# Patient Record
Sex: FEMALE | Race: WHITE | NOT HISPANIC OR LATINO | Employment: FULL TIME | URBAN - METROPOLITAN AREA
[De-identification: names, ages, dates, MRNs, and addresses within clinical notes are randomized per-mention and may not be internally consistent; named-entity substitution may affect disease eponyms.]

---

## 2019-01-07 ENCOUNTER — PROCEDURE VISIT (OUTPATIENT)
Dept: SURGERY | Facility: CLINIC | Age: 54
End: 2019-01-07
Payer: COMMERCIAL

## 2019-01-07 VITALS
WEIGHT: 175 LBS | RESPIRATION RATE: 18 BRPM | SYSTOLIC BLOOD PRESSURE: 118 MMHG | HEIGHT: 68 IN | HEART RATE: 76 BPM | BODY MASS INDEX: 26.52 KG/M2 | DIASTOLIC BLOOD PRESSURE: 72 MMHG

## 2019-01-07 DIAGNOSIS — L72.0 EPIDERMOID CYST: Primary | ICD-10-CM

## 2019-01-07 PROCEDURE — 88304 TISSUE EXAM BY PATHOLOGIST: CPT | Performed by: PATHOLOGY

## 2019-01-07 PROCEDURE — 99203 OFFICE O/P NEW LOW 30 MIN: CPT | Performed by: SURGERY

## 2019-01-07 PROCEDURE — 11402 EXC TR-EXT B9+MARG 1.1-2 CM: CPT | Performed by: SURGERY

## 2019-01-07 RX ORDER — FLUTICASONE FUROATE AND VILANTEROL TRIFENATATE 100; 25 UG/1; UG/1
1 POWDER RESPIRATORY (INHALATION) DAILY
Refills: 10 | COMMUNITY
Start: 2018-10-13 | End: 2021-03-30

## 2019-01-07 RX ORDER — LORATADINE 10 MG/1
CAPSULE, LIQUID FILLED ORAL DAILY
COMMUNITY

## 2019-01-07 RX ORDER — ERGOCALCIFEROL 1.25 MG/1
1 CAPSULE ORAL WEEKLY
COMMUNITY
Start: 2014-10-02 | End: 2020-07-06

## 2019-01-07 RX ORDER — ALBUTEROL SULFATE 90 UG/1
AEROSOL, METERED RESPIRATORY (INHALATION) EVERY 4 HOURS PRN
COMMUNITY
End: 2021-12-28 | Stop reason: SDUPTHER

## 2019-01-07 NOTE — LETTER
January 7, 2019     Kimberlyn Watts MD  [address] [facility] [address]    Patient: Mariia Johnson   YOB: 1965   Date of Visit: 1/7/2019       Dear Dr Dino Verde: Thank you for referring Mariia Johnson to me for evaluation  Below are my notes for this consultation  If you have questions, please do not hesitate to call me  I look forward to following your patient along with you  Sincerely,        Scottie Denver, MD        CC: No Recipients  Scottie Denver, MD  1/7/2019  2:54 PM  Sign at close encounter  Assessment/Plan:    Epidermoid cyst  The patient presents with a mid back epidermoid cyst that is bothersome to her for which she desires definitive treatment by excisional biopsy  On physical examination she has a 15 mm mid back epidermoid cyst amenable to excisional biopsy with primary closure here in the office  There are no diagnoses linked to this encounter  Subjective:      Patient ID: Mariia Johnson is a 48 y o  female  Nurse Note:  Patient is here today for cyst on back that she has had for about 2 years  When she sits on chair it is sore and was got irriated within the last 4 years  The following portions of the patient's history were reviewed and updated as appropriate: allergies, current medications, past family history, past medical history, past social history, past surgical history and problem list     Review of Systems   All other systems reviewed and are negative  Objective:      /72 (BP Location: Left arm, Patient Position: Sitting, Cuff Size: Standard)   Pulse 76   Resp 18   Ht 5' 8" (1 727 m)   Wt 79 4 kg (175 lb)   BMI 26 61 kg/m²           Physical Exam   Constitutional: She is oriented to person, place, and time  She appears well-developed and well-nourished  HENT:   Head: Normocephalic and atraumatic  Eyes: Pupils are equal, round, and reactive to light  Conjunctivae are normal    Neck: Normal range of motion  Neck supple  Cardiovascular: Normal rate and regular rhythm  Pulmonary/Chest: Effort normal and breath sounds normal    Abdominal: Soft  Bowel sounds are normal    Musculoskeletal: Normal range of motion  Neurological: She is alert and oriented to person, place, and time  Skin: Skin is warm and dry  15 mm mid back epidermoid cyst with central pore   Psychiatric: Her behavior is normal  Judgment and thought content normal        Skin excision  Date/Time: 1/7/2019 2:53 PM  Performed by: Isabella Grimes  Authorized by: Isabella Grimes     Procedure Details - Skin Excision:     Number of Lesions:  1  Lesion 1:     Body area:  Trunk    Trunk location:  Back       Final defect size (mm):  15    Malignancy: benign lesion      Repair type:  Linear closure    Graft type: full-thickness      Repair size (cm):  3     Under sterile conditions using aseptic technique using 1% lidocaine with epinephrine and bicarbonate the epidermoid cyst excised from the back  Hemostasis ensured with [id number] of 4 0 Vicryl and the wound closed primarily with vertical mattress sutures of 3 0 nylon  The patient tolerated the procedure well

## 2019-01-07 NOTE — ASSESSMENT & PLAN NOTE
The patient presents with a mid back epidermoid cyst that is bothersome to her for which she desires definitive treatment by excisional biopsy  On physical examination she has a 15 mm mid back epidermoid cyst amenable to excisional biopsy with primary closure here in the office

## 2019-01-07 NOTE — PROGRESS NOTES
Assessment/Plan:    Epidermoid cyst  The patient presents with a mid back epidermoid cyst that is bothersome to her for which she desires definitive treatment by excisional biopsy  On physical examination she has a 15 mm mid back epidermoid cyst amenable to excisional biopsy with primary closure here in the office  There are no diagnoses linked to this encounter  Subjective:      Patient ID: Jose Veliz is a 48 y o  female  Nurse Note:  Patient is here today for cyst on back that she has had for about 2 years  When she sits on chair it is sore and was got irriated within the last 4 years  The following portions of the patient's history were reviewed and updated as appropriate: allergies, current medications, past family history, past medical history, past social history, past surgical history and problem list     Review of Systems   All other systems reviewed and are negative  Objective:      /72 (BP Location: Left arm, Patient Position: Sitting, Cuff Size: Standard)   Pulse 76   Resp 18   Ht 5' 8" (1 727 m)   Wt 79 4 kg (175 lb)   BMI 26 61 kg/m²          Physical Exam   Constitutional: She is oriented to person, place, and time  She appears well-developed and well-nourished  HENT:   Head: Normocephalic and atraumatic  Eyes: Pupils are equal, round, and reactive to light  Conjunctivae are normal    Neck: Normal range of motion  Neck supple  Cardiovascular: Normal rate and regular rhythm  Pulmonary/Chest: Effort normal and breath sounds normal    Abdominal: Soft  Bowel sounds are normal    Musculoskeletal: Normal range of motion  Neurological: She is alert and oriented to person, place, and time  Skin: Skin is warm and dry     15 mm mid back epidermoid cyst with central pore   Psychiatric: Her behavior is normal  Judgment and thought content normal        Skin excision  Date/Time: 1/7/2019 2:53 PM  Performed by: Claudetta Bode by: Allison Scott ROSAURA     Procedure Details - Skin Excision:     Number of Lesions:  1  Lesion 1:     Body area:  Trunk    Trunk location:  Back       Final defect size (mm):  15    Malignancy: benign lesion      Repair type:  Linear closure    Graft type: full-thickness      Repair size (cm):  3     Under sterile conditions using aseptic technique using 1% lidocaine with epinephrine and bicarbonate the epidermoid cyst excised from the back  Hemostasis ensured with [id number] of 4 0 Vicryl and the wound closed primarily with vertical mattress sutures of 3 0 nylon  The patient tolerated the procedure well

## 2019-01-21 ENCOUNTER — OFFICE VISIT (OUTPATIENT)
Dept: SURGERY | Facility: CLINIC | Age: 54
End: 2019-01-21

## 2019-01-21 DIAGNOSIS — L72.0 EPIDERMOID CYST: Primary | ICD-10-CM

## 2019-01-21 PROBLEM — L82.1 SEBORRHEIC KERATOSIS: Status: ACTIVE | Noted: 2019-01-21

## 2019-01-21 PROCEDURE — 99024 POSTOP FOLLOW-UP VISIT: CPT | Performed by: SURGERY

## 2019-01-21 NOTE — ASSESSMENT & PLAN NOTE
Patient well s/p excision benign epidermoid cyst from her back  No post-op complications  Pathology reviewed with copy of the report provided  Questions answered, will see as needed

## 2019-01-21 NOTE — PROGRESS NOTES
Post-Op Note - General Surgery   Aminta Diaz 48 y o  female MRN: [id number]  Encounter: [id number]    Assessment/Plan    Epidermoid cyst  Patient well s/p excision benign epidermoid cyst from her back  No post-op complications  Pathology reviewed with copy of the report provided  Questions answered, will see as needed  Seborrheic keratosis  The patient has a lesion of her left mid back consistent with the diagnosis of a benign seborrheic keratosis  The benign nature of this condition was explained  She was advised to monitor for changes  If it becomes bothersome to her or changes we can arrange for excisional biopsy for diagnosis and treatment  Questions answered  Diagnoses and all orders for this visit:    Epidermoid cyst      Subjective    1- Nurse note:  Patient is here today for a follow up suture removal of back cyst   The sutures were removed and the excision area looks good and is healing nicely  Shira Beckett 49 yo F here for follow up s/p excision epidermoid cyst from her back  She reports no problems post-op  She has a skin lesion of her left mid-back she would like examined  Present for unknown period of time  Review of Systems   Constitutional: Negative for chills and fever  HENT: Negative for congestion  Eyes: Negative for visual disturbance  Respiratory: Negative for shortness of breath  Cardiovascular: Negative for chest pain  Gastrointestinal: Negative for abdominal pain, constipation, diarrhea, nausea and vomiting  Genitourinary: Negative for dysuria  Musculoskeletal: Negative for back pain  Skin: Negative for rash and wound  Neurological: Negative for dizziness and headaches  Psychiatric/Behavioral: Negative for confusion         The following portions of the patient's history were reviewed and updated as appropriate: allergies, current medications, past family history, past medical history, past social history, past surgical history and problem list     Objective      There were no vitals taken for this visit  Physical Exam   Constitutional: She is oriented to person, place, and time  She appears well-developed and well-nourished  No distress  HENT:   Head: Normocephalic and atraumatic  Eyes: Pupils are equal, round, and reactive to light  Conjunctivae and EOM are normal    Neck: Normal range of motion  Pulmonary/Chest: No respiratory distress  Musculoskeletal: Normal range of motion  Neurological: She is alert and oriented to person, place, and time  Skin: Skin is warm and dry  Capillary refill takes less than 2 seconds  She is not diaphoretic  Incision clean dry intact  Healing well  No signs of infection  Psychiatric: She has a normal mood and affect   Her behavior is normal        Signature:  Carlos A Hernandez PA-C  Date: 1/21/2019 Time: 12:38 PM

## 2019-01-21 NOTE — ASSESSMENT & PLAN NOTE
The patient has a lesion of her left mid back consistent with the diagnosis of a benign seborrheic keratosis  The benign nature of this condition was explained  She was advised to monitor for changes  If it becomes bothersome to her or changes we can arrange for excisional biopsy for diagnosis and treatment  Questions answered

## 2019-01-21 NOTE — LETTER
January 21, 2019     Tasha Valverde MD  [address] [facility] [address]    Patient: Sebastian Murphy   YOB: 1965   Date of Visit: 1/21/2019       Dear Dr Marry Mo: Thank you for referring Sebastian Murphy to me for evaluation  Below are my notes for this consultation  If you have questions, please do not hesitate to call me  I look forward to following your patient along with you  Sincerely,        Bravo Hernandez PA-C        CC: No Recipients  Bravo Hernandez PA-C  1/21/2019 12:37 PM  Sign at close encounter  Post-Op Note - General Surgery   Sebastian Murphy 48 y o  female MRN: [id number]  Encounter: [id number]    Assessment/Plan    Epidermoid cyst  Patient well s/p excision benign epidermoid cyst from her back  No post-op complications  Pathology reviewed with copy of the report provided  Questions answered, will see as needed  Diagnoses and all orders for this visit:    Epidermoid cyst      Subjective    1- Nurse note:  Patient is here today for a follow up suture removal of back cyst   The sutures were removed and the excision area looks good and is healing nicely  Karmen Beckett 47 yo F here for follow up s/p excision epidermoid cyst from her back  She reports no problems post-op  She has a skin lesion of her left mid-back she would like examined  Present for unknown period of time  Review of Systems   Constitutional: Negative for chills and fever  HENT: Negative for congestion  Eyes: Negative for visual disturbance  Respiratory: Negative for shortness of breath  Cardiovascular: Negative for chest pain  Gastrointestinal: Negative for abdominal pain, constipation, diarrhea, nausea and vomiting  Genitourinary: Negative for dysuria  Musculoskeletal: Negative for back pain  Skin: Negative for rash and wound  Neurological: Negative for dizziness and headaches  Psychiatric/Behavioral: Negative for confusion  The following portions of the patient's history were reviewed and updated as appropriate: allergies, current medications, past family history, past medical history, past social history, past surgical history and problem list     Objective      There were no vitals taken for this visit  Physical Exam   Constitutional: She is oriented to person, place, and time  She appears well-developed and well-nourished  No distress  HENT:   Head: Normocephalic and atraumatic  Eyes: Pupils are equal, round, and reactive to light  Conjunctivae and EOM are normal    Neck: Normal range of motion  Pulmonary/Chest: No respiratory distress  Musculoskeletal: Normal range of motion  Neurological: She is alert and oriented to person, place, and time  Skin: Skin is warm and dry  Capillary refill takes less than 2 seconds  She is not diaphoretic  Incision clean dry intact  Healing well  No signs of infection  Psychiatric: She has a normal mood and affect   Her behavior is normal        Signature:  Carlos A Hernandez PA-C  Date: 1/21/2019 Time: 12:34 PM

## 2019-10-28 ENCOUNTER — TRANSCRIBE ORDERS (OUTPATIENT)
Dept: URGENT CARE | Facility: HOSPITAL | Age: 54
End: 2019-10-28

## 2019-10-28 ENCOUNTER — APPOINTMENT (OUTPATIENT)
Dept: RADIOLOGY | Facility: HOSPITAL | Age: 54
End: 2019-10-28
Attending: INTERNAL MEDICINE
Payer: COMMERCIAL

## 2019-10-28 DIAGNOSIS — R20.0 NUMBNESS: ICD-10-CM

## 2019-10-28 DIAGNOSIS — R20.0 NUMBNESS: Primary | ICD-10-CM

## 2019-10-28 PROCEDURE — 72050 X-RAY EXAM NECK SPINE 4/5VWS: CPT

## 2019-11-18 ENCOUNTER — TRANSCRIBE ORDERS (OUTPATIENT)
Dept: ADMINISTRATIVE | Facility: HOSPITAL | Age: 54
End: 2019-11-18

## 2019-11-18 DIAGNOSIS — M54.2 NECK PAIN: Primary | ICD-10-CM

## 2019-11-20 ENCOUNTER — TRANSCRIBE ORDERS (OUTPATIENT)
Dept: URGENT CARE | Facility: HOSPITAL | Age: 54
End: 2019-11-20

## 2019-11-20 ENCOUNTER — APPOINTMENT (OUTPATIENT)
Dept: RADIOLOGY | Facility: HOSPITAL | Age: 54
End: 2019-11-20
Attending: ANESTHESIOLOGY
Payer: COMMERCIAL

## 2019-11-20 DIAGNOSIS — M25.511 RIGHT SHOULDER PAIN, UNSPECIFIED CHRONICITY: Primary | ICD-10-CM

## 2019-11-20 PROCEDURE — 73030 X-RAY EXAM OF SHOULDER: CPT

## 2019-11-23 ENCOUNTER — APPOINTMENT (OUTPATIENT)
Dept: LAB | Facility: HOSPITAL | Age: 54
End: 2019-11-23
Attending: INTERNAL MEDICINE
Payer: COMMERCIAL

## 2019-11-23 ENCOUNTER — TRANSCRIBE ORDERS (OUTPATIENT)
Dept: ADMINISTRATIVE | Facility: HOSPITAL | Age: 54
End: 2019-11-23

## 2019-11-23 DIAGNOSIS — Z79.1 ENCOUNTER FOR LONG-TERM (CURRENT) USE OF NSAIDS: Primary | ICD-10-CM

## 2019-11-23 DIAGNOSIS — Z79.1 ENCOUNTER FOR LONG-TERM (CURRENT) USE OF NSAIDS: ICD-10-CM

## 2019-11-23 LAB
ALBUMIN SERPL BCP-MCNC: 4.2 G/DL (ref 3.5–5)
ALP SERPL-CCNC: 71 U/L (ref 46–116)
ALT SERPL W P-5'-P-CCNC: 25 U/L (ref 12–78)
ANION GAP SERPL CALCULATED.3IONS-SCNC: 8 MMOL/L (ref 4–13)
AST SERPL W P-5'-P-CCNC: 12 U/L (ref 5–45)
BASOPHILS # BLD AUTO: 0.1 THOUSANDS/ΜL (ref 0–0.1)
BASOPHILS NFR BLD AUTO: 1 % (ref 0–1)
BILIRUB SERPL-MCNC: 0.24 MG/DL (ref 0.2–1)
BUN SERPL-MCNC: 16 MG/DL (ref 5–25)
CALCIUM SERPL-MCNC: 9.1 MG/DL (ref 8.3–10.1)
CHLORIDE SERPL-SCNC: 111 MMOL/L (ref 100–108)
CO2 SERPL-SCNC: 24 MMOL/L (ref 21–32)
CREAT SERPL-MCNC: 0.57 MG/DL (ref 0.6–1.3)
EOSINOPHIL # BLD AUTO: 0.37 THOUSAND/ΜL (ref 0–0.61)
EOSINOPHIL NFR BLD AUTO: 4 % (ref 0–6)
ERYTHROCYTE [DISTWIDTH] IN BLOOD BY AUTOMATED COUNT: 12.9 % (ref 11.6–15.1)
GFR SERPL CREATININE-BSD FRML MDRD: 105 ML/MIN/1.73SQ M
GLUCOSE P FAST SERPL-MCNC: 95 MG/DL (ref 65–99)
HCT VFR BLD AUTO: 40.9 % (ref 34.8–46.1)
HGB BLD-MCNC: 13.4 G/DL (ref 11.5–15.4)
IMM GRANULOCYTES # BLD AUTO: 0.02 THOUSAND/UL (ref 0–0.2)
IMM GRANULOCYTES NFR BLD AUTO: 0 % (ref 0–2)
LYMPHOCYTES # BLD AUTO: 3.23 THOUSANDS/ΜL (ref 0.6–4.47)
LYMPHOCYTES NFR BLD AUTO: 36 % (ref 14–44)
MCH RBC QN AUTO: 30.3 PG (ref 26.8–34.3)
MCHC RBC AUTO-ENTMCNC: 32.8 G/DL (ref 31.4–37.4)
MCV RBC AUTO: 93 FL (ref 82–98)
MONOCYTES # BLD AUTO: 0.93 THOUSAND/ΜL (ref 0.17–1.22)
MONOCYTES NFR BLD AUTO: 10 % (ref 4–12)
NEUTROPHILS # BLD AUTO: 4.38 THOUSANDS/ΜL (ref 1.85–7.62)
NEUTS SEG NFR BLD AUTO: 49 % (ref 43–75)
NRBC BLD AUTO-RTO: 0 /100 WBCS
PLATELET # BLD AUTO: 209 THOUSANDS/UL (ref 149–390)
PMV BLD AUTO: 11 FL (ref 8.9–12.7)
POTASSIUM SERPL-SCNC: 3.9 MMOL/L (ref 3.5–5.3)
PROT SERPL-MCNC: 7.3 G/DL (ref 6.4–8.2)
RBC # BLD AUTO: 4.42 MILLION/UL (ref 3.81–5.12)
SODIUM SERPL-SCNC: 143 MMOL/L (ref 136–145)
WBC # BLD AUTO: 9.03 THOUSAND/UL (ref 4.31–10.16)

## 2019-11-23 PROCEDURE — 85025 COMPLETE CBC W/AUTO DIFF WBC: CPT

## 2019-11-23 PROCEDURE — 80053 COMPREHEN METABOLIC PANEL: CPT

## 2019-11-29 ENCOUNTER — HOSPITAL ENCOUNTER (OUTPATIENT)
Dept: MRI IMAGING | Facility: HOSPITAL | Age: 54
Discharge: HOME | End: 2019-11-29
Attending: ANESTHESIOLOGY
Payer: COMMERCIAL

## 2019-11-29 DIAGNOSIS — M54.2 NECK PAIN: ICD-10-CM

## 2019-11-29 PROCEDURE — 72141 MRI NECK SPINE W/O DYE: CPT

## 2020-06-02 ENCOUNTER — TRANSCRIBE ORDERS (OUTPATIENT)
Dept: ADMINISTRATIVE | Facility: HOSPITAL | Age: 55
End: 2020-06-02

## 2020-06-02 DIAGNOSIS — Z12.31 ENCOUNTER FOR SCREENING MAMMOGRAM FOR MALIGNANT NEOPLASM OF BREAST: Primary | ICD-10-CM

## 2020-06-05 ENCOUNTER — HOSPITAL ENCOUNTER (OUTPATIENT)
Dept: MAMMOGRAPHY | Facility: HOSPITAL | Age: 55
Discharge: HOME | End: 2020-06-05
Attending: SPECIALIST

## 2020-06-05 VITALS — HEIGHT: 68 IN | BODY MASS INDEX: 27.28 KG/M2 | WEIGHT: 180 LBS

## 2020-06-05 DIAGNOSIS — Z12.31 ENCOUNTER FOR SCREENING MAMMOGRAM FOR MALIGNANT NEOPLASM OF BREAST: ICD-10-CM

## 2020-06-05 PROCEDURE — 77063 BREAST TOMOSYNTHESIS BI: CPT

## 2020-06-05 PROCEDURE — 77067 SCR MAMMO BI INCL CAD: CPT

## 2020-06-15 ENCOUNTER — HOSPITAL ENCOUNTER (OUTPATIENT)
Dept: MAMMOGRAPHY | Facility: CLINIC | Age: 55
Discharge: HOME | End: 2020-06-15
Payer: COMMERCIAL

## 2020-06-15 ENCOUNTER — HOSPITAL ENCOUNTER (OUTPATIENT)
Dept: ULTRASOUND IMAGING | Facility: CLINIC | Age: 55
Discharge: HOME | End: 2020-06-15
Payer: COMMERCIAL

## 2020-06-15 VITALS — HEIGHT: 68 IN | WEIGHT: 180 LBS | BODY MASS INDEX: 27.28 KG/M2

## 2020-06-15 DIAGNOSIS — R92.8 ABNORMAL MAMMOGRAM: ICD-10-CM

## 2020-06-15 PROCEDURE — G0279 TOMOSYNTHESIS, MAMMO: HCPCS

## 2020-06-15 PROCEDURE — 77065 DX MAMMO INCL CAD UNI: CPT

## 2020-06-15 PROCEDURE — 76642 ULTRASOUND BREAST LIMITED: CPT

## 2020-06-15 RX ORDER — MELOXICAM 7.5 MG/1
7.5 TABLET ORAL AS NEEDED
COMMUNITY
End: 2021-02-25

## 2020-06-19 ENCOUNTER — HOSPITAL ENCOUNTER (OUTPATIENT)
Dept: ULTRASOUND IMAGING | Facility: HOSPITAL | Age: 55
Discharge: HOME | End: 2020-06-19
Attending: SPECIALIST | Admitting: SPECIALIST
Payer: COMMERCIAL

## 2020-06-19 ENCOUNTER — HOSPITAL ENCOUNTER (OUTPATIENT)
Dept: MAMMOGRAPHY | Facility: HOSPITAL | Age: 55
Discharge: HOME | End: 2020-06-19
Attending: SPECIALIST

## 2020-06-19 VITALS — HEART RATE: 72 BPM | SYSTOLIC BLOOD PRESSURE: 123 MMHG | OXYGEN SATURATION: 93 % | DIASTOLIC BLOOD PRESSURE: 58 MMHG

## 2020-06-19 DIAGNOSIS — R92.8 ABNORMAL ULTRASOUND OF BREAST: ICD-10-CM

## 2020-06-19 DIAGNOSIS — R92.8 ABNORMAL MAMMOGRAM: ICD-10-CM

## 2020-06-19 PROCEDURE — 88341 IMHCHEM/IMCYTCHM EA ADD ANTB: CPT | Performed by: PATHOLOGY

## 2020-06-19 PROCEDURE — 88361 TUMOR IMMUNOHISTOCHEM/COMPUT: CPT | Performed by: PATHOLOGY

## 2020-06-19 PROCEDURE — 19084 BX BREAST ADD LESION US IMAG: CPT

## 2020-06-19 PROCEDURE — 88305 TISSUE EXAM BY PATHOLOGIST: CPT | Performed by: PATHOLOGY

## 2020-06-19 PROCEDURE — 88342 IMHCHEM/IMCYTCHM 1ST ANTB: CPT | Performed by: PATHOLOGY

## 2020-06-19 PROCEDURE — 19083 BX BREAST 1ST LESION US IMAG: CPT

## 2020-06-19 RX ORDER — LIDOCAINE HYDROCHLORIDE 10 MG/ML
5 INJECTION, SOLUTION EPIDURAL; INFILTRATION; INTRACAUDAL; PERINEURAL ONCE
Status: COMPLETED | OUTPATIENT
Start: 2020-06-19 | End: 2020-06-19

## 2020-06-19 RX ADMIN — LIDOCAINE HYDROCHLORIDE 5 ML: 10 INJECTION, SOLUTION EPIDURAL; INFILTRATION; INTRACAUDAL; PERINEURAL at 08:50

## 2020-06-19 RX ADMIN — LIDOCAINE HYDROCHLORIDE 5 ML: 10 INJECTION, SOLUTION EPIDURAL; INFILTRATION; INTRACAUDAL; PERINEURAL at 08:55

## 2020-06-23 ENCOUNTER — TELEPHONE (OUTPATIENT)
Dept: MAMMOGRAPHY | Facility: CLINIC | Age: 55
End: 2020-06-23

## 2020-06-23 ENCOUNTER — TELEPHONE (OUTPATIENT)
Dept: SURGICAL ONCOLOGY | Facility: CLINIC | Age: 55
End: 2020-06-23

## 2020-06-23 NOTE — TELEPHONE ENCOUNTER
New Patient Encounter    New Patient Intake Form   Patient Details:  Jose Veliz  1965  [id number]    Background Information:  [address] starts by opening a telephone encounter and gathering the following information   Who is calling to schedule? If not self, relationship to patient? rbc   Referring Provider rbc   What is the diagnosis? Right breast invasive ductal   Is this diagnosis confirmed? Yes   When was the diagnosis? 6/2020   Is there a confirmed diagnosis from a biopsy/tissue reviewed by pathology? yes   Is patient aware of diagnosis? Yes   Is there a personal history of cancer and what kind? no   Is there a family history of cancer and what kind? Reason for visit? New Diagnosis   Have you had any imaging or labs done? If so: when, where? Yes     Are records in EPIC? yes   Was the patient told to bring a disk? no   Does the patient smoke or Vape? no   If yes, how many packs or cartridges per day? Scheduling Information:   Preferred [place]:  [place]     Are there any dates/time the patient cannot be seen? Miscellaneous:    After completing the above information, please route to Financial Counselor and the appropriate Nurse Navigator for review

## 2020-06-30 ENCOUNTER — TELEPHONE (OUTPATIENT)
Dept: SURGICAL ONCOLOGY | Facility: CLINIC | Age: 55
End: 2020-06-30

## 2020-07-01 ENCOUNTER — CONSULT (OUTPATIENT)
Dept: SURGICAL ONCOLOGY | Facility: CLINIC | Age: 55
End: 2020-07-01
Payer: COMMERCIAL

## 2020-07-01 VITALS
SYSTOLIC BLOOD PRESSURE: 120 MMHG | TEMPERATURE: 98.2 F | BODY MASS INDEX: 27.22 KG/M2 | HEART RATE: 83 BPM | DIASTOLIC BLOOD PRESSURE: 80 MMHG | HEIGHT: 68 IN | RESPIRATION RATE: 18 BRPM | WEIGHT: 179.6 LBS

## 2020-07-01 DIAGNOSIS — C50.211 MALIGNANT NEOPLASM OF UPPER-INNER QUADRANT OF RIGHT BREAST IN FEMALE, ESTROGEN RECEPTOR POSITIVE (HCC): Primary | ICD-10-CM

## 2020-07-01 DIAGNOSIS — Z17.0 MALIGNANT NEOPLASM OF UPPER-INNER QUADRANT OF RIGHT BREAST IN FEMALE, ESTROGEN RECEPTOR POSITIVE (HCC): Primary | ICD-10-CM

## 2020-07-01 PROBLEM — C50.919 BREAST CANCER (HCC): Status: ACTIVE | Noted: 2020-06-19

## 2020-07-01 PROCEDURE — 99245 OFF/OP CONSLTJ NEW/EST HI 55: CPT | Performed by: SURGERY

## 2020-07-01 RX ORDER — RIZATRIPTAN BENZOATE 10 MG/1
10 TABLET, ORALLY DISINTEGRATING ORAL ONCE AS NEEDED
COMMUNITY
Start: 2020-04-30 | End: 2021-04-28 | Stop reason: SDUPTHER

## 2020-07-01 RX ORDER — ASCORBIC ACID 1000 MG
TABLET ORAL DAILY
COMMUNITY
End: 2021-10-11

## 2020-07-01 RX ORDER — CALCIUM/MAGNESIUM/ZINC 333-133 MG
TABLET ORAL DAILY
COMMUNITY

## 2020-07-01 RX ORDER — TRAMADOL HYDROCHLORIDE 50 MG/1
50 TABLET ORAL EVERY 8 HOURS PRN
Qty: 9 TABLET | Refills: 0 | Status: SHIPPED | OUTPATIENT
Start: 2020-07-01 | End: 2020-07-10 | Stop reason: HOSPADM

## 2020-07-01 RX ORDER — CEFAZOLIN SODIUM 2 G/50ML
2000 SOLUTION INTRAVENOUS ONCE
Status: CANCELLED | OUTPATIENT
Start: 2020-07-10 | End: 2020-07-01
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
## 2020-07-06 ENCOUNTER — HOSPITAL ENCOUNTER (OUTPATIENT)
Dept: RADIOLOGY | Facility: HOSPITAL | Age: 55
Discharge: HOME | End: 2020-07-06
Payer: COMMERCIAL

## 2020-07-06 ENCOUNTER — OFFICE VISIT (OUTPATIENT)
Dept: LAB | Facility: HOSPITAL | Age: 55
End: 2020-07-06
Payer: COMMERCIAL

## 2020-07-06 ENCOUNTER — APPOINTMENT (OUTPATIENT)
Dept: LAB | Facility: HOSPITAL | Age: 55
End: 2020-07-06
Payer: COMMERCIAL

## 2020-07-06 DIAGNOSIS — C50.211 MALIGNANT NEOPLASM OF UPPER-INNER QUADRANT OF RIGHT BREAST IN FEMALE, ESTROGEN RECEPTOR POSITIVE (HCC): ICD-10-CM

## 2020-07-06 DIAGNOSIS — Z17.0 MALIGNANT NEOPLASM OF UPPER-INNER QUADRANT OF RIGHT BREAST IN FEMALE, ESTROGEN RECEPTOR POSITIVE (HCC): ICD-10-CM

## 2020-07-06 LAB
ALBUMIN SERPL BCP-MCNC: 4.4 G/DL (ref 3.5–5.7)
ALP SERPL-CCNC: 58 U/L (ref 40–150)
ALT SERPL W P-5'-P-CCNC: 25 U/L (ref 7–52)
ANION GAP SERPL CALCULATED.3IONS-SCNC: 8 MMOL/L (ref 4–13)
APTT PPP: 31 SECONDS (ref 23–37)
AST SERPL W P-5'-P-CCNC: 18 U/L (ref 13–39)
ATRIAL RATE: 68 BPM
BACTERIA UR QL AUTO: ABNORMAL /HPF
BASOPHILS # BLD AUTO: 0.1 THOUSANDS/ΜL (ref 0–0.1)
BASOPHILS NFR BLD AUTO: 1 % (ref 0–2)
BILIRUB SERPL-MCNC: 0.7 MG/DL (ref 0.2–1)
BILIRUB UR QL STRIP: NEGATIVE
BUN SERPL-MCNC: 17 MG/DL (ref 7–25)
CALCIUM SERPL-MCNC: 9.6 MG/DL (ref 8.6–10.5)
CHLORIDE SERPL-SCNC: 104 MMOL/L (ref 98–107)
CLARITY UR: CLEAR
CO2 SERPL-SCNC: 25 MMOL/L (ref 21–31)
COLOR UR: YELLOW
CREAT SERPL-MCNC: 0.57 MG/DL (ref 0.6–1.2)
EOSINOPHIL # BLD AUTO: 0.4 THOUSAND/ΜL (ref 0–0.61)
EOSINOPHIL NFR BLD AUTO: 4 % (ref 0–5)
ERYTHROCYTE [DISTWIDTH] IN BLOOD BY AUTOMATED COUNT: 13 % (ref 11.5–14.5)
GFR SERPL CREATININE-BSD FRML MDRD: 105 ML/MIN/1.73SQ M
GLUCOSE P FAST SERPL-MCNC: 90 MG/DL (ref 65–99)
GLUCOSE UR STRIP-MCNC: NEGATIVE MG/DL
HCT VFR BLD AUTO: 42 % (ref 42–47)
HGB BLD-MCNC: 14.5 G/DL (ref 12–16)
HGB UR QL STRIP.AUTO: ABNORMAL
INR PPP: 1.05 (ref 0.84–1.19)
KETONES UR STRIP-MCNC: NEGATIVE MG/DL
LEUKOCYTE ESTERASE UR QL STRIP: NEGATIVE
LYMPHOCYTES # BLD AUTO: 2.7 THOUSANDS/ΜL (ref 0.6–4.47)
LYMPHOCYTES NFR BLD AUTO: 30 % (ref 21–51)
MCH RBC QN AUTO: 31 PG (ref 26–34)
MCHC RBC AUTO-ENTMCNC: 34.4 G/DL (ref 31–37)
MCV RBC AUTO: 90 FL (ref 81–99)
MONOCYTES # BLD AUTO: 0.8 THOUSAND/ΜL (ref 0.17–1.22)
MONOCYTES NFR BLD AUTO: 9 % (ref 2–12)
NEUTROPHILS # BLD AUTO: 5.2 THOUSANDS/ΜL (ref 1.4–6.5)
NEUTS SEG NFR BLD AUTO: 57 % (ref 42–75)
NITRITE UR QL STRIP: NEGATIVE
NON-SQ EPI CELLS URNS QL MICRO: ABNORMAL /HPF
P AXIS: 49 DEGREES
PH UR STRIP.AUTO: 5.5 [PH]
PLATELET # BLD AUTO: 205 THOUSANDS/UL (ref 149–390)
PMV BLD AUTO: 8.6 FL (ref 8.6–11.7)
POTASSIUM SERPL-SCNC: 3.9 MMOL/L (ref 3.5–5.5)
PR INTERVAL: 192 MS
PROT SERPL-MCNC: 7.2 G/DL (ref 6.4–8.9)
PROT UR STRIP-MCNC: NEGATIVE MG/DL
PROTHROMBIN TIME: 13.2 SECONDS (ref 11.6–14.5)
QRS AXIS: 63 DEGREES
QRSD INTERVAL: 82 MS
QT INTERVAL: 424 MS
QTC INTERVAL: 450 MS
RBC # BLD AUTO: 4.67 MILLION/UL (ref 3.9–5.2)
RBC #/AREA URNS AUTO: ABNORMAL /HPF
SODIUM SERPL-SCNC: 137 MMOL/L (ref 134–143)
SP GR UR STRIP.AUTO: >=1.03 (ref 1–1.03)
T WAVE AXIS: 41 DEGREES
UROBILINOGEN UR QL STRIP.AUTO: 0.2 E.U./DL
VENTRICULAR RATE: 68 BPM
WBC # BLD AUTO: 9.2 THOUSAND/UL (ref 4.8–10.8)
WBC #/AREA URNS AUTO: ABNORMAL /HPF

## 2020-07-06 PROCEDURE — 93005 ELECTROCARDIOGRAM TRACING: CPT

## 2020-07-06 PROCEDURE — 81003 URINALYSIS AUTO W/O SCOPE: CPT | Performed by: SURGERY

## 2020-07-06 PROCEDURE — 85610 PROTHROMBIN TIME: CPT

## 2020-07-06 PROCEDURE — 85730 THROMBOPLASTIN TIME PARTIAL: CPT

## 2020-07-06 PROCEDURE — 85025 COMPLETE CBC W/AUTO DIFF WBC: CPT

## 2020-07-06 PROCEDURE — 93010 ELECTROCARDIOGRAM REPORT: CPT | Performed by: INTERNAL MEDICINE

## 2020-07-06 PROCEDURE — U0003 INFECTIOUS AGENT DETECTION BY NUCLEIC ACID (DNA OR RNA); SEVERE ACUTE RESPIRATORY SYNDROME CORONAVIRUS 2 (SARS-COV-2) (CORONAVIRUS DISEASE [COVID-19]), AMPLIFIED PROBE TECHNIQUE, MAKING USE OF HIGH THROUGHPUT TECHNOLOGIES AS DESCRIBED BY CMS-2020-01-R: HCPCS

## 2020-07-06 PROCEDURE — 71046 X-RAY EXAM CHEST 2 VIEWS: CPT

## 2020-07-06 PROCEDURE — 36415 COLL VENOUS BLD VENIPUNCTURE: CPT

## 2020-07-06 PROCEDURE — 80053 COMPREHEN METABOLIC PANEL: CPT

## 2020-07-06 PROCEDURE — 81001 URINALYSIS AUTO W/SCOPE: CPT | Performed by: SURGERY

## 2020-07-06 NOTE — PRE-PROCEDURE INSTRUCTIONS
Pre-Surgery Instructions:   Medication Instructions    albuterol (VENTOLIN HFA) 90 mcg/act inhaler Patient was instructed by Physician and understands   B Complex Vitamins (B COMPLEX 1 PO) Patient was instructed by Physician and understands   BREO ELLIPTA 100-25 MCG/INH inhaler Patient was instructed by Physician and understands   Echinacea 400 MG CAPS Patient was instructed by Physician and understands   Ginkgo Biloba 40 MG TABS Patient was instructed by Physician and understands   Loratadine 10 MG CAPS Patient was instructed by Physician and understands   meloxicam (MOBIC) 7 5 mg tablet Patient was instructed by Physician and understands   Multiple Vitamins-Minerals (MULTIVITAMIN ADULT PO) Patient was instructed by Physician and understands   rizatriptan (MAXALT-MLT) 10 MG disintegrating tablet Patient was instructed by Physician and understands  Pt instructed to use breo the morning of surgery and albuterol if needed  [facility] preop instructions reviewed with pt  Pt has surgical soap

## 2020-07-09 ENCOUNTER — ANESTHESIA EVENT (OUTPATIENT)
Dept: PERIOP | Facility: HOSPITAL | Age: 55
End: 2020-07-09
Payer: COMMERCIAL

## 2020-07-09 LAB
INPATIENT: NORMAL
SARS-COV-2 RNA SPEC QL NAA+PROBE: NOT DETECTED

## 2020-07-10 ENCOUNTER — ANESTHESIA (OUTPATIENT)
Dept: PERIOP | Facility: HOSPITAL | Age: 55
End: 2020-07-10
Payer: COMMERCIAL

## 2020-07-10 ENCOUNTER — HOSPITAL ENCOUNTER (OUTPATIENT)
Dept: MAMMOGRAPHY | Facility: HOSPITAL | Age: 55
Setting detail: OUTPATIENT SURGERY
Discharge: HOME | End: 2020-07-10
Payer: COMMERCIAL

## 2020-07-10 ENCOUNTER — HOSPITAL ENCOUNTER (OUTPATIENT)
Dept: NUCLEAR MEDICINE | Facility: HOSPITAL | Age: 55
Discharge: HOME | End: 2020-07-10
Attending: SURGERY
Payer: COMMERCIAL

## 2020-07-10 ENCOUNTER — HOSPITAL ENCOUNTER (OUTPATIENT)
Dept: MAMMOGRAPHY | Facility: HOSPITAL | Age: 55
Discharge: HOME | End: 2020-07-10
Attending: SURGERY
Payer: COMMERCIAL

## 2020-07-10 ENCOUNTER — HOSPITAL ENCOUNTER (OUTPATIENT)
Facility: HOSPITAL | Age: 55
Setting detail: OUTPATIENT SURGERY
Discharge: HOME | End: 2020-07-10
Attending: SURGERY | Admitting: SURGERY
Payer: COMMERCIAL

## 2020-07-10 VITALS
SYSTOLIC BLOOD PRESSURE: 127 MMHG | WEIGHT: 180 LBS | DIASTOLIC BLOOD PRESSURE: 58 MMHG | TEMPERATURE: 98 F | OXYGEN SATURATION: 100 % | HEART RATE: 85 BPM | BODY MASS INDEX: 27.28 KG/M2 | HEIGHT: 68 IN | RESPIRATION RATE: 18 BRPM

## 2020-07-10 DIAGNOSIS — Z17.0 MALIGNANT NEOPLASM OF UPPER-INNER QUADRANT OF RIGHT BREAST IN FEMALE, ESTROGEN RECEPTOR POSITIVE (HCC): ICD-10-CM

## 2020-07-10 DIAGNOSIS — C50.211 MALIGNANT NEOPLASM OF UPPER-INNER QUADRANT OF RIGHT BREAST IN FEMALE, ESTROGEN RECEPTOR POSITIVE (HCC): ICD-10-CM

## 2020-07-10 PROCEDURE — 88342 IMHCHEM/IMCYTCHM 1ST ANTB: CPT | Performed by: PATHOLOGY

## 2020-07-10 PROCEDURE — 88341 IMHCHEM/IMCYTCHM EA ADD ANTB: CPT | Performed by: PATHOLOGY

## 2020-07-10 PROCEDURE — 38525 BIOPSY/REMOVAL LYMPH NODES: CPT | Performed by: SURGERY

## 2020-07-10 PROCEDURE — A9541 TC99M SULFUR COLLOID: HCPCS

## 2020-07-10 PROCEDURE — 88307 TISSUE EXAM BY PATHOLOGIST: CPT | Performed by: PATHOLOGY

## 2020-07-10 PROCEDURE — 19281 PERQ DEVICE BREAST 1ST IMAG: CPT

## 2020-07-10 PROCEDURE — 19301 PARTIAL MASTECTOMY: CPT | Performed by: SURGERY

## 2020-07-10 PROCEDURE — 38900 IO MAP OF SENT LYMPH NODE: CPT | Performed by: SURGERY

## 2020-07-10 RX ORDER — CEFAZOLIN SODIUM 2 G/50ML
2000 SOLUTION INTRAVENOUS ONCE
Status: DISCONTINUED | OUTPATIENT
Start: 2020-07-10 | End: 2020-07-10 | Stop reason: HOSPADM

## 2020-07-10 RX ORDER — FENTANYL CITRATE 50 UG/ML
INJECTION, SOLUTION INTRAMUSCULAR; INTRAVENOUS AS NEEDED
Status: DISCONTINUED | OUTPATIENT
Start: 2020-07-10 | End: 2020-07-10 | Stop reason: SURG

## 2020-07-10 RX ORDER — MIDAZOLAM HYDROCHLORIDE 2 MG/2ML
INJECTION, SOLUTION INTRAMUSCULAR; INTRAVENOUS AS NEEDED
Status: DISCONTINUED | OUTPATIENT
Start: 2020-07-10 | End: 2020-07-10 | Stop reason: SURG

## 2020-07-10 RX ORDER — KETOROLAC TROMETHAMINE 30 MG/ML
INJECTION, SOLUTION INTRAMUSCULAR; INTRAVENOUS AS NEEDED
Status: DISCONTINUED | OUTPATIENT
Start: 2020-07-10 | End: 2020-07-10 | Stop reason: SURG

## 2020-07-10 RX ORDER — HYDROMORPHONE HCL/PF 1 MG/ML
SYRINGE (ML) INJECTION AS NEEDED
Status: DISCONTINUED | OUTPATIENT
Start: 2020-07-10 | End: 2020-07-10 | Stop reason: SURG

## 2020-07-10 RX ORDER — MAGNESIUM HYDROXIDE 1200 MG/15ML
LIQUID ORAL AS NEEDED
Status: DISCONTINUED | OUTPATIENT
Start: 2020-07-10 | End: 2020-07-10 | Stop reason: HOSPADM

## 2020-07-10 RX ORDER — SODIUM CHLORIDE 9 MG/ML
125 INJECTION, SOLUTION INTRAVENOUS CONTINUOUS
Status: DISCONTINUED | OUTPATIENT
Start: 2020-07-10 | End: 2020-07-10 | Stop reason: HOSPADM

## 2020-07-10 RX ORDER — PROPOFOL 10 MG/ML
INJECTION, EMULSION INTRAVENOUS AS NEEDED
Status: DISCONTINUED | OUTPATIENT
Start: 2020-07-10 | End: 2020-07-10 | Stop reason: SURG

## 2020-07-10 RX ORDER — LORAZEPAM 2 MG/ML
1 INJECTION INTRAMUSCULAR ONCE
Status: DISCONTINUED | OUTPATIENT
Start: 2020-07-10 | End: 2020-07-10 | Stop reason: HOSPADM

## 2020-07-10 RX ORDER — ONDANSETRON 2 MG/ML
4 INJECTION INTRAMUSCULAR; INTRAVENOUS ONCE AS NEEDED
Status: DISCONTINUED | OUTPATIENT
Start: 2020-07-10 | End: 2020-07-10 | Stop reason: HOSPADM

## 2020-07-10 RX ORDER — DEXAMETHASONE SODIUM PHOSPHATE 4 MG/ML
INJECTION, SOLUTION INTRA-ARTICULAR; INTRALESIONAL; INTRAMUSCULAR; INTRAVENOUS; SOFT TISSUE AS NEEDED
Status: DISCONTINUED | OUTPATIENT
Start: 2020-07-10 | End: 2020-07-10 | Stop reason: SURG

## 2020-07-10 RX ORDER — IBUPROFEN 600 MG/1
600 TABLET ORAL EVERY 8 HOURS PRN
Status: DISCONTINUED | OUTPATIENT
Start: 2020-07-10 | End: 2020-07-10 | Stop reason: HOSPADM

## 2020-07-10 RX ORDER — ONDANSETRON 2 MG/ML
INJECTION INTRAMUSCULAR; INTRAVENOUS AS NEEDED
Status: DISCONTINUED | OUTPATIENT
Start: 2020-07-10 | End: 2020-07-10 | Stop reason: SURG

## 2020-07-10 RX ORDER — LIDOCAINE WITH 8.4% SOD BICARB 0.9%(10ML)
5 SYRINGE (ML) INJECTION ONCE
Status: COMPLETED | OUTPATIENT
Start: 2020-07-10 | End: 2020-07-10

## 2020-07-10 RX ORDER — ACETAMINOPHEN 325 MG/1
650 TABLET ORAL EVERY 6 HOURS PRN
Status: DISCONTINUED | OUTPATIENT
Start: 2020-07-10 | End: 2020-07-10 | Stop reason: HOSPADM

## 2020-07-10 RX ORDER — TRAMADOL HYDROCHLORIDE 50 MG/1
50 TABLET ORAL EVERY 6 HOURS PRN
Status: DISCONTINUED | OUTPATIENT
Start: 2020-07-10 | End: 2020-07-10 | Stop reason: HOSPADM

## 2020-07-10 RX ORDER — CEFAZOLIN SODIUM 2 G/50ML
SOLUTION INTRAVENOUS AS NEEDED
Status: DISCONTINUED | OUTPATIENT
Start: 2020-07-10 | End: 2020-07-10 | Stop reason: SURG

## 2020-07-10 RX ORDER — BUPIVACAINE HYDROCHLORIDE 5 MG/ML
INJECTION, SOLUTION PERINEURAL AS NEEDED
Status: DISCONTINUED | OUTPATIENT
Start: 2020-07-10 | End: 2020-07-10 | Stop reason: HOSPADM

## 2020-07-10 RX ORDER — FENTANYL CITRATE/PF 50 MCG/ML
25 SYRINGE (ML) INJECTION
Status: DISCONTINUED | OUTPATIENT
Start: 2020-07-10 | End: 2020-07-10 | Stop reason: HOSPADM

## 2020-07-10 RX ORDER — TRAMADOL HYDROCHLORIDE 50 MG/1
50 TABLET ORAL EVERY 8 HOURS PRN
Qty: 15 TABLET | Refills: 0 | Status: SHIPPED | OUTPATIENT
Start: 2020-07-10 | End: 2020-07-20

## 2020-07-10 RX ORDER — ISOSULFAN BLUE 50 MG/5ML
INJECTION, SOLUTION SUBCUTANEOUS AS NEEDED
Status: DISCONTINUED | OUTPATIENT
Start: 2020-07-10 | End: 2020-07-10 | Stop reason: HOSPADM

## 2020-07-10 RX ORDER — LIDOCAINE HYDROCHLORIDE 20 MG/ML
INJECTION, SOLUTION EPIDURAL; INFILTRATION; INTRACAUDAL; PERINEURAL AS NEEDED
Status: DISCONTINUED | OUTPATIENT
Start: 2020-07-10 | End: 2020-07-10 | Stop reason: SURG

## 2020-07-10 RX ADMIN — LIDOCAINE HYDROCHLORIDE 100 MG: 20 INJECTION, SOLUTION EPIDURAL; INFILTRATION; INTRACAUDAL; PERINEURAL at 10:45

## 2020-07-10 RX ADMIN — HYDROMORPHONE HYDROCHLORIDE 0.5 MG: 1 INJECTION, SOLUTION INTRAMUSCULAR; INTRAVENOUS; SUBCUTANEOUS at 11:48

## 2020-07-10 RX ADMIN — KETOROLAC TROMETHAMINE 30 MG: 30 INJECTION, SOLUTION INTRAMUSCULAR at 12:34

## 2020-07-10 RX ADMIN — SODIUM CHLORIDE: 0.9 INJECTION, SOLUTION INTRAVENOUS at 11:45

## 2020-07-10 RX ADMIN — FENTANYL CITRATE 25 MCG: 50 INJECTION, SOLUTION INTRAMUSCULAR; INTRAVENOUS at 13:45

## 2020-07-10 RX ADMIN — SODIUM CHLORIDE 125 ML/HR: 0.9 INJECTION, SOLUTION INTRAVENOUS at 07:45

## 2020-07-10 RX ADMIN — FENTANYL CITRATE 100 MCG: 50 INJECTION, SOLUTION INTRAMUSCULAR; INTRAVENOUS at 10:45

## 2020-07-10 RX ADMIN — FENTANYL CITRATE 25 MCG: 50 INJECTION, SOLUTION INTRAMUSCULAR; INTRAVENOUS at 13:53

## 2020-07-10 RX ADMIN — CEFAZOLIN SODIUM 2000 MG: 2 SOLUTION INTRAVENOUS at 10:30

## 2020-07-10 RX ADMIN — DEXAMETHASONE SODIUM PHOSPHATE 4 MG: 4 INJECTION, SOLUTION INTRAMUSCULAR; INTRAVENOUS at 11:03

## 2020-07-10 RX ADMIN — PROPOFOL 150 MG: 10 INJECTION, EMULSION INTRAVENOUS at 10:45

## 2020-07-10 RX ADMIN — MIDAZOLAM 2 MG: 1 INJECTION INTRAMUSCULAR; INTRAVENOUS at 10:37

## 2020-07-10 RX ADMIN — Medication 5 ML: at 08:15

## 2020-07-10 RX ADMIN — ONDANSETRON 4 MG: 2 INJECTION INTRAMUSCULAR; INTRAVENOUS at 12:34

## 2020-07-10 NOTE — INTERVAL H&P NOTE
H&P reviewed  After examining the patient I find no changes in the patients condition since the H&P had been written      Vitals:    07/10/20 0729   BP: 122/65   Pulse: 67   Resp: 16   Temp: (!) 97 3 °F (36 3 °C)   SpO2: 96%

## 2020-07-10 NOTE — DISCHARGE INSTRUCTIONS
POST-OPERATIVE CARE INSTRUCTIONS       Care after your procedure:   General  · Rest and relax for 24 hours, then gradually return to normal activities  · Do not preform any heavy lifting or strenuous physical activities for 14 days  · Your activity restrictions will be re-evaluated at your post op visit  · Drink clear liquids until you are certain there is no nausea, then resume a normal diet  · Do not drink alcohol, drive any vehicle, operate mechanical equipment or make critical decisions for at least 24 hours and until you are off any narcotic pain medications  The Incision  · Your incision is closed with:   dissolvable stiches just underneath the skin  · The incision is also covered with:                          clear waterproof glue  · A gauze-pad is covering the wound  Wound care  · Remove your gauze-pad after 24 hours  · You may then shower using soap and water to clean your incision  Gently dry the wound  · You may redress your wound with additional gauze and tape if you choose  · A little bruising at the wound site is normal     Medication  · Resume all previous medications  · Take either Naproxen (Aleve) one tablet every 8 hours or Ibuprofen(Advil/Motrin) one(1) to two(2) tablets every 6 hours as needed  · Pain Medication Instructions:tramadol as prescribed; may use over the counter tylenol instead          Other (If applicable)  · Wear a post-surgical bra around the clock  · May use ice to the incision site(s) for the next 24-48 hours, twice daily     Call your  doctor if you have any of the following:  · Redness, swelling, heat, drainage, and/or bleeding from your wound  · Chills or fever ( above 101' F )  · Pain, not relieved with the above medications  · If you have any questions or problems call our office [phone]    Follow-up appointment:  · As scheduled

## 2020-07-10 NOTE — ANESTHESIA PREPROCEDURE EVALUATION
Review of Systems/Medical History  Patient summary reviewed  Chart reviewed  No history of anesthetic complications     Cardiovascular   Pulmonary  COPD moderate- medication dependent , Shortness of breath,        GI/Hepatic  Negative GI/hepatic ROS     Comment: IBS     Negative  ROS        Endo/Other  Negative endo/other ROS      GYN    Breast cancer (Right)        Hematology   Musculoskeletal  Back pain (HCD) , cervical pain,   Arthritis     Neurology    Headaches,    Psychology   Negative psychology ROS              Physical Exam    Airway    Mallampati score: II  TM Distance: >3 FB  Neck ROM: full     Dental   No notable dental hx     Cardiovascular  Rhythm: regular, Rate: normal, Cardiovascular exam normal    Pulmonary  Pulmonary exam normal Breath sounds clear to auscultation,     Other Findings        Anesthesia Plan  ASA Score- 2     Anesthesia Type- general with ASA Monitors  Additional Monitors:   Airway Plan:     Comment: Ativan IV preop ordered  Plan Factors-Patient not instructed to abstain from smoking on day of procedure  Patient did not smoke on day of surgery  Induction- intravenous  Postoperative Plan-     Informed Consent- Anesthetic plan and risks discussed with patient

## 2020-07-16 ENCOUNTER — TELEPHONE (OUTPATIENT)
Dept: SURGICAL ONCOLOGY | Facility: CLINIC | Age: 55
End: 2020-07-16

## 2020-07-16 NOTE — TELEPHONE ENCOUNTER
Pt called with post operative questions regarding swelling and a "swishing sound" at her incision site  Pt is experiencing some swelling in her axilla and a swishing fluid sound at her breast incision line  She denies any redness at sites or fever  Discussed wearing her supportive bra, applying warm soaks to the area and using pain medication as needed  Discussed if she experiences a fever, redness or increased swelling to call office  She has post operative appt scheduled for 07/28/20

## 2020-07-22 ENCOUNTER — PATIENT OUTREACH (OUTPATIENT)
Dept: SURGICAL ONCOLOGY | Facility: CLINIC | Age: 55
End: 2020-07-22

## 2020-07-22 NOTE — PROGRESS NOTES
Breast Cancer Nurse Navigator    Placed call to patient for initial navigation outreach  No answer  Explained reason for call  Left brief information regarding the role of breast cancer nurse navigator as well as additional cancer support services available to utilize as needed  Requested return call to discuss with more detail and to answer any questions and or assist with support needs at this time  Left direct contact information and availability  Awaiting return call

## 2020-07-23 ENCOUNTER — TELEPHONE (OUTPATIENT)
Dept: SURGICAL ONCOLOGY | Facility: CLINIC | Age: 55
End: 2020-07-23

## 2020-07-28 ENCOUNTER — PATIENT OUTREACH (OUTPATIENT)
Dept: SURGICAL ONCOLOGY | Facility: CLINIC | Age: 55
End: 2020-07-28

## 2020-07-28 ENCOUNTER — OFFICE VISIT (OUTPATIENT)
Dept: SURGICAL ONCOLOGY | Facility: CLINIC | Age: 55
End: 2020-07-28

## 2020-07-28 ENCOUNTER — DOCUMENTATION (OUTPATIENT)
Dept: HEMATOLOGY ONCOLOGY | Facility: CLINIC | Age: 55
End: 2020-07-28

## 2020-07-28 VITALS
DIASTOLIC BLOOD PRESSURE: 70 MMHG | RESPIRATION RATE: 18 BRPM | HEIGHT: 68 IN | SYSTOLIC BLOOD PRESSURE: 120 MMHG | WEIGHT: 181.5 LBS | TEMPERATURE: 98 F | BODY MASS INDEX: 27.51 KG/M2 | HEART RATE: 80 BPM

## 2020-07-28 DIAGNOSIS — C50.211 MALIGNANT NEOPLASM OF UPPER-INNER QUADRANT OF RIGHT BREAST IN FEMALE, ESTROGEN RECEPTOR POSITIVE (HCC): Primary | ICD-10-CM

## 2020-07-28 DIAGNOSIS — Z17.0 MALIGNANT NEOPLASM OF UPPER-INNER QUADRANT OF RIGHT BREAST IN FEMALE, ESTROGEN RECEPTOR POSITIVE (HCC): Primary | ICD-10-CM

## 2020-07-28 PROCEDURE — 99024 POSTOP FOLLOW-UP VISIT: CPT | Performed by: SURGERY

## 2020-07-28 NOTE — PROGRESS NOTES
ca email from [phone] that pt is in need of some f/a  Bandar Uriel Pt has active bc effective 01/06/20    Called pt & she sd that she has lots of bills that she isn't going to be paying because she has been out of work since April   she is hoping to be able to go back to work in august  But in the meantime she can't pay the bills  Referred her to  chris jaramillo at [place] pt ss she will call there & see if she can help her   emailed [phone]

## 2020-07-28 NOTE — PROGRESS NOTES
Breast Cancer Nurse Navigator    Met with patient during post op follow up with  [facility]  Patient reports she is doing well post surgically  Appears to have a good understanding of final pathology and additional treatment follow up and recommendations  She has a supportive aunt who is her main source of support  Patient expressed concerns with bills and co-pays she has received that she cannot afford to pay as well a limited income at this time causing hardship and difficulty in paying her bills  Informed that I will reach out to  and financial counselor and request that they contact her to offer support assistance available  Patient agreeable  Provided patient with my contact information and availability  Encouraged patient to call as needed with any questions or needs throughout treatment course  I will continue to follow up with patient as needed  Messages sent to both Dennis Platt  as well as Shana Arevalo counselor requesting follow up to assist as noted above

## 2020-07-28 NOTE — PROGRESS NOTES
47 y o  female is here today s/p right breast lumpectomy and SLNB on 07/10/20  She reports feeling mild discomfort at site  She has been limiting her right arm motion  Discussed arm exercises with her  Her incision lines are dry, intact and healing  Physical Exam   Constitutional: She appears well-developed and well-nourished  Pulmonary/Chest: Right breast exhibits skin change (Well-healing incision in the breast and axilla with no signs of infection)  Neurological: She is alert  Psychiatric: She has a normal mood and affect  Data:       Staging:    Four mm largest component  Tumor grade one  LVI absent  Margins clean  Estrogen receptor and progesterone receptor status positive  HER2 status and test method negative    Lymph node assessment/status negative      Neoadjuvant therapy:  Not applicable  Stage: IA      Diagnoses and all orders for this visit:    Malignant neoplasm of 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, estrogen receptor positive ([facility] [address] )  -     Ambulatory referral to Hematology / Oncology; Future  -     Ambulatory referral to Radiation Oncology; Future        Assessment/Plan:  51-year-old female status post right breast conservation for stage IA invasive ductal with tubular features, ER/MN positive and HER2 Citlali negative  She is healing well with no signs of infection  I am referring her to both medical and radiation oncology  I will see her again in six months or sooner should the need arise

## 2020-07-29 ENCOUNTER — DOCUMENTATION (OUTPATIENT)
Dept: INFUSION CENTER | Facility: CLINIC | Age: 55
End: 2020-07-29

## 2020-07-29 NOTE — SOCIAL WORK
LSW received message from [place], to call pt regarding financial concerns  LSW attempted to contact pt, no answer  Message was left with contact information

## 2020-08-03 ENCOUNTER — RADIATION ONCOLOGY CONSULT (OUTPATIENT)
Dept: RADIATION ONCOLOGY | Facility: CLINIC | Age: 55
End: 2020-08-03
Attending: RADIOLOGY
Payer: COMMERCIAL

## 2020-08-03 VITALS
RESPIRATION RATE: 18 BRPM | BODY MASS INDEX: 27.3 KG/M2 | WEIGHT: 180.12 LBS | OXYGEN SATURATION: 97 % | TEMPERATURE: 98.6 F | HEART RATE: 85 BPM | SYSTOLIC BLOOD PRESSURE: 96 MMHG | DIASTOLIC BLOOD PRESSURE: 58 MMHG | HEIGHT: 68 IN

## 2020-08-03 DIAGNOSIS — C50.211 MALIGNANT NEOPLASM OF UPPER-INNER QUADRANT OF RIGHT BREAST IN FEMALE, ESTROGEN RECEPTOR POSITIVE (HCC): Primary | ICD-10-CM

## 2020-08-03 DIAGNOSIS — Z17.0 MALIGNANT NEOPLASM OF UPPER-INNER QUADRANT OF RIGHT BREAST IN FEMALE, ESTROGEN RECEPTOR POSITIVE (HCC): Primary | ICD-10-CM

## 2020-08-03 DIAGNOSIS — C50.211 MALIGNANT NEOPLASM OF UPPER-INNER QUADRANT OF RIGHT BREAST IN FEMALE, ESTROGEN RECEPTOR POSITIVE (HCC): ICD-10-CM

## 2020-08-03 DIAGNOSIS — Z17.0 MALIGNANT NEOPLASM OF UPPER-INNER QUADRANT OF RIGHT BREAST IN FEMALE, ESTROGEN RECEPTOR POSITIVE (HCC): ICD-10-CM

## 2020-08-03 PROCEDURE — 99211 OFF/OP EST MAY X REQ PHY/QHP: CPT | Performed by: RADIOLOGY

## 2020-08-03 PROCEDURE — G0463 HOSPITAL OUTPT CLINIC VISIT: HCPCS | Performed by: RADIOLOGY

## 2020-08-03 RX ORDER — ACETAMINOPHEN 500 MG
1500 TABLET ORAL EVERY 6 HOURS PRN
COMMUNITY
End: 2021-07-02 | Stop reason: SDUPTHER
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
## 2020-08-19 ENCOUNTER — TELEPHONE (OUTPATIENT)
Dept: HEMATOLOGY ONCOLOGY | Facility: CLINIC | Age: 55
End: 2020-08-19

## 2020-08-20 ENCOUNTER — TELEPHONE (OUTPATIENT)
Dept: HEMATOLOGY ONCOLOGY | Facility: CLINIC | Age: 55
End: 2020-08-20

## 2020-08-20 ENCOUNTER — CONSULT (OUTPATIENT)
Dept: HEMATOLOGY ONCOLOGY | Facility: CLINIC | Age: 55
End: 2020-08-20
Payer: COMMERCIAL

## 2020-08-20 ENCOUNTER — APPOINTMENT (OUTPATIENT)
Dept: LAB | Facility: MEDICAL CENTER | Age: 55
End: 2020-08-20
Payer: COMMERCIAL

## 2020-08-20 VITALS
HEART RATE: 77 BPM | RESPIRATION RATE: 18 BRPM | TEMPERATURE: 98 F | OXYGEN SATURATION: 96 % | WEIGHT: 180 LBS | SYSTOLIC BLOOD PRESSURE: 114 MMHG | DIASTOLIC BLOOD PRESSURE: 64 MMHG | BODY MASS INDEX: 27.28 KG/M2 | HEIGHT: 68 IN

## 2020-08-20 DIAGNOSIS — Z17.0 MALIGNANT NEOPLASM OF UPPER-INNER QUADRANT OF RIGHT BREAST IN FEMALE, ESTROGEN RECEPTOR POSITIVE (HCC): Primary | ICD-10-CM

## 2020-08-20 DIAGNOSIS — Z17.0 MALIGNANT NEOPLASM OF UPPER-INNER QUADRANT OF RIGHT BREAST IN FEMALE, ESTROGEN RECEPTOR POSITIVE (HCC): ICD-10-CM

## 2020-08-20 DIAGNOSIS — C50.211 MALIGNANT NEOPLASM OF UPPER-INNER QUADRANT OF RIGHT BREAST IN FEMALE, ESTROGEN RECEPTOR POSITIVE (HCC): ICD-10-CM

## 2020-08-20 DIAGNOSIS — C50.211 MALIGNANT NEOPLASM OF UPPER-INNER QUADRANT OF RIGHT BREAST IN FEMALE, ESTROGEN RECEPTOR POSITIVE (HCC): Primary | ICD-10-CM

## 2020-08-20 LAB
ESTRADIOL SERPL-MCNC: 14 PG/ML
FSH SERPL-ACNC: 15.4 MIU/ML

## 2020-08-20 PROCEDURE — 83001 ASSAY OF GONADOTROPIN (FSH): CPT

## 2020-08-20 PROCEDURE — 36415 COLL VENOUS BLD VENIPUNCTURE: CPT

## 2020-08-20 PROCEDURE — 99245 OFF/OP CONSLTJ NEW/EST HI 55: CPT | Performed by: INTERNAL MEDICINE

## 2020-08-20 PROCEDURE — 82670 ASSAY OF TOTAL ESTRADIOL: CPT

## 2020-08-20 RX ORDER — ANASTROZOLE 1 MG/1
1 TABLET ORAL DAILY
Qty: 90 TABLET | Refills: 1 | Status: SHIPPED | OUTPATIENT
Start: 2020-08-20 | End: 2021-02-12
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
## 2020-08-20 NOTE — TELEPHONE ENCOUNTER
----- Message from Shu Fam MD sent at 8/20/2020  2:45 PM EDT -----  Let her know estradiol and FSH showed she is postmenopausal  Give her anastrozole 1mg daily

## 2020-08-20 NOTE — TELEPHONE ENCOUNTER
Left VM for patient to make her aware  Anastrozole script sent to her preferred [facility] pharmacy  Call back number left if needed
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
## 2020-08-26 ENCOUNTER — APPOINTMENT (OUTPATIENT)
Dept: RADIATION ONCOLOGY | Facility: CLINIC | Age: 55
End: 2020-08-26
Attending: RADIOLOGY
Payer: COMMERCIAL

## 2020-08-26 ENCOUNTER — DOCUMENTATION (OUTPATIENT)
Dept: INFUSION CENTER | Facility: CLINIC | Age: 55
End: 2020-08-26

## 2020-08-26 PROCEDURE — 77290 THER RAD SIMULAJ FIELD CPLX: CPT | Performed by: RADIOLOGY

## 2020-08-26 PROCEDURE — 77332 RADIATION TREATMENT AID(S): CPT | Performed by: RADIOLOGY

## 2020-08-26 NOTE — SOCIAL WORK
LSW received DT and problem list via email  Pt self scored 7/10 and noted concerns with insurance/financial, work, dealing with partner, fears, nervousness, worry, loss of interest in normal activities, breathing, fatigue, memory, pain and tingling in extremities  LSW attempted to call pt, no answer  LSW will attempt another call at a later time

## 2020-08-27 PROCEDURE — 77300 RADIATION THERAPY DOSE PLAN: CPT | Performed by: RADIOLOGY

## 2020-08-27 PROCEDURE — 77295 3-D RADIOTHERAPY PLAN: CPT | Performed by: RADIOLOGY

## 2020-08-27 PROCEDURE — 77334 RADIATION TREATMENT AID(S): CPT | Performed by: RADIOLOGY

## 2020-09-01 ENCOUNTER — APPOINTMENT (OUTPATIENT)
Dept: RADIATION ONCOLOGY | Facility: CLINIC | Age: 55
End: 2020-09-01
Attending: RADIOLOGY
Payer: COMMERCIAL

## 2020-09-03 PROCEDURE — 77280 THER RAD SIMULAJ FIELD SMPL: CPT | Performed by: RADIOLOGY

## 2020-09-04 DIAGNOSIS — C50.211 MALIGNANT NEOPLASM OF UPPER-INNER QUADRANT OF RIGHT BREAST IN FEMALE, ESTROGEN RECEPTOR POSITIVE (HCC): Primary | ICD-10-CM

## 2020-09-04 DIAGNOSIS — Z17.0 MALIGNANT NEOPLASM OF UPPER-INNER QUADRANT OF RIGHT BREAST IN FEMALE, ESTROGEN RECEPTOR POSITIVE (HCC): Primary | ICD-10-CM

## 2020-09-08 ENCOUNTER — APPOINTMENT (OUTPATIENT)
Dept: RADIATION ONCOLOGY | Facility: CLINIC | Age: 55
End: 2020-09-08
Payer: COMMERCIAL

## 2020-09-09 ENCOUNTER — APPOINTMENT (OUTPATIENT)
Dept: RADIATION ONCOLOGY | Facility: CLINIC | Age: 55
End: 2020-09-09
Attending: RADIOLOGY
Payer: COMMERCIAL

## 2020-09-09 PROCEDURE — 77331 SPECIAL RADIATION DOSIMETRY: CPT | Performed by: RADIOLOGY

## 2020-09-09 PROCEDURE — 77412 RADIATION TX DELIVERY LVL 3: CPT | Performed by: RADIOLOGY

## 2020-09-10 ENCOUNTER — APPOINTMENT (OUTPATIENT)
Dept: RADIATION ONCOLOGY | Facility: CLINIC | Age: 55
End: 2020-09-10
Attending: RADIOLOGY
Payer: COMMERCIAL

## 2020-09-10 ENCOUNTER — APPOINTMENT (OUTPATIENT)
Dept: LAB | Facility: CLINIC | Age: 55
End: 2020-09-10
Attending: RADIOLOGY
Payer: COMMERCIAL

## 2020-09-10 DIAGNOSIS — Z17.0 MALIGNANT NEOPLASM OF UPPER-INNER QUADRANT OF RIGHT BREAST IN FEMALE, ESTROGEN RECEPTOR POSITIVE (HCC): ICD-10-CM

## 2020-09-10 DIAGNOSIS — C50.211 MALIGNANT NEOPLASM OF UPPER-INNER QUADRANT OF RIGHT BREAST IN FEMALE, ESTROGEN RECEPTOR POSITIVE (HCC): ICD-10-CM

## 2020-09-10 LAB
BASOPHILS # BLD AUTO: 0.05 THOUSANDS/ΜL (ref 0–0.1)
BASOPHILS NFR BLD AUTO: 1 % (ref 0–1)
EOSINOPHIL # BLD AUTO: 0.27 THOUSAND/ΜL (ref 0–0.61)
EOSINOPHIL NFR BLD AUTO: 4 % (ref 0–6)
ERYTHROCYTE [DISTWIDTH] IN BLOOD BY AUTOMATED COUNT: 12.5 % (ref 11.6–15.1)
HCT VFR BLD AUTO: 41.4 % (ref 34.8–46.1)
HGB BLD-MCNC: 13.9 G/DL (ref 11.5–15.4)
LYMPHOCYTES # BLD AUTO: 2.86 THOUSANDS/ΜL (ref 0.6–4.47)
LYMPHOCYTES NFR BLD AUTO: 37 % (ref 14–44)
MCH RBC QN AUTO: 30.5 PG (ref 26.8–34.3)
MCHC RBC AUTO-ENTMCNC: 33.6 G/DL (ref 31.4–37.4)
MCV RBC AUTO: 91 FL (ref 82–98)
MONOCYTES # BLD AUTO: 0.71 THOUSAND/ΜL (ref 0.17–1.22)
MONOCYTES NFR BLD AUTO: 9 % (ref 4–12)
NEUTROPHILS # BLD AUTO: 3.85 THOUSANDS/ΜL (ref 1.85–7.62)
NEUTS SEG NFR BLD AUTO: 49 % (ref 43–75)
PLATELET # BLD AUTO: 209 THOUSANDS/UL (ref 149–390)
PMV BLD AUTO: 10.2 FL (ref 8.9–12.7)
RBC # BLD AUTO: 4.55 MILLION/UL (ref 3.81–5.12)
WBC # BLD AUTO: 7.74 THOUSAND/UL (ref 4.31–10.16)

## 2020-09-10 PROCEDURE — 77412 RADIATION TX DELIVERY LVL 3: CPT | Performed by: RADIOLOGY

## 2020-09-10 PROCEDURE — 85025 COMPLETE CBC W/AUTO DIFF WBC: CPT

## 2020-09-10 PROCEDURE — 36415 COLL VENOUS BLD VENIPUNCTURE: CPT

## 2020-09-11 ENCOUNTER — APPOINTMENT (OUTPATIENT)
Dept: RADIATION ONCOLOGY | Facility: CLINIC | Age: 55
End: 2020-09-11
Attending: RADIOLOGY
Payer: COMMERCIAL

## 2020-09-11 ENCOUNTER — APPOINTMENT (OUTPATIENT)
Dept: RADIATION ONCOLOGY | Facility: CLINIC | Age: 55
End: 2020-09-11
Payer: COMMERCIAL

## 2020-09-11 PROCEDURE — 77412 RADIATION TX DELIVERY LVL 3: CPT | Performed by: RADIOLOGY

## 2020-09-14 ENCOUNTER — APPOINTMENT (OUTPATIENT)
Dept: RADIATION ONCOLOGY | Facility: CLINIC | Age: 55
End: 2020-09-14
Attending: RADIOLOGY
Payer: COMMERCIAL

## 2020-09-14 PROCEDURE — 77412 RADIATION TX DELIVERY LVL 3: CPT | Performed by: RADIOLOGY

## 2020-09-15 ENCOUNTER — APPOINTMENT (OUTPATIENT)
Dept: RADIATION ONCOLOGY | Facility: CLINIC | Age: 55
End: 2020-09-15
Attending: RADIOLOGY
Payer: COMMERCIAL

## 2020-09-15 PROCEDURE — 77336 RADIATION PHYSICS CONSULT: CPT | Performed by: RADIOLOGY

## 2020-09-15 PROCEDURE — 77412 RADIATION TX DELIVERY LVL 3: CPT | Performed by: RADIOLOGY

## 2020-09-15 PROCEDURE — 77417 THER RADIOLOGY PORT IMAGE(S): CPT | Performed by: RADIOLOGY

## 2020-09-16 ENCOUNTER — APPOINTMENT (OUTPATIENT)
Dept: RADIATION ONCOLOGY | Facility: CLINIC | Age: 55
End: 2020-09-16
Attending: RADIOLOGY
Payer: COMMERCIAL

## 2020-09-16 PROCEDURE — 77412 RADIATION TX DELIVERY LVL 3: CPT | Performed by: RADIOLOGY

## 2020-09-17 ENCOUNTER — APPOINTMENT (OUTPATIENT)
Dept: RADIATION ONCOLOGY | Facility: CLINIC | Age: 55
End: 2020-09-17
Attending: RADIOLOGY
Payer: COMMERCIAL

## 2020-09-17 PROCEDURE — 77412 RADIATION TX DELIVERY LVL 3: CPT | Performed by: RADIOLOGY

## 2020-09-18 ENCOUNTER — APPOINTMENT (OUTPATIENT)
Dept: RADIATION ONCOLOGY | Facility: CLINIC | Age: 55
End: 2020-09-18
Attending: RADIOLOGY
Payer: COMMERCIAL

## 2020-09-18 PROCEDURE — 77412 RADIATION TX DELIVERY LVL 3: CPT | Performed by: RADIOLOGY

## 2020-09-21 ENCOUNTER — APPOINTMENT (OUTPATIENT)
Dept: RADIATION ONCOLOGY | Facility: CLINIC | Age: 55
End: 2020-09-21
Attending: RADIOLOGY
Payer: COMMERCIAL

## 2020-09-21 PROCEDURE — 77412 RADIATION TX DELIVERY LVL 3: CPT | Performed by: RADIOLOGY

## 2020-09-22 ENCOUNTER — APPOINTMENT (OUTPATIENT)
Dept: RADIATION ONCOLOGY | Facility: CLINIC | Age: 55
End: 2020-09-22
Attending: RADIOLOGY
Payer: COMMERCIAL

## 2020-09-22 PROCEDURE — 77412 RADIATION TX DELIVERY LVL 3: CPT | Performed by: RADIOLOGY

## 2020-09-22 PROCEDURE — 77321 SPECIAL TELETX PORT PLAN: CPT | Performed by: RADIOLOGY

## 2020-09-22 PROCEDURE — 77336 RADIATION PHYSICS CONSULT: CPT | Performed by: RADIOLOGY

## 2020-09-22 PROCEDURE — 77334 RADIATION TREATMENT AID(S): CPT | Performed by: RADIOLOGY

## 2020-09-23 ENCOUNTER — APPOINTMENT (OUTPATIENT)
Dept: RADIATION ONCOLOGY | Facility: CLINIC | Age: 55
End: 2020-09-23
Attending: RADIOLOGY
Payer: COMMERCIAL

## 2020-09-23 PROCEDURE — 77412 RADIATION TX DELIVERY LVL 3: CPT | Performed by: RADIOLOGY

## 2020-09-23 PROCEDURE — 77417 THER RADIOLOGY PORT IMAGE(S): CPT | Performed by: RADIOLOGY

## 2020-09-24 ENCOUNTER — APPOINTMENT (OUTPATIENT)
Dept: RADIATION ONCOLOGY | Facility: CLINIC | Age: 55
End: 2020-09-24
Attending: RADIOLOGY
Payer: COMMERCIAL

## 2020-09-24 PROCEDURE — 77412 RADIATION TX DELIVERY LVL 3: CPT | Performed by: RADIOLOGY

## 2020-09-25 ENCOUNTER — APPOINTMENT (OUTPATIENT)
Dept: RADIATION ONCOLOGY | Facility: CLINIC | Age: 55
End: 2020-09-25
Attending: RADIOLOGY
Payer: COMMERCIAL

## 2020-09-25 PROCEDURE — 77412 RADIATION TX DELIVERY LVL 3: CPT | Performed by: RADIOLOGY

## 2020-09-28 ENCOUNTER — APPOINTMENT (OUTPATIENT)
Dept: RADIATION ONCOLOGY | Facility: CLINIC | Age: 55
End: 2020-09-28
Attending: RADIOLOGY
Payer: COMMERCIAL

## 2020-09-28 PROCEDURE — 77412 RADIATION TX DELIVERY LVL 3: CPT | Performed by: RADIOLOGY

## 2020-09-29 ENCOUNTER — APPOINTMENT (OUTPATIENT)
Dept: RADIATION ONCOLOGY | Facility: CLINIC | Age: 55
End: 2020-09-29
Attending: RADIOLOGY
Payer: COMMERCIAL

## 2020-09-29 PROCEDURE — 77412 RADIATION TX DELIVERY LVL 3: CPT | Performed by: RADIOLOGY

## 2020-09-29 PROCEDURE — 77336 RADIATION PHYSICS CONSULT: CPT | Performed by: RADIOLOGY

## 2020-09-30 ENCOUNTER — APPOINTMENT (OUTPATIENT)
Dept: RADIATION ONCOLOGY | Facility: CLINIC | Age: 55
End: 2020-09-30
Attending: RADIOLOGY
Payer: COMMERCIAL

## 2020-10-01 ENCOUNTER — APPOINTMENT (OUTPATIENT)
Dept: RADIATION ONCOLOGY | Facility: CLINIC | Age: 55
End: 2020-10-01
Attending: RADIOLOGY
Payer: COMMERCIAL

## 2020-10-01 PROCEDURE — 77412 RADIATION TX DELIVERY LVL 3: CPT | Performed by: RADIOLOGY

## 2020-10-02 ENCOUNTER — APPOINTMENT (OUTPATIENT)
Dept: RADIATION ONCOLOGY | Facility: CLINIC | Age: 55
End: 2020-10-02
Attending: RADIOLOGY
Payer: COMMERCIAL

## 2020-10-02 PROCEDURE — 77412 RADIATION TX DELIVERY LVL 3: CPT | Performed by: RADIOLOGY

## 2020-10-05 ENCOUNTER — APPOINTMENT (OUTPATIENT)
Dept: RADIATION ONCOLOGY | Facility: CLINIC | Age: 55
End: 2020-10-05
Attending: RADIOLOGY
Payer: COMMERCIAL

## 2020-10-05 PROCEDURE — 77412 RADIATION TX DELIVERY LVL 3: CPT | Performed by: RADIOLOGY

## 2020-10-06 ENCOUNTER — APPOINTMENT (OUTPATIENT)
Dept: RADIATION ONCOLOGY | Facility: CLINIC | Age: 55
End: 2020-10-06
Attending: RADIOLOGY
Payer: COMMERCIAL

## 2020-10-06 PROCEDURE — 77412 RADIATION TX DELIVERY LVL 3: CPT | Performed by: RADIOLOGY

## 2020-10-07 ENCOUNTER — APPOINTMENT (OUTPATIENT)
Dept: RADIATION ONCOLOGY | Facility: CLINIC | Age: 55
End: 2020-10-07
Attending: RADIOLOGY
Payer: COMMERCIAL

## 2020-10-07 PROCEDURE — 77412 RADIATION TX DELIVERY LVL 3: CPT | Performed by: RADIOLOGY

## 2020-10-07 PROCEDURE — 77336 RADIATION PHYSICS CONSULT: CPT | Performed by: RADIOLOGY

## 2020-10-08 ENCOUNTER — APPOINTMENT (OUTPATIENT)
Dept: RADIATION ONCOLOGY | Facility: CLINIC | Age: 55
End: 2020-10-08
Attending: RADIOLOGY
Payer: COMMERCIAL

## 2020-10-08 PROCEDURE — 77412 RADIATION TX DELIVERY LVL 3: CPT | Performed by: RADIOLOGY

## 2020-10-08 PROCEDURE — 77331 SPECIAL RADIATION DOSIMETRY: CPT | Performed by: RADIOLOGY

## 2020-10-09 ENCOUNTER — APPOINTMENT (OUTPATIENT)
Dept: RADIATION ONCOLOGY | Facility: CLINIC | Age: 55
End: 2020-10-09
Payer: COMMERCIAL

## 2020-10-09 ENCOUNTER — APPOINTMENT (OUTPATIENT)
Dept: RADIATION ONCOLOGY | Facility: CLINIC | Age: 55
End: 2020-10-09
Attending: RADIOLOGY
Payer: COMMERCIAL

## 2020-11-13 ENCOUNTER — TELEMEDICINE (OUTPATIENT)
Dept: RADIATION ONCOLOGY | Facility: CLINIC | Age: 55
End: 2020-11-13
Attending: RADIOLOGY

## 2020-11-13 DIAGNOSIS — Z17.0 MALIGNANT NEOPLASM OF UPPER-INNER QUADRANT OF RIGHT BREAST IN FEMALE, ESTROGEN RECEPTOR POSITIVE (HCC): Primary | ICD-10-CM

## 2020-11-13 DIAGNOSIS — C50.211 MALIGNANT NEOPLASM OF UPPER-INNER QUADRANT OF RIGHT BREAST IN FEMALE, ESTROGEN RECEPTOR POSITIVE (HCC): Primary | ICD-10-CM

## 2020-12-18 ENCOUNTER — TELEPHONE (OUTPATIENT)
Dept: SURGICAL ONCOLOGY | Facility: CLINIC | Age: 55
End: 2020-12-18

## 2021-02-01 ENCOUNTER — TELEPHONE (OUTPATIENT)
Dept: HEMATOLOGY ONCOLOGY | Facility: MEDICAL CENTER | Age: 56
End: 2021-02-01

## 2021-02-01 NOTE — TELEPHONE ENCOUNTER
Patient called in to reschedule 02/02/2021 with Dr Underwood that needs to be reschedule - a good call back [phone]

## 2021-02-12 DIAGNOSIS — C50.211 MALIGNANT NEOPLASM OF UPPER-INNER QUADRANT OF RIGHT BREAST IN FEMALE, ESTROGEN RECEPTOR POSITIVE (HCC): ICD-10-CM

## 2021-02-12 DIAGNOSIS — Z17.0 MALIGNANT NEOPLASM OF UPPER-INNER QUADRANT OF RIGHT BREAST IN FEMALE, ESTROGEN RECEPTOR POSITIVE (HCC): ICD-10-CM

## 2021-02-12 RX ORDER — ANASTROZOLE 1 MG/1
TABLET ORAL
Qty: 90 TABLET | Refills: 1 | Status: SHIPPED | OUTPATIENT
Start: 2021-02-12 | End: 2021-08-04

## 2021-02-25 ENCOUNTER — OFFICE VISIT (OUTPATIENT)
Dept: HEMATOLOGY ONCOLOGY | Facility: CLINIC | Age: 56
End: 2021-02-25
Payer: COMMERCIAL

## 2021-02-25 VITALS
TEMPERATURE: 98.2 F | WEIGHT: 177 LBS | OXYGEN SATURATION: 97 % | HEIGHT: 68 IN | SYSTOLIC BLOOD PRESSURE: 122 MMHG | HEART RATE: 76 BPM | BODY MASS INDEX: 26.83 KG/M2 | RESPIRATION RATE: 18 BRPM | DIASTOLIC BLOOD PRESSURE: 80 MMHG

## 2021-02-25 DIAGNOSIS — C50.211 MALIGNANT NEOPLASM OF UPPER-INNER QUADRANT OF RIGHT BREAST IN FEMALE, ESTROGEN RECEPTOR POSITIVE (HCC): Primary | ICD-10-CM

## 2021-02-25 DIAGNOSIS — Z17.0 MALIGNANT NEOPLASM OF UPPER-INNER QUADRANT OF RIGHT BREAST IN FEMALE, ESTROGEN RECEPTOR POSITIVE (HCC): Primary | ICD-10-CM

## 2021-02-25 PROCEDURE — 99214 OFFICE O/P EST MOD 30 MIN: CPT | Performed by: INTERNAL MEDICINE

## 2021-02-25 RX ORDER — TOPIRAMATE 25 MG/1
25 TABLET ORAL DAILY
COMMUNITY
Start: 2021-02-10 | End: 2021-03-30 | Stop reason: SDUPTHER

## 2021-02-25 RX ORDER — BUDESONIDE AND FORMOTEROL FUMARATE DIHYDRATE 160; 4.5 UG/1; UG/1
2 AEROSOL RESPIRATORY (INHALATION) 2 TIMES DAILY
COMMUNITY
Start: 2020-11-06 | End: 2021-11-22

## 2021-02-25 NOTE — PROGRESS NOTES
Hematology / Oncology Outpatient Follow Up Note    Alexei Garsia 54 y o  female :1965 [facility]:[id number]         Date:  2021    Assessment / Plan:  A 17-year-old postmenopausal woman with  stage IA right breast cancer, grade 1 with invasive tubular histology, ER 70% positive, NC 80% positive, HER2 negative disease  She underwent lumpectomy and sentinel lymph node biopsy, resulting in FANTASMA  Her primary tumor size was only 4 mm  She is currently on adjuvant hormonal therapy with anastrozole with no side effects  Clinically, she has no evidence recurrent disease  I recommended her to continue with anastrozole 1 mg once a day  I will see her again in a year for routine follow-up  She is in agreement with my recommendations                Subjective:      HPI:  A 60-year-old female who has history of simple hysterectomy in   She was recently found to have abnormality in her right breast, based on a screening mammography  Therefore, she underwent right breast biopsy in 2020 which showed invasive ductal carcinoma, grade 1  This was ER 70% positive, NC 80% positive, HER2 negative disease  She subsequently underwent lumpectomy by Dr Gabriel Ordaz in July 10, 2020 which showed 4 mm of invasive tubular carcinoma, grade 1  2 sentinel lymph nodes were negative for metastatic disease  She presents today to discuss adjuvant treatment options  She has no breast related symptomatology  She denied any pain  Her weight is stable  She has no respiratory symptoms  She is up to date for colonoscopy  She has COPD  She was a long-time smoker until 2016  She does not drink alcohol  Her performance status is normal              Interval History:  A 54year-old postmenopausal woman with  stage IA right breast cancer, grade 1 with invasive tubular histology, ER 70% positive, NC 80% positive, HER2 negative disease  She underwent lumpectomy and sentinel lymph node biopsy, resulting in FANTASMA    Her primary tumor size was only 4 mm  Since August 2020, she has been on adjuvant hormonal therapy with anastrozole  She presents today for follow-up  She has absolutely no new symptoms  She denied hot flashes or musculoskeletal symptoms  She had COVID-19 infection in early December 2020 with very minimal symptoms  She feels well with no complaint of bone pain  She has no respiratory symptoms  Her weight is stable  Her performance status is normal            Objective:      Primary Diagnosis:     Right breast cancer, stage IA (pT1a, pN0, M0) grade 1, invasive tubular histology, ER 70% positive, WV 80% positive, HER2 negative disease  Diagnosed in July 2020       Cancer Staging:  Cancer Staging  Malignant neoplasm of 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, estrogen receptor positive ([facility] [address] )  Staging form: Breast, AJCC 8th Edition  - Clinical: Stage IA (cT1, cN0, cM0, G1, ER+, WV+, HER2-) - Signed by Swetha Ewing MD on 7/1/2020  Laterality: Right  Method of lymph node assessment: Clinical  Histologic grading system: 3 grade system  - Pathologic: Stage IA (pT1a, pN0(sn), cM0, G1, ER+, WV+, HER2-) - Signed by Swetha Ewing MD on 7/28/2020  Neoadjuvant therapy: No  Laterality: Right  Method of lymph node assessment: [place] lymph node biopsy  Histologic grading system: 3 grade system           Previous Hematologic/ Oncologic Treatment:            Current Hematologic/ Oncologic Treatment:       Adjuvant hormonal therapy with anastrozole since August 2020    Disease Status:      FANTASMA status post lumpectomy and sentinel lymph node biopsy      Test Results:     Pathology:     4 mm of invasive tubular carcinoma, grade 1  2 sentinel lymph nodes were negative for metastatic disease  ER 70 pr sound positive, WV 80% positive, HER2 negative disease  Stage IA (pT1a, pN0, M0)       Radiology:     Chest x-ray was negative for pulmonary disease      Laboratory:     See below   Estradiol was 14      Physical Exam:        General Appearance: Alert, oriented          Eyes:    PERRL   Ears:    Normal external ear canals, both ears   Nose:   Nares normal, septum midline   Throat:   Mucosa moist  Pharynx without injection  Neck:   Supple         Lungs:     Clear to auscultation bilaterally   Chest Wall:    No tenderness or deformity    Heart:    Regular rate and rhythm         Abdomen:     Soft, non-tender, bowel sounds +, no organomegaly               Extremities:   Extremities no cyanosis or edema         Skin:   no rash or icterus  Lymph nodes:   Cervical, supraclavicular, and axillary nodes normal   Neurologic:   CNII-XII intact, normal strength, sensation and reflexes     Throughout             Breast exam:   Lumpectomy scar at 12:00 p m  Position in her right breast with no palpable abnormalities  Left breast exam is negative               ROS: Review of Systems   All other systems reviewed and are negative  Imaging: No results found  Labs:   Lab Results   Component Value Date    WBC 7 74 09/10/2020    HGB 13 9 09/10/2020    HCT 41 4 09/10/2020    MCV 91 09/10/2020     09/10/2020     Lab Results   Component Value Date     10/02/2015    K 3 9 07/06/2020     07/06/2020    CO2 25 07/06/2020    ANIONGAP 8 10/02/2015    BUN 17 07/06/2020    CREATININE 0 57 (L) 07/06/2020    GLUCOSE 82 10/02/2015    GLUF 90 07/06/2020    CALCIUM 9 6 07/06/2020    AST 18 07/06/2020    ALT 25 07/06/2020    ALKPHOS 58 07/06/2020    PROT 7 5 01/14/2015    BILITOT 0 2 01/14/2015    EGFR 105 07/06/2020         Lab Results   Component Value Date    IRON 106 08/04/2015       Lab Results   Component Value Date    XUOMGAYZ09 1,289 (H) 08/04/2015         Current Medications: Reviewed  Allergies: Reviewed  PMH/FH/SH:  Reviewed      Vital Sign:    Body surface area is 1 94 meters squared      Wt Readings from Last 3 Encounters:   02/25/21 80 3 kg (177 lb)   08/20/20 81 6 kg (180 lb)   08/03/20 81 7 kg (180 lb 1 9 oz)        Temp Readings from Last 3 Encounters:   02/25/21 98 2 °F (36 8 °C) (Tympanic Core)   08/20/20 98 °F (36 7 °C) (Tympanic Core)   08/03/20 98 6 °F (37 °C) (Tympanic)        BP Readings from Last 3 Encounters:   02/25/21 122/80   08/20/20 114/64   08/03/20 96/58         Pulse Readings from Last 3 Encounters:   02/25/21 76   08/20/20 77   08/03/20 85     @LASTSAO2(3)@

## 2021-03-08 ENCOUNTER — TELEPHONE (OUTPATIENT)
Dept: SURGICAL ONCOLOGY | Facility: CLINIC | Age: 56
End: 2021-03-08

## 2021-03-08 ENCOUNTER — TELEPHONE (OUTPATIENT)
Dept: CARDIAC SURGERY | Facility: CLINIC | Age: 56
End: 2021-03-08

## 2021-03-08 NOTE — TELEPHONE ENCOUNTER
Srinivas Grant left a message regarding a breast  "lump" she is feeling   Attempted calling patient she was at work and stated she would return call in a few minutes, she did not return call,

## 2021-03-08 NOTE — TELEPHONE ENCOUNTER
Patient called in with regards to a new lump found in the area of where her surgery was  It is a hard lump and is having some color to that location  Please call patient to discuss this at [phone]

## 2021-03-09 ENCOUNTER — OFFICE VISIT (OUTPATIENT)
Dept: SURGICAL ONCOLOGY | Facility: CLINIC | Age: 56
End: 2021-03-09
Payer: COMMERCIAL

## 2021-03-09 VITALS
WEIGHT: 177 LBS | SYSTOLIC BLOOD PRESSURE: 104 MMHG | HEIGHT: 68 IN | DIASTOLIC BLOOD PRESSURE: 72 MMHG | TEMPERATURE: 98 F | BODY MASS INDEX: 26.83 KG/M2 | HEART RATE: 82 BPM

## 2021-03-09 DIAGNOSIS — N64.89 SEROMA OF BREAST: Primary | ICD-10-CM

## 2021-03-09 DIAGNOSIS — C50.211 MALIGNANT NEOPLASM OF UPPER-INNER QUADRANT OF RIGHT BREAST IN FEMALE, ESTROGEN RECEPTOR POSITIVE (HCC): ICD-10-CM

## 2021-03-09 DIAGNOSIS — Z79.811 USE OF ANASTROZOLE: ICD-10-CM

## 2021-03-09 DIAGNOSIS — Z17.0 MALIGNANT NEOPLASM OF UPPER-INNER QUADRANT OF RIGHT BREAST IN FEMALE, ESTROGEN RECEPTOR POSITIVE (HCC): ICD-10-CM

## 2021-03-09 PROBLEM — Z92.3 HISTORY OF RADIATION THERAPY: Status: RESOLVED | Noted: 2021-03-09 | Resolved: 2021-03-09

## 2021-03-09 PROBLEM — Z92.3 HISTORY OF RADIATION THERAPY: Status: ACTIVE | Noted: 2021-03-09

## 2021-03-09 PROCEDURE — 19000 PUNCTURE ASPIR CYST BREAST: CPT | Performed by: SURGERY

## 2021-03-09 PROCEDURE — 99213 OFFICE O/P EST LOW 20 MIN: CPT | Performed by: SURGERY

## 2021-03-09 PROCEDURE — 76942 ECHO GUIDE FOR BIOPSY: CPT | Performed by: SURGERY

## 2021-03-09 PROCEDURE — 76642 ULTRASOUND BREAST LIMITED: CPT | Performed by: SURGERY
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
## 2021-03-30 ENCOUNTER — OFFICE VISIT (OUTPATIENT)
Dept: FAMILY MEDICINE CLINIC | Facility: CLINIC | Age: 56
End: 2021-03-30
Payer: COMMERCIAL

## 2021-03-30 VITALS
WEIGHT: 174 LBS | DIASTOLIC BLOOD PRESSURE: 68 MMHG | HEART RATE: 73 BPM | HEIGHT: 67 IN | SYSTOLIC BLOOD PRESSURE: 104 MMHG | TEMPERATURE: 97.2 F | OXYGEN SATURATION: 97 % | BODY MASS INDEX: 27.31 KG/M2

## 2021-03-30 DIAGNOSIS — J42 CHRONIC BRONCHITIS, UNSPECIFIED CHRONIC BRONCHITIS TYPE (HCC): ICD-10-CM

## 2021-03-30 DIAGNOSIS — M54.2 NECK PAIN ON RIGHT SIDE: ICD-10-CM

## 2021-03-30 DIAGNOSIS — R93.89 ABNORMAL MRI, NECK: ICD-10-CM

## 2021-03-30 DIAGNOSIS — Z86.010 HISTORY OF COLONIC POLYPS: ICD-10-CM

## 2021-03-30 DIAGNOSIS — G43.101 MIGRAINE WITH AURA AND WITH STATUS MIGRAINOSUS, NOT INTRACTABLE: ICD-10-CM

## 2021-03-30 DIAGNOSIS — Z80.0 FAMILY HISTORY OF COLON CANCER: ICD-10-CM

## 2021-03-30 DIAGNOSIS — Z76.89 ENCOUNTER TO ESTABLISH CARE: Primary | ICD-10-CM

## 2021-03-30 DIAGNOSIS — Z13.31 NEGATIVE DEPRESSION SCREENING: ICD-10-CM

## 2021-03-30 DIAGNOSIS — M50.20 BULGE OF CERVICAL DISC WITHOUT MYELOPATHY: ICD-10-CM

## 2021-03-30 DIAGNOSIS — Z12.4 SCREENING FOR CERVICAL CANCER: ICD-10-CM

## 2021-03-30 DIAGNOSIS — M48.02 CERVICAL STENOSIS OF SPINAL CANAL: ICD-10-CM

## 2021-03-30 PROBLEM — G56.00 CARPAL TUNNEL SYNDROME: Status: ACTIVE | Noted: 2017-09-25

## 2021-03-30 PROBLEM — G54.2 DISORDER OF RIGHT CERVICAL NERVE ROOT: Status: ACTIVE | Noted: 2019-10-31

## 2021-03-30 PROBLEM — M75.20 BICEPS TENDINITIS: Status: ACTIVE | Noted: 2018-01-15

## 2021-03-30 PROBLEM — Z87.891 PERSONAL HISTORY OF TOBACCO USE, PRESENTING HAZARDS TO HEALTH: Status: ACTIVE | Noted: 2020-11-06

## 2021-03-30 PROBLEM — E66.3 OVERWEIGHT: Status: ACTIVE | Noted: 2017-09-25

## 2021-03-30 PROBLEM — M50.30 DDD (DEGENERATIVE DISC DISEASE), CERVICAL: Status: ACTIVE | Noted: 2019-10-29

## 2021-03-30 PROBLEM — E78.5 DYSLIPIDEMIA: Status: ACTIVE | Noted: 2017-01-09

## 2021-03-30 PROCEDURE — 3725F SCREEN DEPRESSION PERFORMED: CPT | Performed by: PHYSICIAN ASSISTANT

## 2021-03-30 PROCEDURE — 1036F TOBACCO NON-USER: CPT | Performed by: PHYSICIAN ASSISTANT

## 2021-03-30 PROCEDURE — 99204 OFFICE O/P NEW MOD 45 MIN: CPT | Performed by: PHYSICIAN ASSISTANT

## 2021-03-30 PROCEDURE — 3008F BODY MASS INDEX DOCD: CPT | Performed by: PHYSICIAN ASSISTANT

## 2021-03-30 RX ORDER — LANOLIN ALCOHOL/MO/W.PET/CERES
3 CREAM (GRAM) TOPICAL
COMMUNITY
End: 2021-10-11

## 2021-03-30 RX ORDER — MELOXICAM 15 MG/1
25 TABLET ORAL DAILY PRN
COMMUNITY

## 2021-03-30 RX ORDER — MULTIVIT WITH MINERALS/LUTEIN
1000 TABLET ORAL DAILY
COMMUNITY

## 2021-03-30 RX ORDER — GLUCOSAMINE/D3/BOSWELLIA SERRA 1500MG-400
TABLET ORAL DAILY
COMMUNITY

## 2021-03-30 RX ORDER — TOPIRAMATE 50 MG/1
50 TABLET, FILM COATED ORAL
Qty: 90 TABLET | Refills: 1 | Status: SHIPPED | OUTPATIENT
Start: 2021-03-30 | End: 2021-04-29 | Stop reason: SDUPTHER

## 2021-03-30 RX ORDER — LOPERAMIDE HYDROCHLORIDE 2 MG/1
2 CAPSULE ORAL 4 TIMES DAILY PRN
COMMUNITY
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
## 2021-04-12 ENCOUNTER — TELEPHONE (OUTPATIENT)
Dept: FAMILY MEDICINE CLINIC | Facility: CLINIC | Age: 56
End: 2021-04-12

## 2021-04-13 ENCOUNTER — EVALUATION (OUTPATIENT)
Dept: PHYSICAL THERAPY | Facility: CLINIC | Age: 56
End: 2021-04-13
Payer: COMMERCIAL

## 2021-04-13 DIAGNOSIS — M50.20 BULGE OF CERVICAL DISC WITHOUT MYELOPATHY: ICD-10-CM

## 2021-04-13 DIAGNOSIS — M48.02 CERVICAL STENOSIS OF SPINAL CANAL: ICD-10-CM

## 2021-04-13 DIAGNOSIS — M54.2 NECK PAIN ON RIGHT SIDE: Primary | ICD-10-CM

## 2021-04-13 DIAGNOSIS — R93.89 ABNORMAL MRI, NECK: ICD-10-CM

## 2021-04-13 PROCEDURE — 97162 PT EVAL MOD COMPLEX 30 MIN: CPT | Performed by: PHYSICAL THERAPIST

## 2021-04-13 PROCEDURE — 97112 NEUROMUSCULAR REEDUCATION: CPT | Performed by: PHYSICAL THERAPIST

## 2021-04-13 NOTE — PROGRESS NOTES
PT Evaluation     Today's date: 2021  Patient name: Janett Paula  : 1965  MRN: [id number]  Referring provider: Emelia Crandall  Dx:   Encounter Diagnosis     ICD-10-CM    1  Neck pain on right side  M54 2 Ambulatory referral to Physical Therapy   2  Cervical stenosis of spinal canal  M48 02 Ambulatory referral to Physical Therapy   3  Bulge of cervical disc without myelopathy  M50 20 Ambulatory referral to Physical Therapy   4  Abnormal MRI, neck  R93 89 Ambulatory referral to Physical Therapy                  Assessment  Assessment details: Janett Paula is a 54y o  year old female presenting to PT with pain, decreased range of motion, decreased strength, and decreased tolerance to activity  Signs and symptoms are consistent with referring diagnosis of neck pain with headaches  She is noted with forward head and shoulder posture as well as recent history of R lumpectomy with ongoing swelling and discomfort  The existing impairments result in difficulty with headaches that worsen as the week progresses  Tony Bean would benefit from skilled PT services to address these issues and to maximize function  Home exercise provided and all questions answered  Thank you for the referral     Impairments: abnormal or restricted ROM, impaired physical strength and pain with function  Understanding of Dx/Px/POC: good   Prognosis: good    Goals  SHORT TERM GOALS (2-4 WEEKS)    Increase cervical spine AROM 75%   Increase upper extremity strength by 5-10lbs in affected planes  Demonstrate 50% independence in correcting seated posture  Increase static positional tolerance to >30 minutes  Able to reach overhead with <5/10 pain    LONG TERM GOALS (DISCHARGE)    >75% independent with correcting sitting posture  Able to reach [address] without exacerbation of symptoms  Lifting tolerance >20lbs    Able to tolerate housework >60 min      Plan  Planned modality interventions: thermotherapy: hydrocollator packs  Planned therapy interventions: joint mobilization, manual therapy, neuromuscular re-education, postural training, strengthening, stretching, therapeutic activities and therapeutic exercise  Frequency: 2x week  Duration in weeks: 6        Subjective Evaluation    History of Present Illness  Mechanism of injury: Mckenzie Torre reports 1 year of neck pain that feels like a "pinched nerve"  MRI in  showed OA in her neck and shoulder and a pinched nerve/bulging disc  She has been treated by pain management since that time  She had her last injection I the neck in December and has a follow up visit scheduled tomorrow  She also reports headaches and migraines  Headaches:  Start as right side neck pain  Migraine symptoms include tinnitus, nausea and lasting up to 48 hours  She is taking medication for migraines and has not had one for >1 year  She does occasionally have numbness into the right thumb  She was diagnosed with breast cancer last year and underwent R side lumpectomy in 2020  She is working as a  and spends most of her day looking down at paperwork, her phone as well as working on a computer  Work is often repetitive  No previous PT has been attempted, but she does feel better when she stretches her neck  Pain is worse as the week goes on and is improved with moist heat or heating pad  Quality of life: good    Pain  Current pain ratin  At best pain ratin  At worst pain ratin  Quality: dull ache and sharp  Aggravating factors: keyboarding and sitting  Progression: improved      Diagnostic Tests  MRI studies: abnormal  Treatments  Previous treatment: injection treatment and medication  Current treatment: injection treatment, medication and physical therapy  Patient Goals  Patient goals for therapy: decreased pain and return to sport/leisure activities          Objective     Palpation   Left   Tenderness of the lower trapezius and upper trapezius       Right   Tenderness of the levator scapulae, pectoralis major, pectoralis minor, scalenes, sternocleidomastoid, suboccipitals and upper trapezius       Active Range of Motion   Cervical/Thoracic Spine       Cervical    Flexion:  Restriction level: moderate  Extension:  Restriction level: minimal  Left rotation:  Restriction level: moderate  Right rotation:  Restriction level: minimal    Strength/Myotome Testing   Cervical Spine     Left   Interossei strength (t1): 4+    Right   Interossei strength (t1): 4+    Left Shoulder     Planes of Motion   Flexion: 4   Abduction: 4     Right Shoulder     Planes of Motion   Flexion: 4   Abduction: 4     Left Elbow   Flexion: 4  Extension: 4    Right Elbow   Flexion: 4  Extension: 4             Precautions: breast ca s/p lympectomy, migraines      Manuals 4/13            SOR, clavicle, upper back nv            Thoracic spine nv                                      Neuro Re-Ed             Upper trap, levator stretch 30"x3            TB ER, low trap RTB 5"x10 ea                                                                Posture ed JL            Ther Ex                                                                                                                     Ther Activity                                       Gait Training                                       Modalities

## 2021-04-14 ENCOUNTER — TELEPHONE (OUTPATIENT)
Dept: SURGICAL ONCOLOGY | Facility: CLINIC | Age: 56
End: 2021-04-14

## 2021-04-14 NOTE — TELEPHONE ENCOUNTER
Spoke with [facility] and discussed her concerns regarding pain and swelling in her breast  Discussed with Dr Yamile Brito and provided [facility] with Dr Arti Jarquin recommendations to use warm compresses to site, use ibuprofen or Tylenol for discomfort  She was given an appt to be seen on 04/20/21 at 12:45 PM She understands and is agreeable

## 2021-04-14 NOTE — TELEPHONE ENCOUNTER
----- Message from Reymundo Swanson sent at 4/14/2021  3:25 PM EDT -----  Regarding: phone call  Contact: [phone]  Patient called, has questions and stated she has swelling on the right breast, hard spots on top of breast and minor pain and discomfort  Patient can be contacted at [phone]

## 2021-04-15 NOTE — TELEPHONE ENCOUNTER
Paperwork completed and scanned to chart  Attempted to call patient  Mailbox is full and message cannot be left  Faxed to [facility] as per fax number on form

## 2021-04-16 ENCOUNTER — APPOINTMENT (OUTPATIENT)
Dept: PHYSICAL THERAPY | Facility: CLINIC | Age: 56
End: 2021-04-16
Payer: COMMERCIAL

## 2021-04-20 ENCOUNTER — OFFICE VISIT (OUTPATIENT)
Dept: SURGICAL ONCOLOGY | Facility: CLINIC | Age: 56
End: 2021-04-20
Payer: COMMERCIAL

## 2021-04-20 VITALS
DIASTOLIC BLOOD PRESSURE: 80 MMHG | BODY MASS INDEX: 26.07 KG/M2 | SYSTOLIC BLOOD PRESSURE: 118 MMHG | HEIGHT: 68 IN | TEMPERATURE: 97.9 F | WEIGHT: 172 LBS | HEART RATE: 73 BPM

## 2021-04-20 DIAGNOSIS — Z17.0 MALIGNANT NEOPLASM OF UPPER-INNER QUADRANT OF RIGHT BREAST IN FEMALE, ESTROGEN RECEPTOR POSITIVE (HCC): Primary | ICD-10-CM

## 2021-04-20 DIAGNOSIS — C50.211 MALIGNANT NEOPLASM OF UPPER-INNER QUADRANT OF RIGHT BREAST IN FEMALE, ESTROGEN RECEPTOR POSITIVE (HCC): Primary | ICD-10-CM

## 2021-04-20 DIAGNOSIS — N64.89 SEROMA OF BREAST: ICD-10-CM

## 2021-04-20 PROCEDURE — 76642 ULTRASOUND BREAST LIMITED: CPT | Performed by: SURGERY

## 2021-04-20 PROCEDURE — 99213 OFFICE O/P EST LOW 20 MIN: CPT | Performed by: SURGERY

## 2021-04-20 PROCEDURE — 19000 PUNCTURE ASPIR CYST BREAST: CPT | Performed by: SURGERY
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
## 2021-04-26 ENCOUNTER — OFFICE VISIT (OUTPATIENT)
Dept: PHYSICAL THERAPY | Facility: CLINIC | Age: 56
End: 2021-04-26
Payer: COMMERCIAL

## 2021-04-26 DIAGNOSIS — R93.89 ABNORMAL MRI, NECK: ICD-10-CM

## 2021-04-26 DIAGNOSIS — M48.02 CERVICAL STENOSIS OF SPINAL CANAL: ICD-10-CM

## 2021-04-26 DIAGNOSIS — M54.2 NECK PAIN ON RIGHT SIDE: Primary | ICD-10-CM

## 2021-04-26 DIAGNOSIS — M50.20 BULGE OF CERVICAL DISC WITHOUT MYELOPATHY: ICD-10-CM

## 2021-04-26 PROCEDURE — 97112 NEUROMUSCULAR REEDUCATION: CPT | Performed by: PHYSICAL THERAPIST

## 2021-04-26 PROCEDURE — 97010 HOT OR COLD PACKS THERAPY: CPT | Performed by: PHYSICAL THERAPIST

## 2021-04-26 PROCEDURE — 97140 MANUAL THERAPY 1/> REGIONS: CPT | Performed by: PHYSICAL THERAPIST

## 2021-04-26 NOTE — PROGRESS NOTES
Daily Note     Today's date: 2021  Patient name: Rahel Pollack  : 1965  MRN: [id number]  Referring provider: Asher Lindsey PA-C  Dx:   Encounter Diagnosis     ICD-10-CM    1  Neck pain on right side  M54 2    2  Cervical stenosis of spinal canal  M48 02    3  Bulge of cervical disc without myelopathy  M50 20    4  Abnormal MRI, neck  R93 [id number]                   Subjective: Fabiola Nowak reports significantly fewer headaches since IE, she has only needed pain medication once in the past two weeks  Objective: See treatment diary below      Assessment: Tolerated treatment well and fatigues appropriately with progression of postural strength/stability exercises    Patient demonstrated fatigue post treatment, exhibited good technique with therapeutic exercises and would benefit from continued PT      Plan: Continue per plan of care  Progress treatment as tolerated         Precautions: breast ca s/p lympectomy, migraines      Manuals            SOR, clavicle, upper back nv 10'           Thoracic spine nv                                      Neuro Re-Ed             Upper trap, levator stretch 30"x3            TB ER, low trap RTB 5"x10 ea Red 5"x15           DNF supine   5"x15           Prone scap retract +ext,  5"x15                                     Posture ed JL            Ther Ex                                                                                                                     Ther Activity                                       Gait Training                                       Modalities

## 2021-04-28 ENCOUNTER — OFFICE VISIT (OUTPATIENT)
Dept: PHYSICAL THERAPY | Facility: CLINIC | Age: 56
End: 2021-04-28
Payer: COMMERCIAL

## 2021-04-28 DIAGNOSIS — M50.20 BULGE OF CERVICAL DISC WITHOUT MYELOPATHY: ICD-10-CM

## 2021-04-28 DIAGNOSIS — G43.101 MIGRAINE WITH AURA AND WITH STATUS MIGRAINOSUS, NOT INTRACTABLE: Primary | ICD-10-CM

## 2021-04-28 DIAGNOSIS — M48.02 CERVICAL STENOSIS OF SPINAL CANAL: ICD-10-CM

## 2021-04-28 DIAGNOSIS — R93.89 ABNORMAL MRI, NECK: ICD-10-CM

## 2021-04-28 DIAGNOSIS — M54.2 NECK PAIN ON RIGHT SIDE: Primary | ICD-10-CM

## 2021-04-28 PROCEDURE — 97112 NEUROMUSCULAR REEDUCATION: CPT | Performed by: PHYSICAL THERAPIST

## 2021-04-28 PROCEDURE — 97140 MANUAL THERAPY 1/> REGIONS: CPT | Performed by: PHYSICAL THERAPIST

## 2021-04-28 RX ORDER — RIZATRIPTAN BENZOATE 10 MG/1
10 TABLET, ORALLY DISINTEGRATING ORAL ONCE AS NEEDED
Qty: 9 TABLET | Refills: 2 | Status: SHIPPED | OUTPATIENT
Start: 2021-04-28 | End: 2021-07-21 | Stop reason: SDUPTHER

## 2021-04-28 NOTE — PROGRESS NOTES
Daily Note     Today's date: 2021  Patient name: Akiko Page  : 1965  MRN: [id number]  Referring provider: Cordell Mina PA-C  Dx:   Encounter Diagnosis     ICD-10-CM    1  Neck pain on right side  M54 2    2  Cervical stenosis of spinal canal  M48 02    3  Bulge of cervical disc without myelopathy  M50 20    4  Abnormal MRI, neck  R93 89                   Subjective: Gayle Dove reports a migraine began around mid-day yesterday with no known trigger  She has taken some of her pain medication with mild relief  Objective: See treatment diary below      Assessment: Tolerated treatment well and entire session focused on MT today with moderate reduction in her heacache symptoms  Greatest amount of tendeness present at proximal SCM on the right, upper trap and suboccipitals  It is possible that her headache is cervicogenic in nature as her migraines had been well controlled for some time    Patient would benefit from continued PT      Plan: Continue per plan of care  Progress treatment as tolerated         Precautions: breast ca s/p lympectomy, migraines      Manuals           SOR, clavicle, upper back nv 10' 10'          Thoracic spine nv            Upper trap,  SCM, scalenes   15                       Neuro Re-Ed             Upper trap, levator stretch 30"x3            TB ER, low trap RTB 5"x10 ea Red 5"x15           DNF supine   5"x15           Prone scap retract +ext,  5"x15           T1 shear MWM   2'          Sphenoid MWM   2'          Posture ed JL  5'          Ther Ex                                                                                                                     Ther Activity                                       Gait Training                                       Modalities             MHP   10' pre

## 2021-04-28 NOTE — TELEPHONE ENCOUNTER
Patient is requesting a refill of Rizatriptan  Pharmacy is [facility] in [place]  Medication has not been filled by our office since establishing care 3/30/21   Next appointment is 4/29/21

## 2021-04-29 ENCOUNTER — OFFICE VISIT (OUTPATIENT)
Dept: FAMILY MEDICINE CLINIC | Facility: CLINIC | Age: 56
End: 2021-04-29
Payer: COMMERCIAL

## 2021-04-29 VITALS
OXYGEN SATURATION: 96 % | WEIGHT: 168 LBS | BODY MASS INDEX: 26.37 KG/M2 | HEIGHT: 67 IN | TEMPERATURE: 97.8 F | SYSTOLIC BLOOD PRESSURE: 102 MMHG | DIASTOLIC BLOOD PRESSURE: 72 MMHG | HEART RATE: 70 BPM

## 2021-04-29 DIAGNOSIS — G43.101 MIGRAINE WITH AURA AND WITH STATUS MIGRAINOSUS, NOT INTRACTABLE: ICD-10-CM

## 2021-04-29 PROCEDURE — 3008F BODY MASS INDEX DOCD: CPT | Performed by: PHYSICIAN ASSISTANT

## 2021-04-29 PROCEDURE — 3725F SCREEN DEPRESSION PERFORMED: CPT | Performed by: PHYSICIAN ASSISTANT

## 2021-04-29 PROCEDURE — 99213 OFFICE O/P EST LOW 20 MIN: CPT | Performed by: PHYSICIAN ASSISTANT

## 2021-04-29 PROCEDURE — 1036F TOBACCO NON-USER: CPT | Performed by: PHYSICIAN ASSISTANT

## 2021-04-29 RX ORDER — METHOCARBAMOL 500 MG/1
500 TABLET, FILM COATED ORAL 2 TIMES DAILY PRN
COMMUNITY
Start: 2021-04-15 | End: 2022-03-10 | Stop reason: SDUPTHER

## 2021-04-29 RX ORDER — TOPIRAMATE 100 MG/1
100 TABLET, FILM COATED ORAL
Start: 2021-04-29 | End: 2021-07-21 | Stop reason: SDUPTHER
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
## 2021-05-03 ENCOUNTER — OFFICE VISIT (OUTPATIENT)
Dept: PHYSICAL THERAPY | Facility: CLINIC | Age: 56
End: 2021-05-03
Payer: COMMERCIAL

## 2021-05-03 DIAGNOSIS — M48.02 CERVICAL STENOSIS OF SPINAL CANAL: ICD-10-CM

## 2021-05-03 DIAGNOSIS — M54.2 NECK PAIN ON RIGHT SIDE: Primary | ICD-10-CM

## 2021-05-03 DIAGNOSIS — R93.89 ABNORMAL MRI, NECK: ICD-10-CM

## 2021-05-03 DIAGNOSIS — M50.20 BULGE OF CERVICAL DISC WITHOUT MYELOPATHY: ICD-10-CM

## 2021-05-03 PROCEDURE — 97010 HOT OR COLD PACKS THERAPY: CPT | Performed by: PHYSICAL THERAPIST

## 2021-05-03 PROCEDURE — 97112 NEUROMUSCULAR REEDUCATION: CPT | Performed by: PHYSICAL THERAPIST

## 2021-05-03 PROCEDURE — 97140 MANUAL THERAPY 1/> REGIONS: CPT | Performed by: PHYSICAL THERAPIST

## 2021-05-03 NOTE — PROGRESS NOTES
Daily Note     Today's date: 5/3/2021  Patient name: Jose Veliz  : 1965  MRN: [id number]  Referring provider: Henny Hayden PA-C  Dx:   Encounter Diagnosis     ICD-10-CM    1  Neck pain on right side  M54 2    2  Cervical stenosis of spinal canal  M48 02    3  Bulge of cervical disc without myelopathy  M50 20    4  Abnormal MRI, neck  R93 89                   Subjective: Isreal Cervantes reports full resolution of headaches  She was also fit for custom support bra's to accommodate lymphedema symptms  Objective: See treatment diary below      Assessment: Tolerated treatment well and tolerated additional strengthening well    Patient demonstrated fatigue post treatment and would benefit from continued PT      Plan: Continue per plan of care  Progress treatment as tolerated         Precautions: breast ca s/p lympectomy, migraines      Manuals 4/13 4/26 4/28 5/3         SOR, clavicle, upper back nv 10' 10' 5'         Thoracic spine nv            Upper trap,  SCM, scalenes   15 10'                      Neuro Re-Ed             Upper trap, levator stretch 30"x3            TB ER, low trap RTB 5"x10 ea Red 5"x15  Red 5"x15         DNF supine   5"x15  5"x15         Prone scap retract +ext,  5"x15           T1 shear MWM   2'          Sphenoid MWM   2'          Posture ed JL  5'          Row, low trap TB    Green 2x10         Ther Ex                                                                                                                     Ther Activity                                       Gait Training                                       Modalities             MHP   10' pre 10' pre

## 2021-05-05 ENCOUNTER — OFFICE VISIT (OUTPATIENT)
Dept: PHYSICAL THERAPY | Facility: CLINIC | Age: 56
End: 2021-05-05
Payer: COMMERCIAL

## 2021-05-05 DIAGNOSIS — R93.89 ABNORMAL MRI, NECK: ICD-10-CM

## 2021-05-05 DIAGNOSIS — M50.20 BULGE OF CERVICAL DISC WITHOUT MYELOPATHY: ICD-10-CM

## 2021-05-05 DIAGNOSIS — M54.2 NECK PAIN ON RIGHT SIDE: Primary | ICD-10-CM

## 2021-05-05 DIAGNOSIS — M48.02 CERVICAL STENOSIS OF SPINAL CANAL: ICD-10-CM

## 2021-05-05 PROCEDURE — 97112 NEUROMUSCULAR REEDUCATION: CPT | Performed by: PHYSICAL THERAPIST

## 2021-05-05 PROCEDURE — 97110 THERAPEUTIC EXERCISES: CPT | Performed by: PHYSICAL THERAPIST

## 2021-05-05 PROCEDURE — 97140 MANUAL THERAPY 1/> REGIONS: CPT | Performed by: PHYSICAL THERAPIST

## 2021-05-05 NOTE — PROGRESS NOTES
Daily Note     Today's date: 2021  Patient name: Ivonne Soto  : 1965  MRN: [id number]  Referring provider: Сергей Campbell PA-C  Dx:   Encounter Diagnosis     ICD-10-CM    1  Neck pain on right side  M54 2    2  Cervical stenosis of spinal canal  M48 02    3  Bulge of cervical disc without myelopathy  M50 20    4  Abnormal MRI, neck  R93 89                   Subjective: Goldie Brandt reports less pain and tightness since beginning PT  She is more aware of her posture throughout the day, especially while at work  Objective: See treatment diary below      Assessment: Tolerated treatment well and tolerated progression of strengthening well  She continues to present with tension in upper trap and scalenes R>L  Patient demonstrated fatigue post treatment, exhibited good technique with therapeutic exercises and would benefit from continued PT      Plan: Continue per plan of care  Progress treatment as tolerated         Precautions: breast ca s/p lympectomy, migraines      Manuals 4/13 4/26 4/28 5/3 5/5        SOR, clavicle, upper back nv 10' 10' 5' 5        Thoracic spine nv            Upper trap,  SCM, scalenes   15 10' 10                     Neuro Re-Ed             Upper trap, levator stretch 30"x3            TB ER, low trap RTB 5"x10 ea Red 5"x15  Red 5"x15 Green 5"x10        DNF supine   5"x15  5"x15         Prone scap retract +ext,  5"x15           T1 shear MWM   2'          Sphenoid MWM   2'          Posture ed JL  5'          Row, low trap TB    Green 2x10 Green 2x10        Serratus Protraction/retraction at wall     15x        Ther Ex             Hz abd     Red 2x10        PNF D2     nv        Full can scaption     0# 2x10                                                                         Ther Activity                                       Gait Training                                       Modalities             MHP   10' pre 10' pre

## 2021-05-10 ENCOUNTER — APPOINTMENT (OUTPATIENT)
Dept: PHYSICAL THERAPY | Facility: CLINIC | Age: 56
End: 2021-05-10
Payer: COMMERCIAL

## 2021-05-12 ENCOUNTER — OFFICE VISIT (OUTPATIENT)
Dept: PHYSICAL THERAPY | Facility: CLINIC | Age: 56
End: 2021-05-12
Payer: COMMERCIAL

## 2021-05-12 DIAGNOSIS — M50.20 BULGE OF CERVICAL DISC WITHOUT MYELOPATHY: ICD-10-CM

## 2021-05-12 DIAGNOSIS — M54.2 NECK PAIN ON RIGHT SIDE: Primary | ICD-10-CM

## 2021-05-12 DIAGNOSIS — M48.02 CERVICAL STENOSIS OF SPINAL CANAL: ICD-10-CM

## 2021-05-12 PROCEDURE — 97010 HOT OR COLD PACKS THERAPY: CPT | Performed by: PHYSICAL THERAPIST

## 2021-05-12 PROCEDURE — 97112 NEUROMUSCULAR REEDUCATION: CPT | Performed by: PHYSICAL THERAPIST

## 2021-05-12 NOTE — PROGRESS NOTES
Daily Note     Today's date: 2021  Patient name: Sebastian Murphy  : 1965  MRN: [id number]  Referring provider: Silvia Blanchard PA-C  Dx:   Encounter Diagnosis     ICD-10-CM    1  Neck pain on right side  M54 2    2  Cervical stenosis of spinal canal  M48 02    3  Bulge of cervical disc without myelopathy  M50 20                   Subjective: Alline New reports another migraine occurred earlier this week  Objective: See treatment diary below      Assessment: Tolerated treatment fair and limited treatment to MT only due to recent migraine and patient concern that exertion is a triggering factor    Patient would benefit from continued PT      Plan: Continue per plan of care  Progress treatment as tolerated         Precautions: breast ca s/p lympectomy, migraines      Manuals 4/13 4/26 4/28 5/3 5/5 5/12       SOR, clavicle, upper back nv 10' 10' 5' 5 10       Thoracic spine nv            Upper trap,  SCM, scalenes   15 10' 10 15                      Neuro Re-Ed             Upper trap, levator stretch 30"x3            TB ER, low trap RTB 5"x10 ea Red 5"x15  Red 5"x15 Green 5"x10        DNF supine   5"x15  5"x15         Prone scap retract +ext,  5"x15           T1 shear MWM   2'          Sphenoid MWM   2'          Posture ed JL  5'          Row, low trap TB    Green 2x10 Green 2x10        Serratus Protraction/retraction at wall     15x        Ther Ex             Hz abd     Red 2x10        PNF D2     nv        Full can scaption     0# 2x10                                                                         Ther Activity                                       Gait Training                                       Modalities             MHP   10' pre 10' pre  10' pre

## 2021-05-13 ENCOUNTER — CONSULT (OUTPATIENT)
Dept: GASTROENTEROLOGY | Facility: MEDICAL CENTER | Age: 56
End: 2021-05-13
Payer: COMMERCIAL

## 2021-05-13 VITALS
HEIGHT: 67 IN | TEMPERATURE: 96.6 F | BODY MASS INDEX: 26.06 KG/M2 | DIASTOLIC BLOOD PRESSURE: 63 MMHG | WEIGHT: 166 LBS | SYSTOLIC BLOOD PRESSURE: 96 MMHG | HEART RATE: 75 BPM

## 2021-05-13 DIAGNOSIS — K64.4 EXTERNAL HEMORRHOIDS: ICD-10-CM

## 2021-05-13 DIAGNOSIS — R10.9 ABDOMINAL CRAMPING: Primary | ICD-10-CM

## 2021-05-13 DIAGNOSIS — Z86.010 HISTORY OF COLONIC POLYPS: ICD-10-CM

## 2021-05-13 DIAGNOSIS — Z80.0 FAMILY HISTORY OF COLON CANCER: ICD-10-CM

## 2021-05-13 PROCEDURE — 99204 OFFICE O/P NEW MOD 45 MIN: CPT | Performed by: PHYSICIAN ASSISTANT

## 2021-05-13 PROCEDURE — 3008F BODY MASS INDEX DOCD: CPT | Performed by: PHYSICIAN ASSISTANT

## 2021-05-13 PROCEDURE — 1036F TOBACCO NON-USER: CPT | Performed by: PHYSICIAN ASSISTANT

## 2021-05-13 RX ORDER — DICYCLOMINE HCL 20 MG
20 TABLET ORAL EVERY 6 HOURS PRN
Qty: 120 TABLET | Refills: 0 | Status: SHIPPED | OUTPATIENT
Start: 2021-05-13 | End: 2021-10-11

## 2021-05-13 RX ORDER — HYDROCORTISONE 25 MG/G
CREAM TOPICAL 2 TIMES DAILY
Qty: 28 G | Refills: 3 | Status: SHIPPED | OUTPATIENT
Start: 2021-05-13 | End: 2022-04-12

## 2021-05-13 NOTE — PATIENT INSTRUCTIONS
The patient is scheduled at [facility] for a colon with Dr Burnett on 07/02/2021  Miralax/ducolax prep instructions have been gone over in the office, with the patient, by the MA  The patient is aware that they will receive a call with the arrival time the day prior to procedure and that they will need a  the day of the procedure   I have asked the patient to call with any questions that they might have prior to procedure
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
## 2021-05-17 ENCOUNTER — OFFICE VISIT (OUTPATIENT)
Dept: PHYSICAL THERAPY | Facility: CLINIC | Age: 56
End: 2021-05-17
Payer: COMMERCIAL

## 2021-05-17 DIAGNOSIS — M54.2 NECK PAIN ON RIGHT SIDE: Primary | ICD-10-CM

## 2021-05-17 DIAGNOSIS — R93.89 ABNORMAL MRI, NECK: ICD-10-CM

## 2021-05-17 DIAGNOSIS — M48.02 CERVICAL STENOSIS OF SPINAL CANAL: ICD-10-CM

## 2021-05-17 DIAGNOSIS — M50.20 BULGE OF CERVICAL DISC WITHOUT MYELOPATHY: ICD-10-CM

## 2021-05-17 PROCEDURE — 97140 MANUAL THERAPY 1/> REGIONS: CPT | Performed by: PHYSICAL THERAPIST

## 2021-05-17 PROCEDURE — 97112 NEUROMUSCULAR REEDUCATION: CPT | Performed by: PHYSICAL THERAPIST

## 2021-05-17 NOTE — PROGRESS NOTES
Daily Note     Today's date: 2021  Patient name: Franki Rdz  : 1965  MRN: [id number]  Referring provider: Emelyn Yang PA-C  Dx:   Encounter Diagnosis     ICD-10-CM    1  Neck pain on right side  M54 2    2  Cervical stenosis of spinal canal  M48 02    3  Bulge of cervical disc without myelopathy  M50 20    4  Abnormal MRI, neck  R93 [id number]                   Subjective: Jenni Pro reports no headaches over the weekend, she did less gardening and allowed for more rest         Objective: See treatment diary below      Assessment: Tolerated treatment well and continued to proress postural strengthening with good tolerance    Patient demonstrated fatigue post treatment and would benefit from continued PT      Plan: Continue per plan of care  Potential discharge next visit       Precautions: breast ca s/p lympectomy, migraines      Manuals 4/13 4/26 4/28 5/3 5/5 5/12 5/17      SOR, clavicle, upper back nv 10' 10' 5' 5 10 10      Thoracic spine nv            Upper trap,  SCM, scalenes   15 10' 10 15   15                   Neuro Re-Ed             Upper trap, levator stretch 30"x3            TB ER, low trap RTB 5"x10 ea Red 5"x15  Red 5"x15 Green 5"x10        DNF supine   5"x15  5"x15  5"x15 5'x15      Prone scap retract +ext,  5"x15           T1 shear MWM   2'          Sphenoid MWM   2'          Posture ed JL  5'          Row, low trap TB    Green 2x10 Green 2x10        Serratus Protraction/retraction at wall     15x  20x      Wall clock       Red 5x ea      Ther Ex             Hz abd     Red 2x10  Red 2x10      PNF D2     nv Red Red 10x ea      Full can scaption     0# 2x10  1# 2x10                                                                       Ther Activity                                       Gait Training                                       Modalities             MHP   10' pre 10' pre  10' pre

## 2021-05-19 ENCOUNTER — OFFICE VISIT (OUTPATIENT)
Dept: PHYSICAL THERAPY | Facility: CLINIC | Age: 56
End: 2021-05-19
Payer: COMMERCIAL

## 2021-05-19 DIAGNOSIS — M48.02 CERVICAL STENOSIS OF SPINAL CANAL: ICD-10-CM

## 2021-05-19 DIAGNOSIS — R93.89 ABNORMAL MRI, NECK: ICD-10-CM

## 2021-05-19 DIAGNOSIS — M50.20 BULGE OF CERVICAL DISC WITHOUT MYELOPATHY: ICD-10-CM

## 2021-05-19 DIAGNOSIS — M54.2 NECK PAIN ON RIGHT SIDE: Primary | ICD-10-CM

## 2021-05-19 PROCEDURE — 97140 MANUAL THERAPY 1/> REGIONS: CPT | Performed by: PHYSICAL THERAPIST

## 2021-05-19 PROCEDURE — 97112 NEUROMUSCULAR REEDUCATION: CPT | Performed by: PHYSICAL THERAPIST

## 2021-05-19 NOTE — PROGRESS NOTES
Alvin     Today's date: 2021  Patient name: Miladis Ely  : 1965  MRN: [id number]  Referring provider: Rosy Villanueva  Dx:   Encounter Diagnosis     ICD-10-CM    1  Neck pain on right side  M54 2    2  Cervical stenosis of spinal canal  M48 02    3  Bulge of cervical disc without myelopathy  M50 20    4  Abnormal MRI, neck  R93 89                   Subjective: Mckenzie Torre reports overall feeling much better  She is planning to DC to HEP       Objective: See treatment diary below      Assessment: Tolerated treatment well and reviewed all HEP as well as self MLD to manage remaining lymphedema symptoms    Patient demonstrated fatigue post treatment and would benefit from continued PT      Plan: Continue per plan of care  Progress treatment as tolerated         Precautions: breast ca s/p lympectomy, migraines      Manuals 4/13 4/26 4/28 5/3 5/5 5/12 5/17 5/19     SOR, clavicle, upper back nv 10' 10' 5' 5 10 10 15       Thoracic spine nv            Upper trap,  SCM, scalenes   15 10' 10 15   15                   Neuro Re-Ed             Upper trap, levator stretch 30"x3            TB ER, low trap RTB 5"x10 ea Red 5"x15  Red 5"x15 Green 5"x10   Greem 2x10     DNF supine   5"x15  5"x15  5"x15 5'x15 5"x15     Prone scap retract +ext,  5"x15           T1 shear MWM   2'          Sphenoid MWM   2'          Posture ed JL  5'          Row, low trap TB    Green 2x10 Green 2x10        Serratus Protraction/retraction at wall     15x  20x 20x     Wall clock       Red 5x ea      Ther Ex             Hz abd     Red 2x10  Red 2x10 green 2x10     PNF D2     nv Red Red 10x ea green 2x10     Full can scaption     0# 2x10  1# 2x10 1# 2x10                                                                      Ther Activity                                       Gait Training                                       Modalities             MHP   10' pre 10' pre  10' pre  5'

## 2021-05-24 ENCOUNTER — APPOINTMENT (OUTPATIENT)
Dept: PHYSICAL THERAPY | Facility: CLINIC | Age: 56
End: 2021-05-24
Payer: COMMERCIAL

## 2021-05-26 ENCOUNTER — APPOINTMENT (OUTPATIENT)
Dept: PHYSICAL THERAPY | Facility: CLINIC | Age: 56
End: 2021-05-26
Payer: COMMERCIAL

## 2021-06-10 PROBLEM — K62.89 ANAL PAIN: Status: ACTIVE | Noted: 2021-06-10

## 2021-06-15 ENCOUNTER — HOSPITAL ENCOUNTER (OUTPATIENT)
Dept: MAMMOGRAPHY | Facility: HOSPITAL | Age: 56
Discharge: HOME | End: 2021-06-15
Payer: COMMERCIAL

## 2021-06-15 ENCOUNTER — HOSPITAL ENCOUNTER (OUTPATIENT)
Dept: ULTRASOUND IMAGING | Facility: HOSPITAL | Age: 56
Discharge: HOME | End: 2021-06-15
Payer: COMMERCIAL

## 2021-06-15 VITALS — BODY MASS INDEX: 26.06 KG/M2 | HEIGHT: 67 IN | WEIGHT: 166 LBS

## 2021-06-15 DIAGNOSIS — Z17.0 MALIGNANT NEOPLASM OF UPPER-INNER QUADRANT OF RIGHT BREAST IN FEMALE, ESTROGEN RECEPTOR POSITIVE (HCC): ICD-10-CM

## 2021-06-15 DIAGNOSIS — C50.211 MALIGNANT NEOPLASM OF UPPER-INNER QUADRANT OF RIGHT BREAST IN FEMALE, ESTROGEN RECEPTOR POSITIVE (HCC): ICD-10-CM

## 2021-06-15 PROCEDURE — G0279 TOMOSYNTHESIS, MAMMO: HCPCS

## 2021-06-15 PROCEDURE — 76642 ULTRASOUND BREAST LIMITED: CPT

## 2021-06-15 PROCEDURE — 77066 DX MAMMO INCL CAD BI: CPT

## 2021-06-16 ENCOUNTER — RADIATION ONCOLOGY FOLLOW-UP (OUTPATIENT)
Dept: RADIATION ONCOLOGY | Facility: CLINIC | Age: 56
End: 2021-06-16
Attending: RADIOLOGY
Payer: COMMERCIAL

## 2021-06-16 VITALS
HEART RATE: 79 BPM | OXYGEN SATURATION: 95 % | BODY MASS INDEX: 25.85 KG/M2 | WEIGHT: 164.68 LBS | RESPIRATION RATE: 18 BRPM | DIASTOLIC BLOOD PRESSURE: 60 MMHG | TEMPERATURE: 98.8 F | SYSTOLIC BLOOD PRESSURE: 108 MMHG | HEIGHT: 67 IN

## 2021-06-16 DIAGNOSIS — Z17.0 MALIGNANT NEOPLASM OF UPPER-INNER QUADRANT OF RIGHT BREAST IN FEMALE, ESTROGEN RECEPTOR POSITIVE (HCC): Primary | ICD-10-CM

## 2021-06-16 DIAGNOSIS — C50.211 MALIGNANT NEOPLASM OF UPPER-INNER QUADRANT OF RIGHT BREAST IN FEMALE, ESTROGEN RECEPTOR POSITIVE (HCC): Primary | ICD-10-CM

## 2021-06-16 PROCEDURE — 99211 OFF/OP EST MAY X REQ PHY/QHP: CPT | Performed by: RADIOLOGY

## 2021-06-16 PROCEDURE — G0463 HOSPITAL OUTPT CLINIC VISIT: HCPCS | Performed by: RADIOLOGY
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
## 2021-06-21 ENCOUNTER — OFFICE VISIT (OUTPATIENT)
Dept: SURGICAL ONCOLOGY | Facility: CLINIC | Age: 56
End: 2021-06-21
Payer: COMMERCIAL

## 2021-06-21 VITALS
WEIGHT: 166 LBS | HEIGHT: 67 IN | DIASTOLIC BLOOD PRESSURE: 82 MMHG | HEART RATE: 72 BPM | BODY MASS INDEX: 26.06 KG/M2 | TEMPERATURE: 97.1 F | SYSTOLIC BLOOD PRESSURE: 126 MMHG

## 2021-06-21 DIAGNOSIS — Z79.811 USE OF ANASTROZOLE: ICD-10-CM

## 2021-06-21 DIAGNOSIS — Z17.0 MALIGNANT NEOPLASM OF UPPER-INNER QUADRANT OF RIGHT BREAST IN FEMALE, ESTROGEN RECEPTOR POSITIVE (HCC): Primary | ICD-10-CM

## 2021-06-21 DIAGNOSIS — R92.2 DENSE BREAST TISSUE: ICD-10-CM

## 2021-06-21 DIAGNOSIS — Z92.3 HISTORY OF RADIATION THERAPY: ICD-10-CM

## 2021-06-21 DIAGNOSIS — N60.81 CYST OF SKIN OF RIGHT BREAST: ICD-10-CM

## 2021-06-21 DIAGNOSIS — C50.211 MALIGNANT NEOPLASM OF UPPER-INNER QUADRANT OF RIGHT BREAST IN FEMALE, ESTROGEN RECEPTOR POSITIVE (HCC): Primary | ICD-10-CM

## 2021-06-21 PROBLEM — R92.30 DENSE BREAST TISSUE: Status: ACTIVE | Noted: 2021-06-21

## 2021-06-21 PROCEDURE — 3008F BODY MASS INDEX DOCD: CPT | Performed by: SURGERY

## 2021-06-21 PROCEDURE — 99214 OFFICE O/P EST MOD 30 MIN: CPT | Performed by: SURGERY

## 2021-06-21 PROCEDURE — 1036F TOBACCO NON-USER: CPT | Performed by: SURGERY
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
## 2021-07-01 ENCOUNTER — ANESTHESIA (OUTPATIENT)
Dept: ANESTHESIOLOGY | Facility: HOSPITAL | Age: 56
End: 2021-07-01

## 2021-07-01 ENCOUNTER — ANESTHESIA EVENT (OUTPATIENT)
Dept: ANESTHESIOLOGY | Facility: HOSPITAL | Age: 56
End: 2021-07-01

## 2021-07-01 ENCOUNTER — TELEPHONE (OUTPATIENT)
Dept: GASTROENTEROLOGY | Facility: MEDICAL CENTER | Age: 56
End: 2021-07-01

## 2021-07-01 NOTE — ANESTHESIA PREPROCEDURE EVALUATION
Procedure:  PRE-OP ONLY    Relevant Problems   ANESTHESIA (within normal limits)      CARDIO (within normal limits)      ENDO   (+) Hypothyroidism      GI/HEPATIC (within normal limits)      /RENAL (within normal limits)      GYN   (+) Malignant neoplasm of 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, estrogen receptor positive (HCC)      HEMATOLOGY (within normal limits)      MUSCULOSKELETAL   (+) DDD (degenerative disc disease), cervical      NEURO/PSYCH   (+) Depression   (+) History of colonic polyps      PULMONARY   (+) Chronic obstructive lung disease (HCC)             Anesthesia Plan  ASA Score- 3     Anesthesia Type- IV sedation with anesthesia with ASA Monitors  Additional Monitors:   Airway Plan:           Plan Factors-Exercise tolerance (METS): >4 METS  Chart reviewed  Patient summary reviewed  Patient is not a current smoker  Patient instructed to abstain from smoking on day of procedure  Patient did not smoke on day of surgery  Induction- intravenous  Postoperative Plan-     Informed Consent- Anesthetic plan and risks discussed with patient

## 2021-07-02 ENCOUNTER — ANESTHESIA (OUTPATIENT)
Dept: GASTROENTEROLOGY | Facility: MEDICAL CENTER | Age: 56
End: 2021-07-02

## 2021-07-02 ENCOUNTER — HOSPITAL ENCOUNTER (OUTPATIENT)
Dept: GASTROENTEROLOGY | Facility: MEDICAL CENTER | Age: 56
Setting detail: OUTPATIENT SURGERY
Discharge: HOME | End: 2021-07-02
Admitting: INTERNAL MEDICINE
Payer: COMMERCIAL

## 2021-07-02 ENCOUNTER — ANESTHESIA EVENT (OUTPATIENT)
Dept: GASTROENTEROLOGY | Facility: MEDICAL CENTER | Age: 56
End: 2021-07-02

## 2021-07-02 VITALS
OXYGEN SATURATION: 100 % | HEIGHT: 67 IN | DIASTOLIC BLOOD PRESSURE: 67 MMHG | WEIGHT: 164 LBS | SYSTOLIC BLOOD PRESSURE: 106 MMHG | BODY MASS INDEX: 25.74 KG/M2 | RESPIRATION RATE: 16 BRPM | TEMPERATURE: 98.1 F | HEART RATE: 70 BPM

## 2021-07-02 DIAGNOSIS — Z80.0 FAMILY HISTORY OF COLON CANCER: ICD-10-CM

## 2021-07-02 DIAGNOSIS — G56.00 CARPAL TUNNEL SYNDROME, UNSPECIFIED LATERALITY: Primary | ICD-10-CM

## 2021-07-02 DIAGNOSIS — Z86.010 HISTORY OF COLONIC POLYPS: ICD-10-CM

## 2021-07-02 PROCEDURE — 88305 TISSUE EXAM BY PATHOLOGIST: CPT | Performed by: PATHOLOGY

## 2021-07-02 PROCEDURE — 45385 COLONOSCOPY W/LESION REMOVAL: CPT | Performed by: INTERNAL MEDICINE

## 2021-07-02 PROCEDURE — 45380 COLONOSCOPY AND BIOPSY: CPT | Performed by: INTERNAL MEDICINE

## 2021-07-02 RX ORDER — ACETAMINOPHEN 500 MG
1000 TABLET ORAL EVERY 6 HOURS PRN
Qty: 30 TABLET | Refills: 0 | Status: SHIPPED | OUTPATIENT
Start: 2021-07-02 | End: 2022-04-12

## 2021-07-02 RX ORDER — LIDOCAINE HYDROCHLORIDE 20 MG/ML
INJECTION, SOLUTION EPIDURAL; INFILTRATION; INTRACAUDAL; PERINEURAL AS NEEDED
Status: DISCONTINUED | OUTPATIENT
Start: 2021-07-02 | End: 2021-07-02

## 2021-07-02 RX ORDER — SODIUM CHLORIDE 9 MG/ML
125 INJECTION, SOLUTION INTRAVENOUS CONTINUOUS
Status: DISCONTINUED | OUTPATIENT
Start: 2021-07-02 | End: 2021-07-02

## 2021-07-02 RX ORDER — PROPOFOL 10 MG/ML
INJECTION, EMULSION INTRAVENOUS AS NEEDED
Status: DISCONTINUED | OUTPATIENT
Start: 2021-07-02 | End: 2021-07-02

## 2021-07-02 RX ADMIN — SODIUM CHLORIDE 125 ML/HR: 0.9 INJECTION, SOLUTION INTRAVENOUS at 11:40

## 2021-07-02 RX ADMIN — PROPOFOL 50 MG: 10 INJECTION, EMULSION INTRAVENOUS at 13:00

## 2021-07-02 RX ADMIN — PROPOFOL 100 MG: 10 INJECTION, EMULSION INTRAVENOUS at 12:49

## 2021-07-02 RX ADMIN — PROPOFOL 50 MG: 10 INJECTION, EMULSION INTRAVENOUS at 13:06

## 2021-07-02 RX ADMIN — PROPOFOL 50 MG: 10 INJECTION, EMULSION INTRAVENOUS at 13:17

## 2021-07-02 RX ADMIN — LIDOCAINE HYDROCHLORIDE 80 MG: 20 INJECTION, SOLUTION EPIDURAL; INFILTRATION; INTRACAUDAL; PERINEURAL at 12:49

## 2021-07-02 RX ADMIN — PROPOFOL 50 MG: 10 INJECTION, EMULSION INTRAVENOUS at 12:55

## 2021-07-02 RX ADMIN — PROPOFOL 100 MG: 10 INJECTION, EMULSION INTRAVENOUS at 13:11
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
## 2021-07-02 NOTE — DISCHARGE INSTRUCTIONS
Colonoscopy   WHAT YOU NEED TO KNOW:   A colonoscopy is a procedure to examine the inside of your colon (intestine) with a scope  Polyps or tissue growths may have been removed during your colonoscopy  It is normal to feel bloated and to have some abdominal discomfort  You should be passing gas  If you have hemorrhoids or you had polyps removed, you may have a small amount of bleeding  DISCHARGE INSTRUCTIONS:   Seek care immediately if:    You have sudden, severe abdominal pain   You have problems swallowing   You have a large amount of black, sticky bowel movements or blood in your bowel movements   You have sudden trouble breathing   You feel weak, lightheaded, or faint or your heart beats faster than normal for you  Contact your healthcare provider if:    You have a fever and chills   You have nausea or are vomiting   Your abdomen is bloated or feels full and hard   You have abdominal pain   You have black, sticky bowel movements or blood in your bowel movements   You have not had a bowel movement for 3 days after your procedure   You have rash or hives   You have questions or concerns about your procedure  Activity:    Do not lift, strain, or run for 24 hours after your procedure   Rest after your procedure  You have been given medicine to relax you  Do not drive or make important decisions until the day after your procedure  Return to your normal activity as directed   Relieve gas and discomfort from bloating by lying on your right side with a heating pad on your abdomen  You may need to take short walks to help the gas move out  Eat small meals until bloating is relieved  Follow up with your healthcare provider as directed: Write down your questions so you remember to ask them during your visits  If you take a blood thinner, please review the specific instructions from your endoscopist about when you should resume it   These can be found in the Recommendation and Your Medication list sections of this After Visit Summary  Colorectal Polyps   WHAT YOU NEED TO KNOW:   Colorectal polyps are small growths of tissue in the lining of the colon and rectum  Most polyps are hyperplastic polyps and are usually benign (noncancerous)  Certain types of polyps, called adenomatous polyps, may turn into cancer  DISCHARGE INSTRUCTIONS:   Follow up with your healthcare provider or gastroenterologist as directed: You may need to return for more tests, such as another colonoscopy  Write down your questions so you remember to ask them during your visits  Reduce your risk for colorectal polyps:   · Eat a variety of healthy foods:  Healthy foods include fruit, vegetables, whole-grain breads, low-fat dairy products, beans, lean meat, and fish  Ask if you need to be on a special diet  · Maintain a healthy weight:  Ask your healthcare provider if you need to lose weight and how much you need to lose  Ask for help with a weight loss program     · Exercise:  Begin to exercise slowly and do more as you get stronger  Talk with your healthcare provider before you start an exercise program      · Limit alcohol:  Your risk for polyps increases the more you drink  · Do not smoke: If you smoke, it is never too late to quit  Ask for information about how to stop  For support and more information:   · Arie Sánchez ([facility]) [address], [place] [address]  Phone: [phone]  Web Address: www digestive  niddk nih gov    Contact your healthcare provider or gastroenterologist if:   · You have a fever  · You have chills, a cough, or feel weak and achy  · You have abdominal pain that does not go away or gets worse after you take medicine  · Your abdomen is swollen  · You are losing weight without trying  · You have questions or concerns about your condition or care      Seek care immediately or call 911 if:   · You have sudden shortness of breath  · You have a fast heart rate, fast breathing, or are too dizzy to stand up  · You have severe abdominal pain  · You see blood in your bowel movement  © Copyright [address] Information is for End User's use only and may not be sold, redistributed or otherwise used for commercial purposes  All illustrations and images included in CareNotes® are the copyrighted property of A D A M , Inc  or [facility] Tova Stephenson   The above information is an  only  It is not intended as medical advice for individual conditions or treatments  Talk to your doctor, nurse or pharmacist before following any medical regimen to see if it is safe and effective for you  Diverticulosis   WHAT YOU NEED TO KNOW:   Diverticulosis is a condition that causes small pockets called diverticula to form in your intestine  These pockets make it difficult for bowel movements to pass through your digestive system  DISCHARGE INSTRUCTIONS:   Seek care immediately if:   · You have severe pain on the left side of your lower abdomen  · Your bowel movements are bright or dark red  Contact your healthcare provider if:   · You have a fever and chills  · You feel dizzy or lightheaded  · You have nausea, or you are vomiting  · You have a change in your bowel movements  · You have questions or concerns about your condition or care  Medicines:   · Medicines  to soften your bowel movements may be given  You may also need medicines to treat symptoms such as bloating and pain  · Take your medicine as directed  Contact your healthcare provider if you think your medicine is not helping or if you have side effects  Tell him or her if you are allergic to any medicine  Keep a list of the medicines, vitamins, and herbs you take  Include the amounts, and when and why you take them  Bring the list or the pill bottles to follow-up visits   Carry your medicine list with you in case of an emergency  Self-care: The goal of treatment is to manage any symptoms you have and prevent other problems such as diverticulitis  Diverticulitis is swelling or infection of the diverticula  Your healthcare provider may recommend any of the following:  · Eat a variety of high-fiber foods  High-fiber foods help you have regular bowel movements  High-fiber foods include cooked beans, fruits, vegetables, and some cereals  Most adults need 25 to 35 grams of fiber each day  Your healthcare provider may recommend that you have more  Ask your healthcare provider how much fiber you need  Increase fiber slowly  You may have abdominal discomfort, bloating, and gas if you add fiber to your diet too quickly  You may need to take a fiber supplement if you are not getting enough fiber from food  · Drink liquids as directed  You may need to drink 2 to 3 liters (8 to 12 cups) of liquids every day  Ask your healthcare provider how much liquid to drink each day and which liquids are best for you  · Apply heat  on your abdomen for 20 to 30 minutes every 2 hours for as many days as directed  Heat helps decrease pain and muscle spasms  Help prevent diverticulitis or other symptoms: The following may help decrease your risk for diverticulitis or symptoms, such as bleeding  Talk to your provider about these or other things you can do to prevent problems that may occur with diverticulosis  · Exercise regularly  Ask your healthcare provider about the best exercise plan for you  Exercise can help you have regular bowel movements  Get 30 minutes of exercise on most days of the week  · Maintain a healthy weight  Ask your healthcare provider how much you should weigh  Ask him or her to help you create a weight loss plan if you are overweight  · Do not smoke  Nicotine and other chemicals in cigarettes increase your risk for diverticulitis   Ask your healthcare provider for information if you currently smoke and need help to quit  E-cigarettes or smokeless tobacco still contain nicotine  Talk to your healthcare provider before you use these products  · Ask your healthcare provider if it is safe to take NSAIDs  NSAIDs may increase your risk of diverticulitis  Follow up with your healthcare provider as directed:  Write down your questions so you remember to ask them during your visits  © Copyright [address] Information is for End User's use only and may not be sold, redistributed or otherwise used for commercial purposes  All illustrations and images included in CareNotes® are the copyrighted property of [employer]  or [address] [place], [place] [address]   The above information is an  only  It is not intended as medical advice for individual conditions or treatments  Talk to your doctor, nurse or pharmacist before following any medical regimen to see if it is safe and effective for you

## 2021-07-02 NOTE — ADDENDUM NOTE
Addendum  created 07/02/21 1934 by Rachel Hand, DO    Flowsheet accepted, Intraprocedure Flowsheets edited

## 2021-07-02 NOTE — ANESTHESIA PREPROCEDURE EVALUATION
Procedure:  COLONOSCOPY    Relevant Problems   ANESTHESIA (within normal limits)      CARDIO (within normal limits)      ENDO   (+) Hypothyroidism      GI/HEPATIC (within normal limits)      /RENAL (within normal limits)      GYN   (+) Malignant neoplasm of 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, estrogen receptor positive (HCC)      HEMATOLOGY (within normal limits)      MUSCULOSKELETAL   (+) DDD (degenerative disc disease), cervical      NEURO/PSYCH   (+) Depression   (+) History of colonic polyps      PULMONARY   (+) Chronic obstructive lung disease (HCC)        Physical Exam    Airway    Mallampati score: II  TM Distance: >3 FB  Neck ROM: full     Dental   No notable dental hx     Cardiovascular  Rhythm: regular, Rate: normal, Cardiovascular exam normal    Pulmonary  Pulmonary exam normal Breath sounds clear to auscultation,     Other Findings        Anesthesia Plan  ASA Score- 2     Anesthesia Type- IV sedation with anesthesia with ASA Monitors  Additional Monitors:   Airway Plan:           Plan Factors-Exercise tolerance (METS): >4 METS  Chart reviewed  Patient summary reviewed  Patient is not a current smoker  Patient instructed to abstain from smoking on day of procedure  Patient did not smoke on day of surgery  Induction- intravenous  Postoperative Plan-     Informed Consent- Anesthetic plan and risks discussed with patient

## 2021-07-21 DIAGNOSIS — G43.101 MIGRAINE WITH AURA AND WITH STATUS MIGRAINOSUS, NOT INTRACTABLE: ICD-10-CM

## 2021-07-21 RX ORDER — TOPIRAMATE 100 MG/1
100 TABLET, FILM COATED ORAL
Qty: 90 TABLET | Refills: 1 | Status: SHIPPED | OUTPATIENT
Start: 2021-07-21 | End: 2022-01-28

## 2021-07-21 RX ORDER — RIZATRIPTAN BENZOATE 10 MG/1
10 TABLET, ORALLY DISINTEGRATING ORAL ONCE AS NEEDED
Qty: 9 TABLET | Refills: 2 | Status: SHIPPED | OUTPATIENT
Start: 2021-07-21 | End: 2021-11-30

## 2021-07-27 ENCOUNTER — OFFICE VISIT (OUTPATIENT)
Dept: GASTROENTEROLOGY | Facility: MEDICAL CENTER | Age: 56
End: 2021-07-27
Payer: COMMERCIAL

## 2021-07-27 VITALS
SYSTOLIC BLOOD PRESSURE: 142 MMHG | HEART RATE: 64 BPM | TEMPERATURE: 97.8 F | WEIGHT: 166.6 LBS | BODY MASS INDEX: 26.09 KG/M2 | DIASTOLIC BLOOD PRESSURE: 74 MMHG

## 2021-07-27 DIAGNOSIS — R19.7 DIARRHEA, UNSPECIFIED TYPE: Primary | ICD-10-CM

## 2021-07-27 DIAGNOSIS — R10.9 ABDOMINAL PAIN, UNSPECIFIED ABDOMINAL LOCATION: ICD-10-CM

## 2021-07-27 DIAGNOSIS — Z86.010 HISTORY OF COLON POLYPS: ICD-10-CM

## 2021-07-27 PROCEDURE — 99214 OFFICE O/P EST MOD 30 MIN: CPT | Performed by: PHYSICIAN ASSISTANT
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
## 2021-07-28 ENCOUNTER — TELEMEDICINE (OUTPATIENT)
Dept: FAMILY MEDICINE CLINIC | Facility: CLINIC | Age: 56
End: 2021-07-28
Payer: COMMERCIAL

## 2021-07-28 DIAGNOSIS — R19.7 DIARRHEA, UNSPECIFIED TYPE: ICD-10-CM

## 2021-07-28 DIAGNOSIS — Z13.21 ENCOUNTER FOR VITAMIN DEFICIENCY SCREENING: ICD-10-CM

## 2021-07-28 DIAGNOSIS — R53.83 OTHER FATIGUE: Primary | ICD-10-CM

## 2021-07-28 DIAGNOSIS — Z13.29 THYROID DISORDER SCREEN: ICD-10-CM

## 2021-07-28 DIAGNOSIS — T14.8XXA BRUISING: ICD-10-CM

## 2021-07-28 PROCEDURE — 1036F TOBACCO NON-USER: CPT | Performed by: PHYSICIAN ASSISTANT

## 2021-07-28 PROCEDURE — 99214 OFFICE O/P EST MOD 30 MIN: CPT | Performed by: PHYSICIAN ASSISTANT
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
## 2021-07-29 ENCOUNTER — APPOINTMENT (OUTPATIENT)
Dept: LAB | Facility: MEDICAL CENTER | Age: 56
End: 2021-07-29
Payer: COMMERCIAL

## 2021-07-29 DIAGNOSIS — T14.8XXA BRUISING: ICD-10-CM

## 2021-07-29 DIAGNOSIS — Z13.29 THYROID DISORDER SCREEN: ICD-10-CM

## 2021-07-29 DIAGNOSIS — Z13.21 ENCOUNTER FOR VITAMIN DEFICIENCY SCREENING: ICD-10-CM

## 2021-07-29 DIAGNOSIS — R19.7 DIARRHEA, UNSPECIFIED TYPE: ICD-10-CM

## 2021-07-29 DIAGNOSIS — R53.83 OTHER FATIGUE: ICD-10-CM

## 2021-07-29 LAB
25(OH)D3 SERPL-MCNC: 49.2 NG/ML (ref 30–100)
ALBUMIN SERPL BCP-MCNC: 4 G/DL (ref 3.5–5)
ALP SERPL-CCNC: 85 U/L (ref 46–116)
ALT SERPL W P-5'-P-CCNC: 29 U/L (ref 12–78)
ANION GAP SERPL CALCULATED.3IONS-SCNC: 7 MMOL/L (ref 4–13)
AST SERPL W P-5'-P-CCNC: 15 U/L (ref 5–45)
BASOPHILS # BLD AUTO: 0.08 THOUSANDS/ΜL (ref 0–0.1)
BASOPHILS NFR BLD AUTO: 1 % (ref 0–1)
BILIRUB SERPL-MCNC: 0.41 MG/DL (ref 0.2–1)
BUN SERPL-MCNC: 23 MG/DL (ref 5–25)
CALCIUM SERPL-MCNC: 8.9 MG/DL (ref 8.3–10.1)
CHLORIDE SERPL-SCNC: 109 MMOL/L (ref 100–108)
CO2 SERPL-SCNC: 24 MMOL/L (ref 21–32)
CREAT SERPL-MCNC: 0.64 MG/DL (ref 0.6–1.3)
EOSINOPHIL # BLD AUTO: 0.23 THOUSAND/ΜL (ref 0–0.61)
EOSINOPHIL NFR BLD AUTO: 3 % (ref 0–6)
ERYTHROCYTE [DISTWIDTH] IN BLOOD BY AUTOMATED COUNT: 13.1 % (ref 11.6–15.1)
FERRITIN SERPL-MCNC: 101 NG/ML (ref 8–388)
GFR SERPL CREATININE-BSD FRML MDRD: 101 ML/MIN/1.73SQ M
GLUCOSE P FAST SERPL-MCNC: 87 MG/DL (ref 65–99)
HCT VFR BLD AUTO: 44.2 % (ref 34.8–46.1)
HGB BLD-MCNC: 14.2 G/DL (ref 11.5–15.4)
IMM GRANULOCYTES # BLD AUTO: 0.02 THOUSAND/UL (ref 0–0.2)
IMM GRANULOCYTES NFR BLD AUTO: 0 % (ref 0–2)
IRON SATN MFR SERPL: 31 %
IRON SERPL-MCNC: 105 UG/DL (ref 50–170)
LYMPHOCYTES # BLD AUTO: 1.82 THOUSANDS/ΜL (ref 0.6–4.47)
LYMPHOCYTES NFR BLD AUTO: 27 % (ref 14–44)
MCH RBC QN AUTO: 30.1 PG (ref 26.8–34.3)
MCHC RBC AUTO-ENTMCNC: 32.1 G/DL (ref 31.4–37.4)
MCV RBC AUTO: 94 FL (ref 82–98)
MONOCYTES # BLD AUTO: 0.62 THOUSAND/ΜL (ref 0.17–1.22)
MONOCYTES NFR BLD AUTO: 9 % (ref 4–12)
NEUTROPHILS # BLD AUTO: 3.97 THOUSANDS/ΜL (ref 1.85–7.62)
NEUTS SEG NFR BLD AUTO: 60 % (ref 43–75)
NRBC BLD AUTO-RTO: 0 /100 WBCS
PLATELET # BLD AUTO: 221 THOUSANDS/UL (ref 149–390)
PMV BLD AUTO: 10.8 FL (ref 8.9–12.7)
POTASSIUM SERPL-SCNC: 4.3 MMOL/L (ref 3.5–5.3)
PROT SERPL-MCNC: 7.5 G/DL (ref 6.4–8.2)
RBC # BLD AUTO: 4.71 MILLION/UL (ref 3.81–5.12)
SODIUM SERPL-SCNC: 140 MMOL/L (ref 136–145)
TIBC SERPL-MCNC: 336 UG/DL (ref 250–450)
TSH SERPL DL<=0.05 MIU/L-ACNC: 2.43 UIU/ML (ref 0.36–3.74)
WBC # BLD AUTO: 6.74 THOUSAND/UL (ref 4.31–10.16)

## 2021-07-29 PROCEDURE — 84443 ASSAY THYROID STIM HORMONE: CPT

## 2021-07-29 PROCEDURE — 87177 OVA AND PARASITES SMEARS: CPT

## 2021-07-29 PROCEDURE — 87209 SMEAR COMPLEX STAIN: CPT

## 2021-07-29 PROCEDURE — 83540 ASSAY OF IRON: CPT

## 2021-07-29 PROCEDURE — 83550 IRON BINDING TEST: CPT

## 2021-07-29 PROCEDURE — 82728 ASSAY OF FERRITIN: CPT

## 2021-07-29 PROCEDURE — 82306 VITAMIN D 25 HYDROXY: CPT

## 2021-07-29 PROCEDURE — 80053 COMPREHEN METABOLIC PANEL: CPT

## 2021-07-29 PROCEDURE — 85025 COMPLETE CBC W/AUTO DIFF WBC: CPT

## 2021-07-29 PROCEDURE — 36415 COLL VENOUS BLD VENIPUNCTURE: CPT

## 2021-07-29 PROCEDURE — 87505 NFCT AGENT DETECTION GI: CPT | Performed by: PHYSICIAN ASSISTANT

## 2021-07-30 LAB
C DIFF TOX B TCDB STL QL NAA+PROBE: NEGATIVE
CAMPYLOBACTER DNA SPEC NAA+PROBE: NORMAL
G LAMBLIA AG STL QL IA: NEGATIVE
SALMONELLA DNA SPEC QL NAA+PROBE: NORMAL
SHIGA TOXIN STX GENE SPEC NAA+PROBE: NORMAL
SHIGELLA DNA SPEC QL NAA+PROBE: NORMAL

## 2021-08-02 ENCOUNTER — TELEPHONE (OUTPATIENT)
Dept: HEMATOLOGY ONCOLOGY | Facility: CLINIC | Age: 56
End: 2021-08-02

## 2021-08-02 NOTE — TELEPHONE ENCOUNTER
Reschedule Appointment     Who is calling in Patient    Doctor Appointment Scheduled with Aníbal Stevens date and time 02- @ 8:00am    New date and time 02- @ 8:00am   Location [place]    Patient verbalized understanding

## 2021-08-04 DIAGNOSIS — Z17.0 MALIGNANT NEOPLASM OF UPPER-INNER QUADRANT OF RIGHT BREAST IN FEMALE, ESTROGEN RECEPTOR POSITIVE (HCC): ICD-10-CM

## 2021-08-04 DIAGNOSIS — C50.211 MALIGNANT NEOPLASM OF UPPER-INNER QUADRANT OF RIGHT BREAST IN FEMALE, ESTROGEN RECEPTOR POSITIVE (HCC): ICD-10-CM

## 2021-08-04 LAB — O+P STL CONC: NORMAL

## 2021-08-04 RX ORDER — ANASTROZOLE 1 MG/1
TABLET ORAL
Qty: 90 TABLET | Refills: 1 | Status: SHIPPED | OUTPATIENT
Start: 2021-08-04 | End: 2022-02-03 | Stop reason: SDUPTHER

## 2021-09-03 ENCOUNTER — TELEPHONE (OUTPATIENT)
Dept: FAMILY MEDICINE CLINIC | Facility: CLINIC | Age: 56
End: 2021-09-03

## 2021-09-09 NOTE — TELEPHONE ENCOUNTER
There is not necessary an order script for this  However, I can write a letter of medical necessity  This was sent through [employer]

## 2021-10-11 ENCOUNTER — TELEPHONE (OUTPATIENT)
Dept: FAMILY MEDICINE CLINIC | Facility: CLINIC | Age: 56
End: 2021-10-11

## 2021-10-11 ENCOUNTER — OFFICE VISIT (OUTPATIENT)
Dept: FAMILY MEDICINE CLINIC | Facility: CLINIC | Age: 56
End: 2021-10-11
Payer: COMMERCIAL

## 2021-10-11 VITALS
WEIGHT: 170.4 LBS | HEART RATE: 73 BPM | TEMPERATURE: 97.9 F | DIASTOLIC BLOOD PRESSURE: 70 MMHG | BODY MASS INDEX: 26.74 KG/M2 | OXYGEN SATURATION: 96 % | SYSTOLIC BLOOD PRESSURE: 108 MMHG | HEIGHT: 67 IN

## 2021-10-11 DIAGNOSIS — K52.9 CHRONIC DIARRHEA: ICD-10-CM

## 2021-10-11 DIAGNOSIS — R09.82 PND (POST-NASAL DRIP): ICD-10-CM

## 2021-10-11 DIAGNOSIS — M50.30 DDD (DEGENERATIVE DISC DISEASE), CERVICAL: ICD-10-CM

## 2021-10-11 DIAGNOSIS — J42 CHRONIC BRONCHITIS, UNSPECIFIED CHRONIC BRONCHITIS TYPE (HCC): ICD-10-CM

## 2021-10-11 DIAGNOSIS — C50.211 MALIGNANT NEOPLASM OF UPPER-INNER QUADRANT OF RIGHT BREAST IN FEMALE, ESTROGEN RECEPTOR POSITIVE (HCC): ICD-10-CM

## 2021-10-11 DIAGNOSIS — J30.2 SEASONAL ALLERGIES: Primary | ICD-10-CM

## 2021-10-11 DIAGNOSIS — G89.29 CHRONIC NECK PAIN: ICD-10-CM

## 2021-10-11 DIAGNOSIS — M54.2 CHRONIC NECK PAIN: ICD-10-CM

## 2021-10-11 DIAGNOSIS — Z23 FLU VACCINE NEED: ICD-10-CM

## 2021-10-11 DIAGNOSIS — Z17.0 MALIGNANT NEOPLASM OF UPPER-INNER QUADRANT OF RIGHT BREAST IN FEMALE, ESTROGEN RECEPTOR POSITIVE (HCC): ICD-10-CM

## 2021-10-11 DIAGNOSIS — R07.89 CHEST TIGHTNESS: ICD-10-CM

## 2021-10-11 DIAGNOSIS — G43.101 MIGRAINE WITH AURA AND WITH STATUS MIGRAINOSUS, NOT INTRACTABLE: ICD-10-CM

## 2021-10-11 PROCEDURE — 99214 OFFICE O/P EST MOD 30 MIN: CPT | Performed by: PHYSICIAN ASSISTANT

## 2021-10-11 PROCEDURE — 90682 RIV4 VACC RECOMBINANT DNA IM: CPT

## 2021-10-11 PROCEDURE — 1036F TOBACCO NON-USER: CPT | Performed by: PHYSICIAN ASSISTANT

## 2021-10-11 PROCEDURE — 90471 IMMUNIZATION ADMIN: CPT

## 2021-10-11 PROCEDURE — 3008F BODY MASS INDEX DOCD: CPT | Performed by: PHYSICIAN ASSISTANT

## 2021-10-11 RX ORDER — AZELASTINE 1 MG/ML
1 SPRAY, METERED NASAL 2 TIMES DAILY
Qty: 30 ML | Refills: 0 | Status: SHIPPED | OUTPATIENT
Start: 2021-10-11 | End: 2021-11-26

## 2021-10-11 RX ORDER — TRAMADOL HYDROCHLORIDE 50 MG/1
50 TABLET ORAL 2 TIMES DAILY PRN
COMMUNITY
Start: 2021-07-21 | End: 2022-06-24 | Stop reason: ALTCHOICE

## 2021-11-02 ENCOUNTER — TELEPHONE (OUTPATIENT)
Dept: FAMILY MEDICINE CLINIC | Facility: CLINIC | Age: 56
End: 2021-11-02

## 2021-11-02 DIAGNOSIS — R07.89 CHEST TIGHTNESS: ICD-10-CM

## 2021-11-02 DIAGNOSIS — J42 CHRONIC BRONCHITIS, UNSPECIFIED CHRONIC BRONCHITIS TYPE (HCC): Primary | ICD-10-CM

## 2021-11-12 ENCOUNTER — HOSPITAL ENCOUNTER (OUTPATIENT)
Dept: PULMONOLOGY | Facility: HOSPITAL | Age: 56
Discharge: HOME | End: 2021-11-12
Payer: COMMERCIAL

## 2021-11-12 ENCOUNTER — HOSPITAL ENCOUNTER (OUTPATIENT)
Dept: RADIOLOGY | Facility: HOSPITAL | Age: 56
Discharge: HOME | End: 2021-11-12
Payer: COMMERCIAL

## 2021-11-12 DIAGNOSIS — J42 CHRONIC BRONCHITIS, UNSPECIFIED CHRONIC BRONCHITIS TYPE (HCC): ICD-10-CM

## 2021-11-12 DIAGNOSIS — R07.89 CHEST TIGHTNESS: ICD-10-CM

## 2021-11-12 PROCEDURE — 94729 DIFFUSING CAPACITY: CPT | Performed by: INTERNAL MEDICINE

## 2021-11-12 PROCEDURE — 71046 X-RAY EXAM CHEST 2 VIEWS: CPT

## 2021-11-12 PROCEDURE — 94726 PLETHYSMOGRAPHY LUNG VOLUMES: CPT | Performed by: INTERNAL MEDICINE

## 2021-11-12 PROCEDURE — 94060 EVALUATION OF WHEEZING: CPT | Performed by: INTERNAL MEDICINE

## 2021-11-12 PROCEDURE — 94726 PLETHYSMOGRAPHY LUNG VOLUMES: CPT

## 2021-11-12 PROCEDURE — 94729 DIFFUSING CAPACITY: CPT

## 2021-11-12 PROCEDURE — 94760 N-INVAS EAR/PLS OXIMETRY 1: CPT

## 2021-11-12 PROCEDURE — 94060 EVALUATION OF WHEEZING: CPT

## 2021-11-12 RX ORDER — ALBUTEROL SULFATE 2.5 MG/3ML
2.5 SOLUTION RESPIRATORY (INHALATION) ONCE AS NEEDED
Status: COMPLETED | OUTPATIENT
Start: 2021-11-12 | End: 2021-11-12

## 2021-11-12 RX ADMIN — ALBUTEROL SULFATE 2.5 MG: 2.5 SOLUTION RESPIRATORY (INHALATION) at 08:29

## 2021-11-22 ENCOUNTER — CONSULT (OUTPATIENT)
Dept: PULMONOLOGY | Facility: CLINIC | Age: 56
End: 2021-11-22
Payer: COMMERCIAL

## 2021-11-22 ENCOUNTER — DOCUMENTATION (OUTPATIENT)
Dept: PULMONOLOGY | Facility: CLINIC | Age: 56
End: 2021-11-22

## 2021-11-22 VITALS
BODY MASS INDEX: 27.31 KG/M2 | OXYGEN SATURATION: 97 % | DIASTOLIC BLOOD PRESSURE: 60 MMHG | SYSTOLIC BLOOD PRESSURE: 108 MMHG | RESPIRATION RATE: 16 BRPM | HEIGHT: 67 IN | WEIGHT: 174 LBS | TEMPERATURE: 97.9 F | HEART RATE: 77 BPM

## 2021-11-22 DIAGNOSIS — F17.201 NICOTINE DEPENDENCE IN REMISSION, UNSPECIFIED NICOTINE PRODUCT TYPE: ICD-10-CM

## 2021-11-22 DIAGNOSIS — J44.9 CHRONIC OBSTRUCTIVE PULMONARY DISEASE, UNSPECIFIED COPD TYPE (HCC): Primary | ICD-10-CM

## 2021-11-22 PROCEDURE — 1036F TOBACCO NON-USER: CPT | Performed by: INTERNAL MEDICINE

## 2021-11-22 PROCEDURE — 3008F BODY MASS INDEX DOCD: CPT | Performed by: INTERNAL MEDICINE

## 2021-11-22 PROCEDURE — 99244 OFF/OP CNSLTJ NEW/EST MOD 40: CPT | Performed by: INTERNAL MEDICINE

## 2021-11-22 RX ORDER — FLUTICASONE FUROATE, UMECLIDINIUM BROMIDE AND VILANTEROL TRIFENATATE 200; 62.5; 25 UG/1; UG/1; UG/1
1 POWDER RESPIRATORY (INHALATION) DAILY
Qty: 60 BLISTER | Refills: 0 | Status: SHIPPED | OUTPATIENT
Start: 2021-11-22 | End: 2021-12-28

## 2021-11-22 RX ORDER — IPRATROPIUM BROMIDE AND ALBUTEROL SULFATE 2.5; .5 MG/3ML; MG/3ML
3 SOLUTION RESPIRATORY (INHALATION) 4 TIMES DAILY
Qty: 360 ML | Refills: 11 | Status: SHIPPED | OUTPATIENT
Start: 2021-11-22 | End: 2021-12-23

## 2021-11-26 DIAGNOSIS — R09.82 PND (POST-NASAL DRIP): ICD-10-CM

## 2021-11-26 DIAGNOSIS — J30.2 SEASONAL ALLERGIES: ICD-10-CM

## 2021-11-26 RX ORDER — AZELASTINE 1 MG/ML
1 SPRAY, METERED NASAL 2 TIMES DAILY
Qty: 1 ML | Refills: 1 | Status: SHIPPED | OUTPATIENT
Start: 2021-11-26 | End: 2022-06-24 | Stop reason: ALTCHOICE

## 2021-12-08 ENCOUNTER — CONSULT (OUTPATIENT)
Dept: PAIN MEDICINE | Facility: CLINIC | Age: 56
End: 2021-12-08
Payer: COMMERCIAL

## 2021-12-08 VITALS
TEMPERATURE: 97.3 F | HEIGHT: 67 IN | DIASTOLIC BLOOD PRESSURE: 80 MMHG | BODY MASS INDEX: 27.15 KG/M2 | SYSTOLIC BLOOD PRESSURE: 122 MMHG | WEIGHT: 173 LBS | HEART RATE: 80 BPM

## 2021-12-08 DIAGNOSIS — M54.2 CHRONIC NECK PAIN: Primary | ICD-10-CM

## 2021-12-08 DIAGNOSIS — M50.30 DDD (DEGENERATIVE DISC DISEASE), CERVICAL: ICD-10-CM

## 2021-12-08 DIAGNOSIS — M50.120 CERVICAL DISC DISORDER WITH RADICULOPATHY OF MID-CERVICAL REGION: ICD-10-CM

## 2021-12-08 DIAGNOSIS — G89.29 CHRONIC NECK PAIN: Primary | ICD-10-CM

## 2021-12-08 PROCEDURE — 99244 OFF/OP CNSLTJ NEW/EST MOD 40: CPT | Performed by: PHYSICAL MEDICINE & REHABILITATION

## 2021-12-21 ENCOUNTER — HOSPITAL ENCOUNTER (OUTPATIENT)
Dept: CT IMAGING | Facility: HOSPITAL | Age: 56
Discharge: HOME | End: 2021-12-21
Attending: INTERNAL MEDICINE
Payer: COMMERCIAL

## 2021-12-21 DIAGNOSIS — F17.201 NICOTINE DEPENDENCE IN REMISSION, UNSPECIFIED NICOTINE PRODUCT TYPE: ICD-10-CM

## 2021-12-21 PROCEDURE — 71271 CT THORAX LUNG CANCER SCR C-: CPT

## 2021-12-23 ENCOUNTER — OFFICE VISIT (OUTPATIENT)
Dept: GASTROENTEROLOGY | Facility: CLINIC | Age: 56
End: 2021-12-23
Payer: COMMERCIAL

## 2021-12-23 VITALS
SYSTOLIC BLOOD PRESSURE: 110 MMHG | HEIGHT: 67 IN | HEART RATE: 83 BPM | BODY MASS INDEX: 27.78 KG/M2 | TEMPERATURE: 97.6 F | DIASTOLIC BLOOD PRESSURE: 70 MMHG | RESPIRATION RATE: 16 BRPM | WEIGHT: 177 LBS

## 2021-12-23 DIAGNOSIS — R19.7 DIARRHEA, UNSPECIFIED TYPE: ICD-10-CM

## 2021-12-23 DIAGNOSIS — R10.9 ABDOMINAL PAIN, UNSPECIFIED ABDOMINAL LOCATION: Primary | ICD-10-CM

## 2021-12-23 PROCEDURE — 99213 OFFICE O/P EST LOW 20 MIN: CPT | Performed by: FAMILY MEDICINE

## 2021-12-23 RX ORDER — DICYCLOMINE HYDROCHLORIDE 10 MG/1
10 CAPSULE ORAL 4 TIMES DAILY PRN
Qty: 120 CAPSULE | Refills: 2 | Status: SHIPPED | OUTPATIENT
Start: 2021-12-23 | End: 2022-06-24 | Stop reason: ALTCHOICE

## 2021-12-27 ENCOUNTER — HOSPITAL ENCOUNTER (OUTPATIENT)
Dept: NON INVASIVE DIAGNOSTICS | Facility: CLINIC | Age: 56
Discharge: HOME | End: 2021-12-27
Payer: COMMERCIAL

## 2021-12-27 VITALS
HEIGHT: 67 IN | SYSTOLIC BLOOD PRESSURE: 122 MMHG | WEIGHT: 173 LBS | DIASTOLIC BLOOD PRESSURE: 80 MMHG | BODY MASS INDEX: 27.15 KG/M2 | HEART RATE: 67 BPM

## 2021-12-27 DIAGNOSIS — J44.9 CHRONIC OBSTRUCTIVE PULMONARY DISEASE, UNSPECIFIED COPD TYPE (HCC): ICD-10-CM

## 2021-12-27 LAB
AORTIC ROOT: 2.8 CM
APICAL FOUR CHAMBER EJECTION FRACTION: 56 %
ASCENDING AORTA: 3 CM
E WAVE DECELERATION TIME: 206 MS
FRACTIONAL SHORTENING: 33 % (ref 28–44)
INTERVENTRICULAR SEPTUM IN DIASTOLE (PARASTERNAL SHORT AXIS VIEW): 0.8 CM
LEFT ATRIUM AREA SYSTOLE SINGLE PLANE A4C: 16.8 CM2
LEFT INTERNAL DIMENSION IN SYSTOLE: 2.6 CM (ref 2.1–4)
LEFT VENTRICULAR INTERNAL DIMENSION IN DIASTOLE: 3.9 CM (ref 4.52–6.73)
LEFT VENTRICULAR POSTERIOR WALL IN END DIASTOLE: 0.8 CM
LEFT VENTRICULAR STROKE VOLUME: 42 ML
MV E'TISSUE VEL-SEP: 10 CM/S
MV PEAK A VEL: 0.59 M/S
MV PEAK E VEL: 69 CM/S
MV STENOSIS PRESSURE HALF TIME: 0 MS
RIGHT ATRIUM AREA SYSTOLE A4C: 15.7 CM2
RIGHT VENTRICLE ID DIMENSION: 2.6 CM
SL CV LV EF: 55
SL CV PED ECHO LEFT VENTRICLE DIASTOLIC VOLUME (MOD BIPLANE) 2D: 67 ML
SL CV PED ECHO LEFT VENTRICLE SYSTOLIC VOLUME (MOD BIPLANE) 2D: 25 ML
TRICUSPID VALVE S': 0.7 CM/S
Z-SCORE OF LEFT VENTRICULAR DIMENSION IN END SYSTOLE: -3.46

## 2021-12-27 PROCEDURE — 93306 TTE W/DOPPLER COMPLETE: CPT | Performed by: INTERNAL MEDICINE

## 2021-12-27 PROCEDURE — 93306 TTE W/DOPPLER COMPLETE: CPT

## 2021-12-28 ENCOUNTER — TELEPHONE (OUTPATIENT)
Dept: PULMONOLOGY | Facility: CLINIC | Age: 56
End: 2021-12-28

## 2021-12-28 ENCOUNTER — APPOINTMENT (OUTPATIENT)
Dept: LAB | Facility: CLINIC | Age: 56
End: 2021-12-28
Payer: COMMERCIAL

## 2021-12-28 DIAGNOSIS — R10.9 ABDOMINAL PAIN, UNSPECIFIED ABDOMINAL LOCATION: ICD-10-CM

## 2021-12-28 DIAGNOSIS — J44.9 CHRONIC OBSTRUCTIVE PULMONARY DISEASE, UNSPECIFIED COPD TYPE (HCC): ICD-10-CM

## 2021-12-28 DIAGNOSIS — R19.7 DIARRHEA, UNSPECIFIED TYPE: ICD-10-CM

## 2021-12-28 DIAGNOSIS — F17.201 NICOTINE DEPENDENCE IN REMISSION, UNSPECIFIED NICOTINE PRODUCT TYPE: ICD-10-CM

## 2021-12-28 PROCEDURE — 87338 HPYLORI STOOL AG IA: CPT

## 2021-12-28 RX ORDER — FLUTICASONE FUROATE, UMECLIDINIUM BROMIDE AND VILANTEROL TRIFENATATE 200; 62.5; 25 UG/1; UG/1; UG/1
1 POWDER RESPIRATORY (INHALATION) DAILY
Qty: 60 EACH | Refills: 5 | Status: SHIPPED | OUTPATIENT
Start: 2021-12-28 | End: 2022-01-10

## 2021-12-28 RX ORDER — ALBUTEROL SULFATE 90 UG/1
2 AEROSOL, METERED RESPIRATORY (INHALATION) EVERY 4 HOURS PRN
Qty: 18 G | Refills: 3 | Status: SHIPPED | OUTPATIENT
Start: 2021-12-28

## 2021-12-29 ENCOUNTER — OFFICE VISIT (OUTPATIENT)
Dept: SURGICAL ONCOLOGY | Facility: CLINIC | Age: 56
End: 2021-12-29
Payer: COMMERCIAL

## 2021-12-29 VITALS
SYSTOLIC BLOOD PRESSURE: 122 MMHG | BODY MASS INDEX: 27.78 KG/M2 | DIASTOLIC BLOOD PRESSURE: 82 MMHG | TEMPERATURE: 98.5 F | OXYGEN SATURATION: 96 % | HEIGHT: 67 IN | RESPIRATION RATE: 16 BRPM | WEIGHT: 177 LBS | HEART RATE: 85 BPM

## 2021-12-29 DIAGNOSIS — R92.2 DENSE BREAST TISSUE: ICD-10-CM

## 2021-12-29 DIAGNOSIS — Z79.811 USE OF ANASTROZOLE: ICD-10-CM

## 2021-12-29 DIAGNOSIS — Z17.0 MALIGNANT NEOPLASM OF UPPER-INNER QUADRANT OF RIGHT BREAST IN FEMALE, ESTROGEN RECEPTOR POSITIVE (HCC): Primary | ICD-10-CM

## 2021-12-29 DIAGNOSIS — C50.211 MALIGNANT NEOPLASM OF UPPER-INNER QUADRANT OF RIGHT BREAST IN FEMALE, ESTROGEN RECEPTOR POSITIVE (HCC): Primary | ICD-10-CM

## 2021-12-29 LAB — H PYLORI AG STL QL IA: NEGATIVE

## 2021-12-29 PROCEDURE — 1036F TOBACCO NON-USER: CPT | Performed by: SURGERY

## 2021-12-29 PROCEDURE — 3008F BODY MASS INDEX DOCD: CPT | Performed by: SURGERY

## 2021-12-29 PROCEDURE — 99214 OFFICE O/P EST MOD 30 MIN: CPT | Performed by: SURGERY

## 2022-01-04 ENCOUNTER — HOSPITAL ENCOUNTER (OUTPATIENT)
Dept: RADIOLOGY | Facility: IMAGING CENTER | Age: 57
Discharge: HOME | End: 2022-01-04
Payer: COMMERCIAL

## 2022-01-04 ENCOUNTER — APPOINTMENT (OUTPATIENT)
Dept: LAB | Facility: IMAGING CENTER | Age: 57
End: 2022-01-04
Payer: COMMERCIAL

## 2022-01-04 DIAGNOSIS — R19.7 DIARRHEA, UNSPECIFIED TYPE: ICD-10-CM

## 2022-01-04 DIAGNOSIS — R10.9 ABDOMINAL PAIN, UNSPECIFIED ABDOMINAL LOCATION: ICD-10-CM

## 2022-01-04 DIAGNOSIS — M50.30 DDD (DEGENERATIVE DISC DISEASE), CERVICAL: ICD-10-CM

## 2022-01-04 DIAGNOSIS — G89.29 CHRONIC NECK PAIN: ICD-10-CM

## 2022-01-04 DIAGNOSIS — M54.2 CHRONIC NECK PAIN: ICD-10-CM

## 2022-01-04 DIAGNOSIS — M50.120 CERVICAL DISC DISORDER WITH RADICULOPATHY OF MID-CERVICAL REGION: ICD-10-CM

## 2022-01-04 PROCEDURE — 72141 MRI NECK SPINE W/O DYE: CPT

## 2022-01-04 PROCEDURE — G1004 CDSM NDSC: HCPCS

## 2022-01-04 PROCEDURE — 82784 ASSAY IGA/IGD/IGG/IGM EACH: CPT

## 2022-01-04 PROCEDURE — 36415 COLL VENOUS BLD VENIPUNCTURE: CPT

## 2022-01-04 PROCEDURE — 83516 IMMUNOASSAY NONANTIBODY: CPT

## 2022-01-05 LAB — TTG IGA SER-ACNC: <2 U/ML (ref 0–3)

## 2022-01-06 LAB — IGA SERPL-MCNC: 123 MG/DL (ref 70–400)

## 2022-01-06 NOTE — TELEPHONE ENCOUNTER
Left VM: Lung nodules stable repeat imaging in 1 year,  incidnal liver lesion advised to follow up w PCP for any further imaging that may be required

## 2022-01-09 NOTE — PROGRESS NOTES
Pulmonary Follow Up Note   Aneesh Canales 64 y o  female MRN: [id number]  1/10/2022    Assessment:  Severe COPD   · Gold stage III B, FEV1 of 42%, no history of frequent exacerbation ED visits or hospitalizations   · Better symptoms control with triple inhaler, still with dyspnea on exertion/chest tightness and wheezing  · Currently on Trelegy Ellipta 200 once a day   · Moderate centrilobular emphysematous changes on CT chest    Plan:   · Switched to [facility] inhaler/triple therapy with twice a day coverage than Trelegy Ellipta   · Continue p r n  nebulizer/DuoNeb states that it feels better on it   · No signs of RV dysfunction on TTE at rest   · Will check myocardial perfusion scan for myocardial dysfunction on stress  · Offered pulmonary rehab referral, she would like to read about it 1st      Former tobacco abuse   · About 45 pack year history quit in 2016   · CT chest 12/2021 showing multiple benign appearing nodules largest at 4 mm   · Recommended follow-up in 1 year   · Noted to hepatic lesions 5 5 cm at 1 of them   · Message sent to PCP, will likely require MRI/further evaluation by Gastroenterology      Return in about 3 months (around 4/10/2022)  History of Present Illness     Follow up for: COPD    Background:  64 y o  female with a h/o COPD, osteoarthritis, former tobacco abuse about 45 pack year quit in 2016, right breast cancer status post lumpectomy/radiotherapy completed in 10/2020, considered stage I A invasive ductal carcinoma on anastrozole  First noted COPD symptoms several years ago, since then has been on Symbicort and p r n  albuterol  Before that was on [employer] but insurance was not covering at  Reports a slowly progressive dyspnea on exertion such as going uphill/1 flight of stairs sometimes has to stop in the middle to catch her breath  Symptoms associated with wheezing/throat clearing and minimally productive cough  No hemoptysis or weight loss  Improve with p r n  albuterol  No history of ED visits, hospitalization for COPD  Quit smoking completely in 2016 11/2021 visit-increased treatment to Trelegy Ellipta 2, discontinued the Symbicort  Ordered TTE, CT chest for lung cancer screening    Interval History  Since last seen, felt slightly better with the Trelegy Ellipta  Still with dyspnea/chest tightness on exertion such as going up steps or uphill  Still able to be active and independent at baseline  Using p r n  about 10 times per week  Review of Systems  As per hpi, all other systems reviewed and were negative    Studies:  Imaging and other studies: I have personally reviewed pertinent films in PACS  Chest x-ray 11/12/2021-clear lung fields    CT chest lung cancer screening at [facility] 12/01/2020-no suspicious nodules, mild to moderate emphysematous changes  Fluid collection at the right breast lumpectomy side  CT chest 12/21/2021-scattered pulmonary nodules up to 4 mm, benign appearing behavior  5 5 cm minimally complex hepatic lesion, suspect benign etiology suggested MRI/MRCP  Second liver lesion at the left hepatic lobe      Pulmonary function testing:     PFT 11/12/2021-ratio 55%, FEV1 1 23 L/42%, FVC 2 25 L/61%  TLC 98%, %  DLCO 51%  After BD administration FEV1 1 39 L/48%/+13% increase, FVC 2 51 L/68%/+11% increase     EKG, Pathology, and Other Studies: I have personally reviewed pertinent reports      TTE 12/27/2021-EF 55%, normal wall motion, normal diastolic function  Normal RV size and function       Past medical, surgical, social and family histories reviewed  Medications/Allergies: Reviewed      Vitals: Blood pressure 110/70, pulse 78, temperature 98 °F (36 7 °C), temperature source Tympanic, resp  rate 16, height 5' 7" (1 702 m), weight 79 7 kg (175 lb 9 6 oz), SpO2 97 %  Body mass index is 27 5 kg/m²  Oxygen Therapy  SpO2: 97 %  Oxygen Therapy: None (Room air)      Physical Exam  Body mass index is 27 5 kg/m²     Gen: not in acute distress, Neck/Eyes: supple, no adenopathy, PERRL  Ear: normal appearance, no significant hearing impairment  Nose:  normal nasal mucosa, no drainage  Mouth:  unremarkable/normal appearance of lips, teeth and gums  Oropharynx: mucosa is moist, no focal lesions or erythema  Salivary glands: soft nontender  Chest: normal respiratory efforts, diminished but clear breath sounds bilaterally  CV: RRR, no murmurs appreciated, no edema  Abdomen: soft, non tender  Extremities:  No observed deformity   Skin: unremarkable  Neuro: AAO X3, no focal motor deficit        Labs:  Lab Results   Component Value Date    WBC 6 74 07/29/2021    HGB 14 2 07/29/2021    HCT 44 2 07/29/2021    MCV 94 07/29/2021     07/29/2021     Lab Results   Component Value Date    GLUCOSE 82 10/02/2015    CALCIUM 8 9 07/29/2021     10/02/2015    K 4 3 07/29/2021    CO2 24 07/29/2021     (H) 07/29/2021    BUN 23 07/29/2021    CREATININE 0 64 07/29/2021     No results found for: IGE  Lab Results   Component Value Date    ALT 29 07/29/2021    AST 15 07/29/2021    ALKPHOS 85 07/29/2021    BILITOT 0 2 01/14/2015           Portions of the record may have been created with voice recognition software  Occasional wrong word or "sound a like" substitutions may have occurred due to the inherent limitations of voice recognition software  Read the chart carefully and recognize, using context, where substitutions have occurred    KJ Osei [facility]

## 2022-01-10 ENCOUNTER — TELEPHONE (OUTPATIENT)
Dept: GASTROENTEROLOGY | Facility: CLINIC | Age: 57
End: 2022-01-10

## 2022-01-10 ENCOUNTER — OFFICE VISIT (OUTPATIENT)
Dept: PULMONOLOGY | Facility: CLINIC | Age: 57
End: 2022-01-10
Payer: COMMERCIAL

## 2022-01-10 ENCOUNTER — APPOINTMENT (OUTPATIENT)
Dept: LAB | Facility: CLINIC | Age: 57
End: 2022-01-10
Payer: COMMERCIAL

## 2022-01-10 ENCOUNTER — TELEPHONE (OUTPATIENT)
Dept: FAMILY MEDICINE CLINIC | Facility: CLINIC | Age: 57
End: 2022-01-10

## 2022-01-10 VITALS
TEMPERATURE: 98 F | BODY MASS INDEX: 27.56 KG/M2 | DIASTOLIC BLOOD PRESSURE: 70 MMHG | WEIGHT: 175.6 LBS | HEART RATE: 78 BPM | RESPIRATION RATE: 16 BRPM | HEIGHT: 67 IN | SYSTOLIC BLOOD PRESSURE: 110 MMHG | OXYGEN SATURATION: 97 %

## 2022-01-10 DIAGNOSIS — R07.89 CHEST TIGHTNESS: Primary | ICD-10-CM

## 2022-01-10 DIAGNOSIS — J44.9 CHRONIC OBSTRUCTIVE PULMONARY DISEASE, UNSPECIFIED COPD TYPE (HCC): ICD-10-CM

## 2022-01-10 DIAGNOSIS — K76.9 LIVER LESION: Primary | ICD-10-CM

## 2022-01-10 PROCEDURE — 99214 OFFICE O/P EST MOD 30 MIN: CPT | Performed by: INTERNAL MEDICINE

## 2022-01-10 PROCEDURE — 83993 ASSAY FOR CALPROTECTIN FECAL: CPT

## 2022-01-10 RX ORDER — BUDESONIDE, GLYCOPYRROLATE, AND FORMOTEROL FUMARATE 160; 9; 4.8 UG/1; UG/1; UG/1
2 AEROSOL, METERED RESPIRATORY (INHALATION) EVERY 12 HOURS
Qty: 31.1 G | Refills: 11 | Status: SHIPPED | OUTPATIENT
Start: 2022-01-10 | End: 2022-05-19 | Stop reason: SDUPTHER

## 2022-01-10 NOTE — TELEPHONE ENCOUNTER
I called patient to make her aware that follow up with GI is recommended due to liver lesions found on CT lung today  She is already established with GI so will reach out to them  Via tiger text GI stated they would order the further testing needed (MRI/MRCP) to have done prior to her follow up appt

## 2022-01-10 NOTE — TELEPHONE ENCOUNTER
Please call patient  Patient was found to have an incidentally noted liver lesion on her most recent chest CT, and has already been made aware of these results by her PCP  While this was noted to likely be benign, follow-up with MRI/MRCP was recommended  This has been ordered  Please assist patient in scheduling, preferably to have completed prior to her f/u appointment in February  Thank you

## 2022-01-10 NOTE — LETTER
January 10, 2022     Patient: Jarrett Martinez   YOB: 1965   Date of Visit: 1/10/2022       To Whom it May Concern:    Gini Vásquez is under my professional care  She was seen in my office on 1/10/2022  She has a limited exercise capacity due to COPD and should avoid severe exertion such as going up the steps  If you have any questions or concerns, please don't hesitate to call           Sincerely,          Zoya Norman MD        CC: No Recipients

## 2022-01-12 LAB — CALPROTECTIN STL-MCNT: 16 UG/G (ref 0–120)

## 2022-01-13 ENCOUNTER — TELEPHONE (OUTPATIENT)
Dept: PAIN MEDICINE | Facility: MEDICAL CENTER | Age: 57
End: 2022-01-13

## 2022-01-13 NOTE — TELEPHONE ENCOUNTER
--pt to C/B--    S/W pt  Pt wants a neck injection  She stated she is at work on a call and needs to call [employer] back    Gave her [address] phone #     --pt had cervical MRI on 1/4/22--

## 2022-01-13 NOTE — TELEPHONE ENCOUNTER
Pt called stating that she is interested in an injection and would like to know if she can schedule one     Pt can be reached at [phone]

## 2022-01-14 NOTE — TELEPHONE ENCOUNTER
Spoke with pt offered her 1/27,2/3 and 2/10 pt couldn't do those days stated she will cb when she figures out her schedules

## 2022-01-14 NOTE — TELEPHONE ENCOUNTER
S/w pt  Pt states she spoke with Dr Willi Garrett on Friday and reviewed her MRI results  Per pt she did not want to schedule a procedure at that time due to not having pain  Per pt she is having a flare up now and would like to schedule a procedure  Pt states she is not interested in having an SCS trial, she would like to continue with injections  Pt has pain at the base of her skull which causes headaches, feels tightening in her neck and shoulders with numbness into her RT hand  Pt takes Meloxicam with some relief      Please advise-

## 2022-01-26 ENCOUNTER — RADIATION ONCOLOGY FOLLOW-UP (OUTPATIENT)
Dept: RADIATION ONCOLOGY | Facility: CLINIC | Age: 57
End: 2022-01-26
Attending: RADIOLOGY
Payer: COMMERCIAL

## 2022-01-26 VITALS
WEIGHT: 179.23 LBS | HEART RATE: 84 BPM | RESPIRATION RATE: 18 BRPM | TEMPERATURE: 98.9 F | DIASTOLIC BLOOD PRESSURE: 79 MMHG | SYSTOLIC BLOOD PRESSURE: 123 MMHG | OXYGEN SATURATION: 94 % | BODY MASS INDEX: 28.13 KG/M2 | HEIGHT: 67 IN

## 2022-01-26 DIAGNOSIS — Z17.0 MALIGNANT NEOPLASM OF UPPER-INNER QUADRANT OF RIGHT BREAST IN FEMALE, ESTROGEN RECEPTOR POSITIVE (HCC): Primary | ICD-10-CM

## 2022-01-26 DIAGNOSIS — C50.211 MALIGNANT NEOPLASM OF UPPER-INNER QUADRANT OF RIGHT BREAST IN FEMALE, ESTROGEN RECEPTOR POSITIVE (HCC): Primary | ICD-10-CM

## 2022-01-26 PROCEDURE — G0463 HOSPITAL OUTPT CLINIC VISIT: HCPCS | Performed by: RADIOLOGY

## 2022-01-26 PROCEDURE — 99213 OFFICE O/P EST LOW 20 MIN: CPT | Performed by: RADIOLOGY

## 2022-01-26 PROCEDURE — 99211 OFF/OP EST MAY X REQ PHY/QHP: CPT | Performed by: RADIOLOGY
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
## 2022-01-27 ENCOUNTER — HOSPITAL ENCOUNTER (OUTPATIENT)
Dept: MRI IMAGING | Facility: HOSPITAL | Age: 57
Discharge: HOME | End: 2022-01-27
Payer: COMMERCIAL

## 2022-01-27 DIAGNOSIS — K76.9 LIVER LESION: ICD-10-CM

## 2022-01-27 PROCEDURE — A9585 GADOBUTROL INJECTION: HCPCS | Performed by: FAMILY MEDICINE

## 2022-01-27 PROCEDURE — G1004 CDSM NDSC: HCPCS

## 2022-01-27 PROCEDURE — 74183 MRI ABD W/O CNTR FLWD CNTR: CPT

## 2022-01-27 RX ADMIN — GADOBUTROL 8 ML: 604.72 INJECTION INTRAVENOUS at 19:26

## 2022-01-28 DIAGNOSIS — G43.101 MIGRAINE WITH AURA AND WITH STATUS MIGRAINOSUS, NOT INTRACTABLE: ICD-10-CM

## 2022-01-28 RX ORDER — TOPIRAMATE 100 MG/1
TABLET, FILM COATED ORAL
Qty: 90 TABLET | Refills: 1 | Status: SHIPPED | OUTPATIENT
Start: 2022-01-28 | End: 2022-07-05

## 2022-02-02 ENCOUNTER — HOSPITAL ENCOUNTER (OUTPATIENT)
Dept: RADIOLOGY | Facility: HOSPITAL | Age: 57
Discharge: HOME | End: 2022-02-02
Attending: INTERNAL MEDICINE
Payer: COMMERCIAL

## 2022-02-02 ENCOUNTER — HOSPITAL ENCOUNTER (OUTPATIENT)
Dept: NON INVASIVE DIAGNOSTICS | Facility: HOSPITAL | Age: 57
Discharge: HOME | End: 2022-02-02
Attending: INTERNAL MEDICINE
Payer: COMMERCIAL

## 2022-02-02 VITALS — BODY MASS INDEX: 28.09 KG/M2 | HEIGHT: 67 IN | WEIGHT: 179 LBS

## 2022-02-02 DIAGNOSIS — R07.89 CHEST TIGHTNESS: ICD-10-CM

## 2022-02-02 LAB
BASELINE ST DEPRESSION: 0 MM
CHEST PAIN STATEMENT: NORMAL
MAX DIASTOLIC BP: 80 MMHG
MAX HEART RATE: 111 BPM
MAX HR PERCENT: 67 %
MAX PREDICTED HEART RATE: 164 BPM
MAX. SYSTOLIC BP: 130 MMHG
NUC STRESS EJECTION FRACTION: 75 %
PROTOCOL NAME: NORMAL
RATE PRESSURE PRODUCT: NORMAL
REASON FOR TERMINATION: NORMAL
SL CV REST NUCLEAR ISOTOPE DOSE: 10.3 MCI
SL CV STRESS NUCLEAR ISOTOPE DOSE: 31.3 MCI
SL CV STRESS RECOVERY BP: NORMAL MMHG
SL CV STRESS RECOVERY HR: 87 BPM
SL CV STRESS RECOVERY O2 SAT: 99 %
STRESS ANGINA INDEX: 0
STRESS BASELINE BP: NORMAL MMHG
STRESS BASELINE HR: 69 BPM
STRESS O2 SAT REST: 99 %
STRESS PEAK HR: 111 BPM
STRESS PERCENT HR: 67 %
STRESS POST O2 SAT PEAK: 100 %
STRESS POST PEAK BP: 130 MMHG
STRESS ST DEPRESSION: 0 MM
STRESS/REST PERFUSION RATIO: 0.92
TARGET HR FORMULA: NORMAL
TEST INDICATION: NORMAL
TIME IN EXERCISE PHASE: NORMAL

## 2022-02-02 PROCEDURE — 93018 CV STRESS TEST I&R ONLY: CPT

## 2022-02-02 PROCEDURE — 78452 HT MUSCLE IMAGE SPECT MULT: CPT

## 2022-02-02 PROCEDURE — A9502 TC99M TETROFOSMIN: HCPCS

## 2022-02-02 PROCEDURE — 93016 CV STRESS TEST SUPVJ ONLY: CPT

## 2022-02-02 PROCEDURE — 93017 CV STRESS TEST TRACING ONLY: CPT

## 2022-02-02 RX ADMIN — REGADENOSON 0.4 MG: 0.08 INJECTION, SOLUTION INTRAVENOUS at 09:45

## 2022-02-03 ENCOUNTER — OFFICE VISIT (OUTPATIENT)
Dept: HEMATOLOGY ONCOLOGY | Facility: CLINIC | Age: 57
End: 2022-02-03
Payer: COMMERCIAL

## 2022-02-03 VITALS
HEART RATE: 73 BPM | DIASTOLIC BLOOD PRESSURE: 80 MMHG | OXYGEN SATURATION: 96 % | HEIGHT: 67 IN | RESPIRATION RATE: 18 BRPM | SYSTOLIC BLOOD PRESSURE: 112 MMHG | BODY MASS INDEX: 27.62 KG/M2 | TEMPERATURE: 98.4 F | WEIGHT: 176 LBS

## 2022-02-03 DIAGNOSIS — C50.211 MALIGNANT NEOPLASM OF UPPER-INNER QUADRANT OF RIGHT BREAST IN FEMALE, ESTROGEN RECEPTOR POSITIVE (HCC): ICD-10-CM

## 2022-02-03 DIAGNOSIS — Z17.0 MALIGNANT NEOPLASM OF UPPER-INNER QUADRANT OF RIGHT BREAST IN FEMALE, ESTROGEN RECEPTOR POSITIVE (HCC): ICD-10-CM

## 2022-02-03 PROCEDURE — 99214 OFFICE O/P EST MOD 30 MIN: CPT | Performed by: INTERNAL MEDICINE

## 2022-02-03 RX ORDER — ANASTROZOLE 1 MG/1
1 TABLET ORAL DAILY
Qty: 90 TABLET | Refills: 3 | Status: SHIPPED | OUTPATIENT
Start: 2022-02-03

## 2022-02-03 NOTE — PROGRESS NOTES
Hematology / Oncology Outpatient Follow Up Note    [facility] 64 y o  female :1965 [id number]         Date:  2/3/2022    Assessment / Plan:  A 15-year-old postmenopausal woman with stage IA right breast cancer, grade 1 with invasive tubular histology, ER 70% positive, UT 80% positive, HER2 negative disease   She underwent lumpectomy and sentinel lymph node biopsy, resulting in FANTASMA   Her primary tumor size was only 4 mm  She is currently on adjuvant hormonal therapy with anastrozole with no side effects  She has no evidence recurrent disease, based on her symptoms and physical examinations  I recommended her to continue anastrozole 1 milligram once a day  She is in agreement with my recommendation  I will see her again in a year for routine follow-up           Subjective:      HPI:  A 15-year-old female who has history of simple hysterectomy in   She was recently found to have abnormality in her right breast, based on a screening mammography  Laverne Melissa, she underwent right breast biopsy in 2020 which showed invasive ductal carcinoma, grade 1  This was ER 70% positive, UT 80% positive, HER2 negative disease   She subsequently underwent lumpectomy by Dr Meek Gramajo in July 10, 2020 which showed 4 mm of invasive tubular carcinoma, grade 1  2 sentinel lymph nodes were negative for metastatic disease   She presents today to discuss adjuvant treatment options   She has no breast related symptomatology   She denied any pain   Her weight is stable   She has no respiratory symptoms   She is up to date for colonoscopy   She has COPD  Yasemin Toney was a long-time smoker until 2016   She does not drink alcohol   Her performance status is normal              Interval History:  A 15-year-old postmenopausal woman with  stage IA right breast cancer, grade 1 with invasive tubular histology, ER 70% positive, UT 80% positive, HER2 negative disease   She underwent lumpectomy and sentinel lymph node biopsy, resulting in FANTASMA   Her primary tumor size was only 4 mm  Since August 2020, she has been on adjuvant hormonal therapy with anastrozole  She presents today for follow-up  She feels well with no new complaint  She absolutely has no hot flashes or musculoskeletal symptoms  She denied any bone pain  Her weight is stable  She has COPD due to the long-time smoking until 2016  Her performance status is normal           Objective:      Primary Diagnosis:     Right breast cancer, stage IA (pT1a, pN0, M0) grade 1, invasive tubular histology, ER 70% positive, MO 80% positive, HER2 negative disease   Diagnosed in July 2020       Cancer Staging:  Cancer Staging  Malignant neoplasm of 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, estrogen receptor positive ([facility] [address] )  Staging form: Breast, AJCC 8th Edition  - Clinical: Stage IA (cT1, cN0, cM0, G1, ER+, MO+, HER2-) - Signed by Zaheer Stewart MD on 7/1/2020  Laterality: Right  Method of lymph node assessment: Clinical  Histologic grading system: 3 grade system  - Pathologic: Stage IA (pT1a, pN0(sn), cM0, G1, ER+, MO+, HER2-) - Signed by Zaheer Stewart MD on 7/28/2020  Neoadjuvant therapy: No  Laterality: Right  Method of lymph node assessment: [place] lymph node biopsy  Histologic grading system: 3 grade system           Previous Hematologic/ Oncologic Treatment:            Current Hematologic/ Oncologic Treatment:       Adjuvant hormonal therapy with anastrozole since August 2020    Disease Status:      FANTASMA status post lumpectomy and sentinel lymph node biopsy      Test Results:     Pathology:     4 mm of invasive tubular carcinoma, grade 1  2 sentinel lymph nodes were negative for metastatic disease   ER 70 pr sound positive, MO 80% positive, HER2 negative disease   Stage IA (pT1a, pN0, M0)       Radiology:     Mammography in June 2021 was benign  BI-RADS 2       Laboratory:     See below   Estradiol was 14      Physical Exam:        General Appearance:    Alert, oriented       Eyes:    PERRL   Ears:    Normal external ear canals, both ears   Nose:   Nares normal, septum midline   Throat:   Mucosa moist  Pharynx without injection  Neck:   Supple         Lungs:     Clear to auscultation bilaterally   Chest Wall:    No tenderness or deformity    Heart:    Regular rate and rhythm         Abdomen:     Soft, non-tender, bowel sounds +, no organomegaly               Extremities:   Extremities no cyanosis or edema         Skin:   no rash or icterus  Lymph nodes:   Cervical, supraclavicular, and axillary nodes normal   Neurologic:   CNII-XII intact, normal strength, sensation and reflexes     Throughout             Breast exam:   Lumpectomy scar at 12:00 p m  Position in her right breast with no palpable abnormalities   Left breast exam is negative             ROS: Review of Systems   All other systems reviewed and are negative  Imaging: MRI abdomen w wo contrast and mrcp    Result Date: 1/31/2022  Narrative: MRI OF THE ABDOMEN WITH AND WITHOUT CONTRAST WITH MRCP INDICATION:  Liver lesion seen on prior chest CT  COMPARISON: CT lung screening 12/21/2021  TECHNIQUE:  The following pulse sequences were obtained:  Coronal and axial T2 with TE of 90 and 180 respectively, axial T2 with fat saturation, axial FIESTA fat-sat, axial T1-weighted in-and-out-of phase, axial DWI/ADC, pre-contrast axial T1 with fat saturation, post-contrast dynamic axial T1 with fat saturation at 20, 70, and 180 seconds, followed by coronal and 7 minute delayed axial T1 with fat saturation  3D MRCP images were obtained with radial thick slabs and projections  3D rendering was performed from the acquisition scanner  IV Contrast:  8 mL of Gadobutrol injection (SINGLE-DOSE) FINDINGS: LOWER CHEST:   Unremarkable  LIVER: Normal in size and configuration  No suspicious mass  Scattered cysts, the largest in segment 4B measuring approximately 4 8 x 4 2 cm with few thin enhancing septations  No solid component  The hepatic veins and portal veins are patent  BILE DUCTS:  No intrahepatic or extrahepatic bile duct dilation  Common bile duct is normal in caliber  No choledocholithiasis, biliary stricture or suspicious mass  GALLBLADDER:  Normal  PANCREAS:  Unremarkable  ADRENAL GLANDS:  Normal  SPLEEN:  Subcentimeter nonenhancing cystic lesion in the superior spleen, benign  KIDNEYS/PROXIMAL URETERS:  No hydroureteronephrosis  No suspicious renal mass  There is a 7 7 x 7 8 x 11 3 cm right renal lower pole cyst with thin enhancing septation  Subcentimeter left mid renal hemorrhagic cyst  BOWEL:   No dilated loops of bowel  PERITONEUM/RETROPERITONEUM:  No ascites  LYMPH NODES:  No abdominal lymphadenopathy  VASCULAR STRUCTURES:  No aneurysm  ABDOMINAL WALL:  Unremarkable  OSSEOUS STRUCTURES:  No suspicious osseous lesion  S-shaped scoliosis  Impression: Multiple hepatic cysts, the largest in segment 4B containing a few thin internal septations  No suspicious liver lesions  Large right renal lower pole cyst with thin enhancing septation  Based on Bosniak Classification of Cystic Renal Masses, Version 2019, this lesion is a Bosniak II: Likely benign Bosniak II renal mass requiring no follow-up  Workstation performed: [id number]     Stress strip    Result Date: 2/2/2022  Narrative: [facility] by [facility] ([id number]  Celena Del Angel ([id number]) on 2/2/2022 11:04:18 AM    NM myocardial perfusion spect (rx stress and/or rest)    Result Date: 2/2/2022  Narrative: [place]  Stress ECG: No ST deviation is noted  There were no arrhythmias during recovery    The ECG was negative for ischemia  The stress ECG is negative for ischemia    Stress Function: Left ventricular function post-stress is normal  Post-stress ejection fraction is 75 %    Stress Combined Conclusion: There is image artifact, without diagnostic evidence for perfusion abnormality     Perfusion: There is a left ventricular perfusion defect that is medium in size present in the basal to mid anterior location(s) that is fixed on supine stress images  This defect resolves on prone imaging  There is a left ventricular perfusion defect that is medium in size with mild reduction in uptake present in the basal to mid inferior location(s) that is reversible  Prone imaging shows normal perfusion  There was image artifact without diagnostic evidence of ischemia  Labs:   Lab Results   Component Value Date    WBC 6 74 07/29/2021    HGB 14 2 07/29/2021    HCT 44 2 07/29/2021    MCV 94 07/29/2021     07/29/2021     Lab Results   Component Value Date     10/02/2015    K 4 3 07/29/2021     (H) 07/29/2021    CO2 24 07/29/2021    ANIONGAP 8 10/02/2015    BUN 23 07/29/2021    CREATININE 0 64 07/29/2021    GLUCOSE 82 10/02/2015    GLUF 87 07/29/2021    CALCIUM 8 9 07/29/2021    AST 15 07/29/2021    ALT 29 07/29/2021    ALKPHOS 85 07/29/2021    PROT 7 5 01/14/2015    BILITOT 0 2 01/14/2015    EGFR 101 07/29/2021         Lab Results   Component Value Date    IRON 105 07/29/2021    TIBC 336 07/29/2021    FERRITIN 101 07/29/2021       Lab Results   Component Value Date    HLLTXXOU90 1,289 (H) 08/04/2015         Current Medications: Reviewed  Allergies: Reviewed  PMH/FH/SH:  Reviewed      Vital Sign:    Body surface area is 1 91 meters squared      Wt Readings from Last 3 Encounters:   02/03/22 79 8 kg (176 lb)   02/02/22 81 2 kg (179 lb)   01/26/22 81 3 kg (179 lb 3 7 oz)        Temp Readings from Last 3 Encounters:   02/03/22 98 4 °F (36 9 °C) (Tympanic Core)   01/26/22 98 9 °F (37 2 °C)   01/10/22 98 °F (36 7 °C) (Tympanic)        BP Readings from Last 3 Encounters:   02/03/22 112/80   01/26/22 123/79   01/10/22 110/70         Pulse Readings from Last 3 Encounters:   02/03/22 73   01/26/22 84   01/10/22 78     @LASTSAO2(3)@

## 2022-02-10 ENCOUNTER — HOSPITAL ENCOUNTER (OUTPATIENT)
Dept: RADIOLOGY | Facility: MEDICAL CENTER | Age: 57
Discharge: HOME | End: 2022-02-10
Attending: PHYSICAL MEDICINE & REHABILITATION | Admitting: PHYSICAL MEDICINE & REHABILITATION
Payer: COMMERCIAL

## 2022-02-10 ENCOUNTER — OFFICE VISIT (OUTPATIENT)
Dept: GASTROENTEROLOGY | Facility: CLINIC | Age: 57
End: 2022-02-10
Payer: COMMERCIAL

## 2022-02-10 VITALS
OXYGEN SATURATION: 96 % | TEMPERATURE: 97.5 F | HEART RATE: 84 BPM | DIASTOLIC BLOOD PRESSURE: 68 MMHG | RESPIRATION RATE: 18 BRPM | SYSTOLIC BLOOD PRESSURE: 114 MMHG

## 2022-02-10 VITALS
RESPIRATION RATE: 18 BRPM | WEIGHT: 175 LBS | DIASTOLIC BLOOD PRESSURE: 68 MMHG | TEMPERATURE: 97.6 F | SYSTOLIC BLOOD PRESSURE: 110 MMHG | BODY MASS INDEX: 27.47 KG/M2 | HEART RATE: 88 BPM | HEIGHT: 67 IN

## 2022-02-10 DIAGNOSIS — M50.120 CERVICAL DISC DISORDER WITH RADICULOPATHY OF MID-CERVICAL REGION: ICD-10-CM

## 2022-02-10 DIAGNOSIS — Z86.010 PERSONAL HISTORY OF COLONIC POLYPS: ICD-10-CM

## 2022-02-10 DIAGNOSIS — K76.89 HEPATIC CYST: ICD-10-CM

## 2022-02-10 DIAGNOSIS — N28.1 RENAL CYST, RIGHT: Primary | ICD-10-CM

## 2022-02-10 DIAGNOSIS — M47.892 OTHER OSTEOARTHRITIS OF SPINE, CERVICAL REGION: ICD-10-CM

## 2022-02-10 DIAGNOSIS — K58.0 IRRITABLE BOWEL SYNDROME WITH DIARRHEA: ICD-10-CM

## 2022-02-10 DIAGNOSIS — G54.2 DISORDER OF RIGHT CERVICAL NERVE ROOT: ICD-10-CM

## 2022-02-10 DIAGNOSIS — R93.5 ABNORMAL CT OF THE ABDOMEN: ICD-10-CM

## 2022-02-10 PROCEDURE — 62321 NJX INTERLAMINAR CRV/THRC: CPT | Performed by: PHYSICAL MEDICINE & REHABILITATION

## 2022-02-10 PROCEDURE — 99214 OFFICE O/P EST MOD 30 MIN: CPT | Performed by: FAMILY MEDICINE

## 2022-02-10 RX ORDER — METHYLPREDNISOLONE ACETATE 80 MG/ML
80 INJECTION, SUSPENSION INTRA-ARTICULAR; INTRALESIONAL; INTRAMUSCULAR; PARENTERAL; SOFT TISSUE ONCE
Status: COMPLETED | OUTPATIENT
Start: 2022-02-10 | End: 2022-02-10

## 2022-02-10 RX ADMIN — METHYLPREDNISOLONE ACETATE 80 MG: 80 INJECTION, SUSPENSION INTRA-ARTICULAR; INTRALESIONAL; INTRAMUSCULAR; PARENTERAL; SOFT TISSUE at 08:59

## 2022-02-10 RX ADMIN — IOHEXOL 1 ML: 300 INJECTION, SOLUTION INTRAVENOUS at 08:59
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
## 2022-02-10 NOTE — DISCHARGE INSTRUCTIONS
Epidural Steroid Injection   WHAT YOU NEED TO KNOW:   An epidural steroid injection (PONCHO) is a procedure to inject steroid medicine into the epidural space  The epidural space is between your spinal cord and vertebrae  Steroids reduce inflammation and fluid buildup in your spine that may be causing pain  You may be given pain medicine along with the steroids  ACTIVITY  · Do not drive or operate machinery today  · No strenuous activity today - bending, lifting, etc   · You may resume normal activites starting tomorrow - start slowly and as tolerated  · You may shower today, but no tub baths or hot tubs  · You may have numbness for several hours from the local anesthetic  Please use caution and common sense, especially with weight-bearing activities  CARE OF THE INJECTION SITE  · If you have soreness or pain, apply ice to the area today (20 minutes on/20 minutes off)  · Starting tomorrow, you may use warm, moist heat or ice if needed  · You may have an increase or change in your discomfort for 36-48 hours after your treatment  · Apply ice and continue with any pain medication you have been prescribed  · Notify the [facility] if you have any of the following: redness, drainage, swelling, headache, stiff neck or fever above 100°F     SPECIAL INSTRUCTIONS  · Our office will contact you in approximately 7 days for a progress report  MEDICATIONS  · Continue to take all routine medications  · Our office may have instructed you to hold some medications  You may resume your Meloxicam after 24 hours, so tomorrow after 9:30 am    As no general anesthesia was used in today's procedure, you should not experience any side effects related to anesthesia  If you have a problem specifically related to your procedure, please call our office at [phone]  Problems not related to your procedure should be directed to your primary care physician

## 2022-02-17 ENCOUNTER — OFFICE VISIT (OUTPATIENT)
Dept: FAMILY MEDICINE CLINIC | Facility: CLINIC | Age: 57
End: 2022-02-17
Payer: COMMERCIAL

## 2022-02-17 ENCOUNTER — TELEPHONE (OUTPATIENT)
Dept: PAIN MEDICINE | Facility: CLINIC | Age: 57
End: 2022-02-17

## 2022-02-17 VITALS
HEART RATE: 78 BPM | DIASTOLIC BLOOD PRESSURE: 72 MMHG | BODY MASS INDEX: 28.25 KG/M2 | WEIGHT: 180 LBS | TEMPERATURE: 98.1 F | HEIGHT: 67 IN | SYSTOLIC BLOOD PRESSURE: 114 MMHG | OXYGEN SATURATION: 96 %

## 2022-02-17 DIAGNOSIS — J01.90 ACUTE SINUSITIS, RECURRENCE NOT SPECIFIED, UNSPECIFIED LOCATION: Primary | ICD-10-CM

## 2022-02-17 PROCEDURE — 99213 OFFICE O/P EST LOW 20 MIN: CPT | Performed by: FAMILY MEDICINE

## 2022-02-17 PROCEDURE — 3008F BODY MASS INDEX DOCD: CPT | Performed by: FAMILY MEDICINE

## 2022-02-17 PROCEDURE — 1036F TOBACCO NON-USER: CPT | Performed by: FAMILY MEDICINE

## 2022-02-17 RX ORDER — FLUTICASONE PROPIONATE 50 MCG
1 SPRAY, SUSPENSION (ML) NASAL DAILY
Qty: 9.9 ML | Refills: 0 | Status: SHIPPED | OUTPATIENT
Start: 2022-02-17 | End: 2022-06-24 | Stop reason: ALTCHOICE

## 2022-02-17 RX ORDER — IPRATROPIUM BROMIDE AND ALBUTEROL SULFATE 2.5; .5 MG/3ML; MG/3ML
SOLUTION RESPIRATORY (INHALATION)
COMMUNITY
Start: 2022-01-26

## 2022-02-17 NOTE — TELEPHONE ENCOUNTER
Pt report 30% improvement post inj   Pain level 6/10  Pt aware I will call next week for an update

## 2022-02-17 NOTE — ASSESSMENT & PLAN NOTE
Viral sinusitis  No fevers and symptoms only for 2 days  Lungs clear to auscultation and has good air movement  Encouraged supportive care  Will send ocean spray nasal spray and Flonase to pharmacy  Advised to use [place] spray prior to [facility]  Continue using inhalers for COPD as needed  If using p r n  inhaler more than 2 times or high fevers develop call back

## 2022-02-17 NOTE — PROGRESS NOTES
Assessment/Plan:    Acute sinusitis  Viral sinusitis  No fevers and symptoms only for 2 days  Lungs clear to auscultation and has good air movement  Encouraged supportive care  Will send ocean spray nasal spray and Flonase to pharmacy  Advised to use [place] spray prior to [facility]  Continue using inhalers for COPD as needed  If using p r n  inhaler more than 2 times or high fevers develop call back  Problem List Items Addressed This Visit        Respiratory    Acute sinusitis - Primary     Viral sinusitis  No fevers and symptoms only for 2 days  Lungs clear to auscultation and has good air movement  Encouraged supportive care  Will send ocean spray nasal spray and Flonase to pharmacy  Advised to use [place] spray prior to [facility]  Continue using inhalers for COPD as needed  If using p r n  inhaler more than 2 times or high fevers develop call back  Relevant Medications    sodium chloride (OCEAN) 0 65 % nasal spray    fluticasone (FLONASE) 50 mcg/act nasal spray            Subjective:      Patient ID: Amie Ruvalcaba is a 64 y o  female  Sinus Problem  This is a new problem  The current episode started in the past 7 days  The problem is unchanged  There has been no fever  Associated symptoms include congestion and sinus pressure  Pertinent negatives include no chills, coughing, diaphoresis, ear pain, hoarse voice, shortness of breath, sneezing or sore throat  Past treatments include oral decongestants  The treatment provided mild relief  The following portions of the patient's history were reviewed and updated as appropriate: allergies, current medications, past family history, past medical history, past social history, past surgical history and problem list     Review of Systems   Constitutional: Negative for appetite change, chills, diaphoresis, fatigue and fever  HENT: Positive for congestion and sinus pressure   Negative for ear discharge, ear pain, hoarse voice, postnasal drip, sinus pain, sneezing, sore throat, trouble swallowing and voice change  Eyes: Negative for pain and visual disturbance  Respiratory: Negative for cough and shortness of breath  Cardiovascular: Negative for chest pain and palpitations  Gastrointestinal: Negative for abdominal pain and vomiting  Musculoskeletal: Negative for arthralgias and back pain  Skin: Negative for color change and rash  Neurological: Negative for seizures and syncope  All other systems reviewed and are negative  Objective:      /72 (BP Location: Left arm, Patient Position: Sitting)   Pulse 78   Temp 98 1 °F (36 7 °C)   Ht 5' 7" (1 702 m)   Wt 81 6 kg (180 lb)   SpO2 96%   BMI 28 19 kg/m²          Physical Exam  Vitals and nursing note reviewed  Constitutional:       General: She is not in acute distress  Appearance: Normal appearance  She is not ill-appearing, toxic-appearing or diaphoretic  HENT:      Head: Normocephalic and atraumatic  Nose: Congestion present  Mouth/Throat:      Mouth: Mucous membranes are moist       Pharynx: Oropharynx is clear  No posterior oropharyngeal erythema  Eyes:      General:         Right eye: No discharge  Left eye: No discharge  Extraocular Movements: Extraocular movements intact  Conjunctiva/sclera: Conjunctivae normal    Cardiovascular:      Rate and Rhythm: Normal rate and regular rhythm  Pulses: Normal pulses  Heart sounds: Normal heart sounds  Pulmonary:      Effort: Pulmonary effort is normal  No respiratory distress  Breath sounds: Normal breath sounds  No stridor  No wheezing, rhonchi or rales  Chest:      Chest wall: No tenderness  Musculoskeletal:      Cervical back: Normal range of motion and neck supple  Lymphadenopathy:      Cervical: No cervical adenopathy  Skin:     General: Skin is warm and dry  Neurological:      Mental Status: She is alert and oriented to person, place, and time  Psychiatric:         Mood and Affect: Mood normal

## 2022-03-10 ENCOUNTER — OFFICE VISIT (OUTPATIENT)
Dept: PAIN MEDICINE | Facility: CLINIC | Age: 57
End: 2022-03-10
Payer: COMMERCIAL

## 2022-03-10 VITALS
SYSTOLIC BLOOD PRESSURE: 120 MMHG | DIASTOLIC BLOOD PRESSURE: 76 MMHG | HEIGHT: 67 IN | BODY MASS INDEX: 28.41 KG/M2 | HEART RATE: 71 BPM | TEMPERATURE: 98.6 F | WEIGHT: 181 LBS

## 2022-03-10 DIAGNOSIS — G89.4 CHRONIC PAIN SYNDROME: Primary | ICD-10-CM

## 2022-03-10 DIAGNOSIS — M62.830 SPASM OF BACK MUSCLES: ICD-10-CM

## 2022-03-10 DIAGNOSIS — M62.838 SPASM OF CERVICAL PARASPINOUS MUSCLE: ICD-10-CM

## 2022-03-10 DIAGNOSIS — M79.18 MYOFASCIAL PAIN SYNDROME: ICD-10-CM

## 2022-03-10 PROCEDURE — 99214 OFFICE O/P EST MOD 30 MIN: CPT | Performed by: NURSE PRACTITIONER

## 2022-03-10 PROCEDURE — 1036F TOBACCO NON-USER: CPT | Performed by: NURSE PRACTITIONER

## 2022-03-10 PROCEDURE — 3008F BODY MASS INDEX DOCD: CPT | Performed by: NURSE PRACTITIONER

## 2022-03-10 RX ORDER — METHOCARBAMOL 500 MG/1
500 TABLET, FILM COATED ORAL 3 TIMES DAILY
Qty: 120 TABLET | Refills: 1 | Status: SHIPPED | OUTPATIENT
Start: 2022-03-10
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
## 2022-03-16 ENCOUNTER — APPOINTMENT (OUTPATIENT)
Dept: RADIATION ONCOLOGY | Facility: CLINIC | Age: 57
End: 2022-03-16

## 2022-04-04 ENCOUNTER — TELEPHONE (OUTPATIENT)
Dept: NEPHROLOGY | Facility: CLINIC | Age: 57
End: 2022-04-04

## 2022-04-04 DIAGNOSIS — N17.9 ACUTE RENAL FAILURE, UNSPECIFIED ACUTE RENAL FAILURE TYPE (HCC): Primary | ICD-10-CM

## 2022-04-07 ENCOUNTER — TELEPHONE (OUTPATIENT)
Dept: FAMILY MEDICINE CLINIC | Facility: CLINIC | Age: 57
End: 2022-04-07

## 2022-04-12 ENCOUNTER — APPOINTMENT (OUTPATIENT)
Dept: LAB | Facility: CLINIC | Age: 57
End: 2022-04-12
Payer: COMMERCIAL

## 2022-04-12 ENCOUNTER — OFFICE VISIT (OUTPATIENT)
Dept: FAMILY MEDICINE CLINIC | Facility: CLINIC | Age: 57
End: 2022-04-12
Payer: COMMERCIAL

## 2022-04-12 VITALS
HEIGHT: 67 IN | BODY MASS INDEX: 28.53 KG/M2 | HEART RATE: 84 BPM | DIASTOLIC BLOOD PRESSURE: 70 MMHG | OXYGEN SATURATION: 96 % | WEIGHT: 181.8 LBS | SYSTOLIC BLOOD PRESSURE: 100 MMHG | TEMPERATURE: 97.4 F

## 2022-04-12 DIAGNOSIS — Z11.4 SCREENING FOR HIV (HUMAN IMMUNODEFICIENCY VIRUS): ICD-10-CM

## 2022-04-12 DIAGNOSIS — J42 CHRONIC BRONCHITIS, UNSPECIFIED CHRONIC BRONCHITIS TYPE (HCC): Primary | ICD-10-CM

## 2022-04-12 DIAGNOSIS — G43.101 MIGRAINE WITH AURA AND WITH STATUS MIGRAINOSUS, NOT INTRACTABLE: ICD-10-CM

## 2022-04-12 DIAGNOSIS — E78.5 DYSLIPIDEMIA: ICD-10-CM

## 2022-04-12 DIAGNOSIS — R93.7 ABNORMAL BONE DENSITY SCREENING: ICD-10-CM

## 2022-04-12 DIAGNOSIS — Z12.4 CERVICAL CANCER SCREENING: ICD-10-CM

## 2022-04-12 DIAGNOSIS — Z11.59 NEED FOR HEPATITIS C SCREENING TEST: ICD-10-CM

## 2022-04-12 DIAGNOSIS — Z78.0 POSTMENOPAUSAL: ICD-10-CM

## 2022-04-12 DIAGNOSIS — N17.9 ACUTE RENAL FAILURE, UNSPECIFIED ACUTE RENAL FAILURE TYPE (HCC): ICD-10-CM

## 2022-04-12 LAB
ANION GAP SERPL CALCULATED.3IONS-SCNC: 3 MMOL/L (ref 4–13)
BACTERIA UR QL AUTO: ABNORMAL /HPF
BASOPHILS # BLD AUTO: 0.07 THOUSANDS/ΜL (ref 0–0.1)
BASOPHILS NFR BLD AUTO: 1 % (ref 0–1)
BILIRUB UR QL STRIP: NEGATIVE
BUN SERPL-MCNC: 20 MG/DL (ref 5–25)
CALCIUM SERPL-MCNC: 9.5 MG/DL (ref 8.3–10.1)
CHLORIDE SERPL-SCNC: 109 MMOL/L (ref 100–108)
CLARITY UR: CLEAR
CO2 SERPL-SCNC: 28 MMOL/L (ref 21–32)
COLOR UR: YELLOW
CREAT SERPL-MCNC: 0.74 MG/DL (ref 0.6–1.3)
CREAT UR-MCNC: 176 MG/DL
EOSINOPHIL # BLD AUTO: 0.28 THOUSAND/ΜL (ref 0–0.61)
EOSINOPHIL NFR BLD AUTO: 5 % (ref 0–6)
ERYTHROCYTE [DISTWIDTH] IN BLOOD BY AUTOMATED COUNT: 13.2 % (ref 11.6–15.1)
GFR SERPL CREATININE-BSD FRML MDRD: 90 ML/MIN/1.73SQ M
GLUCOSE P FAST SERPL-MCNC: 99 MG/DL (ref 65–99)
GLUCOSE UR STRIP-MCNC: NEGATIVE MG/DL
HCT VFR BLD AUTO: 43.6 % (ref 34.8–46.1)
HGB BLD-MCNC: 14 G/DL (ref 11.5–15.4)
HGB UR QL STRIP.AUTO: NEGATIVE
IMM GRANULOCYTES # BLD AUTO: 0.02 THOUSAND/UL (ref 0–0.2)
IMM GRANULOCYTES NFR BLD AUTO: 0 % (ref 0–2)
KETONES UR STRIP-MCNC: NEGATIVE MG/DL
LEUKOCYTE ESTERASE UR QL STRIP: NEGATIVE
LYMPHOCYTES # BLD AUTO: 1.78 THOUSANDS/ΜL (ref 0.6–4.47)
LYMPHOCYTES NFR BLD AUTO: 30 % (ref 14–44)
MCH RBC QN AUTO: 30 PG (ref 26.8–34.3)
MCHC RBC AUTO-ENTMCNC: 32.1 G/DL (ref 31.4–37.4)
MCV RBC AUTO: 94 FL (ref 82–98)
MONOCYTES # BLD AUTO: 0.68 THOUSAND/ΜL (ref 0.17–1.22)
MONOCYTES NFR BLD AUTO: 12 % (ref 4–12)
MUCOUS THREADS UR QL AUTO: ABNORMAL
NEUTROPHILS # BLD AUTO: 3.07 THOUSANDS/ΜL (ref 1.85–7.62)
NEUTS SEG NFR BLD AUTO: 52 % (ref 43–75)
NITRITE UR QL STRIP: NEGATIVE
NON-SQ EPI CELLS URNS QL MICRO: ABNORMAL /HPF
NRBC BLD AUTO-RTO: 0 /100 WBCS
PH UR STRIP.AUTO: 6.5 [PH]
PLATELET # BLD AUTO: 207 THOUSANDS/UL (ref 149–390)
PMV BLD AUTO: 11.7 FL (ref 8.9–12.7)
POTASSIUM SERPL-SCNC: 3.8 MMOL/L (ref 3.5–5.3)
PROT UR STRIP-MCNC: ABNORMAL MG/DL
PROT UR-MCNC: 18 MG/DL
PROT/CREAT UR: 0.1 MG/G{CREAT} (ref 0–0.1)
RBC # BLD AUTO: 4.66 MILLION/UL (ref 3.81–5.12)
RBC #/AREA URNS AUTO: ABNORMAL /HPF
SODIUM SERPL-SCNC: 140 MMOL/L (ref 136–145)
SP GR UR STRIP.AUTO: 1.02 (ref 1–1.03)
UROBILINOGEN UR STRIP-ACNC: <2 MG/DL
WBC # BLD AUTO: 5.9 THOUSAND/UL (ref 4.31–10.16)
WBC #/AREA URNS AUTO: ABNORMAL /HPF

## 2022-04-12 PROCEDURE — 80048 BASIC METABOLIC PNL TOTAL CA: CPT

## 2022-04-12 PROCEDURE — 81001 URINALYSIS AUTO W/SCOPE: CPT

## 2022-04-12 PROCEDURE — 85025 COMPLETE CBC W/AUTO DIFF WBC: CPT

## 2022-04-12 PROCEDURE — 82570 ASSAY OF URINE CREATININE: CPT

## 2022-04-12 PROCEDURE — 36415 COLL VENOUS BLD VENIPUNCTURE: CPT

## 2022-04-12 PROCEDURE — 3008F BODY MASS INDEX DOCD: CPT | Performed by: INTERNAL MEDICINE

## 2022-04-12 PROCEDURE — 99214 OFFICE O/P EST MOD 30 MIN: CPT | Performed by: PHYSICIAN ASSISTANT

## 2022-04-12 PROCEDURE — 84156 ASSAY OF PROTEIN URINE: CPT
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
## 2022-04-13 ENCOUNTER — PROCEDURE VISIT (OUTPATIENT)
Dept: PAIN MEDICINE | Facility: CLINIC | Age: 57
End: 2022-04-13
Payer: COMMERCIAL

## 2022-04-13 DIAGNOSIS — M79.18 MYOFASCIAL PAIN SYNDROME: Primary | ICD-10-CM

## 2022-04-13 PROCEDURE — 76942 ECHO GUIDE FOR BIOPSY: CPT | Performed by: PHYSICAL MEDICINE & REHABILITATION

## 2022-04-13 PROCEDURE — 20552 NJX 1/MLT TRIGGER POINT 1/2: CPT | Performed by: PHYSICAL MEDICINE & REHABILITATION

## 2022-04-13 RX ORDER — METHYLPREDNISOLONE ACETATE 40 MG/ML
40 INJECTION, SUSPENSION INTRA-ARTICULAR; INTRALESIONAL; INTRAMUSCULAR; SOFT TISSUE ONCE
Status: COMPLETED | OUTPATIENT
Start: 2022-04-13 | End: 2022-04-13

## 2022-04-13 RX ORDER — BUPIVACAINE HYDROCHLORIDE 2.5 MG/ML
10 INJECTION, SOLUTION EPIDURAL; INFILTRATION; INTRACAUDAL ONCE
Status: COMPLETED | OUTPATIENT
Start: 2022-04-13 | End: 2022-04-13

## 2022-04-13 RX ADMIN — METHYLPREDNISOLONE ACETATE 40 MG: 40 INJECTION, SUSPENSION INTRA-ARTICULAR; INTRALESIONAL; INTRAMUSCULAR; SOFT TISSUE at 14:55

## 2022-04-13 RX ADMIN — BUPIVACAINE HYDROCHLORIDE 10 ML: 2.5 INJECTION, SOLUTION EPIDURAL; INFILTRATION; INTRACAUDAL at 14:54

## 2022-04-13 NOTE — PROGRESS NOTES
Indication:  Muscular pain  Preprocedure diagnosis:  1  Myofascial pain syndrome  Postprocedure diagnosis:  1  Myofascial pain syndrome    Procedure:  Ultrasound-guided right muscle trigger point injection(s)  After discussing the risks, benefits, and alternatives to the procedure, the patient expressed understanding and wished to proceed  The patient was brought to the procedure suite and placed in the seated position  A procedural pause was conducted to verify:  correct patient identity, procedure to be performed and as applicable, correct side and site, correct patient position, and availability of implants, special equipment or special requirements  A simple surgical tray was used  Prior to the procedure, the right cervical paraspinal musculature was examined with a 12 MHz linear transducer to visualize the targeted trigger points and determine the optimal needle path  Following this, the area was prepared with a ChloraPrep scrub, then re-examined using the same transducer, a sterile ultrasound transducer cover, and sterile ultrasound transducer gel  Thereafter, using ultrasound guidance, a 2 5-inch 25-gauge needle was advanced into the targeted trigger points  After visualization of the tip and negative aspiration for blood, a mixture of 1% lidocaine with 40 milligrams/milliliter Depo-Medrol was injected into the targeted trigger points  Following the injection, the needle was withdrawn  The patient tolerated the procedure well and there were no apparent complications  After an appropriate amount of observation, the patient was dismissed from the clinic in good condition under their own power

## 2022-04-14 ENCOUNTER — TELEPHONE (OUTPATIENT)
Dept: PAIN MEDICINE | Facility: CLINIC | Age: 57
End: 2022-04-14

## 2022-04-14 ENCOUNTER — TELEPHONE (OUTPATIENT)
Dept: NEPHROLOGY | Facility: CLINIC | Age: 57
End: 2022-04-14

## 2022-04-14 NOTE — TELEPHONE ENCOUNTER
Appointment Confirmation   Person confirmed appointment with  If not patient, name of the person LVM    Date and time of appointment 4/15/ 4pm    Patient acknowledged and will be at appointment? no    Did you advise the patient that they will need a urine sample if they are a new patient?  Yes    Did you advise the patient to bring their current medications for verification? (including any OTC) Yes    Additional Information

## 2022-04-14 NOTE — TELEPHONE ENCOUNTER
Drafted letter and sent to pt via [facility]  Called and informed pt

## 2022-04-14 NOTE — TELEPHONE ENCOUNTER
Patient is requesting a work note for her procedure she had yesterday  She's is hoping this work note can be put into her [employer] account & she'll get it from there   Please advise, jesusita    Call back# [phone]

## 2022-04-15 ENCOUNTER — CONSULT (OUTPATIENT)
Dept: NEPHROLOGY | Facility: CLINIC | Age: 57
End: 2022-04-15
Payer: COMMERCIAL

## 2022-04-15 VITALS
DIASTOLIC BLOOD PRESSURE: 64 MMHG | HEART RATE: 72 BPM | OXYGEN SATURATION: 98 % | BODY MASS INDEX: 28.35 KG/M2 | SYSTOLIC BLOOD PRESSURE: 110 MMHG | WEIGHT: 181 LBS

## 2022-04-15 DIAGNOSIS — E87.8 HYPERCHLOREMIA: ICD-10-CM

## 2022-04-15 DIAGNOSIS — N28.1 RENAL CYST, RIGHT: ICD-10-CM

## 2022-04-15 DIAGNOSIS — N28.1 RENAL CYST: Primary | ICD-10-CM

## 2022-04-15 DIAGNOSIS — E78.5 DYSLIPIDEMIA: ICD-10-CM

## 2022-04-15 PROCEDURE — 99204 OFFICE O/P NEW MOD 45 MIN: CPT | Performed by: INTERNAL MEDICINE

## 2022-04-15 PROCEDURE — 1036F TOBACCO NON-USER: CPT | Performed by: INTERNAL MEDICINE

## 2022-04-15 NOTE — ASSESSMENT & PLAN NOTE
She has a Bosniak 2 thin walled renal cyst on 1 kidney  This requires no follow-up  However, will obtain a kidney ultrasound in 6 months to assess for stability    She has normal kidney function and is not spilling any protein in the urine on protein to creatinine determination
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
## 2022-04-15 NOTE — ASSESSMENT & PLAN NOTE
Chloride level is mildly elevated  This is due to diarrhea with bicarbonate loss    She needs to drink more water when she has diarrhea

## 2022-04-20 ENCOUNTER — TELEPHONE (OUTPATIENT)
Dept: PAIN MEDICINE | Facility: CLINIC | Age: 57
End: 2022-04-20

## 2022-04-22 NOTE — TELEPHONE ENCOUNTER
Patient states that she got 20% relief at this time  Informed patient that we will call next week to see if there is more improvement

## 2022-05-02 ENCOUNTER — TELEPHONE (OUTPATIENT)
Dept: FAMILY MEDICINE CLINIC | Facility: CLINIC | Age: 57
End: 2022-05-02

## 2022-05-02 NOTE — TELEPHONE ENCOUNTER
Med with [facility] left msg on voicemail     Med would like to discuss Pt forms with someone

## 2022-05-07 ENCOUNTER — APPOINTMENT (OUTPATIENT)
Dept: LAB | Facility: CLINIC | Age: 57
End: 2022-05-07
Payer: COMMERCIAL

## 2022-05-07 DIAGNOSIS — Z11.4 SCREENING FOR HIV (HUMAN IMMUNODEFICIENCY VIRUS): ICD-10-CM

## 2022-05-07 DIAGNOSIS — E78.5 DYSLIPIDEMIA: ICD-10-CM

## 2022-05-07 DIAGNOSIS — Z11.59 NEED FOR HEPATITIS C SCREENING TEST: ICD-10-CM

## 2022-05-07 LAB
CHOLEST SERPL-MCNC: 219 MG/DL
HCV AB SER QL: NORMAL
HDLC SERPL-MCNC: 66 MG/DL
LDLC SERPL CALC-MCNC: 133 MG/DL (ref 0–100)
NONHDLC SERPL-MCNC: 153 MG/DL
TRIGL SERPL-MCNC: 98 MG/DL

## 2022-05-07 PROCEDURE — 80061 LIPID PANEL: CPT

## 2022-05-07 PROCEDURE — 87389 HIV-1 AG W/HIV-1&-2 AB AG IA: CPT

## 2022-05-07 PROCEDURE — 36415 COLL VENOUS BLD VENIPUNCTURE: CPT

## 2022-05-07 PROCEDURE — 86803 HEPATITIS C AB TEST: CPT

## 2022-05-08 LAB — HIV 1+2 AB+HIV1 P24 AG SERPL QL IA: NORMAL

## 2022-05-16 ENCOUNTER — TELEPHONE (OUTPATIENT)
Dept: FAMILY MEDICINE CLINIC | Facility: CLINIC | Age: 57
End: 2022-05-16

## 2022-05-16 NOTE — TELEPHONE ENCOUNTER
Spoke with patient over the phone about questions that were needed for the clarification to complete the original form  I apologized for delay and she was thankful  Form was completed and will be faxed now  Patient would like to  hard copy of the update later today or tomorrow

## 2022-05-16 NOTE — TELEPHONE ENCOUNTER
Patient had dropped off [employer] forms in the office last week that needed to be updated by the provider  She called this morning looking for an update  I did relay that this ppwk was not completed/updated and with Joel Jacobs out of the office today the soonest would be tomorrow that it could be faxed back if completed  Patient was verbally upset on the phone and stated that "it is due today and I will probably lose my accommodations because it needs to be sent in today " I relayed that our office would contact her once faxed over  Patient stated "No, I will call the office tomorrow to make sure it is done " and proceeded to hang up the phone

## 2022-05-16 NOTE — TELEPHONE ENCOUNTER
Forms faxed to [phone]  Notified patient that's forms were faxed/completed and could be picked up in office

## 2022-05-19 ENCOUNTER — OFFICE VISIT (OUTPATIENT)
Dept: PULMONOLOGY | Facility: CLINIC | Age: 57
End: 2022-05-19
Payer: COMMERCIAL

## 2022-05-19 VITALS
OXYGEN SATURATION: 94 % | BODY MASS INDEX: 28.19 KG/M2 | HEART RATE: 86 BPM | WEIGHT: 179.6 LBS | TEMPERATURE: 98 F | RESPIRATION RATE: 17 BRPM | DIASTOLIC BLOOD PRESSURE: 70 MMHG | HEIGHT: 67 IN | SYSTOLIC BLOOD PRESSURE: 102 MMHG

## 2022-05-19 DIAGNOSIS — R07.89 CHEST TIGHTNESS: ICD-10-CM

## 2022-05-19 DIAGNOSIS — J44.9 CHRONIC OBSTRUCTIVE PULMONARY DISEASE, UNSPECIFIED COPD TYPE (HCC): ICD-10-CM

## 2022-05-19 DIAGNOSIS — R06.00 DYSPNEA ON EXERTION: Primary | ICD-10-CM

## 2022-05-19 DIAGNOSIS — J42 CHRONIC BRONCHITIS, UNSPECIFIED CHRONIC BRONCHITIS TYPE (HCC): ICD-10-CM

## 2022-05-19 PROBLEM — R06.09 DYSPNEA ON EXERTION: Status: ACTIVE | Noted: 2022-05-19

## 2022-05-19 PROCEDURE — 3008F BODY MASS INDEX DOCD: CPT | Performed by: INTERNAL MEDICINE

## 2022-05-19 PROCEDURE — 99215 OFFICE O/P EST HI 40 MIN: CPT | Performed by: INTERNAL MEDICINE

## 2022-05-19 PROCEDURE — 1036F TOBACCO NON-USER: CPT | Performed by: INTERNAL MEDICINE

## 2022-05-19 RX ORDER — BUDESONIDE, GLYCOPYRROLATE, AND FORMOTEROL FUMARATE 160; 9; 4.8 UG/1; UG/1; UG/1
2 AEROSOL, METERED RESPIRATORY (INHALATION) EVERY 12 HOURS
Qty: 31.1 G | Refills: 11 | Status: SHIPPED | OUTPATIENT
Start: 2022-05-19

## 2022-05-19 NOTE — PROGRESS NOTES
Pulmonary Follow Up Note   Luisana Calvillo 64 y o  female MRN: [id number]  5/19/2022    Assessment:  Severe COPD   · GOLD stage III B, FEV1 of 42%  · Improvement symptoms with triple inhaler therapy however still symptomatic with dyspnea on exertion such as going uphill/up stairs   · Noted frequent use of p r n  HFA/nebulizer   · No history of frequent exacerbation, or need for oral steroids  · Clear lung fields on exam/diminished chest expansion   · Bilateral centrilobular emphysematous changes on last CT chest TLC 98%, %    Plan:   · Continue Breztri q 12, p r n  DuoNeb/albuterol  · Still not interested in pulmonary rehab referral  · Offered repeat PFT for evaluation of EBV/LVR, states that she would like to think about it    Chronic dyspnea on exertion  · Suspect from severe COPD/emphysema  · For possible cardiac pathology: Recent TTE without significant cardiac dysfunction  · Recent myocardial perfusion scan discussed with the reading cardiologist Dr Chandrika Warren, prone imaging were normal no evidence of ischemia  Given the ongoing symptoms on maximal treatment for COPD, it would not be unreasonable to consider coronary CT angiogram due to multiple artifacts seen on the last nuclear test  · I offered the coronary CT angiogram to the patient however, she would like to think about it and will let us know  · She appears to be appropriate for referrals to EBV/LVR, possible lung transplant given her young age but also will think about it and let us know  · I extensively reviewed all these test results with the patient, and explained the details of further next step and she would like to have more time to think about it and let us know over the phone before next visit    Return in about 6 months (around 11/19/2022)      History of Present Illness     Follow up for: COPD/BRENNAN    Background:  64 y o  female with a h/o COPD, osteoarthritis, former tobacco abuse about 45 pack year quit in 2016, right breast cancer status post lumpectomy/radiotherapy completed in 10/2020, considered stage I A invasive ductal carcinoma on anastrozole  First noted COPD symptoms several years ago, since then has been on Symbicort and p r n  albuterol   Before that was on [employer] but insurance was not covering at   Reports a slowly progressive dyspnea on exertion such as going uphill/1 flight of stairs sometimes has to stop in the middle to catch her breath   Symptoms associated with wheezing/throat clearing and minimally productive cough   No hemoptysis or weight loss   Improve with p r n  albuterol   No history of ED visits, hospitalization for COPD   Quit smoking completely in 2016 11/2021 visit-increased treatment to Trelegy Ellipta 2, discontinued the Symbicort  Ordered TTE, CT chest for lung cancer screening    1/2022 visit-switched to [employer], myocardial perfusion scan negative for inducible ischemia, not interested in pulmonary rehab    Interval History  Since last seen continues to have dyspnea on exertion, noted frequent use of the p r n  albuterol/nebulizer  Feeling frustrated about not feeling better  Reports symptoms with activities such as going up steps, sometimes associated with chest pressure, no diaphoresis  Using p r n  albuterol every day 2-3 times with some improvement  Symptoms improve with rest       Review of Systems  As per hpi, all other systems reviewed and were negative    Studies:  Imaging and other studies: I have personally reviewed pertinent films in PACS  Chest x-ray 11/12/2021-clear lung fields     CT chest lung [facility] screening [facility] 12/01/2020-no suspicious nodules, mild to moderate emphysematous changes   Fluid collection at the right breast lumpectomy side      CT chest 12/21/2021-scattered pulmonary nodules up to 4 mm, benign appearing behavior  5 5 cm minimally complex hepatic lesion, suspect benign etiology suggested MRI/MRCP    Second liver lesion at the left hepatic lobe      Pulmonary function testing:      PFT 11/12/2021-ratio 55%, FEV1 1 23 L/42%, FVC 2 25 L/61%  TLC 98%, %  DLCO 51%  After BD administration FEV1 1 39 L/48%/+13% increase, FVC 2 51 L/68%/+11% increase     EKG, Pathology, and Other Studies: I have personally reviewed pertinent reports     TTE 12/27/2021-EF 55%, normal wall motion, normal diastolic function  Normal RV size and function    Myocardial perfusion scan 02/02/2022-no EKG ischemic changes with stress  Post stress EF 75%  Without diagnostic evidence for perfusion abnormality  Some defect resolves on prone imaging  There is left ventricular perfusion defect that is medium in size with mild reduction in uptake present in the basal to mid inferior location that is carlos formal      Past medical, surgical, social and family histories reviewed  Medications/Allergies: Reviewed      Vitals: Blood pressure 102/70, pulse 86, temperature 98 °F (36 7 °C), resp  rate 17, height 5' 7" (1 702 m), weight 81 5 kg (179 lb 9 6 oz), SpO2 94 %  Body mass index is 28 13 kg/m²  Oxygen Therapy  SpO2: 94 %      Physical Exam  Body mass index is 28 13 kg/m²     Gen: not in acute distress,   Neck/Eyes: supple, no adenopathy, PERRL  Ear: normal appearance, no significant hearing impairment  Nose:  normal nasal mucosa, no drainage  Mouth:  unremarkable/normal appearance of lips, teeth and gums  Oropharynx: mucosa is moist, no focal lesions or erythema  Salivary glands: soft nontender  Chest: normal respiratory efforts, clear breath sounds bilaterally  CV: RRR, no murmurs appreciated, no edema  Abdomen: soft, non tender  Extremities:  No observed deformity   Skin: unremarkable  Neuro: AAO X3, no focal motor deficit        Labs:  Lab Results   Component Value Date    WBC 5 90 04/12/2022    HGB 14 0 04/12/2022    HCT 43 6 04/12/2022    MCV 94 04/12/2022     04/12/2022     Lab Results   Component Value Date    GLUCOSE 82 10/02/2015    CALCIUM 9 5 04/12/2022     10/02/2015 K 3 8 04/12/2022    CO2 28 04/12/2022     (H) 04/12/2022    BUN 20 04/12/2022    CREATININE 0 74 04/12/2022     No results found for: IGE  Lab Results   Component Value Date    ALT 29 07/29/2021    AST 15 07/29/2021    ALKPHOS 85 07/29/2021    BILITOT 0 2 01/14/2015           Portions of the record may have been created with voice recognition software  Occasional wrong word or "sound a like" substitutions may have occurred due to the inherent limitations of voice recognition software  Read the chart carefully and recognize, using context, where substitutions have occurred    KJ Echols [facility]

## 2022-05-31 ENCOUNTER — TELEPHONE (OUTPATIENT)
Dept: FAMILY MEDICINE CLINIC | Facility: CLINIC | Age: 57
End: 2022-05-31

## 2022-05-31 DIAGNOSIS — J42 CHRONIC BRONCHITIS, UNSPECIFIED CHRONIC BRONCHITIS TYPE (HCC): Primary | ICD-10-CM

## 2022-05-31 DIAGNOSIS — R06.09 DOE (DYSPNEA ON EXERTION): ICD-10-CM

## 2022-05-31 NOTE — TELEPHONE ENCOUNTER
Patient is asking if PCP can write a new referral for Pulmonology  Patient states she is not happy and would like a second opinion   Patient currently sees Gregg Alejo MD     Please advise

## 2022-06-07 ENCOUNTER — OFFICE VISIT (OUTPATIENT)
Dept: BARIATRICS | Facility: CLINIC | Age: 57
End: 2022-06-07

## 2022-06-07 VITALS — BODY MASS INDEX: 27.95 KG/M2 | HEIGHT: 67 IN | WEIGHT: 178.1 LBS

## 2022-06-07 DIAGNOSIS — R63.5 ABNORMAL WEIGHT GAIN: Primary | ICD-10-CM

## 2022-06-07 PROCEDURE — RECHECK: Performed by: DIETITIAN, REGISTERED

## 2022-06-07 PROCEDURE — WMDI30: Performed by: DIETITIAN, REGISTERED

## 2022-06-07 NOTE — PROGRESS NOTES
Weight Management Medical Nutrition Assessment  Patient presents for medical meal planning  Patient wants to loose weigh tto help with her COPD  Gained 20 lbs during COVID-19  Patient seen by Medical Provider in past 6 months:  yes  Requested to schedule appointment with Medical Provider: No      Anthropometric Measurements  Start Weight (#): 178 1  Current Weight (#): 178 1  TBW % Change from start weight:N/A  Ideal Body Weight (#):133 75  Goal Weight (#):155    Weight Loss History  Previous weight loss attempts: keto (December 2020- Sept 2021)    Food and Nutrition Related History  Wake up: 3:30-4a  Bed Time:9-10:30p  -works 6a-2:30, but does overtime until 3:30-4p  -no sleep issues    Food Recall  5:55am Breakfast: 16oz OJ water, frozen egg sandwich with no bread  20oz Coffee with 1 shot of flavored cream   -mixes 2 cups OJ in 1 galloon water    8-9a Snack:banana and protein bar (prefers something with nuts and chocolate)  12-1p Lunch:packs- leftovers from night before, drinks water or v-8 energy drink  Snack:none  7p Dinner: chicken breast or pork, vegetable  Snack:none      Beverages: water and coffee/tea  Volume of beverage intake: 20oz coffee, 16oz OJ/water mixed, 32oz water, 16oz sweetened iced tea on weekends    Weekends: Worse, her significant others likes sweets and will eat what he eats  Cravings: sweets  Trouble area of day:dinner times, eating at home    Frequency of Eating out: 1-2 times a week  Food restrictions: none  Cooking: self   Food Shopping: self    Physical Activity Intake  Activity:Walking (her dog- weather dependent)  -walks Tues-Thurs 3 miles with her friend  -likes to garden, active in her yard  Frequency:several times per week  Physical limitations/barriers to exercise: COPD    Estimated Needs  Energy  SECA: BMR:n/a    X 1 3 -1000 =  [employer] [place] [employer] Needs:  BMR :4769   1-2# loss weekly sedentary:  712-1212           1-2# loss weekly lightly active:961-1461  Maintenance calories for sedentary activity level: 1712  Protein:73-92g     (1 2-1 5g/kg IBW)  Fluid: 71oz (35mL/kg IBW)    Nutrition Diagnosis  Yes; Overweight/obesity  related to Excess energy intake as evidenced by  BMI more than normative standard for age and sex (overweight 25-29  9)       Nutrition Intervention    Nutrition Prescription  Calories:1200  Protein:75-90  Fluid:70OZ    Meal Plan (Bryan/Pro/Carb)  Breakfast:200-300/15-20  Snack:100-150/5-10  Lunch:200-300/20  Snack:100-150/5-10  Dinner:200-300/20  Snack:100-150/5-10    Nutrition Education:    Calorie controlled menu  Lean protein food choices  Healthy snack options  Food journaling tips      Nutrition Counseling:  Strategies: meal planning, portion sizes, healthy snack choices, hydration, fiber intake, protein intake, exercise, food journal      Monitoring and Evaluation:  Evaluation criteria:  Energy Intake  Meet protein needs  Maintain adequate hydration  Monitor weekly weight  Meal planning/preparation  Food journal   Decreased portions at mealtimes and snacks  Physical activity     Barriers to learning:none  Readiness to change: Preparation:  (Getting ready to change)   Comprehension: good  Expected Compliance: good

## 2022-06-14 ENCOUNTER — TELEPHONE (OUTPATIENT)
Dept: PULMONOLOGY | Facility: CLINIC | Age: 57
End: 2022-06-14

## 2022-06-14 DIAGNOSIS — J43.2 CENTRILOBULAR EMPHYSEMA (HCC): Primary | ICD-10-CM

## 2022-06-14 NOTE — TELEPHONE ENCOUNTER
----- Message from Isaac Gaucher, DO sent at 6/14/2022  1:50 PM EDT -----  Regarding: RE: Prash[address]  Let's repeat PFT/6MWT/ABG  Order entered  ----- Message -----  From: Maurice Heart  Sent: 6/14/2022  11:00 AM EDT  To: Isaac Gaucher, DO, #  Subject: Shalini Patel                                      Good morning,       Patient was seen by Dr Katt Jefferson and he would like her to be evaluated for valves  Looks like the according to the PFT the patient would not qualify yet for valves, please review       Thanks    Chava Styles

## 2022-06-21 ENCOUNTER — HOSPITAL ENCOUNTER (OUTPATIENT)
Dept: MAMMOGRAPHY | Facility: CLINIC | Age: 57
Discharge: HOME | End: 2022-06-21
Payer: COMMERCIAL

## 2022-06-21 VITALS — BODY MASS INDEX: 27.94 KG/M2 | HEIGHT: 67 IN | WEIGHT: 178 LBS

## 2022-06-21 DIAGNOSIS — Z17.0 MALIGNANT NEOPLASM OF UPPER-INNER QUADRANT OF RIGHT BREAST IN FEMALE, ESTROGEN RECEPTOR POSITIVE (HCC): ICD-10-CM

## 2022-06-21 DIAGNOSIS — C50.211 MALIGNANT NEOPLASM OF UPPER-INNER QUADRANT OF RIGHT BREAST IN FEMALE, ESTROGEN RECEPTOR POSITIVE (HCC): ICD-10-CM

## 2022-06-21 PROCEDURE — G0279 TOMOSYNTHESIS, MAMMO: HCPCS

## 2022-06-21 PROCEDURE — 77066 DX MAMMO INCL CAD BI: CPT

## 2022-06-23 NOTE — PROGRESS NOTES
[facility] [place], [place]    ASSESSMENT/PLAN: Ninetta Blizzard is a 64 y o   who presents for annual gynecologic exam     Encounter for routine gynecologic examination  - Routine well woman exam completed today  - Cervical Cancer Screening: Current ASCCP Guidelines reviewed  Last Pap: unknown  Next Pap Due: today  - HPV Vaccination status: Not immunized  - STI screening offered including HIV: not indicated based on hx or requested at time of visit  - Breast Cancer Screening: Last Mammogram 2022, script for next yr given  - Colorectal cancer screening was not ordered  Done 2021  - The following were reviewed in today's visit: breast self exam, mammography screening ordered, menopause, adequate intake of calcium and vitamin D, exercise and age related changes  - RTO 1 yr    Additional problems addressed at this visit:  1  Encounter for gynecological examination without abnormal finding  -     Liquid-based pap, screening    2  Malignant neoplasm of 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, estrogen receptor positive ([facility] [address] )    3  Use of anastrozole    4  Cervical cancer screening  -     Ambulatory Referral to Obstetrics / Gynecology  -     Liquid-based pap, screening          CC: Annual Gynecologic Examination    HPI: Ninetta Blizzard is a 64 y o  [place] who presents for annual gynecologic examination  Katie Orantes presents today for gyn exam  No vaginal bleeding since hysterectomy  Unknown last pap smear- believes it may have been last year, Hx of abnormal pap smear no abnormal  Sexually active- no  2022 mammogram- normal , and 2021 colonoscopy- return in 5 yrs  Reports 6 hrs of sleep daily, Takes calcium, vitamin D and magnesium  1 servings of calcium rich foods daily  Walks 7 days per week  2 servings of caffeine daily  Concerns: States that she has been on Anastrozole for 2 yrs   Breast cancer- ER,NC positive      The following portions of the patient's history were reviewed and updated as appropriate: allergies, current medications, past family history, past medical history, obstetric history, gynecologic history, past social history, past surgical history and problem list     Review of Systems   Constitutional: Negative for activity change, appetite change, fatigue and fever  Respiratory: Negative for cough and shortness of breath  Cardiovascular: Negative for chest pain, palpitations and leg swelling  Gastrointestinal: Positive for constipation and diarrhea  Genitourinary: Negative for difficulty urinating, frequency, pelvic pain, urgency, vaginal bleeding, vaginal discharge and vaginal pain  Neurological: Negative for light-headedness and headaches  Psychiatric/Behavioral: The patient is not nervous/anxious  Objective:  /64   Ht 5' 6 5" (1 689 m)   Wt 80 9 kg (178 lb 6 4 oz)   LMP  (LMP Unknown)   BMI 28 36 kg/m²    Physical Exam  Vitals reviewed  Constitutional:       Appearance: Normal appearance  HENT:      Head: Normocephalic  Neck:      Thyroid: No thyroid mass or thyroid tenderness  Cardiovascular:      Rate and Rhythm: Normal rate and regular rhythm  Heart sounds: Normal heart sounds  Pulmonary:      Effort: Pulmonary effort is normal       Breath sounds: Normal breath sounds  Chest:   Breasts:      Right: No mass, tenderness or axillary adenopathy  Left: No mass, nipple discharge, skin change, tenderness or axillary adenopathy  Comments: Surgical scar and area of lumpectomy without redness or tenderness  Abdominal:      General: There is no distension  Palpations: There is no mass  Tenderness: There is no abdominal tenderness  There is no guarding  Genitourinary:     General: Normal vulva  Exam position: Lithotomy position  Labia:         Right: No tenderness or lesion  Left: No tenderness or lesion         Vagina: No vaginal discharge, tenderness, bleeding or lesions  Uterus: Absent  Adnexa:         Right: No mass, tenderness or fullness  Left: No mass, tenderness or fullness  Musculoskeletal:      Cervical back: Normal range of motion  Lymphadenopathy:      Upper Body:      Right upper body: No axillary adenopathy  Left upper body: No axillary adenopathy  Skin:     General: Skin is warm and dry  Neurological:      Mental Status: She is alert     Psychiatric:         Mood and Affect: Mood normal          Behavior: Behavior normal          Judgment: Judgment normal              Macie Borjas CNM  [facility]  06/26/22 10:32 PM

## 2022-06-24 ENCOUNTER — OFFICE VISIT (OUTPATIENT)
Dept: OBGYN CLINIC | Facility: CLINIC | Age: 57
End: 2022-06-24
Payer: COMMERCIAL

## 2022-06-24 VITALS
WEIGHT: 178.4 LBS | SYSTOLIC BLOOD PRESSURE: 110 MMHG | BODY MASS INDEX: 28 KG/M2 | HEIGHT: 67 IN | DIASTOLIC BLOOD PRESSURE: 64 MMHG

## 2022-06-24 DIAGNOSIS — Z12.4 CERVICAL CANCER SCREENING: ICD-10-CM

## 2022-06-24 DIAGNOSIS — Z17.0 MALIGNANT NEOPLASM OF UPPER-INNER QUADRANT OF RIGHT BREAST IN FEMALE, ESTROGEN RECEPTOR POSITIVE (HCC): ICD-10-CM

## 2022-06-24 DIAGNOSIS — Z79.811 USE OF ANASTROZOLE: ICD-10-CM

## 2022-06-24 DIAGNOSIS — Z01.419 ENCOUNTER FOR GYNECOLOGICAL EXAMINATION WITHOUT ABNORMAL FINDING: Primary | ICD-10-CM

## 2022-06-24 DIAGNOSIS — C50.211 MALIGNANT NEOPLASM OF UPPER-INNER QUADRANT OF RIGHT BREAST IN FEMALE, ESTROGEN RECEPTOR POSITIVE (HCC): ICD-10-CM

## 2022-06-24 PROCEDURE — S0610 ANNUAL GYNECOLOGICAL EXAMINA: HCPCS | Performed by: ADVANCED PRACTICE MIDWIFE

## 2022-06-24 PROCEDURE — G0476 HPV COMBO ASSAY CA SCREEN: HCPCS | Performed by: ADVANCED PRACTICE MIDWIFE

## 2022-06-24 PROCEDURE — 3008F BODY MASS INDEX DOCD: CPT | Performed by: INTERNAL MEDICINE

## 2022-06-24 PROCEDURE — G0145 SCR C/V CYTO,THINLAYER,RESCR: HCPCS | Performed by: ADVANCED PRACTICE MIDWIFE

## 2022-06-27 LAB
HPV HR 12 DNA CVX QL NAA+PROBE: NEGATIVE
HPV16 DNA CVX QL NAA+PROBE: NEGATIVE
HPV18 DNA CVX QL NAA+PROBE: NEGATIVE

## 2022-06-30 LAB
LAB AP GYN PRIMARY INTERPRETATION: NORMAL
Lab: NORMAL

## 2022-07-03 DIAGNOSIS — G43.101 MIGRAINE WITH AURA AND WITH STATUS MIGRAINOSUS, NOT INTRACTABLE: ICD-10-CM

## 2022-07-05 RX ORDER — TOPIRAMATE 100 MG/1
TABLET, FILM COATED ORAL
Qty: 90 TABLET | Refills: 1 | Status: SHIPPED | OUTPATIENT
Start: 2022-07-05

## 2022-07-07 ENCOUNTER — HOSPITAL ENCOUNTER (OUTPATIENT)
Dept: PULMONOLOGY | Facility: HOSPITAL | Age: 57
Discharge: HOME | End: 2022-07-07
Attending: INTERNAL MEDICINE
Payer: COMMERCIAL

## 2022-07-07 DIAGNOSIS — G43.101 MIGRAINE WITH AURA AND WITH STATUS MIGRAINOSUS, NOT INTRACTABLE: ICD-10-CM

## 2022-07-07 DIAGNOSIS — J43.2 CENTRILOBULAR EMPHYSEMA (HCC): ICD-10-CM

## 2022-07-07 PROCEDURE — 94761 N-INVAS EAR/PLS OXIMETRY MLT: CPT

## 2022-07-07 PROCEDURE — 94618 PULMONARY STRESS TESTING: CPT | Performed by: INTERNAL MEDICINE

## 2022-07-07 PROCEDURE — 84295 ASSAY OF SERUM SODIUM: CPT

## 2022-07-07 PROCEDURE — 94729 DIFFUSING CAPACITY: CPT | Performed by: INTERNAL MEDICINE

## 2022-07-07 PROCEDURE — 94726 PLETHYSMOGRAPHY LUNG VOLUMES: CPT

## 2022-07-07 PROCEDURE — 82330 ASSAY OF CALCIUM: CPT

## 2022-07-07 PROCEDURE — 84132 ASSAY OF SERUM POTASSIUM: CPT

## 2022-07-07 PROCEDURE — 36600 WITHDRAWAL OF ARTERIAL BLOOD: CPT

## 2022-07-07 PROCEDURE — 94060 EVALUATION OF WHEEZING: CPT

## 2022-07-07 PROCEDURE — 94726 PLETHYSMOGRAPHY LUNG VOLUMES: CPT | Performed by: INTERNAL MEDICINE

## 2022-07-07 PROCEDURE — 82803 BLOOD GASES ANY COMBINATION: CPT

## 2022-07-07 PROCEDURE — 85014 HEMATOCRIT: CPT

## 2022-07-07 PROCEDURE — 94729 DIFFUSING CAPACITY: CPT

## 2022-07-07 PROCEDURE — 82947 ASSAY GLUCOSE BLOOD QUANT: CPT

## 2022-07-07 PROCEDURE — 94060 EVALUATION OF WHEEZING: CPT | Performed by: INTERNAL MEDICINE

## 2022-07-07 RX ORDER — ALBUTEROL SULFATE 2.5 MG/3ML
2.5 SOLUTION RESPIRATORY (INHALATION) ONCE AS NEEDED
Status: COMPLETED | OUTPATIENT
Start: 2022-07-07 | End: 2022-07-07

## 2022-07-07 RX ORDER — RIZATRIPTAN BENZOATE 10 MG/1
TABLET, ORALLY DISINTEGRATING ORAL
Qty: 9 TABLET | Refills: 2 | Status: SHIPPED | OUTPATIENT
Start: 2022-07-07

## 2022-07-07 RX ADMIN — ALBUTEROL SULFATE 2.5 MG: 2.5 SOLUTION RESPIRATORY (INHALATION) at 17:16

## 2022-07-08 DIAGNOSIS — R91.8 PULMONARY NODULES: ICD-10-CM

## 2022-07-08 DIAGNOSIS — J44.9 COPD, SEVERE (HCC): Primary | ICD-10-CM

## 2022-07-08 LAB
BASE EXCESS BLDA CALC-SCNC: -2 MMOL/L (ref -2–3)
CA-I BLD-SCNC: 1.34 MMOL/L (ref 1.12–1.32)
FIO2 GAS DIL.REBREATH: 21 L
GLUCOSE SERPL-MCNC: 92 MG/DL (ref 65–140)
HCO3 BLDA-SCNC: 22.9 MMOL/L (ref 22–28)
HCT VFR BLD CALC: 40 % (ref 34.8–46.1)
HGB BLDA-MCNC: 13.6 G/DL (ref 11.5–15.4)
PCO2 BLD: 24 MMOL/L (ref 21–32)
PCO2 BLD: 37 MM HG (ref 36–44)
PH BLD: 7.4 [PH] (ref 7.35–7.45)
PO2 BLD: 73 MM HG (ref 75–129)
POTASSIUM BLD-SCNC: 3.8 MMOL/L (ref 3.5–5.3)
SAO2 % BLD FROM PO2: 95 % (ref 60–85)
SODIUM BLD-SCNC: 139 MMOL/L (ref 136–145)
SPECIMEN SOURCE: ABNORMAL

## 2022-07-08 NOTE — TELEPHONE ENCOUNTER
Called and spoke to patient made her aware that per Dr Humphrey Arango, she would like to move on to the next phase of testing which is the CT and then the Eval appt  Patient is going to schedule the CT of the chest her self and is scheduled for an Eval on 08/04/ at 9 am at the [facility]

## 2022-07-25 ENCOUNTER — HOSPITAL ENCOUNTER (OUTPATIENT)
Dept: CT IMAGING | Facility: HOSPITAL | Age: 57
Discharge: HOME | End: 2022-07-25
Attending: INTERNAL MEDICINE
Payer: COMMERCIAL

## 2022-07-25 DIAGNOSIS — J44.9 COPD, SEVERE (HCC): ICD-10-CM

## 2022-07-25 DIAGNOSIS — R91.8 PULMONARY NODULES: ICD-10-CM

## 2022-07-25 PROCEDURE — 71250 CT THORAX DX C-: CPT

## 2022-07-25 PROCEDURE — G1004 CDSM NDSC: HCPCS

## 2022-07-29 ENCOUNTER — TELEPHONE (OUTPATIENT)
Dept: PULMONOLOGY | Facility: HOSPITAL | Age: 57
End: 2022-07-29

## 2022-08-04 ENCOUNTER — OFFICE VISIT (OUTPATIENT)
Dept: PULMONOLOGY | Facility: CLINIC | Age: 57
End: 2022-08-04
Payer: COMMERCIAL

## 2022-08-04 VITALS
RESPIRATION RATE: 18 BRPM | SYSTOLIC BLOOD PRESSURE: 114 MMHG | HEIGHT: 67 IN | BODY MASS INDEX: 27.45 KG/M2 | WEIGHT: 174.9 LBS | TEMPERATURE: 97.6 F | HEART RATE: 76 BPM | DIASTOLIC BLOOD PRESSURE: 68 MMHG | OXYGEN SATURATION: 97 %

## 2022-08-04 DIAGNOSIS — J45.30 MILD PERSISTENT ASTHMA WITHOUT COMPLICATION: Primary | ICD-10-CM

## 2022-08-04 DIAGNOSIS — J44.9 COPD, SEVERE (HCC): ICD-10-CM

## 2022-08-04 PROCEDURE — 99215 OFFICE O/P EST HI 40 MIN: CPT | Performed by: INTERNAL MEDICINE

## 2022-08-04 NOTE — PATIENT INSTRUCTIONS
Have your blood work done to evaluate for asthma and allergy  I will call you with results  We will make a referral to pulmonary rehab  If it is not possible to do with your schedule, can consider online rehab  The website is [email or web address]    We will make a follow up visit for 4 months from now
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
## 2022-08-04 NOTE — ASSESSMENT & PLAN NOTE
Patient has severe COPD with hyperinflation and demonstrates improvement with bronchodilator therapy  She continues to be symptomatic despite optimal therapy with Breztri  She has completed testing to determine whether she would be a candidate for bronchoscopic lung volume reduction  Unfortunately, she does not have any emphysema destruction score above 50%, suggesting predominant small airways disease contributing to her obstruction  I explained why she is not a candidate she verbalizes understanding  Would like to explore whether there is a superimposed allergy/asthma component    She will undergo [facility] allergy testing and CBC with differential

## 2022-08-07 NOTE — PROGRESS NOTES
Weight Management Medical Nutrition Assessment  Mikell Leventhal presented for a meal planning session  Today's weight is 174 1#   She has lost 4# in the past 2 months  She stated she has decreased her carb intake mainly breads  She is not formally food logging  Reviewed manual methods and gave logs with a calorie guide  Reviewed a low calorie meal plan      Patient seen by Medical Provider in past 6 months:  yes  Requested to schedule appointment with Medical Provider: No        Anthropometric Measurements  Start Weight (#): 178 1  Current Weight (#): 174 1#  TBW % Change from start weight:2 2%  Ideal Body Weight (#):132 5#  Goal Weight (#):155     Weight Loss History  Previous weight loss attempts: keto (December 2020- Sept 2021)     Food and Nutrition Related History  -works 6a-2:30pm, but does overtime until 3:30-4p  -no sleep issues     Food Recall    Wake: 3:30-4:00am  5:55am Breakfast: Protein Waffle (2) 7oz water/ 1oz Orange Juice        8-9am Snack:English Muffin/ hard boiled egg / banana   10:00am: Protein Bar or granola bar  1:00pm -2:00pm Lunch:packs- leftovers from night before, drinks water or v-8 energy drink  Or [employer] - pizza (1)   Snack:skip   7p Dinner: chicken breast or pork, vegetable  Salad with chicken   Snack:skip or ice cream sandwich         Beverages: water and coffee/tea  Volume of beverage intake: 20oz coffee, 16oz OJ/water mixed, 32oz water, 16oz sweetened iced tea on weekends     Weekends:Same   Cravings: sweets  Trouble area of day:dinner times, eating at home     Frequency of Eating out: 1-2 times a week  Food restrictions: none  Cooking: self   Food Shopping: self     Physical Activity Intake  Activity:Walking (her dog- weather dependent)  -walks Tues-Thurs 3 miles with her friend  -likes to garden, active in her yard  Frequency:several times per week  Physical limitations/barriers to exercise: COPD     Estimated Needs  Energy  Sandi 1898 Energy Needs:  BMR :8983   1-2# loss weekly sedentary:  712-1212           1-2# loss weekly lightly active:961-1461  Maintenance calories for sedentary activity level: 1712  Protein:73-92g     (1 2-1 5g/kg IBW)  Fluid: 71oz (35mL/kg IBW)     Nutrition Diagnosis  Yes; Overweight/obesity  related to Excess energy intake as evidenced by  BMI more than normative standard for age and sex (overweight 25-29  9)     Nutrition Intervention     Nutrition Prescription  Calories:1200 calories and flex to 1400 calories on cardio days  Protein:75-90gm  Fluid:70oz     Meal Plan (Bryan/Pro/Carb)  Breakfast:200-300/15-20  Snack:100-150/5-10  Lunch:200-300/20  Snack:100-150/5-10  Dinner:200-300/20  Snack:100-150/5-10     Nutrition Education:    Calorie controlled menu  Lean protein food choices  Healthy snack options  Food journaling tips        Nutrition Counseling:  Strategies: meal planning, portion sizes, healthy snack choices, hydration, fiber intake, protein intake, exercise, food journal        Monitoring and Evaluation:  Evaluation criteria:  Energy Intake  Meet protein needs  Maintain adequate hydration  Monitor weekly weight  Meal planning/preparation  Food journal   Decreased portions at mealtimes and snacks  Physical activity      Barriers to learning:none  Readiness to change: Preparation:  (Getting ready to change)   Comprehension: good  Expected Compliance: good

## 2022-08-08 ENCOUNTER — APPOINTMENT (OUTPATIENT)
Dept: LAB | Facility: MEDICAL CENTER | Age: 57
End: 2022-08-08
Payer: COMMERCIAL

## 2022-08-08 ENCOUNTER — OFFICE VISIT (OUTPATIENT)
Dept: BARIATRICS | Facility: CLINIC | Age: 57
End: 2022-08-08

## 2022-08-08 VITALS — BODY MASS INDEX: 27.33 KG/M2 | HEIGHT: 67 IN | WEIGHT: 174.1 LBS

## 2022-08-08 DIAGNOSIS — J45.30 MILD PERSISTENT ASTHMA WITHOUT COMPLICATION: ICD-10-CM

## 2022-08-08 DIAGNOSIS — R63.5 ABNORMAL WEIGHT GAIN: ICD-10-CM

## 2022-08-08 LAB
BASOPHILS # BLD AUTO: 0.09 THOUSANDS/ΜL (ref 0–0.1)
BASOPHILS NFR BLD AUTO: 1 % (ref 0–1)
EOSINOPHIL # BLD AUTO: 0.27 THOUSAND/ΜL (ref 0–0.61)
EOSINOPHIL NFR BLD AUTO: 4 % (ref 0–6)
ERYTHROCYTE [DISTWIDTH] IN BLOOD BY AUTOMATED COUNT: 12.8 % (ref 11.6–15.1)
HCT VFR BLD AUTO: 41.7 % (ref 34.8–46.1)
HGB BLD-MCNC: 13.6 G/DL (ref 11.5–15.4)
IMM GRANULOCYTES # BLD AUTO: 0.02 THOUSAND/UL (ref 0–0.2)
IMM GRANULOCYTES NFR BLD AUTO: 0 % (ref 0–2)
LYMPHOCYTES # BLD AUTO: 2.43 THOUSANDS/ΜL (ref 0.6–4.47)
LYMPHOCYTES NFR BLD AUTO: 34 % (ref 14–44)
MCH RBC QN AUTO: 29.8 PG (ref 26.8–34.3)
MCHC RBC AUTO-ENTMCNC: 32.6 G/DL (ref 31.4–37.4)
MCV RBC AUTO: 91 FL (ref 82–98)
MONOCYTES # BLD AUTO: 0.74 THOUSAND/ΜL (ref 0.17–1.22)
MONOCYTES NFR BLD AUTO: 10 % (ref 4–12)
NEUTROPHILS # BLD AUTO: 3.61 THOUSANDS/ΜL (ref 1.85–7.62)
NEUTS SEG NFR BLD AUTO: 51 % (ref 43–75)
NRBC BLD AUTO-RTO: 0 /100 WBCS
PLATELET # BLD AUTO: 226 THOUSANDS/UL (ref 149–390)
PMV BLD AUTO: 10.9 FL (ref 8.9–12.7)
RBC # BLD AUTO: 4.56 MILLION/UL (ref 3.81–5.12)
WBC # BLD AUTO: 7.16 THOUSAND/UL (ref 4.31–10.16)

## 2022-08-08 PROCEDURE — 36415 COLL VENOUS BLD VENIPUNCTURE: CPT

## 2022-08-08 PROCEDURE — 86003 ALLG SPEC IGE CRUDE XTRC EA: CPT

## 2022-08-08 PROCEDURE — RECHECK: Performed by: DIETITIAN, REGISTERED

## 2022-08-08 PROCEDURE — 82785 ASSAY OF IGE: CPT

## 2022-08-08 PROCEDURE — 85025 COMPLETE CBC W/AUTO DIFF WBC: CPT

## 2022-08-08 PROCEDURE — WMDI30: Performed by: DIETITIAN, REGISTERED

## 2022-08-10 LAB

## 2022-08-15 ENCOUNTER — OFFICE VISIT (OUTPATIENT)
Dept: FAMILY MEDICINE CLINIC | Facility: CLINIC | Age: 57
End: 2022-08-15
Payer: COMMERCIAL

## 2022-08-15 VITALS
OXYGEN SATURATION: 97 % | HEART RATE: 72 BPM | SYSTOLIC BLOOD PRESSURE: 130 MMHG | WEIGHT: 174.4 LBS | HEIGHT: 67 IN | BODY MASS INDEX: 27.37 KG/M2 | TEMPERATURE: 96.8 F | DIASTOLIC BLOOD PRESSURE: 70 MMHG

## 2022-08-15 DIAGNOSIS — M70.61 TROCHANTERIC BURSITIS OF RIGHT HIP: ICD-10-CM

## 2022-08-15 DIAGNOSIS — M25.551 RIGHT HIP PAIN: Primary | ICD-10-CM

## 2022-08-15 PROCEDURE — 99213 OFFICE O/P EST LOW 20 MIN: CPT | Performed by: PHYSICIAN ASSISTANT

## 2022-08-15 RX ORDER — MELOXICAM 15 MG/1
15 TABLET ORAL DAILY
Qty: 30 TABLET | Refills: 0 | Status: SHIPPED | OUTPATIENT
Start: 2022-08-15 | End: 2022-09-19
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
## 2022-08-16 ENCOUNTER — TELEPHONE (OUTPATIENT)
Dept: FAMILY MEDICINE CLINIC | Facility: CLINIC | Age: 57
End: 2022-08-16

## 2022-08-16 NOTE — TELEPHONE ENCOUNTER
Patient wanted to relay correct diagnosis to go with the correct case numbers for her FMLA ppwk that Kylah Pimentel is completing for her   Patient relayed following information for pain managmenet [id number], cancer [address] [place] [address], COPD [id number]

## 2022-08-22 ENCOUNTER — HOSPITAL ENCOUNTER (OUTPATIENT)
Dept: RADIOLOGY | Facility: HOSPITAL | Age: 57
Discharge: HOME | End: 2022-08-22
Payer: COMMERCIAL

## 2022-08-22 DIAGNOSIS — M25.551 RIGHT HIP PAIN: ICD-10-CM

## 2022-08-22 PROCEDURE — 73502 X-RAY EXAM HIP UNI 2-3 VIEWS: CPT

## 2022-08-24 ENCOUNTER — OFFICE VISIT (OUTPATIENT)
Dept: SURGICAL ONCOLOGY | Facility: CLINIC | Age: 57
End: 2022-08-24
Payer: COMMERCIAL

## 2022-08-24 VITALS
TEMPERATURE: 98.7 F | SYSTOLIC BLOOD PRESSURE: 122 MMHG | HEIGHT: 67 IN | DIASTOLIC BLOOD PRESSURE: 74 MMHG | WEIGHT: 176 LBS | BODY MASS INDEX: 27.62 KG/M2

## 2022-08-24 DIAGNOSIS — R92.2 DENSE BREAST TISSUE: ICD-10-CM

## 2022-08-24 DIAGNOSIS — Z79.811 USE OF ANASTROZOLE: ICD-10-CM

## 2022-08-24 DIAGNOSIS — Z17.0 MALIGNANT NEOPLASM OF UPPER-INNER QUADRANT OF RIGHT BREAST IN FEMALE, ESTROGEN RECEPTOR POSITIVE (HCC): Primary | ICD-10-CM

## 2022-08-24 DIAGNOSIS — C50.211 MALIGNANT NEOPLASM OF UPPER-INNER QUADRANT OF RIGHT BREAST IN FEMALE, ESTROGEN RECEPTOR POSITIVE (HCC): Primary | ICD-10-CM

## 2022-08-24 PROBLEM — N64.89 SEROMA OF BREAST: Status: RESOLVED | Noted: 2021-03-09 | Resolved: 2022-08-24

## 2022-08-24 PROCEDURE — 99214 OFFICE O/P EST MOD 30 MIN: CPT | Performed by: SURGERY
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
## 2022-10-10 ENCOUNTER — OFFICE VISIT (OUTPATIENT)
Dept: BARIATRICS | Facility: CLINIC | Age: 57
End: 2022-10-10

## 2022-10-10 VITALS — HEIGHT: 67 IN | WEIGHT: 172.5 LBS | BODY MASS INDEX: 27.07 KG/M2

## 2022-10-10 DIAGNOSIS — R63.5 ABNORMAL WEIGHT GAIN: ICD-10-CM

## 2022-10-10 PROCEDURE — WEIGHT: Performed by: DIETITIAN, REGISTERED

## 2022-10-10 PROCEDURE — RECHECK: Performed by: DIETITIAN, REGISTERED

## 2022-10-11 PROBLEM — J01.90 ACUTE SINUSITIS: Status: RESOLVED | Noted: 2022-02-17 | Resolved: 2022-10-11

## 2022-10-12 ENCOUNTER — TELEMEDICINE (OUTPATIENT)
Dept: FAMILY MEDICINE CLINIC | Facility: CLINIC | Age: 57
End: 2022-10-12
Payer: COMMERCIAL

## 2022-10-12 ENCOUNTER — TELEPHONE (OUTPATIENT)
Dept: FAMILY MEDICINE CLINIC | Facility: CLINIC | Age: 57
End: 2022-10-12

## 2022-10-12 ENCOUNTER — APPOINTMENT (OUTPATIENT)
Dept: RADIOLOGY | Facility: CLINIC | Age: 57
End: 2022-10-12
Payer: COMMERCIAL

## 2022-10-12 DIAGNOSIS — J44.9 COPD, SEVERE (HCC): ICD-10-CM

## 2022-10-12 DIAGNOSIS — U07.1 COVID-19 VIRUS INFECTION: Primary | ICD-10-CM

## 2022-10-12 DIAGNOSIS — Z17.0 MALIGNANT NEOPLASM OF UPPER-INNER QUADRANT OF RIGHT BREAST IN FEMALE, ESTROGEN RECEPTOR POSITIVE (HCC): ICD-10-CM

## 2022-10-12 DIAGNOSIS — C50.211 MALIGNANT NEOPLASM OF UPPER-INNER QUADRANT OF RIGHT BREAST IN FEMALE, ESTROGEN RECEPTOR POSITIVE (HCC): ICD-10-CM

## 2022-10-12 DIAGNOSIS — U07.1 COVID-19 VIRUS INFECTION: ICD-10-CM

## 2022-10-12 PROCEDURE — 99214 OFFICE O/P EST MOD 30 MIN: CPT | Performed by: FAMILY MEDICINE

## 2022-10-12 PROCEDURE — 71046 X-RAY EXAM CHEST 2 VIEWS: CPT

## 2022-10-12 RX ORDER — PREDNISONE 20 MG/1
TABLET ORAL
Qty: 10 TABLET | Refills: 0 | Status: SHIPPED | OUTPATIENT
Start: 2022-10-12

## 2022-10-12 RX ORDER — BUDESONIDE 180 UG/1
2 AEROSOL, POWDER RESPIRATORY (INHALATION) 2 TIMES DAILY
Qty: 1 EACH | Refills: 0 | Status: SHIPPED | OUTPATIENT
Start: 2022-10-12 | End: 2022-10-13

## 2022-10-12 NOTE — PROGRESS NOTES
COVID-19 Outpatient Progress Note    Assessment/Plan:  Mckenzie Torre was seen today for virtual regular visit and covid-19  Diagnoses and all orders for this visit:    COVID-19 virus infection  Comments:  Discussed antiviral, and pt not agreeable; I escribed budesonide 160mcg/act inhaler - Inhale 2 puffs 2 (two) times a day in addition to the Breztri BID           Orders:  -     Discontinue: budesonide (Pulmicort Flexhaler) 180 MCG/ACT inhaler; Inhale 2 puffs 2 (two) times a day for 14 days (in addition to the 2 puffs Breztri taken BID) Rinse mouth after use  -     predniSONE 20 mg tablet; 3 PO day 1, then 2 PO QD x 2 days, then 1 PO QD x 2 days, then half PO QD x 2 days, then stop  -     XR chest pa & lateral; Future  -     Pulse oximetry, spot; Future    COPD, severe ([address] )  -     Discontinue: budesonide (Pulmicort Flexhaler) 180 MCG/ACT inhaler; Inhale 2 puffs 2 (two) times a day for 14 days (in addition to the 2 puffs Breztri taken BID) Rinse mouth after use  -     predniSONE 20 mg tablet; 3 PO day 1, then 2 PO QD x 2 days, then 1 PO QD x 2 days, then half PO QD x 2 days, then stop  -     XR chest pa & lateral; Future  -     Pulse oximetry, spot; Future    Malignant neoplasm of 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, estrogen receptor positive ([address] )        Problem List Items Addressed This Visit        Respiratory    COPD, severe ([address] )    Relevant Medications    predniSONE 20 mg tablet    Other Relevant Orders    XR chest pa & lateral    Pulse oximetry, spot       Other    Malignant neoplasm of 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, estrogen receptor positive ([address] )    COVID-19 virus infection - Primary    Relevant Medications    predniSONE 20 mg tablet    Other Relevant Orders    XR chest pa & lateral    Pulse oximetry, spot         Disposition:     Patient has asymptomatic or mild COVID-19 infection  Based off CDC guidelines, they were recommended to isolate for 5 days   If they are asymptomatic or symptoms are improving with no fevers in the past 24 hours, isolation may be ended followed by 5 days of wearing a mask when around othes to minimize risk of infecting others  If still have a fever or other symptoms have not improved, continue to isolate until they improve  Regardless of when they end isolation, avoid being around people who are more likely to get very sick from COVID-19 until at least day 11  Discussed symptom directed medication options with patient  Discussed vitamin D, vitamin C, and/or zinc supplementation with patient  Discussed antiviral; and pt not agreeable    I have spent 20 minutes directly with the patient  Encounter provider: Mini Greenberg DO     Provider located at: [address] [facility]  [address] [address]     Recent Visits  Date Type Provider Dept   10/12/22 Telephone Maria G Levin [address] [place] [address]   10/12/22 [address], [address] [place] [address] recent visits within past 7 days and meeting all other requirements  Today's Visits  Date Type Provider Dept   10/14/22 Telemedicine Mini Greenberg DO [facility] [address] [facility] today's visits and meeting all other requirements  Future Appointments  No visits were found meeting these conditions  Showing future appointments within next 150 days and meeting all other requirements     This virtual check-in was done via [employer] and patient was informed that this is a secure, HIPAA-compliant platform  She agrees to proceed  Patient agrees to participate in a virtual check in via telephone or video visit instead of presenting to the office to address urgent/immediate medical needs  Patient is aware this is a billable service  She acknowledged consent and understanding of privacy and security of the video platform  The patient has agreed to participate and understands they can discontinue the visit at any time      After connecting through [facility], the patient was identified by name and date of birth  Ahsan Olivarez was informed that this was a telemedicine visit and that the exam was being conducted confidentially over secure lines  My office door was closed  No one else was in the room  Ahsan Olivarez acknowledged consent and understanding of privacy and security of the telemedicine visit  I informed the patient that I have reviewed her record in Epic and presented the opportunity for her to ask any questions regarding the visit today  The patient agreed to participate  Verification of patient location:  Patient is located in the following state in which I hold an active license: [place]    Subjective:   Ahsan Olivarez is a 64 y o  female who has been screened for COVID-19  Patient's symptoms include fever, nasal congestion, cough and diarrhea  - Date of symptom onset: 10/10/2022  - Date of positive COVID-19 test: 10/11/2022  Type of test: Home antigen  Patient with typical symptoms of COVID-19 and they attest that they were positive on home rapid antigen testing  Image of positive result is not able to be uploaded into their chart  COVID-19 vaccination status: Fully vaccinated (primary series)    Jenni Wharton has been staying home and has isolated themselves in her home  She is taking care to not share personal items and is cleaning all surfaces that are touched often, like counters, tabletops, and doorknobs using household cleaning sprays or wipes  She is wearing a mask when she leaves her room  COVID positive last night has COPD(using nasal spray)    Lab Results   Component Value Date    SARSCOV2 Not Detected 07/06/2020    SARSCORONAVI Detected (A) 12/05/2020       Review of Systems   Constitutional: Positive for fever  HENT: Positive for congestion  Respiratory: Positive for cough  Gastrointestinal: Positive for diarrhea       Current Outpatient Medications on File Prior to Visit   Medication Sig   • albuterol (Ventolin HFA) 90 mcg/act inhaler Inhale 2 puffs every 4 (four) hours as needed for wheezing   • anastrozole (ARIMIDEX) 1 mg tablet Take 1 tablet (1 mg total) by mouth daily   • Ascorbic Acid (vitamin C) 1000 MG tablet Take 1,000 mg by mouth daily   • B Complex Vitamins (B COMPLEX 1 PO) Take by mouth daily    • Biotin 24254 MCG TABS Take by mouth daily    • Budeson-Glycopyrrol-Formoterol (Breztri Aerosphere) 160-9-4 8 MCG/ACT AERO Inhale 2 puffs every 12 (twelve) hours Rinse mouth after use  • Calcium-Magnesium-Vitamin D 185- MG-MG-UNIT CAPS Take by mouth daily    • cyanocobalamin (VITAMIN B-12) 500 MCG tablet Take 500 mcg by mouth daily   • Echinacea 400 MG CAPS Take by mouth daily    • ipratropium-albuterol (DUO-NEB) 0 5-2 5 mg/3 mL nebulizer solution    • loperamide (IMODIUM) 2 mg capsule Take 2 mg by mouth 4 (four) times a day as needed for diarrhea   • Loratadine 10 MG CAPS Take by mouth daily    • meloxicam (MOBIC) 15 mg tablet TAKE 1 TABLET (15 MG TOTAL) BY MOUTH IN THE MORNING   • methocarbamol (ROBAXIN) 500 mg tablet Take 1 tablet (500 mg total) by mouth 3 (three) times a day May take 2 tablets TID if 1 tablet is ineffective   • Multiple Vitamins-Minerals (MULTIVITAMIN ADULT PO) Take by mouth daily    • rizatriptan (MAXALT-MLT) 10 mg disintegrating tablet TAKE 1 TABLET (10 MG TOTAL) BY MOUTH ONCE AS NEEDED FOR MIGRAINE>>FILL 4/5/22   • topiramate (TOPAMAX) 100 mg tablet TAKE 1 TABLET BY MOUTH EVERYDAY AT BEDTIME   • [DISCONTINUED] budesonide-formoterol (Symbicort) 160-4 5 mcg/act inhaler Inhale 2 puffs 2 (two) times a day       Objective:    LMP  (LMP Unknown)      Physical Exam  Constitutional:       General: She is awake  She is not in acute distress  Appearance: She is ill-appearing (mildly)  She is not toxic-appearing  Pulmonary:      Effort: No tachypnea, bradypnea, accessory muscle usage, prolonged expiration, respiratory distress or retractions     Neurological:      Mental Status: She is alert     Psychiatric:         Mood and Affect: Mood normal        Sueanne Labs, DO

## 2022-10-12 NOTE — TELEPHONE ENCOUNTER
Patient called stating Pulmicort is too expensive and wants to know if a different inhaler can be called in  Please advise  Thanks!

## 2022-10-12 NOTE — TELEPHONE ENCOUNTER
Pt called states the new order for inhaler was going to cost her over $100 00 she is requesting for a different inhaler medication   [facility]

## 2022-10-13 DIAGNOSIS — J44.9 COPD, SEVERE (HCC): ICD-10-CM

## 2022-10-13 DIAGNOSIS — U07.1 COVID-19 VIRUS INFECTION: Primary | ICD-10-CM

## 2022-10-13 RX ORDER — FLUTICASONE PROPIONATE 110 UG/1
2 AEROSOL, METERED RESPIRATORY (INHALATION) 2 TIMES DAILY
Qty: 12 G | Refills: 0 | Status: SHIPPED | OUTPATIENT
Start: 2022-10-13 | End: 2022-10-13

## 2022-10-14 ENCOUNTER — TELEPHONE (OUTPATIENT)
Dept: FAMILY MEDICINE CLINIC | Facility: CLINIC | Age: 57
End: 2022-10-14

## 2022-10-14 ENCOUNTER — TELEMEDICINE (OUTPATIENT)
Dept: FAMILY MEDICINE CLINIC | Facility: CLINIC | Age: 57
End: 2022-10-14
Payer: COMMERCIAL

## 2022-10-14 DIAGNOSIS — U07.1 COVID-19 VIRUS INFECTION: Primary | ICD-10-CM

## 2022-10-14 PROCEDURE — 99213 OFFICE O/P EST LOW 20 MIN: CPT | Performed by: FAMILY MEDICINE

## 2022-10-14 NOTE — PROGRESS NOTES
COVID-19 Outpatient Progress Note    Assessment/Plan:    Problem List Items Addressed This Visit        Other    COVID-19 virus infection - Primary         Disposition:     Patient has asymptomatic or mild COVID-19 infection  Based off CDC guidelines, they were recommended to isolate for 5 days  If they are asymptomatic or symptoms are improving with no fevers in the past 24 hours, isolation may be ended followed by 5 days of wearing a mask when around othes to minimize risk of infecting others  If still have a fever or other symptoms have not improved, continue to isolate until they improve  Regardless of when they end isolation, avoid being around people who are more likely to get very sick from COVID-19 until at least day 11  I have spent 11 minutes directly with the patient  Encounter provider: Merline Rainwater, DO     Provider located at: [address] [facility]  [address] [facility] [address]     Recent Visits  Date Type Provider Dept   10/12/22 Telephone [facility] [address] [facility]   10/12/22 [address], [address] [place] [address] recent visits within past 7 days and meeting all other requirements  Today's Visits  Date Type Provider Dept   10/14/22 Telemedicine Merline Rainwater, DO [facility] [address] [facility] today's visits and meeting all other requirements  Future Appointments  No visits were found meeting these conditions  Showing future appointments within next 150 days and meeting all other requirements     This virtual check-in was done via telephone and she agrees to proceed  Patient agrees to participate in a virtual check in via telephone or video visit instead of presenting to the office to address urgent/immediate medical needs  Patient is aware this is a billable service  She acknowledged consent and understanding of privacy and security of the video platform   The patient has agreed to participate and understands they can discontinue the visit at any time  After connecting through Telephone, the patient was identified by name and date of birth  Cris Kline was informed that this was a telemedicine visit and that the exam was being conducted confidentially over secure lines  My office door was closed  No one else was in the room  Cris Kline acknowledged consent and understanding of privacy and security of the telemedicine visit  I informed the patient that I have reviewed her record in Epic and presented the opportunity for her to ask any questions regarding the visit today  The patient agreed to participate  It was my intent to perform this visit via video technology but the patient was not able to do a video connection so the visit was completed via audio telephone only  Verification of patient location:  Patient is located in the following state in which I hold an active license: [place]    Subjective:   Cris Kline is a 64 y o  female who has been screened for COVID-19  Symptom change since last report: improving      - Date of symptom onset: 10/10/2022  - Date of positive COVID-19 test: 10/11/2022  COVID-19 vaccination status: Fully vaccinated (primary series)    Mikell Leventhal has been staying home and has isolated themselves in her home  She is taking care to not share personal items and is cleaning all surfaces that are touched often, like counters, tabletops, and doorknobs using household cleaning sprays or wipes  She is wearing a mask when she leaves her room       "Just cough and chest congestion, the stuffy nose went away"  Couldn't get even the flovent- still too expensive  "Am taking the prednisone, though, seemed to help yesterday, I am getting better, did have pain in the ribs from coughing- that's to be expected"  "fever is gone- just feels like a cold"  "Supposed to go back to Tuesday, so I'll test again on Monday"   Admits greenish-yellow sputum  CXR not yet read- will call radiology to see if they could expedite reading today    Lab Results   Component Value Date    Randy Bird Not Detected 07/06/2020    SARSCORONAVI Detected (A) 12/05/2020       Review of Systems  Current Outpatient Medications on File Prior to Visit   Medication Sig   • albuterol (Ventolin HFA) 90 mcg/act inhaler Inhale 2 puffs every 4 (four) hours as needed for wheezing   • anastrozole (ARIMIDEX) 1 mg tablet Take 1 tablet (1 mg total) by mouth daily   • Ascorbic Acid (vitamin C) 1000 MG tablet Take 1,000 mg by mouth daily   • B Complex Vitamins (B COMPLEX 1 PO) Take by mouth daily    • Biotin 60568 MCG TABS Take by mouth daily    • Budeson-Glycopyrrol-Formoterol (Breztri Aerosphere) 160-9-4 8 MCG/ACT AERO Inhale 2 puffs every 12 (twelve) hours Rinse mouth after use     • Calcium-Magnesium-Vitamin D 185- MG-MG-UNIT CAPS Take by mouth daily    • cyanocobalamin (VITAMIN B-12) 500 MCG tablet Take 500 mcg by mouth daily   • Echinacea 400 MG CAPS Take by mouth daily    • ipratropium-albuterol (DUO-NEB) 0 5-2 5 mg/3 mL nebulizer solution    • loperamide (IMODIUM) 2 mg capsule Take 2 mg by mouth 4 (four) times a day as needed for diarrhea   • Loratadine 10 MG CAPS Take by mouth daily    • meloxicam (MOBIC) 15 mg tablet TAKE 1 TABLET (15 MG TOTAL) BY MOUTH IN THE MORNING   • methocarbamol (ROBAXIN) 500 mg tablet Take 1 tablet (500 mg total) by mouth 3 (three) times a day May take 2 tablets TID if 1 tablet is ineffective   • Multiple Vitamins-Minerals (MULTIVITAMIN ADULT PO) Take by mouth daily    • predniSONE 20 mg tablet 3 PO day 1, then 2 PO QD x 2 days, then 1 PO QD x 2 days, then half PO QD x 2 days, then stop   • rizatriptan (MAXALT-MLT) 10 mg disintegrating tablet TAKE 1 TABLET (10 MG TOTAL) BY MOUTH ONCE AS NEEDED FOR MIGRAINE>>FILL 4/5/22   • topiramate (TOPAMAX) 100 mg tablet TAKE 1 TABLET BY MOUTH EVERYDAY AT BEDTIME   • [DISCONTINUED] budesonide-formoterol (Symbicort) 160-4 5 mcg/act inhaler Inhale 2 puffs 2 (two) times a day       Objective:    LMP  (LMP Unknown)      Physical Exam  Constitutional:       General: She is awake  Pulmonary:      Comments: Carries on full conversation without dyspnea  Neurological:      Mental Status: She is alert and oriented to person, place, and time         Renny Campos DO

## 2022-10-14 NOTE — TELEPHONE ENCOUNTER
Left message for the patient in regards to cxr   Pt was informed relayed message if any questions to contact the office on Monday 10/17/22

## 2022-10-17 ENCOUNTER — TELEPHONE (OUTPATIENT)
Dept: FAMILY MEDICINE CLINIC | Facility: CLINIC | Age: 57
End: 2022-10-17

## 2022-10-17 NOTE — TELEPHONE ENCOUNTER
Patient spoke with employer and they are allowing her to work from home this week  Her employer advised that they need a note from the doctor stating that patient is able to work from home this week due to covid  Please advise if this is something you are able to do for patient

## 2022-10-17 NOTE — TELEPHONE ENCOUNTER
Casey Gleason   10/14/2022  4:15 PM EDT         Left message for patient to contact office  Tyler Pimentel DO   10/14/2022 11:40 AM EDT         Please let pt know that lungs appear clear on CXR; radiologist notes that the tiny bilateral pulmonary nodules that had been identified by CT are not able to be visualized on this exam     Patient called this office this morning, relayed information  Also wanted to make the doctor aware that she took her last steroid yesterday and tested positive still for COVID  She is still having chest congestion

## 2022-10-17 NOTE — LETTER
October 17, 2022    Patient: Neha Washington  YOB: 1965  Date of Last Encounter: 10/14/2022      To whom it may concern:     Neha Washington has tested positive for COVID-19 (Coronavirus)  Please allow her to work from home from October 17, 2022 through October 21, 2022  If there are any questions or concerns, please contact our office      Sincerely,      Jesica Schaefer, [address]

## 2022-10-28 ENCOUNTER — TELEPHONE (OUTPATIENT)
Dept: PAIN MEDICINE | Facility: CLINIC | Age: 57
End: 2022-10-28

## 2022-11-04 ENCOUNTER — OFFICE VISIT (OUTPATIENT)
Dept: FAMILY MEDICINE CLINIC | Facility: CLINIC | Age: 57
End: 2022-11-04

## 2022-11-04 VITALS
WEIGHT: 175.8 LBS | HEART RATE: 81 BPM | HEIGHT: 66 IN | RESPIRATION RATE: 14 BRPM | OXYGEN SATURATION: 94 % | SYSTOLIC BLOOD PRESSURE: 110 MMHG | BODY MASS INDEX: 28.25 KG/M2 | DIASTOLIC BLOOD PRESSURE: 64 MMHG | TEMPERATURE: 97.6 F

## 2022-11-04 DIAGNOSIS — L72.9 SUBCUTANEOUS CYST: ICD-10-CM

## 2022-11-04 DIAGNOSIS — Z23 NEED FOR INFLUENZA VACCINATION: ICD-10-CM

## 2022-11-04 DIAGNOSIS — U07.1 COVID-19 VIRUS INFECTION: ICD-10-CM

## 2022-11-04 DIAGNOSIS — J44.9 COPD, SEVERE (HCC): Primary | ICD-10-CM

## 2022-11-04 DIAGNOSIS — M50.30 DDD (DEGENERATIVE DISC DISEASE), CERVICAL: ICD-10-CM

## 2022-11-04 NOTE — PROGRESS NOTES
Name: Mesfin Lawson      : 1965      MRN: [id number]  Encounter Provider: Mortimer Loa, MD  Encounter Date: 2022   Encounter department: FAMILY PRACTICE AT [address] [address]  COPD, severe ([facility] [address] )  Assessment & Plan:  Stable / recovering from covid infection  sxs controlled on current inhaler regimen  Normal respiratory effort today  continue current regimen  2  DDD (degenerative disc disease), cervical  Assessment & Plan:  Controlled with mobic and robaxin prn  continue current treatment plan  3  Subcutaneous cyst  Comments:  requests referral for removal  gen surg referral placed  Orders:  -     Ambulatory Referral to General Surgery; Future    4  COVID-19 virus infection    5  Need for influenza vaccination  -     influenza vaccine, quadrivalent, recombinant, PF, 0 5 mL, for patients 18 yr+ (FLUBLOK)         Subjective      Presents to the office for follow-up and skin lump  She 1st noticed this a few weeks ago in it has not gone away  It bothers her from time to time  It is not growing in size  It has been normal color since she 1st noticed it  She would like it removed  Lump is on her right hand  Does not interfere with movement of fingers  Other than mild discomfort she denies any numbness tingling in her fingers  No weakness    She is recovering from COVID-19 infection  She has significant history for severe COPD  Her covid symptoms have resolved  Denies any increasing shortness of breath or chest tightness  No cough  Has been using her inhalers as she was prior to Kendrick Doan  Review of Systems   Constitutional: Negative for chills and fever  HENT: Negative for congestion, rhinorrhea and sore throat  Respiratory: Positive for shortness of breath (chronic )  Negative for cough  Cardiovascular: Negative for chest pain and palpitations  Gastrointestinal: Negative for abdominal pain, diarrhea, nausea and vomiting  Musculoskeletal: Negative for myalgias  Neurological: Negative for headaches  All other systems reviewed and are negative  Current Outpatient Medications on File Prior to Visit   Medication Sig   • albuterol (Ventolin HFA) 90 mcg/act inhaler Inhale 2 puffs every 4 (four) hours as needed for wheezing   • anastrozole (ARIMIDEX) 1 mg tablet Take 1 tablet (1 mg total) by mouth daily   • Ascorbic Acid (vitamin C) 1000 MG tablet Take 1,000 mg by mouth daily   • B Complex Vitamins (B COMPLEX 1 PO) Take by mouth daily    • Biotin 98176 MCG TABS Take by mouth daily    • Budeson-Glycopyrrol-Formoterol (Breztri Aerosphere) 160-9-4 8 MCG/ACT AERO Inhale 2 puffs every 12 (twelve) hours Rinse mouth after use     • Calcium-Magnesium-Vitamin D 185- MG-MG-UNIT CAPS Take by mouth daily    • cyanocobalamin (VITAMIN B-12) 500 MCG tablet Take 500 mcg by mouth daily   • Echinacea 400 MG CAPS Take by mouth daily    • ipratropium-albuterol (DUO-NEB) 0 5-2 5 mg/3 mL nebulizer solution    • loperamide (IMODIUM) 2 mg capsule Take 2 mg by mouth 4 (four) times a day as needed for diarrhea   • Loratadine 10 MG CAPS Take by mouth daily    • meloxicam (MOBIC) 15 mg tablet TAKE 1 TABLET (15 MG TOTAL) BY MOUTH IN THE MORNING   • methocarbamol (ROBAXIN) 500 mg tablet Take 1 tablet (500 mg total) by mouth 3 (three) times a day May take 2 tablets TID if 1 tablet is ineffective   • Multiple Vitamins-Minerals (MULTIVITAMIN ADULT PO) Take by mouth daily    • predniSONE 20 mg tablet 3 PO day 1, then 2 PO QD x 2 days, then 1 PO QD x 2 days, then half PO QD x 2 days, then stop   • rizatriptan (MAXALT-MLT) 10 mg disintegrating tablet TAKE 1 TABLET (10 MG TOTAL) BY MOUTH ONCE AS NEEDED FOR MIGRAINE>>FILL 4/5/22   • topiramate (TOPAMAX) 100 mg tablet TAKE 1 TABLET BY MOUTH EVERYDAY AT BEDTIME   • [DISCONTINUED] budesonide-formoterol (Symbicort) 160-4 5 mcg/act inhaler Inhale 2 puffs 2 (two) times a day       Objective     /64 (BP Location: Left arm, Patient Position: Sitting, Cuff Size: Standard)   Pulse 81   Temp 97 6 °F (36 4 °C) (Tympanic)   Resp 14   Ht 5' 6" (1 676 m)   Wt 79 7 kg (175 lb 12 8 oz)   LMP  (LMP Unknown)   SpO2 94%   BMI 28 37 kg/m²     Physical Exam  Vitals and nursing note reviewed  Constitutional:       General: She is not in acute distress  Appearance: Normal appearance  She is not ill-appearing, toxic-appearing or diaphoretic  Eyes:      General:         Right eye: No discharge  Left eye: No discharge  Extraocular Movements: Extraocular movements intact  Conjunctiva/sclera: Conjunctivae normal    Cardiovascular:      Rate and Rhythm: Normal rate and regular rhythm  Pulses: Normal pulses  Heart sounds: Normal heart sounds  Pulmonary:      Effort: Pulmonary effort is normal       Breath sounds: Normal breath sounds  Musculoskeletal:      Cervical back: Normal range of motion and neck supple  Skin:     Findings: Lesion (cyst right hand ) present  Neurological:      Mental Status: She is alert and oriented to person, place, and time  Psychiatric:         Mood and Affect: Mood normal          Behavior: Behavior normal          Thought Content:  Thought content normal          Judgment: Judgment normal        Shahla Sierra MD

## 2022-11-06 NOTE — ASSESSMENT & PLAN NOTE
Stable / recovering from covid infection  sxs controlled on current inhaler regimen  Normal respiratory effort today  continue current regimen

## 2022-11-09 ENCOUNTER — OFFICE VISIT (OUTPATIENT)
Dept: PAIN MEDICINE | Facility: CLINIC | Age: 57
End: 2022-11-09

## 2022-11-09 VITALS
SYSTOLIC BLOOD PRESSURE: 140 MMHG | WEIGHT: 175.6 LBS | HEART RATE: 84 BPM | BODY MASS INDEX: 28.22 KG/M2 | DIASTOLIC BLOOD PRESSURE: 90 MMHG | HEIGHT: 66 IN

## 2022-11-09 DIAGNOSIS — M50.120 CERVICAL DISC DISORDER WITH RADICULOPATHY OF MID-CERVICAL REGION: Primary | ICD-10-CM

## 2022-11-09 DIAGNOSIS — M79.18 MYOFASCIAL PAIN SYNDROME: ICD-10-CM

## 2022-11-09 DIAGNOSIS — M54.2 NECK PAIN: ICD-10-CM

## 2022-11-09 RX ORDER — TIZANIDINE 4 MG/1
4 TABLET ORAL
Qty: 30 TABLET | Refills: 2 | Status: SHIPPED | OUTPATIENT
Start: 2022-11-09
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
## 2022-11-14 ENCOUNTER — CONSULT (OUTPATIENT)
Dept: SURGERY | Facility: CLINIC | Age: 57
End: 2022-11-14

## 2022-11-14 VITALS
TEMPERATURE: 97.3 F | HEIGHT: 66 IN | RESPIRATION RATE: 18 BRPM | WEIGHT: 176 LBS | DIASTOLIC BLOOD PRESSURE: 64 MMHG | SYSTOLIC BLOOD PRESSURE: 110 MMHG | BODY MASS INDEX: 28.28 KG/M2 | HEART RATE: 76 BPM | OXYGEN SATURATION: 98 %

## 2022-11-14 DIAGNOSIS — L72.9 SUBCUTANEOUS CYST: Primary | ICD-10-CM

## 2022-11-14 RX ORDER — CEPHALEXIN 500 MG/1
500 CAPSULE ORAL EVERY 6 HOURS SCHEDULED
Qty: 20 CAPSULE | Refills: 0 | Status: SHIPPED | OUTPATIENT
Start: 2022-11-14 | End: 2022-11-19

## 2022-11-14 NOTE — PATIENT INSTRUCTIONS
The appearance of the tissue was of a localized infection, not a cyst   It was submitted for pathology, and results should be available in 10 - 14 days  Please fill and take the antibiotic as prescribed  Keep the band aid on tonight, remove it tomorrow and shower, if there is drainage, then replace it with another band aid, when there is no further drainage, you can leave it open to air  Return in one week to have the sutures out  Call if you are having any problems

## 2022-11-14 NOTE — ASSESSMENT & PLAN NOTE
With palpable subcutaneous mass  Subsequent findings suggest localized skin infection  Excision through a 1 cm by 2 mm elliptical incision attempted  Inflamed subcutaneous tissue was debrided and submitted for pathology

## 2022-11-15 ENCOUNTER — TELEPHONE (OUTPATIENT)
Dept: PAIN MEDICINE | Facility: CLINIC | Age: 57
End: 2022-11-15

## 2022-11-15 NOTE — TELEPHONE ENCOUNTER
Patient was seen in [place] [address] office with Tamar Richards on 11/9 and ordered QUINTIN C7-T1  Patient was wanting to stay in [facility] office but Dr Espinoza Retana has no openings until after January  Would like to [employer]  Message fowarded to Surgery Coordinator

## 2022-11-16 ENCOUNTER — OFFICE VISIT (OUTPATIENT)
Dept: PAIN MEDICINE | Facility: CLINIC | Age: 57
End: 2022-11-16

## 2022-11-16 VITALS
HEIGHT: 66 IN | SYSTOLIC BLOOD PRESSURE: 110 MMHG | DIASTOLIC BLOOD PRESSURE: 79 MMHG | WEIGHT: 175.4 LBS | HEART RATE: 67 BPM | BODY MASS INDEX: 28.19 KG/M2

## 2022-11-16 DIAGNOSIS — M62.838 MUSCLE SPASM: Primary | ICD-10-CM

## 2022-11-16 DIAGNOSIS — M79.18 MYOFASCIAL PAIN SYNDROME: ICD-10-CM

## 2022-11-16 RX ORDER — LIDOCAINE HYDROCHLORIDE 10 MG/ML
5 INJECTION, SOLUTION INFILTRATION; PERINEURAL ONCE
Status: COMPLETED | OUTPATIENT
Start: 2022-11-16 | End: 2022-11-16

## 2022-11-16 RX ADMIN — LIDOCAINE HYDROCHLORIDE 5 ML: 10 INJECTION, SOLUTION INFILTRATION; PERINEURAL at 08:53

## 2022-11-16 NOTE — LETTER
November 16, 2022     Patient: Humberto Calderon  YOB: 1965  Date of Visit: 11/16/2022      To Whom it May Concern:    Jamil Mcgee is under my professional care  Iker He was seen in my office on 11/16/2022  Iker He can return to work tomorrow  If you have any questions or concerns, please don't hesitate to call           Sincerely,          Darren Hebert PA-C        CC: No Recipients

## 2022-11-16 NOTE — PATIENT INSTRUCTIONS
Trigger Point Injection   AMBULATORY CARE:   A trigger point injection  is used to relax a muscle knot  This helps relieve pain  You may be able to have more than one trigger point treated during a session  How to prepare for a trigger point injection:   Your healthcare provider will tell you how to prepare  Arrange to have someone drive you home after the injection  Tell your provider about all medicines you take, including pain medicine, blood thinners, and muscle relaxers  He or she will tell you if you need to stop any medicine for the injection, and when to stop  He or she will tell you which medicines to take or not take on the day of the injection  Tell your provider about all your allergies, including to any pain medicine  What will happen during a trigger point injection:   You may be sitting or lying, depending on where the trigger point is located  Your healthcare provider will feel for a knot in the muscle  He or she may petra your skin over the knot  Your provider will put a needle through your skin and into the trigger point  Saline (salt solution), pain relievers, or other medicines may be pushed through the needle into the trigger point  Your provider may use only a dry needle (no medicine)  He or she will pull the needle almost all the way out and then push it in again  He or she will repeat this several times until the muscle stops twitching or feels relaxed  Your provider will remove the needle and stretch the muscle area  He or she may apply pressure to the area for 2 minutes  A bandage will be put over the injection site to prevent bleeding or an infection  What to expect after a trigger point injection:   You may feel pain relief right away  It is normal for some pain to start again 2 hours later  An ice pack or over-the-counter pain medicine can help lower the pain  You may feel sore in the injection site for a few days   If you need another injection in the same area, wait until the area is not sore  Your healthcare provider may give you specific activity instructions to follow at home or recommend physical therapy  In general, you should try to stay active  Avoid strenuous activity for the first 3 or 4 days after the injection  Do not have more injections if you still have trigger point pain after 2 or 3 injections  Risks of a trigger point injection:  You may have a severe allergic reaction to pain medicine injected  The injection may be painful, or you may be sore where you got the injection  You may bleed, bruise, or develop an infection in the injection area  The injection may cause you to feel faint  Rarely, the needle may cause muscle or blood vessel damage or your lung may collapse if you get the injection near your chest   Call your local emergency number (911 in the [address] [place] [address],[address]), or have someone call if:   Your mouth and throat are swollen  You are wheezing or have trouble breathing  You have chest pain or your heart is beating faster than usual     You feel like you are going to faint  When should I seek immediate care? Your face is red or swollen  You have hives that spread over your body  You feel weak or dizzy  Call your doctor or pain specialist if:   You have a fever within 1 week of the injection  You have redness or swelling within 1 week of the injection  You have new or worsening pain near the injection site  You have questions or concerns about your condition or care  Self-care:   Stay active after you have trigger point injections  Gently move your joints through their full range of motion during the first week  Avoid strenuous activity for 3 or 4 days  Do regular stretches of the trigger point muscle  Place gentle pressure on the trigger point, and then stretch the muscle  Ask your healthcare provider for more information about how to stretch and apply pressure  Apply ice to the injection site    Use an ice pack, or put ice in a plastic bag  Cover the bag with a towel before you apply it  Apply ice for 15 to 20 minutes every hour, or as directed  Apply heat to trigger point sites  Heat can help relax muscles and relieve trigger point pain  Use a heat pack or a heating pad set on low  Apply heat for 15 minutes every hour, or as directed  Follow up with your doctor or pain specialist as directed:  Write down your questions so you remember to ask them during your visits  © Copyright [employer] 2022 Information is for End User's use only and may not be sold, redistributed or otherwise used for commercial purposes  All illustrations and images included in CareNotes® are the copyrighted property of A D A M , Inc  or [address] [place], [place] [address]gregorio   The above information is an  only  It is not intended as medical advice for individual conditions or treatments  Talk to your doctor, nurse or pharmacist before following any medical regimen to see if it is safe and effective for you
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
## 2022-11-22 ENCOUNTER — OFFICE VISIT (OUTPATIENT)
Dept: SURGERY | Facility: CLINIC | Age: 57
End: 2022-11-22

## 2022-11-22 VITALS — TEMPERATURE: 97.2 F

## 2022-11-22 DIAGNOSIS — L72.9 SUBCUTANEOUS CYST: Primary | ICD-10-CM

## 2022-11-22 NOTE — ASSESSMENT & PLAN NOTE
Status post attempted excision of subcutaneous cyst, ending with biopsy of inflamed subcutaneous tissue  The patient is completed her course of Keflex, the incision is healing and the skin sutures were removed  The final pathology report is not yet available

## 2022-11-22 NOTE — PROGRESS NOTES
Assessment/Plan:    No problem-specific Assessment & Plan notes found for this encounter  There are no diagnoses linked to this encounter  Subjective:      Patient ID: Pura Montemayor is a 62 y o  female  The patient presents in follow-up 8 days out from office procedure initially to excise the epidermal cyst, and ultimately a biopsy inflamed skin and subcutaneous tissue involving the dorsum of her right hand  The patient was given a short course of Keflex for the suspicion of a localized skin infection, the wound is healing uneventfully, and the skin sutures removed  There is no pathology report yet available yet, however  The following portions of the patient's history were reviewed and updated as appropriate: allergies, current medications, past family history, past medical history, past social history, past surgical history and problem list     Review of Systems   Constitutional: Negative for chills and fever  Skin: Wound: healing incision dorsum Rt hand  Objective:      Temp (!) 97 2 °F (36 2 °C) (Temporal)   LMP  (LMP Unknown)          Physical Exam  Skin:     Comments: Healing incision on the dorsum of the right hand between the thumb and 1st finger  Minimal residual ecchymosis

## 2022-11-22 NOTE — PATIENT INSTRUCTIONS
I would suggest using cocoa butter on the scar  No further antibiotics  Please call and return if the wound becomes more tender or starts to drain    I will call you with the pathology report when it is available

## 2022-12-01 ENCOUNTER — TELEPHONE (OUTPATIENT)
Dept: WOUND CARE | Facility: CLINIC | Age: 57
End: 2022-12-01

## 2022-12-01 PROCEDURE — 88305 TISSUE EXAM BY PATHOLOGIST: CPT | Performed by: PATHOLOGY

## 2022-12-01 NOTE — TELEPHONE ENCOUNTER
Called the patient with the pathology results, call went to voicemail  Discussed on the message the for final diagnosis of a benign fibroma, and the concern regarding the potential for recurrence  Invited the patient to call back to discuss the pathology report in detail

## 2022-12-11 DIAGNOSIS — G43.101 MIGRAINE WITH AURA AND WITH STATUS MIGRAINOSUS, NOT INTRACTABLE: ICD-10-CM

## 2022-12-12 ENCOUNTER — PROCEDURE VISIT (OUTPATIENT)
Dept: SURGERY | Facility: CLINIC | Age: 57
End: 2022-12-12

## 2022-12-12 VITALS
DIASTOLIC BLOOD PRESSURE: 62 MMHG | HEART RATE: 86 BPM | SYSTOLIC BLOOD PRESSURE: 110 MMHG | BODY MASS INDEX: 27.97 KG/M2 | WEIGHT: 174 LBS | OXYGEN SATURATION: 99 % | TEMPERATURE: 96.9 F | HEIGHT: 66 IN | RESPIRATION RATE: 18 BRPM

## 2022-12-12 DIAGNOSIS — D21.9 FIBROMA: Primary | ICD-10-CM

## 2022-12-12 PROBLEM — D23.61: Status: ACTIVE | Noted: 2022-11-14

## 2022-12-12 RX ORDER — SODIUM CHLORIDE 9 MG/ML
75 INJECTION, SOLUTION INTRAVENOUS CONTINUOUS
OUTPATIENT
Start: 2022-12-12

## 2022-12-12 RX ORDER — RIZATRIPTAN BENZOATE 10 MG/1
TABLET, ORALLY DISINTEGRATING ORAL
Qty: 9 TABLET | Refills: 2 | Status: SHIPPED | OUTPATIENT
Start: 2022-12-12

## 2022-12-12 NOTE — ASSESSMENT & PLAN NOTE
The subcutaneous mass has recurred  The pathology report is consistent with a dermatofibroma with positive margins  Reexcision in the operating room under anesthesia is planned for Tuesday, December 27, 2022

## 2022-12-12 NOTE — PROGRESS NOTES
Assessment/Plan:    Recurrent Dermatofibroma of right hand      The subcutaneous mass has recurred  The pathology report is consistent with a dermatofibroma with positive margins  Reexcision in the operating room under anesthesia is planned for Tuesday, December 27, 2022  Diagnoses and all orders for this visit:    Fibroma  -     Case request operating room: 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; Standing  -     Case request operating room: 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    Other orders  -     Diet NPO; Sips with meds; Standing  -     Shave and Prep; Standing  -     Apply chlorhexidine 4% cloth topical; Standing  -     Void on call to OR; Standing  -     Insert peripheral IV; Standing  -     Place sequential compression device; Standing  -     sodium chloride 0 9 % infusion  -     ceFAZolin (ANCEF) 1,000 mg in dextrose 5 % 100 mL IVPB          Subjective:      Patient ID: Elder Vazquez is a 62 y o  female  The patient is a 68-year-old white female who presents with recurrent tender mass at the point of excision of a presumed epidermal inclusion cyst as an office procedure  The final pathology report revealed a dermatophyte fibroma with positive margins, and now appears to had a recurrence of the dermatofibroma, and requires excision and more formal procedure in the operating room  Excision of the recurrent dermatofibroma is scheduled for an outpatient procedure on Tuesday, December 27  The following portions of the patient's history were reviewed and updated as appropriate: allergies, current medications, past family history, past medical history, past social history, past surgical history and problem list     Review of Systems   Constitutional: Negative for chills and fever  Respiratory: Positive for cough  Negative for shortness of breath  Cardiovascular: Negative for chest pain  Gastrointestinal: Negative  Genitourinary: Negative      Skin: Positive for wound    Psychiatric/Behavioral: Negative  Objective:      /62 (BP Location: Left arm, Patient Position: Sitting, Cuff Size: Large)   Pulse 86   Temp (!) 96 9 °F (36 1 °C) (Temporal)   Resp 18   Ht 5' 6" (1 676 m)   Wt 78 9 kg (174 lb)   LMP  (LMP Unknown)   SpO2 99%   BMI 28 08 kg/m²          Physical Exam  Constitutional:       Appearance: Normal appearance  HENT:      Head: Normocephalic  Eyes:      Pupils: Pupils are equal, round, and reactive to light  Cardiovascular:      Rate and Rhythm: Normal rate  Pulses: Normal pulses  Pulmonary:      Effort: Pulmonary effort is normal    Abdominal:      General: Abdomen is flat  Palpations: Abdomen is soft  Musculoskeletal:        Arms:    Skin:     General: Skin is warm and dry  Neurological:      General: No focal deficit present     Psychiatric:         Mood and Affect: Mood normal

## 2022-12-12 NOTE — PATIENT INSTRUCTIONS
Pre-Surgery Instructions:   Medication Instructions    albuterol (Ventolin HFA) 90 mcg/act inhaler Take morning of surgery    anastrozole (ARIMIDEX) 1 mg tablet per anesthesia guidelines     Ascorbic Acid (vitamin C) 1000 MG tablet per anesthesia guidelines     B Complex Vitamins (B COMPLEX 1 PO) per anesthesia guidelines     Biotin 52292 MCG TABS per anesthesia guidelines     Budeson-Glycopyrrol-Formoterol (Breztri Aerosphere) 160-9-4 8 MCG/ACT AERO Take morning of surgery    Calcium-Magnesium-Vitamin D 185- MG-MG-UNIT CAPS per anesthesia guidelines     cyanocobalamin (VITAMIN B-12) 500 MCG tablet per anesthesia guidelines     Echinacea 400 MG CAPS per anesthesia guidelines     ipratropium-albuterol (DUO-NEB) 0 5-2 5 mg/3 mL nebulizer solution per anesthesia guidelines     loperamide (IMODIUM) 2 mg capsule per anesthesia guidelines     Loratadine 10 MG CAPS per anesthesia guidelines     meloxicam (MOBIC) 15 mg tablet per anesthesia guidelines     Multiple Vitamins-Minerals (MULTIVITAMIN ADULT PO) per anesthesia guidelines     predniSONE 20 mg tablet per anesthesia guidelines     rizatriptan (MAXALT-MLT) 10 mg disintegrating tablet per anesthesia guidelines     tiZANidine (ZANAFLEX) 4 mg tablet per anesthesia guidelines     topiramate (TOPAMAX) 100 mg tablet per anesthesia guidelines      Your surgery is scheduled for Tuesday 12/27/22  The hospital will call the evening prior to tell you when to arrive  You will need a   Please shower with hibiclens the evening prior and the morning of surgery    Nothing to eat or drink after midnight the evening prior to surgery    Case Report   Surgical Pathology Report                         Case: [id number]                                    Authorizing Provider:  Karon Vikc MD    Collected:           11/14/2022 1600               Ordering Location:     [facility]  Received:            11/15/2022 [address] [facility]                                                                     Pathologist:           Gi Hardin MD                                                          Specimen:    Skin, Other, Hand, Right                                                                   Final Diagnosis   A  Skin lesion, Hand, Right, excisional biopsy:  - Portions of dermatofibroma, present at examined tissue edges  See Note  - Associated scar    - Concurrent hypertrophic actinic keratosis  - Negative for malignancy  Electronically signed by Gi Hardin MD on 12/1/2022 at 11:40 AM   Note    As the smaller portion of the specimen appears to be deep tissue with residual lesion extending from the larger specimen, clinical correlation is required to determine complete excision of the lesion  Dermatofibromas may locally recur if incompletely excised  Cellular, atypical and aneurysmal subtypes have a higher risk of local recurrence  Additional Information    All reported additional testing was performed with appropriately reactive controls  These tests were developed and their performance characteristics determined by [facility] or appropriate performing facility, though some tests may be performed on tissues which have not been validated for performance characteristics (such as staining performed on alcohol exposed cell blocks and decalcified tissues)  Results should be interpreted with caution and in the context of the patients’ clinical condition  These tests may not be cleared or approved by the [place]  Food and Drug Administration, though the FDA has determined that such clearance or approval is not necessary  These tests are used for clinical purposes and they should not be regarded as investigational or for research  This laboratory has been approved by CLIA 88, designated as a high-complexity laboratory and is qualified to perform these tests    Interpretation performed at [facility], [address] [facility] [address]  Gross Description    A  The specimen is received in formalin, labeled with the patient's name and hospital number, and is designated "right hand"  The specimen consists of 2 tan flat and elongated tissue fragments measuring 1 1 x 0 3 x 0 2 cm and 0 8 x 0 2 x 0 1 cm  The presumed margins of resection are and the presumed epithelial surfaces are inked red  The specimen is entirely submitted between sponges, 1 cassette

## 2022-12-12 NOTE — H&P (VIEW-ONLY)
Assessment/Plan:    Recurrent Dermatofibroma of right hand      The subcutaneous mass has recurred  The pathology report is consistent with a dermatofibroma with positive margins  Reexcision in the operating room under anesthesia is planned for Tuesday, December 27, 2022  Diagnoses and all orders for this visit:    Fibroma  -     Case request operating room: 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; Standing  -     Case request operating room: 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    Other orders  -     Diet NPO; Sips with meds; Standing  -     Shave and Prep; Standing  -     Apply chlorhexidine 4% cloth topical; Standing  -     Void on call to OR; Standing  -     Insert peripheral IV; Standing  -     Place sequential compression device; Standing  -     sodium chloride 0 9 % infusion  -     ceFAZolin (ANCEF) 1,000 mg in dextrose 5 % 100 mL IVPB          Subjective:      Patient ID: Bayard Cowden is a 62 y o  female  The patient is a 77-year-old white female who presents with recurrent tender mass at the point of excision of a presumed epidermal inclusion cyst as an office procedure  The final pathology report revealed a dermatophyte fibroma with positive margins, and now appears to had a recurrence of the dermatofibroma, and requires excision and more formal procedure in the operating room  Excision of the recurrent dermatofibroma is scheduled for an outpatient procedure on Tuesday, December 27  The following portions of the patient's history were reviewed and updated as appropriate: allergies, current medications, past family history, past medical history, past social history, past surgical history and problem list     Review of Systems   Constitutional: Negative for chills and fever  Respiratory: Positive for cough  Negative for shortness of breath  Cardiovascular: Negative for chest pain  Gastrointestinal: Negative  Genitourinary: Negative      Skin: Positive for wound    Psychiatric/Behavioral: Negative  Objective:      /62 (BP Location: Left arm, Patient Position: Sitting, Cuff Size: Large)   Pulse 86   Temp (!) 96 9 °F (36 1 °C) (Temporal)   Resp 18   Ht 5' 6" (1 676 m)   Wt 78 9 kg (174 lb)   LMP  (LMP Unknown)   SpO2 99%   BMI 28 08 kg/m²          Physical Exam  Constitutional:       Appearance: Normal appearance  HENT:      Head: Normocephalic  Eyes:      Pupils: Pupils are equal, round, and reactive to light  Cardiovascular:      Rate and Rhythm: Normal rate  Pulses: Normal pulses  Pulmonary:      Effort: Pulmonary effort is normal    Abdominal:      General: Abdomen is flat  Palpations: Abdomen is soft  Musculoskeletal:        Arms:    Skin:     General: Skin is warm and dry  Neurological:      General: No focal deficit present     Psychiatric:         Mood and Affect: Mood normal

## 2022-12-13 ENCOUNTER — TELEPHONE (OUTPATIENT)
Dept: SURGERY | Facility: CLINIC | Age: 57
End: 2022-12-13

## 2022-12-13 NOTE — TELEPHONE ENCOUNTER
Left message for the patient to call our office to make a post operative appointment after her surgery   She will s/p exc of recurrent fibroma dorsum of rt hand 12/27/2022

## 2022-12-14 NOTE — ANESTHESIA POSTPROCEDURE EVALUATION
Post-Op Assessment Note    CV Status:  Stable    Pain management: adequate     Mental Status:  Alert and awake   Hydration Status:  Euvolemic   PONV Controlled:  Controlled   Airway Patency:  Patent      Post Op Vitals Reviewed: Yes      Staff: Anesthesiologist         No complications documented      BP      Temp      Pulse     Resp      SpO2 1.87

## 2022-12-16 DIAGNOSIS — G43.101 MIGRAINE WITH AURA AND WITH STATUS MIGRAINOSUS, NOT INTRACTABLE: ICD-10-CM

## 2022-12-16 RX ORDER — TOPIRAMATE 100 MG/1
TABLET, FILM COATED ORAL
Qty: 90 TABLET | Refills: 1 | Status: SHIPPED | OUTPATIENT
Start: 2022-12-16

## 2022-12-19 ENCOUNTER — TELEPHONE (OUTPATIENT)
Dept: PAIN MEDICINE | Facility: MEDICAL CENTER | Age: 57
End: 2022-12-19

## 2022-12-19 NOTE — TELEPHONE ENCOUNTER
Patient has a procedure for 12/20/22 with Dr Kinjal Heck, patient wants to make sure the insurance (prior auth) was approved for this appt  Patient was moved up earlier for appt  Please follow up and call patient back  Thank you

## 2022-12-20 ENCOUNTER — HOSPITAL ENCOUNTER (OUTPATIENT)
Dept: RADIOLOGY | Facility: MEDICAL CENTER | Age: 57
Discharge: HOME | End: 2022-12-20

## 2022-12-20 VITALS
HEART RATE: 78 BPM | TEMPERATURE: 97.6 F | SYSTOLIC BLOOD PRESSURE: 124 MMHG | OXYGEN SATURATION: 97 % | RESPIRATION RATE: 20 BRPM | DIASTOLIC BLOOD PRESSURE: 66 MMHG

## 2022-12-20 DIAGNOSIS — M79.18 MYOFASCIAL PAIN SYNDROME: ICD-10-CM

## 2022-12-20 DIAGNOSIS — M50.120 CERVICAL DISC DISORDER WITH RADICULOPATHY OF MID-CERVICAL REGION: ICD-10-CM

## 2022-12-20 RX ORDER — METHYLPREDNISOLONE ACETATE 80 MG/ML
80 INJECTION, SUSPENSION INTRA-ARTICULAR; INTRALESIONAL; INTRAMUSCULAR; PARENTERAL; SOFT TISSUE ONCE
Status: COMPLETED | OUTPATIENT
Start: 2022-12-20 | End: 2022-12-20

## 2022-12-20 RX ADMIN — IOHEXOL 2 ML: 300 INJECTION, SOLUTION INTRAVENOUS at 16:23

## 2022-12-20 RX ADMIN — METHYLPREDNISOLONE ACETATE 80 MG: 80 INJECTION, SUSPENSION INTRA-ARTICULAR; INTRALESIONAL; INTRAMUSCULAR; PARENTERAL; SOFT TISSUE at 16:24

## 2022-12-20 NOTE — PRE-PROCEDURE INSTRUCTIONS
Pre-Surgery Instructions:   Medication Instructions   • albuterol (Ventolin HFA) 90 mcg/act inhaler Uses PRN- OK to take day of surgery   • anastrozole (ARIMIDEX) 1 mg tablet Take night before surgery   • Ascorbic Acid (vitamin C) 1000 MG tablet Stop taking 7 days prior to surgery  • Biotin 65139 MCG TABS Stop taking 7 days prior to surgery  • Budeson-Glycopyrrol-Formoterol (Breztri Aerosphere) 160-9-4 8 MCG/ACT AERO Uses PRN- OK to take day of surgery   • Calcium-Magnesium-Vitamin D 185- MG-MG-UNIT CAPS Stop taking 7 days prior to surgery  • cyanocobalamin (VITAMIN B-12) 500 MCG tablet Stop taking 7 days prior to surgery  • Echinacea 400 MG CAPS Stop taking 7 days prior to surgery  • loperamide (IMODIUM) 2 mg capsule Uses PRN- OK to take day of surgery   • Loratadine 10 MG CAPS Take day of surgery  • meloxicam (MOBIC) 15 mg tablet Stop taking 3 days prior to surgery  • Multiple Vitamins-Minerals (MULTIVITAMIN ADULT PO) Stop taking 7 days prior to surgery  • rizatriptan (MAXALT-MLT) 10 mg disintegrating tablet Uses PRN- OK to take day of surgery   • tiZANidine (ZANAFLEX) 4 mg tablet Take night before surgery   • topiramate (TOPAMAX) 100 mg tablet Take night before surgery      - Reviewed with patient, in detail, instructions from "My Surgical Experience"  - Instructed to avoid all OTC vitamins/supplements and NSAIDS for one week prior to surgery per anesthesia guidelines  Tylenol ok to take PRN  - Advised patient nothing eat or drink after midnight prior to surgery, except medications that he/she is to take morning DOS with only small sip of water     - Advised patient that Kang Hayden will call with surgery arrival time and hospital directions the business day prior to surgery   Patient verbalized understanding of current visitor restrictions/masking guidelines and advised that he/she can confirm these at time of arrival call with Kang Hayden      - Patient verbalized understanding and knows to call surgeon's office with any additional questions prior to surgery  Instructed to call surgeon's office in meantime with any new illnesses/exposure, patient verbalized understanding
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
## 2022-12-20 NOTE — DISCHARGE INSTRUCTIONS
Epidural Steroid Injection   WHAT YOU NEED TO KNOW:   An epidural steroid injection (PONCHO) is a procedure to inject steroid medicine into the epidural space  The epidural space is between your spinal cord and vertebrae  Steroids reduce inflammation and fluid buildup in your spine that may be causing pain  You may be given pain medicine along with the steroids  ACTIVITY  Do not drive or operate machinery today  No strenuous activity today - bending, lifting, etc   You may resume normal activites starting tomorrow - start slowly and as tolerated  You may shower today, but no tub baths or hot tubs  You may have numbness for several hours from the local anesthetic  Please use caution and common sense, especially with weight-bearing activities  CARE OF THE INJECTION SITE  If you have soreness or pain, apply ice to the area today (20 minutes on/20 minutes off)  Starting tomorrow, you may use warm, moist heat or ice if needed  You may have an increase or change in your discomfort for 36-48 hours after your treatment  Apply ice and continue with any pain medication you have been prescribed  Notify the [facility] if you have any of the following: redness, drainage, swelling, headache, stiff neck or fever above 100°F     SPECIAL INSTRUCTIONS  Our office will contact you in approximately 7 days for a progress report  MEDICATIONS  Continue to take all routine medications  Our office may have instructed you to hold some medications  You may resume your Meloxicam in 24 hours, so tomorrow after 4:45 pm    As no general anesthesia was used in today's procedure, you should not experience any side effects related to anesthesia  If you are diabetic, the steroids used in today's injection may temporarily increase your blood sugar levels after the first few days after your injection   Please keep a close eye on your sugars and alert the doctor who manages your diabetes if your sugars are significantly high from your baseline or you are symptomatic  If you have a problem specifically related to your procedure, please call our office at [phone]  Problems not related to your procedure should be directed to your primary care physician

## 2022-12-27 ENCOUNTER — ANESTHESIA (OUTPATIENT)
Dept: PERIOP | Facility: HOSPITAL | Age: 57
End: 2022-12-27

## 2022-12-27 ENCOUNTER — TELEPHONE (OUTPATIENT)
Dept: PAIN MEDICINE | Facility: CLINIC | Age: 57
End: 2022-12-27

## 2022-12-27 ENCOUNTER — HOSPITAL ENCOUNTER (OUTPATIENT)
Facility: HOSPITAL | Age: 57
Setting detail: OUTPATIENT SURGERY
Discharge: HOME | End: 2022-12-27
Attending: SPECIALIST | Admitting: SPECIALIST

## 2022-12-27 ENCOUNTER — ANESTHESIA EVENT (OUTPATIENT)
Dept: PERIOP | Facility: HOSPITAL | Age: 57
End: 2022-12-27

## 2022-12-27 VITALS
TEMPERATURE: 97 F | OXYGEN SATURATION: 95 % | RESPIRATION RATE: 20 BRPM | HEIGHT: 66 IN | DIASTOLIC BLOOD PRESSURE: 81 MMHG | SYSTOLIC BLOOD PRESSURE: 124 MMHG | HEART RATE: 76 BPM | WEIGHT: 174 LBS | BODY MASS INDEX: 27.97 KG/M2

## 2022-12-27 DIAGNOSIS — D21.9 FIBROMA: ICD-10-CM

## 2022-12-27 RX ORDER — ACETAMINOPHEN 325 MG/1
650 TABLET ORAL EVERY 6 HOURS PRN
Status: DISCONTINUED | OUTPATIENT
Start: 2022-12-27 | End: 2022-12-27 | Stop reason: HOSPADM

## 2022-12-27 RX ORDER — LIDOCAINE HYDROCHLORIDE AND EPINEPHRINE 10; 10 MG/ML; UG/ML
INJECTION, SOLUTION INFILTRATION; PERINEURAL AS NEEDED
Status: DISCONTINUED | OUTPATIENT
Start: 2022-12-27 | End: 2022-12-27 | Stop reason: HOSPADM

## 2022-12-27 RX ORDER — PROPOFOL 10 MG/ML
INJECTION, EMULSION INTRAVENOUS CONTINUOUS PRN
Status: DISCONTINUED | OUTPATIENT
Start: 2022-12-27 | End: 2022-12-27

## 2022-12-27 RX ORDER — SODIUM CHLORIDE 9 MG/ML
75 INJECTION, SOLUTION INTRAVENOUS CONTINUOUS
Status: DISCONTINUED | OUTPATIENT
Start: 2022-12-27 | End: 2022-12-27 | Stop reason: HOSPADM

## 2022-12-27 RX ORDER — CEFAZOLIN SODIUM 1 G/50ML
SOLUTION INTRAVENOUS AS NEEDED
Status: DISCONTINUED | OUTPATIENT
Start: 2022-12-27 | End: 2022-12-27

## 2022-12-27 RX ORDER — CEFAZOLIN SODIUM 1 G/50ML
1000 SOLUTION INTRAVENOUS ONCE
Status: DISCONTINUED | OUTPATIENT
Start: 2022-12-27 | End: 2022-12-27 | Stop reason: HOSPADM

## 2022-12-27 RX ORDER — DEXMEDETOMIDINE HYDROCHLORIDE 100 UG/ML
INJECTION, SOLUTION INTRAVENOUS AS NEEDED
Status: DISCONTINUED | OUTPATIENT
Start: 2022-12-27 | End: 2022-12-27

## 2022-12-27 RX ORDER — FENTANYL CITRATE 50 UG/ML
INJECTION, SOLUTION INTRAMUSCULAR; INTRAVENOUS AS NEEDED
Status: DISCONTINUED | OUTPATIENT
Start: 2022-12-27 | End: 2022-12-27

## 2022-12-27 RX ORDER — FENTANYL CITRATE/PF 50 MCG/ML
25 SYRINGE (ML) INJECTION
Status: DISCONTINUED | OUTPATIENT
Start: 2022-12-27 | End: 2022-12-27 | Stop reason: HOSPADM

## 2022-12-27 RX ORDER — PROPOFOL 10 MG/ML
INJECTION, EMULSION INTRAVENOUS AS NEEDED
Status: DISCONTINUED | OUTPATIENT
Start: 2022-12-27 | End: 2022-12-27

## 2022-12-27 RX ORDER — MIDAZOLAM HYDROCHLORIDE 2 MG/2ML
INJECTION, SOLUTION INTRAMUSCULAR; INTRAVENOUS AS NEEDED
Status: DISCONTINUED | OUTPATIENT
Start: 2022-12-27 | End: 2022-12-27

## 2022-12-27 RX ORDER — SODIUM CHLORIDE, SODIUM LACTATE, POTASSIUM CHLORIDE, CALCIUM CHLORIDE 600; 310; 30; 20 MG/100ML; MG/100ML; MG/100ML; MG/100ML
125 INJECTION, SOLUTION INTRAVENOUS CONTINUOUS
Status: DISCONTINUED | OUTPATIENT
Start: 2022-12-27 | End: 2022-12-27 | Stop reason: HOSPADM

## 2022-12-27 RX ORDER — SODIUM CHLORIDE, SODIUM LACTATE, POTASSIUM CHLORIDE, CALCIUM CHLORIDE 600; 310; 30; 20 MG/100ML; MG/100ML; MG/100ML; MG/100ML
INJECTION, SOLUTION INTRAVENOUS CONTINUOUS PRN
Status: DISCONTINUED | OUTPATIENT
Start: 2022-12-27 | End: 2022-12-27

## 2022-12-27 RX ORDER — SODIUM CHLORIDE, SODIUM LACTATE, POTASSIUM CHLORIDE, CALCIUM CHLORIDE 600; 310; 30; 20 MG/100ML; MG/100ML; MG/100ML; MG/100ML
20 INJECTION, SOLUTION INTRAVENOUS CONTINUOUS
Status: DISCONTINUED | OUTPATIENT
Start: 2022-12-27 | End: 2022-12-27 | Stop reason: HOSPADM

## 2022-12-27 RX ORDER — ONDANSETRON 2 MG/ML
INJECTION INTRAMUSCULAR; INTRAVENOUS AS NEEDED
Status: DISCONTINUED | OUTPATIENT
Start: 2022-12-27 | End: 2022-12-27

## 2022-12-27 RX ORDER — MAGNESIUM HYDROXIDE 1200 MG/15ML
LIQUID ORAL AS NEEDED
Status: DISCONTINUED | OUTPATIENT
Start: 2022-12-27 | End: 2022-12-27 | Stop reason: HOSPADM

## 2022-12-27 RX ADMIN — DEXMEDETOMIDINE HCL 4 MCG: 100 INJECTION INTRAVENOUS at 11:38

## 2022-12-27 RX ADMIN — PROPOFOL 50 MG: 10 INJECTION, EMULSION INTRAVENOUS at 11:40

## 2022-12-27 RX ADMIN — FENTANYL CITRATE 50 MCG: 50 INJECTION, SOLUTION INTRAMUSCULAR; INTRAVENOUS at 11:34

## 2022-12-27 RX ADMIN — SODIUM CHLORIDE, SODIUM LACTATE, POTASSIUM CHLORIDE, AND CALCIUM CHLORIDE 125 ML/HR: .6; .31; .03; .02 INJECTION, SOLUTION INTRAVENOUS at 11:05

## 2022-12-27 RX ADMIN — MIDAZOLAM HYDROCHLORIDE 2 MG: 1 INJECTION, SOLUTION INTRAMUSCULAR; INTRAVENOUS at 11:35

## 2022-12-27 RX ADMIN — FENTANYL CITRATE 50 MCG: 50 INJECTION, SOLUTION INTRAMUSCULAR; INTRAVENOUS at 11:40

## 2022-12-27 RX ADMIN — PROPOFOL 120 MCG/KG/MIN: 10 INJECTION, EMULSION INTRAVENOUS at 11:40

## 2022-12-27 RX ADMIN — CEFAZOLIN SODIUM 1000 MG: 1 SOLUTION INTRAVENOUS at 11:38

## 2022-12-27 RX ADMIN — ONDANSETRON 4 MG: 2 INJECTION INTRAMUSCULAR; INTRAVENOUS at 11:40

## 2022-12-27 RX ADMIN — SODIUM CHLORIDE, SODIUM LACTATE, POTASSIUM CHLORIDE, AND CALCIUM CHLORIDE: .6; .31; .03; .02 INJECTION, SOLUTION INTRAVENOUS at 11:34

## 2022-12-27 NOTE — INTERVAL H&P NOTE
H&P reviewed  After examining the patient I find no changes in the patients condition since the H&P had been written      Vitals:    12/27/22 1056   BP: 125/76   Pulse: 77   Resp: 18   Temp: 97 9 °F (36 6 °C)   SpO2: 94%

## 2022-12-27 NOTE — ANESTHESIA PREPROCEDURE EVALUATION
Procedure:  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 (Chest)    Relevant Problems   CARDIO   (+) Dyspnea on exertion      ENDO   (+) Hypothyroidism      /RENAL   (+) Renal cyst      GYN   (+) Malignant neoplasm of 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, estrogen receptor positive (HCC)      MUSCULOSKELETAL   (+) DDD (degenerative disc disease), cervical   (+) Myofascial pain syndrome   (+) Spinal osteoarthritis      NEURO/PSYCH   (+) Depression   (+) History of colonic polyps   (+) Myofascial pain syndrome      PULMONARY   (+) COPD, severe (HCC)   (+) Dyspnea on exertion        Physical Exam    Airway    Mallampati score: III  TM Distance: >3 FB  Neck ROM: full     Dental   No notable dental hx     Cardiovascular  Cardiovascular exam normal    Pulmonary  Pulmonary exam normal     Other Findings    Severe COPD  Not on home O2, uses inhaler when goes out from home and encounters triggers  No recent steroids/hospitalizations  Anesthesia Plan  ASA Score- 3     Anesthesia Type- IV sedation with anesthesia with ASA Monitors  Additional Monitors:   Airway Plan:           Plan Factors-Exercise tolerance (METS): >4 METS  Chart reviewed  EKG reviewed  Existing labs reviewed  Patient summary reviewed  Patient is not a current smoker  Induction- intravenous  Postoperative Plan- Plan for postoperative opioid use  Informed Consent- Anesthetic plan and risks discussed with patient  I personally reviewed this patient with the CRNA  Discussed and agreed on the Anesthesia Plan with the CRNA  Lorenzo Elliott

## 2022-12-27 NOTE — OP NOTE
OPERATIVE REPORT  PATIENT NAME: Zhao Smith    :  1965  MRN: [id number]  Pt Location: [facility] ROOM 01    SURGERY DATE: 2022    Surgeon(s) and Role:     * Rosalind Talavera MD - Primary     * Thao Gautam PA-C - Assisting    Preop Diagnosis:  Fibroma [D21 9]    Post-Op Diagnosis Codes:     * Fibroma [D21 9]    Procedure(s) (LRB):  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 (N/A)    Specimen(s):  ID Type Source Tests Collected by Time Destination   1 : suture marks apex of fibroma Tissue Hand, Right TISSUE EXAM Rosalind Talavera MD 2022 1156        Estimated Blood Loss:   Minimal    Drains:  * No LDAs found *    Anesthesia Type:   Conscious Sedation     Operative Indications:  Fibroma [D21 9]  Recurrent Dermatofibroma    Operative Findings:  1 1 cm lesion, excised with overlying skin, nylon suture marks proximal apex  22 minute tourniquet time    Complications:   None    Procedure and Technique:  The patient was identified by myself taken to the operating room where neck anesthesia was induced on her stretcher with laryngeal mask airway  An armboard was then placed and the right arm was prepped and draped in usual sterile fashion using ChloraPrep solution  A tourniquet was also employed to 250 mmHg  A timeout was called the patient identified as Aníbal Gregorio, IV antibiotics have been given all parties agreed this was the appropriate patient for the proposed procedure  The extremity was exsanguinated with an Esmarch and the tourniquet inflated  1% lidocaine with epinephrine was infiltrated following which an elliptical incision was used to excise the lesion and the overlying skin including scar from her previous excision  Meticulous hemostasis was obtained using electrocautery  A random flap was created to offset the tension on the wound    This was set into position using subcutaneous 3-0 chromic suture, and secured into position using interrupted vertical mattress 4-0 nylon suture  Between the sutures benzoin and Steri-Strips were applied  A bulky dressing was then applied and the tourniquet released the patient made an uneventful recovery and was transported on her stretcher back to the recovery room in good condition having tolerated the procedure well         I was present for the entire procedure, A qualified resident physician was not available and A physician assistant was required during the procedure for retraction tissue handling,dissection and suturing    Patient Disposition:  PACU  and extubated and stable        SIGNATURE: Evelia Farmer MD  DATE: December 27, 2022  TIME: 12:59 PM

## 2022-12-27 NOTE — DISCHARGE INSTR - AVS FIRST PAGE
Keep dressing on right hand clean and dry  If dressing gets wet remove it completely, otherwise maintain dressing until seen in the office next week  No heavy lifting no strenuous activity using the right upper extremity  Tylenol for pain    Call if you are having any difficulties

## 2022-12-27 NOTE — ANESTHESIA POSTPROCEDURE EVALUATION
Post-Op Assessment Note    CV Status:  Stable  Pain Score: 0    Pain management: adequate     Mental Status:  Alert and awake   Hydration Status:  Euvolemic   PONV Controlled:  Controlled   Airway Patency:  Patent      Post Op Vitals Reviewed: Yes      Staff: CRNA         No notable events documented      BP   132/60   Temp   97   Pulse  70   Resp   12   SpO2   98

## 2023-01-03 ENCOUNTER — OFFICE VISIT (OUTPATIENT)
Dept: SURGERY | Facility: CLINIC | Age: 58
End: 2023-01-03

## 2023-01-03 VITALS — TEMPERATURE: 97 F

## 2023-01-03 DIAGNOSIS — D23.61: Primary | ICD-10-CM

## 2023-01-03 NOTE — PROGRESS NOTES
Assessment/Plan:    Recurrent Dermatofibroma of right hand  The patient presents for dressing change postop day 6 from excision of the recurrent dermatofibroma, with random flap for closure  The patient is kept the dressing in place for the last 6 days, and the sutures and Steri-Strips remain in place  The pathology report not yet available  The patient will return in 1 week to have the nylon sutures removed, review of the pathology report  Diagnoses and all orders for this visit:    Recurrent Dermatofibroma of right hand          Subjective:      Patient ID: Laila Holden is a 62 y o  female  The patient is a 61-year-old white female who is 6 days out from excision of a recurrent dermatofibroma from her right hand, with a random flap for wound closure  The patient has had no interval problems, and the dressings were changed  The flap margins are viable, and Steri-Strips remain in place  The patient will follow up in 1 week to have the sutures out, by that time the pathology report should be available  The following portions of the patient's history were reviewed and updated as appropriate: allergies, current medications, past family history, past medical history, past social history, past surgical history and problem list     Review of Systems   Constitutional: Negative  Skin:        Healing incision on dorsum right hand         Objective:      Temp (!) 97 °F (36 1 °C) (Temporal)   LMP  (LMP Unknown)          Physical Exam  Musculoskeletal:         General: No swelling  Normal range of motion  Skin:     General: Skin is warm and dry        Comments: Healing incision dorsum right hand

## 2023-01-03 NOTE — PATIENT INSTRUCTIONS
You can wash the wound with soap and water, pat dry  Showers are fine, no tub baths until the sutures come out next week  When the steri-strips are loose and ready to come off you can take them the rest of the way off

## 2023-01-03 NOTE — ASSESSMENT & PLAN NOTE
The patient presents for dressing change postop day 6 from excision of the recurrent dermatofibroma, with random flap for closure  The patient is kept the dressing in place for the last 6 days, and the sutures and Steri-Strips remain in place  The pathology report not yet available  The patient will return in 1 week to have the nylon sutures removed, review of the pathology report

## 2023-01-05 ENCOUNTER — OFFICE VISIT (OUTPATIENT)
Dept: FAMILY MEDICINE CLINIC | Facility: CLINIC | Age: 58
End: 2023-01-05

## 2023-01-05 ENCOUNTER — TELEPHONE (OUTPATIENT)
Dept: FAMILY MEDICINE CLINIC | Facility: CLINIC | Age: 58
End: 2023-01-05

## 2023-01-05 VITALS
HEIGHT: 66 IN | SYSTOLIC BLOOD PRESSURE: 150 MMHG | BODY MASS INDEX: 28.28 KG/M2 | OXYGEN SATURATION: 98 % | WEIGHT: 176 LBS | DIASTOLIC BLOOD PRESSURE: 92 MMHG | HEART RATE: 84 BPM | TEMPERATURE: 97.7 F

## 2023-01-05 DIAGNOSIS — Z17.0 MALIGNANT NEOPLASM OF UPPER-INNER QUADRANT OF RIGHT BREAST IN FEMALE, ESTROGEN RECEPTOR POSITIVE (HCC): ICD-10-CM

## 2023-01-05 DIAGNOSIS — Z86.16 PERSONAL HISTORY OF COVID-19: ICD-10-CM

## 2023-01-05 DIAGNOSIS — J44.9 COPD, SEVERE (HCC): ICD-10-CM

## 2023-01-05 DIAGNOSIS — J01.90 ACUTE SINUSITIS, RECURRENCE NOT SPECIFIED, UNSPECIFIED LOCATION: ICD-10-CM

## 2023-01-05 DIAGNOSIS — J40 BRONCHITIS: Primary | ICD-10-CM

## 2023-01-05 DIAGNOSIS — C50.211 MALIGNANT NEOPLASM OF UPPER-INNER QUADRANT OF RIGHT BREAST IN FEMALE, ESTROGEN RECEPTOR POSITIVE (HCC): ICD-10-CM

## 2023-01-05 RX ORDER — ALBUTEROL SULFATE 90 UG/1
2 AEROSOL, METERED RESPIRATORY (INHALATION) EVERY 4 HOURS PRN
Qty: 18 G | Refills: 3 | Status: SHIPPED | OUTPATIENT
Start: 2023-01-05

## 2023-01-05 RX ORDER — PREDNISONE 20 MG/1
TABLET ORAL
Qty: 10 TABLET | Refills: 0 | Status: SHIPPED | OUTPATIENT
Start: 2023-01-05

## 2023-01-05 RX ORDER — BENZONATATE 200 MG/1
200 CAPSULE ORAL 3 TIMES DAILY PRN
Qty: 20 CAPSULE | Refills: 0 | Status: SHIPPED | OUTPATIENT
Start: 2023-01-05

## 2023-01-05 RX ORDER — AZITHROMYCIN 250 MG/1
TABLET, FILM COATED ORAL
Qty: 6 TABLET | Refills: 0 | Status: SHIPPED | OUTPATIENT
Start: 2023-01-05 | End: 2023-01-09

## 2023-01-05 NOTE — PROGRESS NOTES
Name: Jacob August      : 1965      MRN: [id number]  Encounter Provider: Norma Cabrera DO  Encounter Date: 2023   Encounter department: [address] [place] [address]     1  Bronchitis  -     azithromycin (ZITHROMAX) 250 mg tablet; Take 2 tablets today then 1 tablet daily x 4 days  -     predniSONE 20 mg tablet; 3 PO day 1, then 2 PO QD x 2 days, then 1 PO QD x 2 days, then half PO QD x 2 days, then stop    2  Acute sinusitis, recurrence not specified, unspecified location  -     azithromycin (ZITHROMAX) 250 mg tablet; Take 2 tablets today then 1 tablet daily x 4 days  -     predniSONE 20 mg tablet; 3 PO day 1, then 2 PO QD x 2 days, then 1 PO QD x 2 days, then half PO QD x 2 days, then stop    3  COPD, severe ([facility] [address] )  -     albuterol (Ventolin HFA) 90 mcg/act inhaler; Inhale 2 puffs every 4 (four) hours as needed for wheezing  -     predniSONE 20 mg tablet; 3 PO day 1, then 2 PO QD x 2 days, then 1 PO QD x 2 days, then half PO QD x 2 days, then stop    4  Personal history of COVID-19  Comments:  10/2022 fully recovered    5   Malignant neoplasm of 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, estrogen receptor positive ([facility] [address] )  Comments:  managed by surg/onc, s/p rad thx and maintained on anastrozole           Subjective      Chief Complaint   Patient presents with   • Nasal Congestion     Nasal congestion, chest congestion, cough since the weekend, no fever, has been very short of breath and using her nebulizer more       Same day sick visit   runny nose since the weekend, cough past 3 days- worse at night- solid phlegm, grey-green - feels like bronchitis- have had that quite a few times when I was a smoker  Admits sinus pressure and pain- and coming out like cement from the nose"    Review of Systems    Current Outpatient Medications on File Prior to Visit   Medication Sig   • anastrozole (ARIMIDEX) 1 mg tablet Take 1 tablet (1 mg total) by mouth daily (Patient taking differently: Take 1 mg by mouth every evening)   • Ascorbic Acid (vitamin C) 1000 MG tablet Take 1,000 mg by mouth daily   • B Complex Vitamins (B COMPLEX 1 PO) Take by mouth daily    • Biotin 27622 MCG TABS Take by mouth daily    • Budeson-Glycopyrrol-Formoterol (Breztri Aerosphere) 160-9-4 8 MCG/ACT AERO Inhale 2 puffs every 12 (twelve) hours Rinse mouth after use     • Calcium-Magnesium-Vitamin D 185- MG-MG-UNIT CAPS Take by mouth daily    • cyanocobalamin (VITAMIN B-12) 500 MCG tablet Take 500 mcg by mouth daily   • Echinacea 400 MG CAPS Take by mouth daily    • ipratropium-albuterol (DUO-NEB) 0 5-2 5 mg/3 mL nebulizer solution    • loperamide (IMODIUM) 2 mg capsule Take 2 mg by mouth 4 (four) times a day as needed for diarrhea   • Loratadine 10 MG CAPS Take 10 mg by mouth daily   • meloxicam (MOBIC) 15 mg tablet TAKE 1 TABLET (15 MG TOTAL) BY MOUTH IN THE MORNING   • Multiple Vitamins-Minerals (MULTIVITAMIN ADULT PO) Take by mouth daily    • rizatriptan (MAXALT-MLT) 10 mg disintegrating tablet TAKE 1 TABLET (10 MG TOTAL) BY MOUTH ONCE AS NEEDED FOR MIGRAINE>>FILL 4/5/22   • tiZANidine (ZANAFLEX) 4 mg tablet Take 1 tablet (4 mg total) by mouth daily at bedtime   • topiramate (TOPAMAX) 100 mg tablet TAKE 1 TABLET BY MOUTH EVERYDAY AT BEDTIME   • [DISCONTINUED] albuterol (Ventolin HFA) 90 mcg/act inhaler Inhale 2 puffs every 4 (four) hours as needed for wheezing   • [DISCONTINUED] budesonide-formoterol (Symbicort) 160-4 5 mcg/act inhaler Inhale 2 puffs 2 (two) times a day   • [DISCONTINUED] predniSONE 20 mg tablet 3 PO day 1, then 2 PO QD x 2 days, then 1 PO QD x 2 days, then half PO QD x 2 days, then stop (Patient not taking: Reported on 12/20/2022)       Objective     /92 (BP Location: Left arm, Patient Position: Sitting, Cuff Size: Standard)   Pulse 84   Temp 97 7 °F (36 5 °C) (Tympanic)   Ht 5' 6" (1 676 m)   Wt 79 8 kg (176 lb)   LMP  (LMP Unknown)   SpO2 98%   BMI 28 41 kg/m²     Physical Exam  Vitals and nursing note reviewed  Constitutional:       General: She is not in acute distress  Appearance: She is well-developed  She is not ill-appearing, toxic-appearing or diaphoretic  HENT:      Head: Normocephalic and atraumatic  Nose: Mucosal edema and congestion present  Right Sinus: Maxillary sinus tenderness and frontal sinus tenderness present  Left Sinus: Maxillary sinus tenderness and frontal sinus tenderness present  Mouth/Throat:      Lips: Pink  Mouth: Mucous membranes are moist       Pharynx: Oropharynx is clear  Uvula midline  Neck:      Trachea: Phonation normal    Cardiovascular:      Rate and Rhythm: Normal rate and regular rhythm  Heart sounds: Normal heart sounds  Pulmonary:      Effort: Pulmonary effort is normal       Breath sounds: Normal breath sounds and air entry  Musculoskeletal:      Right lower leg: No edema  Left lower leg: No edema  Lymphadenopathy:      Cervical: No cervical adenopathy  Skin:     General: Skin is warm and dry  Coloration: Skin is not pale  Neurological:      Mental Status: She is alert and oriented to person, place, and time  Psychiatric:         Behavior: Behavior is cooperative         Vear Vikash, DO

## 2023-01-05 NOTE — TELEPHONE ENCOUNTER
Patient was seen in the office today, 1/5/23, and was prescribed an antibiotic, inhaler and steroid  Patient is asking if there is anything can be prescribed for her cough

## 2023-01-11 ENCOUNTER — OFFICE VISIT (OUTPATIENT)
Dept: SURGERY | Facility: CLINIC | Age: 58
End: 2023-01-11

## 2023-01-11 VITALS — TEMPERATURE: 97.1 F

## 2023-01-11 DIAGNOSIS — D23.61: Primary | ICD-10-CM

## 2023-01-11 NOTE — PROGRESS NOTES
Post-Op Note - General Surgery   Dorina Fox 62 y o  female MRN: [id number]  Encounter: [id number]    Assessment/Plan    Recurrent Dermatofibroma of right hand  Doing well after excision of dermatofibroma from her right hand  On exam incision is well-healed and sutures removed uneventfully  Copy of the op and path provided and reviewed  We will see back in 1 week with Dr Mason Sher for further evaluation  Diagnoses and all orders for this visit:    Recurrent Dermatofibroma of right hand        Subjective      Chief Complaint   Patient presents with   • Post-op     Suture Removal/ po exc of recurrent fibroma dorsum rt hand 12/27/2022     Patient came in today for Suture Removal/ po exc of recurrent fibroma dorsum rt hand 12/27/2022  Patient states the inc is healed and theres no drainage  No fevers or chills  Carolina FORREST MA    Doing well postop  Review of Systems   Skin: Positive for wound  The following portions of the patient's history were reviewed and updated as appropriate: allergies, current medications, past family history, past medical history, past social history, past surgical history and problem list     Objective      Temperature (!) 97 1 °F (36 2 °C), temperature source Temporal    Physical Exam  Vitals and nursing note reviewed  Constitutional:       General: She is not in acute distress  Appearance: She is well-developed  She is not diaphoretic  HENT:      Head: Normocephalic and atraumatic  Eyes:      Conjunctiva/sclera: Conjunctivae normal       Pupils: Pupils are equal, round, and reactive to light  Pulmonary:      Effort: No respiratory distress  Musculoskeletal:         General: Normal range of motion  Cervical back: Normal range of motion  Skin:     General: Skin is warm and dry  Capillary Refill: Capillary refill takes less than 2 seconds  Comments: Incision clean dry and intact  Sutures removed uneventfully     Neurological:      Mental Status: She is alert and oriented to person, place, and time     Psychiatric:         Behavior: Behavior normal          Signature:  Carlos A Hernandez PA-C  Date: 1/30/2023 Time: 1:23 PM

## 2023-01-17 ENCOUNTER — OFFICE VISIT (OUTPATIENT)
Dept: SURGERY | Facility: CLINIC | Age: 58
End: 2023-01-17

## 2023-01-17 VITALS — RESPIRATION RATE: 18 BRPM | HEART RATE: 76 BPM | TEMPERATURE: 96.2 F | OXYGEN SATURATION: 96 %

## 2023-01-17 DIAGNOSIS — L90.5 CICATRIX: Primary | ICD-10-CM

## 2023-01-17 NOTE — PROGRESS NOTES
Assessment/Plan:    No problem-specific Assessment & Plan notes found for this encounter  There are no diagnoses linked to this encounter  Subjective:      Patient ID: Jose Pardo is a 62 y o  female  The patient presents in follow-up, she is 3 weeks out from excision of recurrent dermatofibroma after the first excision had positive margins  The final pathology report revealing only scar  The patient had her nylon sutures removed 6 days ago, but presents today concerned that in the nylon skin suture may have been missed  The patient indeed has a prominent dogear, but the incision is otherwise well-healed and there does not appear to be a retained nylon suture  The patient was instructed to use a scar remedy system to help the dogears settle down, and she will follow-up in 2 weeks for reevaluation, if she is satisfied with the progress, she will call and cancel the appointment  The following portions of the patient's history were reviewed and updated as appropriate: allergies, current medications, past family history, past medical history, past social history, past surgical history and problem list     Review of Systems   Constitutional: Negative  Skin:        Evolving cicatrix dorsum right hand           Objective:      Pulse 76   Temp (!) 96 2 °F (35 7 °C) (Temporal)   Resp 18   LMP  (LMP Unknown)   SpO2 96%          Physical Exam  Musculoskeletal:        Arms:

## 2023-01-17 NOTE — ASSESSMENT & PLAN NOTE
The patient is concerned about the appearance of the cicatrix developing on the dorsum of her right hand  This appears to be evolving from a dog ear at the end of surgical wound  Of asked the patient to obtain the scar remedy kit, including silicone sheet, to help with the scar remodel as it continues to heal     Patient will follow up in 2 weeks or cancel the appointment if she is happy with the progress  She is aware that that timeframe for scar remodeling is 6 months to a year

## 2023-01-17 NOTE — PATIENT INSTRUCTIONS
It appears that the area of concern is what we refer to as a "dog ear" in the incision where the edges are pushed together and up  It should settle down in the next few weeks and months, but the time to completely remodel the scar is 6 months to a year  You can help the scar settle down by using a scar remedy, such as Curad Scar remedy, which involves using ointments and a sheet of silicone to help the process  You can google "Curad Scar Remedy", look for it in a local pharmacy, or find it on [address] [facility] [address]  com    Return in 2 weeks, or if you are satisfied that things are going OK, cancel the appointment

## 2023-01-23 ENCOUNTER — OFFICE VISIT (OUTPATIENT)
Dept: PAIN MEDICINE | Facility: MEDICAL CENTER | Age: 58
End: 2023-01-23

## 2023-01-23 VITALS
BODY MASS INDEX: 28.93 KG/M2 | HEART RATE: 77 BPM | WEIGHT: 180 LBS | DIASTOLIC BLOOD PRESSURE: 80 MMHG | SYSTOLIC BLOOD PRESSURE: 135 MMHG | HEIGHT: 66 IN

## 2023-01-23 DIAGNOSIS — M79.18 MYOFASCIAL PAIN SYNDROME: ICD-10-CM

## 2023-01-23 DIAGNOSIS — M50.120 CERVICAL DISC DISORDER WITH RADICULOPATHY OF MID-CERVICAL REGION: Primary | ICD-10-CM

## 2023-01-23 DIAGNOSIS — M48.02 CERVICAL SPINAL STENOSIS: ICD-10-CM

## 2023-01-23 RX ORDER — TIZANIDINE 4 MG/1
4 TABLET ORAL
Qty: 30 TABLET | Refills: 2 | Status: SHIPPED | OUTPATIENT
Start: 2023-01-23
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
## 2023-01-25 ENCOUNTER — TELEPHONE (OUTPATIENT)
Dept: PAIN MEDICINE | Facility: MEDICAL CENTER | Age: 58
End: 2023-01-25

## 2023-01-25 ENCOUNTER — OFFICE VISIT (OUTPATIENT)
Dept: FAMILY MEDICINE CLINIC | Facility: CLINIC | Age: 58
End: 2023-01-25

## 2023-01-25 ENCOUNTER — OFFICE VISIT (OUTPATIENT)
Dept: PULMONOLOGY | Facility: CLINIC | Age: 58
End: 2023-01-25

## 2023-01-25 VITALS
HEIGHT: 66 IN | DIASTOLIC BLOOD PRESSURE: 86 MMHG | OXYGEN SATURATION: 98 % | HEART RATE: 80 BPM | BODY MASS INDEX: 28.77 KG/M2 | WEIGHT: 179 LBS | TEMPERATURE: 98.4 F | SYSTOLIC BLOOD PRESSURE: 124 MMHG

## 2023-01-25 VITALS
RESPIRATION RATE: 16 BRPM | DIASTOLIC BLOOD PRESSURE: 60 MMHG | HEART RATE: 73 BPM | SYSTOLIC BLOOD PRESSURE: 116 MMHG | TEMPERATURE: 97.1 F | WEIGHT: 180 LBS | BODY MASS INDEX: 28.93 KG/M2 | OXYGEN SATURATION: 96 % | HEIGHT: 66 IN

## 2023-01-25 DIAGNOSIS — F17.211 CIGARETTE NICOTINE DEPENDENCE IN REMISSION: ICD-10-CM

## 2023-01-25 DIAGNOSIS — J44.9 CHRONIC OBSTRUCTIVE PULMONARY DISEASE, UNSPECIFIED COPD TYPE (HCC): ICD-10-CM

## 2023-01-25 DIAGNOSIS — Z92.3 HISTORY OF THYROID IRRADIATION: ICD-10-CM

## 2023-01-25 DIAGNOSIS — Z00.00 ANNUAL PHYSICAL EXAM: Primary | ICD-10-CM

## 2023-01-25 DIAGNOSIS — Z17.0 MALIGNANT NEOPLASM OF UPPER-INNER QUADRANT OF RIGHT BREAST IN FEMALE, ESTROGEN RECEPTOR POSITIVE (HCC): ICD-10-CM

## 2023-01-25 DIAGNOSIS — J44.9 COPD, SEVERE (HCC): Primary | ICD-10-CM

## 2023-01-25 DIAGNOSIS — Z23 IMMUNIZATION DUE: ICD-10-CM

## 2023-01-25 DIAGNOSIS — C50.211 MALIGNANT NEOPLASM OF UPPER-INNER QUADRANT OF RIGHT BREAST IN FEMALE, ESTROGEN RECEPTOR POSITIVE (HCC): ICD-10-CM

## 2023-01-25 DIAGNOSIS — J45.40 MODERATE PERSISTENT ASTHMA WITHOUT COMPLICATION: ICD-10-CM

## 2023-01-25 DIAGNOSIS — Z86.39 HISTORY OF HYPOTHYROIDISM: ICD-10-CM

## 2023-01-25 DIAGNOSIS — E78.5 DYSLIPIDEMIA: ICD-10-CM

## 2023-01-25 DIAGNOSIS — Z86.39 HISTORY OF HYPERTHYROIDISM: ICD-10-CM

## 2023-01-25 RX ORDER — BUDESONIDE, GLYCOPYRROLATE, AND FORMOTEROL FUMARATE 160; 9; 4.8 UG/1; UG/1; UG/1
2 AEROSOL, METERED RESPIRATORY (INHALATION) EVERY 12 HOURS
Qty: 31.1 G | Refills: 11 | Status: SHIPPED | OUTPATIENT
Start: 2023-01-25

## 2023-01-25 RX ORDER — ALBUTEROL SULFATE 90 UG/1
2 AEROSOL, METERED RESPIRATORY (INHALATION) EVERY 4 HOURS PRN
Qty: 18 G | Refills: 3 | Status: SHIPPED | OUTPATIENT
Start: 2023-01-25

## 2023-01-25 NOTE — PATIENT INSTRUCTIONS
Look at the website www  [employer]  com to explore another option to help your asthma    Please use www  pulmonarywellness  org for the exercise program
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
## 2023-01-25 NOTE — PATIENT INSTRUCTIONS

## 2023-01-25 NOTE — TELEPHONE ENCOUNTER
ADDISONI-    S/w pt  Per pt her PCP is completing the FMLA documents  Nothing further needed at this time

## 2023-01-25 NOTE — TELEPHONE ENCOUNTER
Caller: Ngozi Dougherty    Doctor: Janna Virk    Reason for call: patient provided you with FMLA documents she is calling to find out will you be filling them out for her or should she give them to her PCP?     Call back#: [phone]

## 2023-01-25 NOTE — ASSESSMENT & PLAN NOTE
Patient has severe COPD with hyperinflation, however was not a candidate for bronchoscopic lung volume reduction based on StratX analysis  She will continue with Breztri once daily

## 2023-01-25 NOTE — ASSESSMENT & PLAN NOTE
Many of her symptoms seem to be triggered by various environmental exposures with symptoms of chest tightness and wheezing  She does have significant bronchodilator responsiveness on PFTs, suggesting that an underlying asthma component is contributing  Her labs in August showed eosinophilia (270)  No significant IgE component  I provided the patient with the website for fasenra to explore the possibility of starting treatment in the hopes of gaining better control  She will call the office if she is interested in moving forward and we will send her the paperwork
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
## 2023-01-27 ENCOUNTER — TELEPHONE (OUTPATIENT)
Dept: PULMONOLOGY | Facility: CLINIC | Age: 58
End: 2023-01-27

## 2023-01-27 NOTE — TELEPHONE ENCOUNTER
Patient calling saying that she has questions about Bhavna Gone  She's asking if she would need labs prior and also has questions about the cost  Please advise

## 2023-01-27 NOTE — TELEPHONE ENCOUNTER
Called patient back answered all questions about co-pay assistance, prior auth  I gave her the code to call insurance for the Jass Gallo to see if they can give her the cost  Patient stated that she is going to think about it more and call back

## 2023-01-30 NOTE — ASSESSMENT & PLAN NOTE
Doing well after excision of dermatofibroma from her right hand  On exam incision is well-healed and sutures removed uneventfully  Copy of the op and path provided and reviewed  We will see back in 1 week with Dr Dmitry Grant for further evaluation

## 2023-02-01 ENCOUNTER — RADIATION ONCOLOGY FOLLOW-UP (OUTPATIENT)
Dept: RADIATION ONCOLOGY | Facility: CLINIC | Age: 58
End: 2023-02-01
Attending: RADIOLOGY

## 2023-02-01 ENCOUNTER — CLINICAL SUPPORT (OUTPATIENT)
Dept: RADIATION ONCOLOGY | Facility: CLINIC | Age: 58
End: 2023-02-01
Attending: RADIOLOGY

## 2023-02-01 VITALS
OXYGEN SATURATION: 95 % | TEMPERATURE: 97.7 F | HEART RATE: 85 BPM | DIASTOLIC BLOOD PRESSURE: 73 MMHG | BODY MASS INDEX: 28.88 KG/M2 | HEIGHT: 66 IN | SYSTOLIC BLOOD PRESSURE: 111 MMHG | WEIGHT: 179.68 LBS

## 2023-02-01 DIAGNOSIS — Z17.0 MALIGNANT NEOPLASM OF UPPER-INNER QUADRANT OF RIGHT BREAST IN FEMALE, ESTROGEN RECEPTOR POSITIVE (HCC): Primary | ICD-10-CM

## 2023-02-01 DIAGNOSIS — C50.211 MALIGNANT NEOPLASM OF UPPER-INNER QUADRANT OF RIGHT BREAST IN FEMALE, ESTROGEN RECEPTOR POSITIVE (HCC): Primary | ICD-10-CM

## 2023-02-01 DIAGNOSIS — Z79.811 USE OF ANASTROZOLE: ICD-10-CM
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
## 2023-02-03 ENCOUNTER — OFFICE VISIT (OUTPATIENT)
Dept: HEMATOLOGY ONCOLOGY | Facility: CLINIC | Age: 58
End: 2023-02-03

## 2023-02-03 VITALS
TEMPERATURE: 96.9 F | OXYGEN SATURATION: 96 % | DIASTOLIC BLOOD PRESSURE: 78 MMHG | SYSTOLIC BLOOD PRESSURE: 132 MMHG | HEART RATE: 97 BPM | RESPIRATION RATE: 18 BRPM | BODY MASS INDEX: 29.09 KG/M2 | WEIGHT: 181 LBS | HEIGHT: 66 IN

## 2023-02-03 DIAGNOSIS — C50.211 MALIGNANT NEOPLASM OF UPPER-INNER QUADRANT OF RIGHT BREAST IN FEMALE, ESTROGEN RECEPTOR POSITIVE (HCC): Primary | ICD-10-CM

## 2023-02-03 DIAGNOSIS — Z17.0 MALIGNANT NEOPLASM OF UPPER-INNER QUADRANT OF RIGHT BREAST IN FEMALE, ESTROGEN RECEPTOR POSITIVE (HCC): Primary | ICD-10-CM

## 2023-02-03 RX ORDER — ANASTROZOLE 1 MG/1
1 TABLET ORAL DAILY
Qty: 90 TABLET | Refills: 3 | Status: SHIPPED | OUTPATIENT
Start: 2023-02-03

## 2023-02-03 NOTE — PROGRESS NOTES
Hematology / Oncology Outpatient Follow Up Note    Bety Winston 62 y o  female :1965 [facility]         Date:  2/3/2023    Assessment / Plan:  A 51-year-old postmenopausal woman with stage IA right breast cancer, grade 1 with invasive tubular histology, ER 70% positive, [place] 80% positive, HER2 negative disease   She underwent lumpectomy and sentinel lymph node biopsy, resulting in FANTASMA   Her primary tumor size was only 4 mm  She is currently on adjuvant hormonal therapy with anastrozole with no side effects  Clinically, she has no evidence of recurrent disease  I recommended her to continue anastrozole 1 mg once a day  I will see her again in a year for routine follow-up  She is in agreement with my recommendations        Subjective:      HPI:  A 51-year-old female who has history of simple hysterectomy in   She was recently found to have abnormality in her right breast, based on a screening mammography  Stan Sheppard, she underwent right breast biopsy in 2020 which showed invasive ductal carcinoma, grade 1  This was ER 70% positive, [place] 80% positive, HER2 negative disease   She subsequently underwent lumpectomy by Dr Lucio Melgar in July 10, 2020 which showed 4 mm of invasive tubular carcinoma, grade 1  2 sentinel lymph nodes were negative for metastatic disease   She presents today to discuss adjuvant treatment options   She has no breast related symptomatology   She denied any pain   Her weight is stable   She has no respiratory symptoms   She is up to date for colonoscopy   She has COPD  Rena Maciel was a long-time smoker until 2016   She does not drink alcohol   Her performance status is normal              Interval History:  A 51-year-old postmenopausal woman with  stage IA right breast cancer, grade 1 with invasive tubular histology, ER 70% positive, [place] 80% positive, HER2 negative disease   She underwent lumpectomy and sentinel lymph node biopsy, resulting in FANTASMA   Her primary tumor size was only 4 mm   Since August 2020, she has been on adjuvant hormonal therapy with anastrozole  She presents today for follow-up  She feels well with no new complaint  She has no complaint of hot flashes  However, she has exertional shortness of breath, due to the COPD  She was a smoker until 2016  She denied any pain  Her weight is stable  Her performance status is normal         Objective:      Primary Diagnosis:     Right breast cancer, stage IA (pT1a, pN0, M0) grade 1, invasive tubular histology, ER 70% positive, [place] 80% positive, HER2 negative disease   Diagnosed in July 2020       Cancer Staging:  Cancer Staging  Malignant neoplasm of 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, estrogen receptor positive ([facility] [address] )  Staging form: Breast, AJCC 8th Edition  - Clinical: Stage IA (cT1, cN0, cM0, G1, ER+, VT+, HER2-) - Signed by Fany Thomas MD on 7/1/2020  Laterality: Right  Method of lymph node assessment: Clinical  Histologic grading system: 3 grade system  - Pathologic: Stage IA (pT1a, pN0(sn), cM0, G1, ER+, VT+, HER2-) - Signed by Fany Thomas MD on 7/28/2020  Neoadjuvant therapy: No  Laterality: Right  Method of lymph node assessment: [place] lymph node biopsy  Histologic grading system: 3 grade system           Previous Hematologic/ Oncologic Treatment:            Current Hematologic/ Oncologic Treatment:       Adjuvant hormonal therapy with anastrozole since August 2020      Disease Status:      FANTASMA status post lumpectomy and sentinel lymph node biopsy      Test Results:     Pathology:     4 mm of invasive tubular carcinoma, grade 1  2 sentinel lymph nodes were negative for metastatic disease   ER 70 pr sound positive, VT 80% positive, HER2 negative disease   Stage IA (pT1a, pN0, M0)       Radiology:     Mammography in June 2022 was benign  BI-RADS 2       Laboratory:     See below   Estradiol was 14      Physical Exam:        General Appearance:    Alert, oriented          Eyes:    PERRL   Ears:    Normal external ear canals, both ears   Nose:   Nares normal, septum midline   Throat:   Mucosa moist  Pharynx without injection  Neck:   Supple         Lungs:     Clear to auscultation bilaterally   Chest Wall:    No tenderness or deformity    Heart:    Regular rate and rhythm         Abdomen:     Soft, non-tender, bowel sounds +, no organomegaly               Extremities:   Extremities no cyanosis or edema         Skin:   no rash or icterus  Lymph nodes:   Cervical, supraclavicular, and axillary nodes normal   Neurologic:   CNII-XII intact, normal strength, sensation and reflexes     Throughout             Breast exam:   Lumpectomy scar at 12:00 p m  Position in her right breast with no palpable abnormalities   Left breast exam is negative             ROS: Review of Systems   All other systems reviewed and are negative  Imaging: MRI abdomen w wo contrast and mrcp    Result Date: 1/31/2022  Narrative: MRI OF THE ABDOMEN WITH AND WITHOUT CONTRAST WITH MRCP INDICATION:  Liver lesion seen on prior chest CT  COMPARISON: CT lung screening 12/21/2021  TECHNIQUE:  The following pulse sequences were obtained:  Coronal and axial T2 with TE of 90 and 180 respectively, axial T2 with fat saturation, axial FIESTA fat-sat, axial T1-weighted in-and-out-of phase, axial DWI/ADC, pre-contrast axial T1 with fat saturation, post-contrast dynamic axial T1 with fat saturation at 20, 70, and 180 seconds, followed by coronal and 7 minute delayed axial T1 with fat saturation  3D MRCP images were obtained with radial thick slabs and projections  3D rendering was performed from the acquisition scanner  IV Contrast:  8 mL of Gadobutrol injection (SINGLE-DOSE) FINDINGS: LOWER CHEST:   Unremarkable  LIVER: Normal in size and configuration  No suspicious mass  Scattered cysts, the largest in segment 4B measuring approximately 4 8 x 4 2 cm with few thin enhancing septations  No solid component   The hepatic veins and portal veins are patent  BILE DUCTS:  No intrahepatic or extrahepatic bile duct dilation  Common bile duct is normal in caliber  No choledocholithiasis, biliary stricture or suspicious mass  GALLBLADDER:  Normal  PANCREAS:  Unremarkable  ADRENAL GLANDS:  Normal  SPLEEN:  Subcentimeter nonenhancing cystic lesion in the superior spleen, benign  KIDNEYS/PROXIMAL URETERS:  No hydroureteronephrosis  No suspicious renal mass  There is a 7 7 x 7 8 x 11 3 cm right renal lower pole cyst with thin enhancing septation  Subcentimeter left mid renal hemorrhagic cyst  BOWEL:   No dilated loops of bowel  PERITONEUM/RETROPERITONEUM:  No ascites  LYMPH NODES:  No abdominal lymphadenopathy  VASCULAR STRUCTURES:  No aneurysm  ABDOMINAL WALL:  Unremarkable  OSSEOUS STRUCTURES:  No suspicious osseous lesion  S-shaped scoliosis  Impression: Multiple hepatic cysts, the largest in segment 4B containing a few thin internal septations  No suspicious liver lesions  Large right renal lower pole cyst with thin enhancing septation  Based on Bosniak Classification of Cystic Renal Masses, Version 2019, this lesion is a Bosniak II: Likely benign Bosniak II renal mass requiring no follow-up  Workstation performed: [id number]     Stress strip    Result Date: 2/2/2022  Narrative: Confirmed by [facility] ([id number]),  Heather Reyes ([id number]) on 2/2/2022 11:04:18 AM    NM myocardial perfusion spect (rx stress and/or rest)    Result Date: 2/2/2022  Narrative: •  Stress ECG: No ST deviation is noted  There were no arrhythmias during recovery    The ECG was negative for ischemia  The stress ECG is negative for ischemia  •  Stress Function: Left ventricular function post-stress is normal  Post-stress ejection fraction is 75 %  •  Stress Combined Conclusion: There is image artifact, without diagnostic evidence for perfusion abnormality   •  Perfusion: There is a left ventricular perfusion defect that is medium in size present in the basal to mid anterior location(s) that is fixed on supine stress images  This defect resolves on prone imaging  There is a left ventricular perfusion defect that is medium in size with mild reduction in uptake present in the basal to mid inferior location(s) that is reversible  Prone imaging shows normal perfusion  There was image artifact without diagnostic evidence of ischemia  Labs:   Lab Results   Component Value Date    WBC 7 16 08/08/2022    HGB 13 6 08/08/2022    HCT 41 7 08/08/2022    MCV 91 08/08/2022     08/08/2022     Lab Results   Component Value Date     10/02/2015    K 3 8 04/12/2022     (H) 04/12/2022    CO2 24 07/07/2022    ANIONGAP 8 10/02/2015    BUN 20 04/12/2022    CREATININE 0 74 04/12/2022    GLUCOSE 92 07/07/2022    GLUF 99 04/12/2022    CALCIUM 9 5 04/12/2022    AST 15 07/29/2021    ALT 29 07/29/2021    ALKPHOS 85 07/29/2021    PROT 7 5 01/14/2015    BILITOT 0 2 01/14/2015    EGFR 90 04/12/2022         Lab Results   Component Value Date    IRON 105 07/29/2021    TIBC 336 07/29/2021    FERRITIN 101 07/29/2021       Lab Results   Component Value Date    [id number] 1,289 (H) 08/04/2015         Current Medications: Reviewed  Allergies: Reviewed  PMH/FH/SH:  Reviewed      Vital Sign:    Body surface area is 1 92 meters squared      Wt Readings from Last 3 Encounters:   02/03/23 82 1 kg (181 lb)   02/01/23 81 5 kg (179 lb 10 8 oz)   01/25/23 81 2 kg (179 lb)        Temp Readings from Last 3 Encounters:   02/03/23 (!) 96 9 °F (36 1 °C) (Tympanic)   02/01/23 97 7 °F (36 5 °C) (Temporal)   01/25/23 98 4 °F (36 9 °C) (Tympanic)        BP Readings from Last 3 Encounters:   02/03/23 132/78   02/01/23 111/73   01/25/23 124/86         Pulse Readings from Last 3 Encounters:   02/03/23 97   02/01/23 85   01/25/23 80     @LASTSAO2(3)@

## 2023-02-06 ENCOUNTER — TELEPHONE (OUTPATIENT)
Dept: SURGERY | Facility: CLINIC | Age: 58
End: 2023-02-06

## 2023-02-06 NOTE — TELEPHONE ENCOUNTER
Patient canceled her appt for tomorrow and didn't wish to reschedule because she can't afford another doctor's visit   Patient agreed to be called back in a month to make sure everything is okay

## 2023-02-08 NOTE — TELEPHONE ENCOUNTER
Patient returning your call from the other day  She said the best time to call is after 2:30  Please advise

## 2023-02-08 NOTE — TELEPHONE ENCOUNTER
Patient is now agreeable to start Gracy Bumpers and she should like to do the at home administration

## 2023-02-13 ENCOUNTER — TELEPHONE (OUTPATIENT)
Dept: PULMONOLOGY | Facility: CLINIC | Age: 58
End: 2023-02-13

## 2023-02-13 NOTE — TELEPHONE ENCOUNTER
Patient is calling she had brought in her [facility] paperwork with her at her appointment on 1/25  She is in need of the Doctor to completed question number 7 on this paperwork  The portion where it says "Frequencey of appointments it needs to note how many appointments she needs every 3-4 months  She is hoping this can be completed and resigned and dated and faxed back to Dave  She asks to please contact her once this is sent   Please advise    Bao Maravilla #[phone]

## 2023-02-13 NOTE — TELEPHONE ENCOUNTER
Please see note  Forms are scanned in media for patient  If you can review I can adjust and re-send for you  Please advise

## 2023-02-14 DIAGNOSIS — J45.40 MODERATE PERSISTENT ASTHMA WITHOUT COMPLICATION: Primary | ICD-10-CM

## 2023-02-22 DIAGNOSIS — T78.2XXA ANAPHYLAXIS, INITIAL ENCOUNTER: Primary | ICD-10-CM

## 2023-02-22 RX ORDER — EPINEPHRINE 0.3 MG/.3ML
0.3 INJECTION SUBCUTANEOUS ONCE
Qty: 0.6 ML | Refills: 0 | Status: SHIPPED | OUTPATIENT
Start: 2023-02-22 | End: 2023-02-22

## 2023-02-23 ENCOUNTER — ONCOLOGY SURVIVORSHIP (OUTPATIENT)
Dept: SURGICAL ONCOLOGY | Facility: CLINIC | Age: 58
End: 2023-02-23

## 2023-02-23 VITALS
SYSTOLIC BLOOD PRESSURE: 138 MMHG | DIASTOLIC BLOOD PRESSURE: 80 MMHG | WEIGHT: 179 LBS | OXYGEN SATURATION: 93 % | HEART RATE: 77 BPM | HEIGHT: 66 IN | TEMPERATURE: 98.2 F | RESPIRATION RATE: 15 BRPM | BODY MASS INDEX: 28.77 KG/M2

## 2023-02-23 DIAGNOSIS — C50.211 MALIGNANT NEOPLASM OF UPPER-INNER QUADRANT OF RIGHT BREAST IN FEMALE, ESTROGEN RECEPTOR POSITIVE (HCC): Primary | ICD-10-CM

## 2023-02-23 DIAGNOSIS — Z79.811 USE OF ANASTROZOLE: ICD-10-CM

## 2023-02-23 DIAGNOSIS — Z17.0 MALIGNANT NEOPLASM OF UPPER-INNER QUADRANT OF RIGHT BREAST IN FEMALE, ESTROGEN RECEPTOR POSITIVE (HCC): Primary | ICD-10-CM

## 2023-02-23 NOTE — PROGRESS NOTES
Assessment/Plan:    Diagnoses and all orders for this visit:    Malignant neoplasm of 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, estrogen receptor positive ([facility] [address] )      Patient is a 28-year-old female that was diagnosed with a right breast cancer in June 2020  Her pathology revealed invasive ductal carcinoma, ER/IN positive, HER2 negative  She underwent a right lumpectomy and sentinel node biopsy with Dr Evonne Parker  She completed adjuvant radiation therapy and is currently maintained on anastrozole  She had a benign mammogram in June 2022  There are no worrisome findings on today's exam   Breast cancer survivorship visit performed today and treatment summary and care plan were reviewed with patient  She is up-to-date on colorectal and cervical cancer screening  She will schedule her DEXA scan in the near future  Referral placed to the [facility] program as patient does report some chest wall tenderness  She is not interested in genetic testing  Prescription given for mammogram due this summer we will see her back in 6 months or sooner should the need arise  -     Ambulatory referral to oncology social worker; Future  -     Ambulatory referral to Physical Therapy;  Future  -     Mammo diagnostic bilateral w 3d & cad; Future    Use of anastrozole        REASON FOR VISIT:   Survivorship      Previous therapy:  Oncology History   Malignant neoplasm of 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, estrogen receptor positive ([facility] [address] )   6/19/2020 Biopsy    Right breast biopsy:  Invasive ductal carcinoma   Grade I    ER 70%  IN 80%  HER2 negative     7/1/2020 -  Cancer Staged    Staging form: Breast, AJCC 8th Edition  - Clinical: Stage IA (cT1, cN0, cM0, G1, ER+, IN+, HER2-) - Signed by Sammie Mccann MD on 7/1/2020  Laterality: Right  Method of lymph node assessment: Clinical  Histologic grading system: 3 grade system       7/10/2020 Surgery    Lumpectomy right breast and [place] lymph node biopsy:  - Margins negative  - 0/2 lymph nodes    Dr Yael Flaherty     7/28/2020 -  Cancer Staged    Staging form: Breast, AJCC 8th Edition  - Pathologic: Stage IA (pT1a, pN0(sn), cM0, G1, ER+, MN+, HER2-) - Signed by Shirley Wright MD on 7/28/2020  Neoadjuvant therapy: No  Laterality: Right  Method of lymph node assessment: [place] lymph node biopsy  Histologic grading system: 3 grade system       8/2020 -  Hormone Therapy    Anastrozole  Dr Mary Kate Epps     9/9/2020 - 10/8/2020 Radiation    Plan ID Energy Fractions Dose per Fraction (cGy) Dose Correction (cGy) Total Dose Delivered (cGy) Elapsed Days   R Breast 6X 16 / 16 266 0 4,256 22   R Brst Boost 12E 5 / 5 200 0 1,000 6   Dr Ellyn Burnham           Patient ID: Chet Allen is a 62 y o  female  HPI      Review of Systems   Constitutional: Negative for activity change, appetite change, chills, fatigue, fever and unexpected weight change  Respiratory: Negative for cough and shortness of breath  Cardiovascular: Negative for chest pain  Gastrointestinal: Negative for abdominal pain, constipation, diarrhea, nausea and vomiting  Musculoskeletal: Negative for arthralgias, back pain, gait problem and myalgias  Skin: Negative for color change and rash  Neurological: Negative for dizziness and headaches  Hematological: Negative for adenopathy  Psychiatric/Behavioral: Negative for agitation and confusion  All other systems reviewed and are negative  Objective:    Blood pressure 138/80, pulse 77, temperature 98 2 °F (36 8 °C), temperature source Tympanic, resp  rate 15, height 5' 6" (1 676 m), weight 81 2 kg (179 lb), SpO2 93 %  Body mass index is 28 89 kg/m²  Physical Exam  Vitals reviewed  Constitutional:       General: She is not in acute distress  Appearance: Normal appearance  She is well-developed  She is not diaphoretic  HENT:      Head: Normocephalic and atraumatic  Cardiovascular:      Rate and Rhythm: Normal rate and regular rhythm        Heart sounds: Normal heart sounds  Pulmonary:      Effort: Pulmonary effort is normal       Breath sounds: Normal breath sounds  Chest:      Chest wall: Tenderness (lateral chest wall tenderness) present  Breasts:     Breasts are asymmetrical       Right: Skin change (surgical scars) present  No swelling, bleeding, inverted nipple, mass, nipple discharge or tenderness  Left: No swelling, bleeding, inverted nipple, mass, nipple discharge, skin change or tenderness  Abdominal:      Palpations: Abdomen is soft  There is no mass  Tenderness: There is no abdominal tenderness  Musculoskeletal:         General: Normal range of motion  Cervical back: Normal range of motion  Lymphadenopathy:      Upper Body:      Right upper body: No supraclavicular or axillary adenopathy  Left upper body: No supraclavicular or axillary adenopathy  Skin:     General: Skin is warm and dry  Findings: No rash  Neurological:      Mental Status: She is alert and oriented to person, place, and time  Psychiatric:         Speech: Speech normal              Discussed symptoms related to disease recurrence, Yes    Evaluated for late effects related to cancer treatment, Yes, describe: discussed effects of surgery, RT, AI therapy    Screening current for cervical cancer, Yes, describe: pap 6/2022    Screening current for colon cancer, Yes, describe: colonoscopy 7/2021, repeat in 3 years    Cancer rehabilitation services addressed, Yes, describe: referral to [facility] placed    Screening current for osteoporosis, No Pt to schedule DXA ordered by PCP    Oncology Treatment Summary reviewed with patient and copy provided, Yes    Referral placed for psychosocial evaluation/screening to oncology social work  Yes    I have spent 45 minutes with Patient  today in which greater than 50% of this time was spent in counseling/coordination of care regarding breast cancer survivorship

## 2023-02-24 ENCOUNTER — PATIENT OUTREACH (OUTPATIENT)
Dept: CASE MANAGEMENT | Facility: OTHER | Age: 58
End: 2023-02-24

## 2023-03-02 DIAGNOSIS — M70.61 TROCHANTERIC BURSITIS OF RIGHT HIP: ICD-10-CM

## 2023-03-02 DIAGNOSIS — M25.551 RIGHT HIP PAIN: ICD-10-CM

## 2023-03-02 RX ORDER — MELOXICAM 15 MG/1
15 TABLET ORAL DAILY
Qty: 30 TABLET | Refills: 2 | Status: SHIPPED | OUTPATIENT
Start: 2023-03-02

## 2023-03-03 ENCOUNTER — PATIENT OUTREACH (OUTPATIENT)
Dept: CASE MANAGEMENT | Facility: OTHER | Age: 58
End: 2023-03-03

## 2023-03-03 NOTE — PROGRESS NOTES
OSW placed outreach TC to pt this afternoon  We spoke earlier and she asked that this writer call after 3:00  OSW received her VM  Detailed VM was left requesting a return TC  Contact information was provided

## 2023-03-07 ENCOUNTER — CLINICAL SUPPORT (OUTPATIENT)
Dept: PULMONOLOGY | Facility: CLINIC | Age: 58
End: 2023-03-07

## 2023-03-07 VITALS
DIASTOLIC BLOOD PRESSURE: 82 MMHG | RESPIRATION RATE: 16 BRPM | SYSTOLIC BLOOD PRESSURE: 122 MMHG | TEMPERATURE: 97.6 F | HEIGHT: 66 IN | OXYGEN SATURATION: 97 % | WEIGHT: 175 LBS | HEART RATE: 97 BPM | BODY MASS INDEX: 28.12 KG/M2

## 2023-03-07 DIAGNOSIS — J45.30 MILD PERSISTENT ASTHMA, UNSPECIFIED WHETHER COMPLICATED: Primary | ICD-10-CM

## 2023-03-07 RX ORDER — BENRALIZUMAB 30 MG/ML
30 INJECTION, SOLUTION SUBCUTANEOUS
Refills: 0 | COMMUNITY
Start: 2023-02-21

## 2023-03-14 ENCOUNTER — PATIENT OUTREACH (OUTPATIENT)
Dept: CASE MANAGEMENT | Facility: OTHER | Age: 58
End: 2023-03-14

## 2023-03-14 NOTE — PROGRESS NOTES
OSW placed second outreach TC to pt this afternoon  OSW received pts VM  Detailed VM was left requesting a return TC      ADDENDUM:  PT returned OSW's TC and a distress thermometer and psychosocial assessment were completed  Biopsychosocial and Barriers Assessment Survivorship     Home/Cell Phone: [phone]  Emergency Contact: Frances- [phone]  Marital Status: Single  Interpretation concerns, speaks another language, preferred language: English  Cultural concerns: none  Ability to read or write: yes     Housing: Modular  Home Setup: 4-5 steps to enter  Lives With: alone  Daily Living Activities: independent  Durable Medical Equipment: none  Ambulation: independent    Preferred Pharmacy: [facility]  Medication coverage: yes    Employment: [place] [id number] Status/Location: n/a  Ability to pay bills: yes  POA/LW/AD: none    Additional Comments:  OSW received a return TC from pt this afternoon  OSW introduced self and role  Pt completed a Dt, where she scored a 0/10  She reports having sleeping difficulties  She also expressed her medical bills are piling up  OSW encouraged her to call the hospital finance team and she states that she is already working with them  Overall pt states she is doing well

## 2023-03-27 DIAGNOSIS — J45.40 MODERATE PERSISTENT ASTHMA WITHOUT COMPLICATION: Primary | ICD-10-CM

## 2023-03-28 RX ORDER — BENRALIZUMAB 30 MG/ML
INJECTION, SOLUTION SUBCUTANEOUS
Qty: 1 ML | Refills: 6 | Status: SHIPPED | OUTPATIENT
Start: 2023-03-28

## 2023-03-29 ENCOUNTER — TELEPHONE (OUTPATIENT)
Dept: SURGERY | Facility: CLINIC | Age: 58
End: 2023-03-29

## 2023-03-29 NOTE — TELEPHONE ENCOUNTER
----- Message from Vinh Ambrose sent at 2023 12:19 PM EST -----  Regardin wk f/u, evolving cicatrix dorsum right hand, s/p exc of recurrent [id number]/27/2022  Call in 1 mo to see if she would like to reschedule

## 2023-04-24 ENCOUNTER — OFFICE VISIT (OUTPATIENT)
Dept: PAIN MEDICINE | Facility: MEDICAL CENTER | Age: 58
End: 2023-04-24

## 2023-04-24 VITALS
HEIGHT: 66 IN | SYSTOLIC BLOOD PRESSURE: 120 MMHG | DIASTOLIC BLOOD PRESSURE: 61 MMHG | HEART RATE: 77 BPM | BODY MASS INDEX: 28.28 KG/M2 | WEIGHT: 176 LBS

## 2023-04-24 DIAGNOSIS — M54.12 CERVICAL RADICULOPATHY: Primary | ICD-10-CM

## 2023-04-24 DIAGNOSIS — M48.02 CERVICAL SPINAL STENOSIS: ICD-10-CM

## 2023-04-24 DIAGNOSIS — M79.18 MYOFASCIAL PAIN SYNDROME: ICD-10-CM
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
## 2023-04-24 NOTE — PATIENT INSTRUCTIONS
Trigger Point Injection   AMBULATORY CARE:   A trigger point injection  is used to relax a muscle knot  This helps relieve pain  You may be able to have more than one trigger point treated during a session  How to prepare for a trigger point injection:   Your healthcare provider will tell you how to prepare  Arrange to have someone drive you home after the injection  Tell your provider about all medicines you take, including pain medicine, blood thinners, and muscle relaxers  He or she will tell you if you need to stop any medicine for the injection, and when to stop  He or she will tell you which medicines to take or not take on the day of the injection  Tell your provider about all your allergies, including to any pain medicine  What will happen during a trigger point injection:   You may be sitting or lying, depending on where the trigger point is located  Your healthcare provider will feel for a knot in the muscle  He or she may petra your skin over the knot  Your provider will put a needle through your skin and into the trigger point  Saline (salt solution), pain relievers, or other medicines may be pushed through the needle into the trigger point  Your provider may use only a dry needle (no medicine)  He or she will pull the needle almost all the way out and then push it in again  He or she will repeat this several times until the muscle stops twitching or feels relaxed  Your provider will remove the needle and stretch the muscle area  He or she may apply pressure to the area for 2 minutes  A bandage will be put over the injection site to prevent bleeding or an infection  What to expect after a trigger point injection:   You may feel pain relief right away  It is normal for some pain to start again 2 hours later  An ice pack or over-the-counter pain medicine can help lower the pain  You may feel sore in the injection site for a few days   If you need another injection in the same area, wait until the area is not sore  Your healthcare provider may give you specific activity instructions to follow at home or recommend physical therapy  In general, you should try to stay active  Avoid strenuous activity for the first 3 or 4 days after the injection  Do not have more injections if you still have trigger point pain after 2 or 3 injections  Risks of a trigger point injection:  You may have a severe allergic reaction to pain medicine injected  The injection may be painful, or you may be sore where you got the injection  You may bleed, bruise, or develop an infection in the injection area  The injection may cause you to feel faint  Rarely, the needle may cause muscle or blood vessel damage or your lung may collapse if you get the injection near your chest   Call your local emergency number (911 in the [address] [place] [address],[address]), or have someone call if:   Your mouth and throat are swollen  You are wheezing or have trouble breathing  You have chest pain or your heart is beating faster than usual     You feel like you are going to faint  When should I seek immediate care? Your face is red or swollen  You have hives that spread over your body  You feel weak or dizzy  Call your doctor or pain specialist if:   You have a fever within 1 week of the injection  You have redness or swelling within 1 week of the injection  You have new or worsening pain near the injection site  You have questions or concerns about your condition or care  Self-care:   Stay active after you have trigger point injections  Gently move your joints through their full range of motion during the first week  Avoid strenuous activity for 3 or 4 days  Do regular stretches of the trigger point muscle  Place gentle pressure on the trigger point, and then stretch the muscle  Ask your healthcare provider for more information about how to stretch and apply pressure  Apply ice to the injection site    Use an ice pack, or put ice in a plastic bag  Cover the bag with a towel before you apply it  Apply ice for 15 to 20 minutes every hour, or as directed  Apply heat to trigger point sites  Heat can help relax muscles and relieve trigger point pain  Use a heat pack or a heating pad set on low  Apply heat for 15 minutes every hour, or as directed  Follow up with your doctor or pain specialist as directed:  Write down your questions so you remember to ask them during your visits  © Copyright [employer] 2022 Information is for End User's use only and may not be sold, redistributed or otherwise used for commercial purposes  The above information is an  only  It is not intended as medical advice for individual conditions or treatments  Talk to your doctor, nurse or pharmacist before following any medical regimen to see if it is safe and effective for you

## 2023-04-25 RX ORDER — LIDOCAINE HYDROCHLORIDE 10 MG/ML
4 INJECTION, SOLUTION INFILTRATION; PERINEURAL ONCE
Status: COMPLETED | OUTPATIENT
Start: 2023-04-25 | End: 2023-04-25

## 2023-04-25 RX ADMIN — LIDOCAINE HYDROCHLORIDE 4 ML: 10 INJECTION, SOLUTION INFILTRATION; PERINEURAL at 12:23

## 2023-05-16 ENCOUNTER — HOSPITAL ENCOUNTER (OUTPATIENT)
Dept: RADIOLOGY | Facility: MEDICAL CENTER | Age: 58
Discharge: HOME | End: 2023-05-16
Admitting: PHYSICAL MEDICINE & REHABILITATION

## 2023-05-16 VITALS
SYSTOLIC BLOOD PRESSURE: 132 MMHG | TEMPERATURE: 98.7 F | OXYGEN SATURATION: 96 % | RESPIRATION RATE: 18 BRPM | HEART RATE: 87 BPM | DIASTOLIC BLOOD PRESSURE: 81 MMHG

## 2023-05-16 DIAGNOSIS — M54.12 CERVICAL RADICULOPATHY: ICD-10-CM

## 2023-05-16 RX ORDER — METHYLPREDNISOLONE ACETATE 80 MG/ML
80 INJECTION, SUSPENSION INTRA-ARTICULAR; INTRALESIONAL; INTRAMUSCULAR; PARENTERAL; SOFT TISSUE ONCE
Status: COMPLETED | OUTPATIENT
Start: 2023-05-16 | End: 2023-05-16

## 2023-05-16 RX ADMIN — METHYLPREDNISOLONE ACETATE 80 MG: 80 INJECTION, SUSPENSION INTRA-ARTICULAR; INTRALESIONAL; INTRAMUSCULAR; PARENTERAL; SOFT TISSUE at 09:32

## 2023-05-16 RX ADMIN — IOHEXOL 2 ML: 300 INJECTION, SOLUTION INTRAVENOUS at 09:31
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
## 2023-05-16 NOTE — DISCHARGE INSTRUCTIONS
Epidural Steroid Injection   WHAT YOU NEED TO KNOW:   An epidural steroid injection (PONCHO) is a procedure to inject steroid medicine into the epidural space  The epidural space is between your spinal cord and vertebrae  Steroids reduce inflammation and fluid buildup in your spine that may be causing pain  You may be given pain medicine along with the steroids  ACTIVITY  Do not drive or operate machinery today  No strenuous activity today - bending, lifting, etc   You may resume normal activites starting tomorrow - start slowly and as tolerated  You may shower today, but no tub baths or hot tubs  You may have numbness for several hours from the local anesthetic  Please use caution and common sense, especially with weight-bearing activities  CARE OF THE INJECTION SITE  If you have soreness or pain, apply ice to the area today (20 minutes on/20 minutes off)  Starting tomorrow, you may use warm, moist heat or ice if needed  You may have an increase or change in your discomfort for 36-48 hours after your treatment  Apply ice and continue with any pain medication you have been prescribed  Notify the [facility] if you have any of the following: redness, drainage, swelling, headache, stiff neck or fever above 100°F     SPECIAL INSTRUCTIONS  Our office will contact you in approximately 7 days for a progress report  MEDICATIONS  Continue to take all routine medications  Our office may have instructed you to hold some medications  As no general anesthesia was used in today's procedure, you should not experience any side effects related to anesthesia  If you are diabetic, the steroids used in today's injection may temporarily increase your blood sugar levels after the first few days after your injection  Please keep a close eye on your sugars and alert the doctor who manages your diabetes if your sugars are significantly high from your baseline or you are symptomatic       If you have a problem specifically related to your procedure, please call our office at [phone]  Problems not related to your procedure should be directed to your primary care physician

## 2023-05-23 ENCOUNTER — TELEPHONE (OUTPATIENT)
Dept: PAIN MEDICINE | Facility: CLINIC | Age: 58
End: 2023-05-23
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
## 2023-06-22 DIAGNOSIS — G43.101 MIGRAINE WITH AURA AND WITH STATUS MIGRAINOSUS, NOT INTRACTABLE: ICD-10-CM

## 2023-06-24 RX ORDER — TOPIRAMATE 100 MG/1
TABLET, FILM COATED ORAL
Qty: 90 TABLET | Refills: 1 | Status: SHIPPED | OUTPATIENT
Start: 2023-06-24

## 2023-06-26 NOTE — OP NOTE
OPERATIVE REPORT  PATIENT NAME: Raffi Reeys    :  1965  MRN: [id number]  Pt Location: [facility] ROOM 07    SURGERY DATE: 7/10/2020    Surgeon(s) and Role:     * Zaheer Stewart MD - Primary    Preop Diagnosis:  Malignant neoplasm of 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, estrogen receptor positive ([facility] [address] ) [C50 211, Z17 0]    Post-Op Diagnosis Codes:     * Malignant neoplasm of 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, estrogen receptor positive ([facility] [address] ) [C50 211, Z17 0]    Procedure(s) (LRB):  LUMPECTOMY BREAST NEEDLE LOCALIZED; 0800 NEEDLE LOC; 0900 NUC MED (Right)  BIOPSY LYMPH NODE SENTINEL (Right)   Injection of blue dye  Use of gamma probe    Specimen(s):  ID Type Source Tests Collected by Time Destination   1 : Right breast lumpectomy   Short stitch superior, long lateral Tissue Breast, Right TISSUE EXAM Zaheer Stewart MD 7/10/2020 1109    2 : New medial margin, Right breast, Suture petra true margin Tissue Breast, Right TISSUE EXAM Zaheer Stewart MD 7/10/2020 1110    3 : New superior margin, Right breast, Suture petra true margin Tissue Breast, Right TISSUE EXAM Zaheer Stewart MD 7/10/2020 1110    4 : New inferior margin, Right breast, Suture petra true margin Tissue Breast, Right TISSUE EXAM Zaheer Stewart MD 7/10/2020 1148    5 : New posterior margin, Right breast, Suture petra true margin Tissue Breast, Right TISSUE EXAM Zaheer Stewart MD 7/10/2020 1155    6 : New anterior margin, Right breast, Suture eptra true margin Tissue Breast, Right TISSUE EXAM Zaheer Stewart MD 7/10/2020 1157    7 : New lateral margin, Right breast, Suture petra true margin Tissue Breast, Right TISSUE EXAM Zaheer Stewart MD 7/10/2020 1202    8 : [place] node 1, Right breast Tissue Lymph Node, [place] TISSUE EXAM Zaheer Stewart MD 7/10/2020 1109    9 : [place] node 2, Right breast Tissue Lymph Node, [place] TISSUE EXAM Zaheer Stewart MD 7/10/2020 1212        Estimated Blood Loss:   Minimal    Drains:  * No LDAs found *    Anesthesia Type:   General    Operative Indications:  Malignant neoplasm of 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, estrogen receptor positive ([facility] ) [C50 211, Z17 0]      Operative Findings:  Wire in specimen    Complications:   None    Procedure and Technique:  Kimani Haq is a 51-year-old female presented to my office with a right breast carcinoma and she was counseled on breast conservation  She presented the day of surgery to the radiology suite and underwent needle localization as well as lymphoscintigraphy of the right breast   From there she went to the operating room  She was given general anesthesia  She had preoperative antibiotics  She had a 5 cc injection of Lymphazurin into the right subareolar plexus  She was prepped and draped in the usual standard fashion  Attention was turned to the upper aspect of the right breast   0 5% Marcaine plain was injected for local anesthesia  A broad elliptical incision was created slightly inferior and lateral to the wire  Electrocautery was used to dissect to the level of the wire, which was then elevated into the wound  Margin of tissue was excised around the wire down to the tip  This was marked with a short stitch superior and a long stitch lateral   This was imaged in the operating room  The intact wire was present but the prior biopsy clip was not present  A majority of the tissue was lateral to the tip; therefore a broad medial margin was excised  This was imaged  The clip was not present  Additional margins were excised and imaged including superior, inferior, posterior, anterior and lateral   The clip was not identified  All breast specimens were sent to pathology in formalin  Attention was then turned to the right axilla  There was an area of increased radioactive uptake in the mid axillary line  Additional 0 5% Marcaine plain was injected for local anesthesia  An incision was created through the skin and subcutaneous tissue  Electrocautery was used to dissect through the remaining subcutaneous tissue and clavipectoral fascia to enter the axillary fat pad  Deep in the mid axilla was a radioactive node that was elevated into the wound  Hemoclips were used on the draining vessels  The node was excised completely and submitted to pathology in formalin  There was an additional radioactive foci slightly superior to this  This led to a radioactive in blue-stained node  This was excised completely and submitted to pathology in formalin  Following removal of the 2nd node, there were no additional blue-stained, radioactive or palpable nodes  Both of her wounds were irrigated and hemostasis was achieved  Surgicel powder was placed within the cavity for additional hemostasis  Hemoclips were also placed within the lumpectomy cavity for anticipated radiation therapy  The wounds were then closed in a layered fashion using multiple interrupted 3-0 Monocryl suture and a running 4-0 Monocryl subcuticular stitch  All counts were correct  The skin was cleaned and dried  Surgical glue, fluffs and a bra were applied  The patient was then extubated and taken to recovery in stable condition         Disposition:  extubated and stable    SIGNATURE: Sindy Reno MD  DATE: July 10, 2020  TIME: 12:52 PM Plan: Patient denies treatment associated side effects such as nausea/vomiting or mood changes Continue Regimen: Isotretnoin 30mg BID Detail Level: Detailed

## 2023-07-12 ENCOUNTER — HOSPITAL ENCOUNTER (OUTPATIENT)
Dept: MAMMOGRAPHY | Facility: CLINIC | Age: 58
Discharge: HOME | End: 2023-07-12
Payer: COMMERCIAL

## 2023-07-12 ENCOUNTER — HOSPITAL ENCOUNTER (OUTPATIENT)
Dept: ULTRASOUND IMAGING | Facility: CLINIC | Age: 58
Discharge: HOME | End: 2023-07-12
Payer: COMMERCIAL

## 2023-07-12 VITALS — HEIGHT: 66 IN | BODY MASS INDEX: 28.28 KG/M2 | WEIGHT: 176 LBS

## 2023-07-12 DIAGNOSIS — C50.211 MALIGNANT NEOPLASM OF UPPER-INNER QUADRANT OF RIGHT BREAST IN FEMALE, ESTROGEN RECEPTOR POSITIVE (HCC): ICD-10-CM

## 2023-07-12 DIAGNOSIS — Z17.0 MALIGNANT NEOPLASM OF UPPER-INNER QUADRANT OF RIGHT BREAST IN FEMALE, ESTROGEN RECEPTOR POSITIVE (HCC): ICD-10-CM

## 2023-07-12 PROCEDURE — 76642 ULTRASOUND BREAST LIMITED: CPT

## 2023-07-12 PROCEDURE — 77066 DX MAMMO INCL CAD BI: CPT

## 2023-07-12 PROCEDURE — G0279 TOMOSYNTHESIS, MAMMO: HCPCS

## 2023-07-21 ENCOUNTER — OFFICE VISIT (OUTPATIENT)
Dept: FAMILY MEDICINE CLINIC | Facility: CLINIC | Age: 58
End: 2023-07-21
Payer: COMMERCIAL

## 2023-07-21 VITALS
BODY MASS INDEX: 28.45 KG/M2 | SYSTOLIC BLOOD PRESSURE: 121 MMHG | OXYGEN SATURATION: 97 % | HEIGHT: 66 IN | DIASTOLIC BLOOD PRESSURE: 78 MMHG | TEMPERATURE: 97.1 F | WEIGHT: 177 LBS | HEART RATE: 68 BPM

## 2023-07-21 DIAGNOSIS — Z86.39 HISTORY OF HYPERTHYROIDISM: ICD-10-CM

## 2023-07-21 DIAGNOSIS — C50.211 MALIGNANT NEOPLASM OF UPPER-INNER QUADRANT OF RIGHT BREAST IN FEMALE, ESTROGEN RECEPTOR POSITIVE (HCC): Primary | ICD-10-CM

## 2023-07-21 DIAGNOSIS — Z79.811 USE OF ANASTROZOLE: ICD-10-CM

## 2023-07-21 DIAGNOSIS — Z78.0 POSTMENOPAUSAL STATUS (AGE-RELATED) (NATURAL): ICD-10-CM

## 2023-07-21 DIAGNOSIS — Z17.0 MALIGNANT NEOPLASM OF UPPER-INNER QUADRANT OF RIGHT BREAST IN FEMALE, ESTROGEN RECEPTOR POSITIVE (HCC): Primary | ICD-10-CM

## 2023-07-21 DIAGNOSIS — Z92.3 HISTORY OF THYROID IRRADIATION: ICD-10-CM

## 2023-07-21 DIAGNOSIS — F40.243 FEAR OF FLYING: ICD-10-CM

## 2023-07-21 DIAGNOSIS — Z86.39 HISTORY OF HYPOTHYROIDISM: ICD-10-CM

## 2023-07-21 DIAGNOSIS — R23.3 EASY BRUISING: ICD-10-CM

## 2023-07-21 PROCEDURE — 99214 OFFICE O/P EST MOD 30 MIN: CPT | Performed by: FAMILY MEDICINE

## 2023-07-21 RX ORDER — ALPRAZOLAM 0.5 MG/1
TABLET ORAL
Qty: 3 TABLET | Refills: 0 | Status: SHIPPED | OUTPATIENT
Start: 2023-07-21

## 2023-07-21 NOTE — PROGRESS NOTES
Name: Orville Lynn      : 1965      MRN: [id number]  Encounter Provider: Faisal Hopkins DO  Encounter Date: 2023   Encounter department: [address] [place] [address]     1. Malignant neoplasm of 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, estrogen receptor positive ([address])    2. Use of anastrozole    3. Easy bruising  -     Protime-INR; Future    4. Fear of flying  -     ALPRAZolam (XANAX) 0.5 mg tablet; Take 1 tab (0.5mg) 20-30 minutes prior to plane flight as needed for fear of flying    5. History of hypothyroidism    6. History of thyroid irradiation    7. History of hyperthyroidism    8. Postmenopausal status (age-related) (natural)  -     DXA bone density spine hip and pelvis;  Future; Expected date: 2023           Subjective      Scheduled office visit  Labs ordered in January not yet obtained  Brought in several sets of forms for her 6mo renewal of FMLA - states she does not need until September   C/o new problem easy bruising- states first noticed about a year ago, but wasn't too bad, now past 4-5 months "has gotten real bad" "only on my arms and my hands"  Denies gums bleeding, nosebleeds, blood in stool or urine or any melena  Not on any ASA and rarely takes meloxicam (1 or 2 times a month per pt)  Pt asks about triptan rx- states she used to get 15 at a time, not sure why she's only getting 9 at a time- I advise her that records since she's been a pt of this office indicate that refills have always been for #9 tablets with 2RF's  Also states, "Going to fly for the first time - can you give me something to help me relax?" - visiting her daughter     Review of Systems    Current Outpatient Medications on File Prior to Visit   Medication Sig   • albuterol (Ventolin HFA) 90 mcg/act inhaler Inhale 2 puffs every 4 (four) hours as needed for wheezing   • anastrozole (ARIMIDEX) 1 mg tablet Take 1 tablet (1 mg total) by mouth daily   • Ascorbic Acid (vitamin C) 1000 MG tablet Take 1,000 mg by mouth daily   • B Complex Vitamins (B COMPLEX 1 PO) Take by mouth daily    • benralizumab (FASENRA) subcutaneous injection Inject 1 mL (30 mg total) under the skin every 56 days   • Budeson-Glycopyrrol-Formoterol (Breztri Aerosphere) 160-9-4.8 MCG/ACT AERO Inhale 2 puffs every 12 (twelve) hours Rinse mouth after use.    • Calcium-Magnesium-Vitamin D 185- MG-MG-UNIT CAPS Take by mouth daily    • cyanocobalamin (VITAMIN B-12) 500 MCG tablet Take 500 mcg by mouth daily   • Echinacea 400 MG CAPS Take by mouth daily    • ipratropium-albuterol (DUO-NEB) 0.5-2.5 mg/3 mL nebulizer solution    • loperamide (IMODIUM) 2 mg capsule Take 2 mg by mouth 4 (four) times a day as needed for diarrhea   • Loratadine 10 MG CAPS Take 10 mg by mouth daily   • meloxicam (MOBIC) 15 mg tablet Take 1 tablet (15 mg total) by mouth in the morning   • Multiple Vitamins-Minerals (MULTIVITAMIN ADULT PO) Take by mouth daily    • rizatriptan (MAXALT-MLT) 10 mg disintegrating tablet TAKE 1 TABLET (10 MG TOTAL) BY MOUTH ONCE AS NEEDED FOR MIGRAINE>>FILL 4/5/22   • tiZANidine (ZANAFLEX) 4 mg tablet Take 1 tablet (4 mg total) by mouth daily at bedtime   • topiramate (TOPAMAX) 100 mg tablet TAKE 1 TABLET BY MOUTH EVERYDAY AT BEDTIME   • EPINEPHrine (EPIPEN) 0.3 mg/0.3 mL SOAJ Inject 0.3 mL (0.3 mg total) into a muscle once for 1 dose   • [DISCONTINUED] budesonide-formoterol (Symbicort) 160-4.5 mcg/act inhaler Inhale 2 puffs 2 (two) times a day       Objective     /78 (BP Location: Left arm, Patient Position: Sitting, Cuff Size: Standard)   Pulse 68   Temp (!) 97.1 °F (36.2 °C) (Tympanic)   Ht 5' 6" (1.676 m)   Wt 80.3 kg (177 lb)   LMP  (LMP Unknown)   SpO2 97%   BMI 28.57 kg/m²     Physical Exam  Aneta Delgado DO

## 2023-08-02 ENCOUNTER — OFFICE VISIT (OUTPATIENT)
Dept: PAIN MEDICINE | Facility: MEDICAL CENTER | Age: 58
End: 2023-08-02

## 2023-08-02 VITALS
HEIGHT: 66 IN | DIASTOLIC BLOOD PRESSURE: 75 MMHG | WEIGHT: 176 LBS | HEART RATE: 93 BPM | SYSTOLIC BLOOD PRESSURE: 123 MMHG | OXYGEN SATURATION: 93 % | BODY MASS INDEX: 28.28 KG/M2

## 2023-08-02 DIAGNOSIS — M47.812 CERVICAL SPONDYLOSIS: ICD-10-CM

## 2023-08-02 DIAGNOSIS — M54.2 NECK PAIN: ICD-10-CM

## 2023-08-02 DIAGNOSIS — M54.12 CERVICAL RADICULOPATHY: Primary | ICD-10-CM

## 2023-08-02 DIAGNOSIS — M48.02 FORAMINAL STENOSIS OF CERVICAL REGION: ICD-10-CM

## 2023-08-02 NOTE — PATIENT INSTRUCTIONS
Epidural Steroid Injection   WHAT YOU NEED TO KNOW:   What do I need to know about an epidural steroid injection (PONCHO)? An PONCHO is a procedure to inject steroid medicine into the epidural space. The epidural space is between your spinal cord and vertebrae. Steroids reduce inflammation and fluid buildup in your spine that may be causing pain. You may be given pain medicine along with the steroids. How do I prepare for an PONCHO? Your provider will talk to you about how to prepare for your procedure. He or she will tell you what medicines to take or not take on the day of your procedure. You may need to stop taking blood thinners or other medicines several days before your procedure. You may need to adjust any diabetes medicine you take on the day of your procedure. Steroid medicine can increase your blood sugar level. Arrange for someone to drive you home when you are discharged. What will happen during an PONCHO? You will be given medicine to numb the procedure area. You will be awake for the procedure, but you will not feel pain. You may also be given medicine to help you relax. Contrast liquid will be used to help your provider see the area better. Tell the provider if you have ever had an allergic reaction to contrast liquid. Your provider may place the needle into your neck area, middle of your back, or tailbone area. He or she may inject the medicine next to the nerves that are causing your pain. He or she may instead inject the medicine into a larger area of the epidural space. This helps the medicine spread to more nerves. Your provider will use a fluoroscope to help guide the needle to the right place. A fluoroscope is a type of x-ray. After the procedure, a bandage will be placed over the injection site to prevent infection. What will happen after an PONCHO? You will have a bandage over the injection site to prevent infection.  Your provider will tell you when you can bathe and any activity guidelines. You will be able to go home. What are the risks of an PONCHO? You may have temporary or permanent nerve damage or paralysis. You may have bleeding or develop a serious infection, such as meningitis (swelling of the brain coverings). An abscess may also develop. An abscess is a pus-filled area under the skin. You may need surgery to fix the abscess. You may have a seizure, anxiety, or trouble sleeping. If you are a man, you may have temporary erectile dysfunction (not able to have an erection). CARE AGREEMENT:   You have the right to help plan your care. Learn about your health condition and how it may be treated. Discuss treatment options with your healthcare providers to decide what care you want to receive. You always have the right to refuse treatment. The above information is an  only. It is not intended as medical advice for individual conditions or treatments. Talk to your doctor, nurse or pharmacist before following any medical regimen to see if it is safe and effective for you. © Copyright [employer] 2022 Information is for End User's use only and may not be sold, redistributed or otherwise used for commercial purposes.
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
## 2023-08-09 DIAGNOSIS — M79.18 MYOFASCIAL PAIN SYNDROME: ICD-10-CM

## 2023-08-09 NOTE — TELEPHONE ENCOUNTER
Pt contacted Call Center requested refill of their medication. Medication Name: tizanidine       Dosage of Med: 4mg      Frequency of Med: daily prn    Remaining Medication:  unsure      Pharmacy and Location:   [facility]/pharmacy #[id number] [facility]        Pt. Preferred Callback Phone Number:       Thank you.        PLEASE ADVISE PATIENTS:    REFILL REQUESTS WILL BE PROCESSED WITHIN 24-48 HOURS

## 2023-08-10 DIAGNOSIS — G43.101 MIGRAINE WITH AURA AND WITH STATUS MIGRAINOSUS, NOT INTRACTABLE: ICD-10-CM

## 2023-08-10 RX ORDER — RIZATRIPTAN BENZOATE 10 MG/1
TABLET, ORALLY DISINTEGRATING ORAL
Qty: 9 TABLET | Refills: 2 | Status: SHIPPED | OUTPATIENT
Start: 2023-08-10

## 2023-08-10 RX ORDER — TIZANIDINE 4 MG/1
4 TABLET ORAL
Qty: 30 TABLET | Refills: 2 | Status: SHIPPED | OUTPATIENT
Start: 2023-08-10

## 2023-08-10 NOTE — TELEPHONE ENCOUNTER
Pt was seen in the office with [facility] on 8/2. Pt has a few tizanidine left but would like a refill of same. Pt states that she takes it once a day as needed and she does not have any side effects and it does help her      --refill same?

## 2023-08-11 ENCOUNTER — APPOINTMENT (OUTPATIENT)
Dept: LAB | Facility: CLINIC | Age: 58
End: 2023-08-11
Payer: COMMERCIAL

## 2023-08-11 DIAGNOSIS — Z86.39 HISTORY OF HYPERTHYROIDISM: ICD-10-CM

## 2023-08-11 DIAGNOSIS — Z92.3 HISTORY OF THYROID IRRADIATION: ICD-10-CM

## 2023-08-11 DIAGNOSIS — R23.3 EASY BRUISING: ICD-10-CM

## 2023-08-11 DIAGNOSIS — C50.211 MALIGNANT NEOPLASM OF UPPER-INNER QUADRANT OF RIGHT BREAST IN FEMALE, ESTROGEN RECEPTOR POSITIVE (HCC): ICD-10-CM

## 2023-08-11 DIAGNOSIS — E78.5 DYSLIPIDEMIA: ICD-10-CM

## 2023-08-11 DIAGNOSIS — Z86.39 HISTORY OF HYPOTHYROIDISM: ICD-10-CM

## 2023-08-11 DIAGNOSIS — Z17.0 MALIGNANT NEOPLASM OF UPPER-INNER QUADRANT OF RIGHT BREAST IN FEMALE, ESTROGEN RECEPTOR POSITIVE (HCC): ICD-10-CM

## 2023-08-11 LAB
ALBUMIN SERPL BCP-MCNC: 3.7 G/DL (ref 3.5–5)
ALP SERPL-CCNC: 95 U/L (ref 46–116)
ALT SERPL W P-5'-P-CCNC: 29 U/L (ref 12–78)
ANION GAP SERPL CALCULATED.3IONS-SCNC: 6 MMOL/L
AST SERPL W P-5'-P-CCNC: 13 U/L (ref 5–45)
BASOPHILS # BLD AUTO: 0.05 THOUSANDS/ÂΜL (ref 0–0.1)
BASOPHILS NFR BLD AUTO: 1 % (ref 0–1)
BILIRUB SERPL-MCNC: 0.37 MG/DL (ref 0.2–1)
BUN SERPL-MCNC: 20 MG/DL (ref 5–25)
CALCIUM SERPL-MCNC: 9.1 MG/DL (ref 8.3–10.1)
CHLORIDE SERPL-SCNC: 114 MMOL/L (ref 96–108)
CHOLEST SERPL-MCNC: 190 MG/DL
CO2 SERPL-SCNC: 24 MMOL/L (ref 21–32)
CREAT SERPL-MCNC: 0.78 MG/DL (ref 0.6–1.3)
EOSINOPHIL # BLD AUTO: 0.01 THOUSAND/ÂΜL (ref 0–0.61)
EOSINOPHIL NFR BLD AUTO: 0 % (ref 0–6)
ERYTHROCYTE [DISTWIDTH] IN BLOOD BY AUTOMATED COUNT: 13.2 % (ref 11.6–15.1)
GFR SERPL CREATININE-BSD FRML MDRD: 84 ML/MIN/1.73SQ M
GLUCOSE P FAST SERPL-MCNC: 88 MG/DL (ref 65–99)
HCT VFR BLD AUTO: 43 % (ref 34.8–46.1)
HDLC SERPL-MCNC: 59 MG/DL
HGB BLD-MCNC: 13.8 G/DL (ref 11.5–15.4)
IMM GRANULOCYTES # BLD AUTO: 0.02 THOUSAND/UL (ref 0–0.2)
IMM GRANULOCYTES NFR BLD AUTO: 0 % (ref 0–2)
INR PPP: 0.99 (ref 0.84–1.19)
LDLC SERPL CALC-MCNC: 111 MG/DL (ref 0–100)
LYMPHOCYTES # BLD AUTO: 2.09 THOUSANDS/ÂΜL (ref 0.6–4.47)
LYMPHOCYTES NFR BLD AUTO: 32 % (ref 14–44)
MCH RBC QN AUTO: 30.3 PG (ref 26.8–34.3)
MCHC RBC AUTO-ENTMCNC: 32.1 G/DL (ref 31.4–37.4)
MCV RBC AUTO: 95 FL (ref 82–98)
MONOCYTES # BLD AUTO: 0.66 THOUSAND/ÂΜL (ref 0.17–1.22)
MONOCYTES NFR BLD AUTO: 10 % (ref 4–12)
NEUTROPHILS # BLD AUTO: 3.7 THOUSANDS/ÂΜL (ref 1.85–7.62)
NEUTS SEG NFR BLD AUTO: 57 % (ref 43–75)
NONHDLC SERPL-MCNC: 131 MG/DL
NRBC BLD AUTO-RTO: 0 /100 WBCS
PLATELET # BLD AUTO: 213 THOUSANDS/UL (ref 149–390)
PMV BLD AUTO: 11.7 FL (ref 8.9–12.7)
POTASSIUM SERPL-SCNC: 4 MMOL/L (ref 3.5–5.3)
PROT SERPL-MCNC: 7.3 G/DL (ref 6.4–8.4)
PROTHROMBIN TIME: 13.3 SECONDS (ref 11.6–14.5)
RBC # BLD AUTO: 4.55 MILLION/UL (ref 3.81–5.12)
SODIUM SERPL-SCNC: 144 MMOL/L (ref 135–147)
TRIGL SERPL-MCNC: 99 MG/DL
TSH SERPL DL<=0.05 MIU/L-ACNC: 2.71 UIU/ML (ref 0.45–4.5)
WBC # BLD AUTO: 6.53 THOUSAND/UL (ref 4.31–10.16)

## 2023-08-11 PROCEDURE — 36415 COLL VENOUS BLD VENIPUNCTURE: CPT

## 2023-08-11 PROCEDURE — 84443 ASSAY THYROID STIM HORMONE: CPT

## 2023-08-11 PROCEDURE — 80061 LIPID PANEL: CPT

## 2023-08-11 PROCEDURE — 85025 COMPLETE CBC W/AUTO DIFF WBC: CPT

## 2023-08-11 PROCEDURE — 85610 PROTHROMBIN TIME: CPT

## 2023-08-11 PROCEDURE — 80053 COMPREHEN METABOLIC PANEL: CPT

## 2023-08-14 ENCOUNTER — OFFICE VISIT (OUTPATIENT)
Dept: PULMONOLOGY | Facility: CLINIC | Age: 58
End: 2023-08-14
Payer: COMMERCIAL

## 2023-08-14 VITALS
SYSTOLIC BLOOD PRESSURE: 116 MMHG | TEMPERATURE: 98.4 F | OXYGEN SATURATION: 97 % | WEIGHT: 174 LBS | HEART RATE: 71 BPM | DIASTOLIC BLOOD PRESSURE: 68 MMHG | BODY MASS INDEX: 28.08 KG/M2

## 2023-08-14 DIAGNOSIS — J45.40 MODERATE PERSISTENT ASTHMA WITHOUT COMPLICATION: ICD-10-CM

## 2023-08-14 DIAGNOSIS — J44.9 COPD, SEVERE (HCC): Primary | ICD-10-CM

## 2023-08-14 DIAGNOSIS — J44.9 CHRONIC OBSTRUCTIVE PULMONARY DISEASE, UNSPECIFIED COPD TYPE (HCC): ICD-10-CM

## 2023-08-14 DIAGNOSIS — F17.211 CIGARETTE NICOTINE DEPENDENCE IN REMISSION: ICD-10-CM

## 2023-08-14 PROCEDURE — 99214 OFFICE O/P EST MOD 30 MIN: CPT | Performed by: INTERNAL MEDICINE

## 2023-08-14 RX ORDER — BUDESONIDE, GLYCOPYRROLATE, AND FORMOTEROL FUMARATE 160; 9; 4.8 UG/1; UG/1; UG/1
2 AEROSOL, METERED RESPIRATORY (INHALATION) EVERY 12 HOURS
Qty: 31.1 G | Refills: 3 | Status: SHIPPED | OUTPATIENT
Start: 2023-08-14

## 2023-08-14 RX ORDER — ALBUTEROL SULFATE 90 UG/1
2 AEROSOL, METERED RESPIRATORY (INHALATION) EVERY 4 HOURS PRN
Qty: 18 G | Refills: 3 | Status: SHIPPED | OUTPATIENT
Start: 2023-08-14

## 2023-08-14 NOTE — ASSESSMENT & PLAN NOTE
I discussed with her that she is a candidate for lung cancer CT screening. The following Shared Decision-Making points were covered:  1. Benefits of screening were discussed, including the rates of reduction in death from lung cancer and other causes. Harms of screening were reviewed, including false positive tests, radiation exposure levels, risks of invasive procedures, risks of complications of screening, and risk of overdiagnosis. 2. I counseled on the importance of adherence to annual lung cancer LDCT screening, impact of co-morbidities, and ability or willingness to undergo diagnosis and treatment. 3. I counseled on the importance of maintaining abstinence as a former smoker or was counseled on the importance of smoking cessation if a current smoker    Review of Eligibility Criteria: She meets all of the criteria for Lung Cancer Screening. · She is 62 y.o. · She has 20 pack year tobacco history and is a current smoker or has quit within the past 15 years  · She presents no signs or symptoms of lung cancer    After discussion, the patient decided to elect lung cancer screening.

## 2023-08-14 NOTE — ASSESSMENT & PLAN NOTE
She has derived significant benefit since the addition of [place]. She is no longer needing her nebulizer and her symptoms related to asthma are far less common and less severe. She will continue with Breztri and albuterol as needed.

## 2023-08-14 NOTE — ASSESSMENT & PLAN NOTE
We had explored bronchoscopic lung volume reduction, but she was not a candidate due to lack of significant emphysema.   We will continue Breztri as above
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
## 2023-08-15 DIAGNOSIS — J45.40 MODERATE PERSISTENT ASTHMA WITHOUT COMPLICATION: ICD-10-CM

## 2023-08-15 NOTE — TELEPHONE ENCOUNTER
I received re-approval for [employer] is effective from: 08/15/2023-08/15/2024    Patient is still to use [facility] specialty pharmacy.      PA #: [phone]

## 2023-08-23 DIAGNOSIS — Z17.0 MALIGNANT NEOPLASM OF UPPER-INNER QUADRANT OF RIGHT BREAST IN FEMALE, ESTROGEN RECEPTOR POSITIVE (HCC): Primary | ICD-10-CM

## 2023-08-23 DIAGNOSIS — Z98.890 STATUS POST RIGHT BREAST LUMPECTOMY: ICD-10-CM

## 2023-08-23 DIAGNOSIS — C50.211 MALIGNANT NEOPLASM OF UPPER-INNER QUADRANT OF RIGHT BREAST IN FEMALE, ESTROGEN RECEPTOR POSITIVE (HCC): Primary | ICD-10-CM

## 2023-09-08 ENCOUNTER — HOSPITAL ENCOUNTER (OUTPATIENT)
Dept: RADIOLOGY | Facility: MEDICAL CENTER | Age: 58
Discharge: HOME | End: 2023-09-08
Payer: COMMERCIAL

## 2023-09-08 VITALS
OXYGEN SATURATION: 97 % | TEMPERATURE: 98.6 F | RESPIRATION RATE: 16 BRPM | HEART RATE: 76 BPM | DIASTOLIC BLOOD PRESSURE: 69 MMHG | SYSTOLIC BLOOD PRESSURE: 112 MMHG

## 2023-09-08 DIAGNOSIS — M54.12 CERVICAL RADICULOPATHY: ICD-10-CM

## 2023-09-08 PROCEDURE — 62321 NJX INTERLAMINAR CRV/THRC: CPT | Performed by: PHYSICAL MEDICINE & REHABILITATION

## 2023-09-08 RX ORDER — METHYLPREDNISOLONE ACETATE 80 MG/ML
80 INJECTION, SUSPENSION INTRA-ARTICULAR; INTRALESIONAL; INTRAMUSCULAR; PARENTERAL; SOFT TISSUE ONCE
Status: COMPLETED | OUTPATIENT
Start: 2023-09-08 | End: 2023-09-08

## 2023-09-08 RX ADMIN — IOHEXOL 2 ML: 300 INJECTION, SOLUTION INTRAVENOUS at 08:22

## 2023-09-08 RX ADMIN — METHYLPREDNISOLONE ACETATE 80 MG: 80 INJECTION, SUSPENSION INTRA-ARTICULAR; INTRALESIONAL; INTRAMUSCULAR; PARENTERAL; SOFT TISSUE at 08:22

## 2023-09-08 NOTE — DISCHARGE INSTRUCTIONS
Epidural Steroid Injection   WHAT YOU NEED TO KNOW:   An epidural steroid injection (PONCHO) is a procedure to inject steroid medicine into the epidural space. The epidural space is between your spinal cord and vertebrae. Steroids reduce inflammation and fluid buildup in your spine that may be causing pain. You may be given pain medicine along with the steroids. ACTIVITY  Do not drive or operate machinery today. No strenuous activity today - bending, lifting, etc.  You may resume normal activites starting tomorrow - start slowly and as tolerated. You may shower today, but no tub baths or hot tubs. You may have numbness for several hours from the local anesthetic. Please use caution and common sense, especially with weight-bearing activities. CARE OF THE INJECTION SITE  If you have soreness or pain, apply ice to the area today (20 minutes on/20 minutes off). Starting tomorrow, you may use warm, moist heat or ice if needed. You may have an increase or change in your discomfort for 36-48 hours after your treatment. Apply ice and continue with any pain medication you have been prescribed. Notify the [facility] if you have any of the following: redness, drainage, swelling, headache, stiff neck or fever above 100°F.    SPECIAL INSTRUCTIONS  Our office will contact you in approximately 7 days for a progress report. MEDICATIONS  Continue to take all routine medications. Our office may have instructed you to hold some medications. You may resume your Meloxicam and or Excedrin in 24 hours, so tomorrow after 0845    As no general anesthesia was used in today's procedure, you should not experience any side effects related to anesthesia. If you are diabetic, the steroids used in today's injection may temporarily increase your blood sugar levels after the first few days after your injection.  Please keep a close eye on your sugars and alert the doctor who manages your diabetes if your sugars are significantly high from your baseline or you are symptomatic. If you have a problem specifically related to your procedure, please call our office at [phone]. Problems not related to your procedure should be directed to your primary care physician.
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
## 2023-09-15 ENCOUNTER — TELEPHONE (OUTPATIENT)
Dept: PAIN MEDICINE | Facility: CLINIC | Age: 58
End: 2023-09-15

## 2023-09-15 NOTE — TELEPHONE ENCOUNTER
Patient reports 0% improvement post inj  Pain level 8/10    Patient is having a lot of muscle/nerve pain

## 2023-10-06 ENCOUNTER — OFFICE VISIT (OUTPATIENT)
Dept: PAIN MEDICINE | Facility: MEDICAL CENTER | Age: 58
End: 2023-10-06
Payer: COMMERCIAL

## 2023-10-06 VITALS
SYSTOLIC BLOOD PRESSURE: 120 MMHG | HEIGHT: 66 IN | WEIGHT: 175 LBS | BODY MASS INDEX: 28.12 KG/M2 | DIASTOLIC BLOOD PRESSURE: 82 MMHG | HEART RATE: 79 BPM

## 2023-10-06 DIAGNOSIS — M54.2 NECK PAIN: ICD-10-CM

## 2023-10-06 DIAGNOSIS — M48.02 CERVICAL SPINAL STENOSIS: ICD-10-CM

## 2023-10-06 DIAGNOSIS — M54.12 CERVICAL RADICULOPATHY: Primary | ICD-10-CM

## 2023-10-06 DIAGNOSIS — M47.812 CERVICAL SPONDYLOSIS: ICD-10-CM

## 2023-10-06 DIAGNOSIS — M54.81 BILATERAL OCCIPITAL NEURALGIA: ICD-10-CM

## 2023-10-06 DIAGNOSIS — M79.18 MYOFASCIAL PAIN SYNDROME: ICD-10-CM

## 2023-10-06 PROCEDURE — 20552 NJX 1/MLT TRIGGER POINT 1/2: CPT | Performed by: PHYSICIAN ASSISTANT

## 2023-10-06 PROCEDURE — 99214 OFFICE O/P EST MOD 30 MIN: CPT | Performed by: PHYSICIAN ASSISTANT

## 2023-10-06 RX ORDER — TRIAMCINOLONE ACETONIDE 40 MG/ML
40 INJECTION, SUSPENSION INTRA-ARTICULAR; INTRAMUSCULAR ONCE
Status: CANCELLED | OUTPATIENT
Start: 2023-10-06 | End: 2023-10-06

## 2023-10-06 RX ORDER — LIDOCAINE HYDROCHLORIDE 10 MG/ML
2 INJECTION, SOLUTION EPIDURAL; INFILTRATION; INTRACAUDAL; PERINEURAL ONCE
Status: COMPLETED | OUTPATIENT
Start: 2023-10-06 | End: 2023-10-06

## 2023-10-06 RX ORDER — NORTRIPTYLINE HYDROCHLORIDE 10 MG/1
10 CAPSULE ORAL
Qty: 30 CAPSULE | Refills: 2 | Status: SHIPPED | OUTPATIENT
Start: 2023-10-06

## 2023-10-06 RX ORDER — BENRALIZUMAB 30 MG/ML
INJECTION, SOLUTION SUBCUTANEOUS
COMMUNITY
Start: 2023-08-16

## 2023-10-06 RX ADMIN — LIDOCAINE HYDROCHLORIDE 2 ML: 10 INJECTION, SOLUTION EPIDURAL; INFILTRATION; INTRACAUDAL; PERINEURAL at 08:38

## 2023-10-06 NOTE — PATIENT INSTRUCTIONS
Trigger Point Injection   AMBULATORY CARE:   A trigger point injection  is used to relax a muscle knot. This helps relieve pain. You may be able to have more than one trigger point treated during a session. How to prepare for a trigger point injection:   Your healthcare provider will tell you how to prepare. Arrange to have someone drive you home after the injection. Tell your provider about all medicines you take, including pain medicine, blood thinners, and muscle relaxers. He or she will tell you if you need to stop any medicine for the injection, and when to stop. He or she will tell you which medicines to take or not take on the day of the injection. Tell your provider about all your allergies, including to any pain medicine. What will happen during a trigger point injection:   You may be sitting or lying, depending on where the trigger point is located. Your healthcare provider will feel for a knot in the muscle. He or she may petra your skin over the knot. Your provider will put a needle through your skin and into the trigger point. Saline (salt solution), pain relievers, or other medicines may be pushed through the needle into the trigger point. Your provider may use only a dry needle (no medicine). He or she will pull the needle almost all the way out and then push it in again. He or she will repeat this several times until the muscle stops twitching or feels relaxed. Your provider will remove the needle and stretch the muscle area. He or she may apply pressure to the area for 2 minutes. A bandage will be put over the injection site to prevent bleeding or an infection. What to expect after a trigger point injection:   You may feel pain relief right away. It is normal for some pain to start again 2 hours later. An ice pack or over-the-counter pain medicine can help lower the pain. You may feel sore in the injection site for a few days.  If you need another injection in the same area, wait until the area is not sore. Your healthcare provider may give you specific activity instructions to follow at home or recommend physical therapy. In general, you should try to stay active. Avoid strenuous activity for the first 3 or 4 days after the injection. Do not have more injections if you still have trigger point pain after 2 or 3 injections. Risks of a trigger point injection:  You may have a severe allergic reaction to pain medicine injected. The injection may be painful, or you may be sore where you got the injection. You may bleed, bruise, or develop an infection in the injection area. The injection may cause you to feel faint. Rarely, the needle may cause muscle or blood vessel damage or your lung may collapse if you get the injection near your chest.  Call your local emergency number ([phone] in the [address]), or have someone call if:   Your mouth and throat are swollen. You are wheezing or have trouble breathing. You have chest pain or your heart is beating faster than usual.    You feel like you are going to faint. When should I seek immediate care? Your face is red or swollen. You have hives that spread over your body. You feel weak or dizzy. Call your doctor or pain specialist if:   You have a fever within 1 week of the injection. You have redness or swelling within 1 week of the injection. You have new or worsening pain near the injection site. You have questions or concerns about your condition or care. Self-care:   Stay active after you have trigger point injections. Gently move your joints through their full range of motion during the first week. Avoid strenuous activity for 3 or 4 days. Do regular stretches of the trigger point muscle. Place gentle pressure on the trigger point, and then stretch the muscle. Ask your healthcare provider for more information about how to stretch and apply pressure. Apply ice to the injection site.   Use an ice pack, or put ice in a plastic bag. Cover the bag with a towel before you apply it. Apply ice for 15 to 20 minutes every hour, or as directed. Apply heat to trigger point sites. Heat can help relax muscles and relieve trigger point pain. Use a heat pack or a heating pad set on low. Apply heat for 15 minutes every hour, or as directed. Follow up with your doctor or pain specialist as directed:  Write down your questions so you remember to ask them during your visits. © Copyright [facility] 2023 Information is for End User's use only and may not be sold, redistributed or otherwise used for commercial purposes. The above information is an  only. It is not intended as medical advice for individual conditions or treatments. Talk to your doctor, nurse or pharmacist before following any medical regimen to see if it is safe and effective for you.
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
## 2023-10-19 ENCOUNTER — HOSPITAL ENCOUNTER (OUTPATIENT)
Dept: MRI IMAGING | Facility: HOSPITAL | Age: 58
End: 2023-10-19
Payer: COMMERCIAL

## 2023-10-19 DIAGNOSIS — M47.812 CERVICAL SPONDYLOSIS: ICD-10-CM

## 2023-10-19 DIAGNOSIS — M54.12 CERVICAL RADICULOPATHY: ICD-10-CM

## 2023-10-19 DIAGNOSIS — M48.02 CERVICAL SPINAL STENOSIS: ICD-10-CM

## 2023-10-19 PROCEDURE — 72141 MRI NECK SPINE W/O DYE: CPT

## 2023-10-19 PROCEDURE — G1004 CDSM NDSC: HCPCS

## 2023-10-24 ENCOUNTER — OFFICE VISIT (OUTPATIENT)
Dept: FAMILY MEDICINE CLINIC | Facility: CLINIC | Age: 58
End: 2023-10-24
Payer: COMMERCIAL

## 2023-10-24 VITALS
HEART RATE: 73 BPM | TEMPERATURE: 97.1 F | DIASTOLIC BLOOD PRESSURE: 70 MMHG | WEIGHT: 178.6 LBS | HEIGHT: 66 IN | SYSTOLIC BLOOD PRESSURE: 120 MMHG | OXYGEN SATURATION: 97 % | BODY MASS INDEX: 28.7 KG/M2

## 2023-10-24 DIAGNOSIS — Z17.0 MALIGNANT NEOPLASM OF UPPER-INNER QUADRANT OF RIGHT BREAST IN FEMALE, ESTROGEN RECEPTOR POSITIVE: ICD-10-CM

## 2023-10-24 DIAGNOSIS — N64.4 ACUTE BREAST PAIN: ICD-10-CM

## 2023-10-24 DIAGNOSIS — Z92.3 HISTORY OF THYROID IRRADIATION: ICD-10-CM

## 2023-10-24 DIAGNOSIS — N63.0 PALPABLE MASS OF BREAST: ICD-10-CM

## 2023-10-24 DIAGNOSIS — C50.211 MALIGNANT NEOPLASM OF UPPER-INNER QUADRANT OF RIGHT BREAST IN FEMALE, ESTROGEN RECEPTOR POSITIVE: ICD-10-CM

## 2023-10-24 DIAGNOSIS — G43.109 MIGRAINE WITH AURA AND WITHOUT STATUS MIGRAINOSUS, NOT INTRACTABLE: ICD-10-CM

## 2023-10-24 DIAGNOSIS — Z23 ENCOUNTER FOR IMMUNIZATION: ICD-10-CM

## 2023-10-24 DIAGNOSIS — R51.9 WORSENING HEADACHES: Primary | ICD-10-CM

## 2023-10-24 PROCEDURE — 90686 IIV4 VACC NO PRSV 0.5 ML IM: CPT

## 2023-10-24 PROCEDURE — 90471 IMMUNIZATION ADMIN: CPT

## 2023-10-24 PROCEDURE — 99214 OFFICE O/P EST MOD 30 MIN: CPT | Performed by: FAMILY MEDICINE

## 2023-10-24 RX ORDER — TOPIRAMATE 100 MG/1
TABLET, FILM COATED ORAL
Qty: 90 TABLET | Refills: 1
Start: 2023-10-24

## 2023-10-24 RX ORDER — PROPRANOLOL HYDROCHLORIDE 80 MG/1
80 CAPSULE, EXTENDED RELEASE ORAL DAILY
Qty: 30 CAPSULE | Refills: 1 | Status: SHIPPED | OUTPATIENT
Start: 2023-11-04 | End: 2023-11-02

## 2023-10-24 RX ORDER — RIZATRIPTAN BENZOATE 10 MG/1
10 TABLET, ORALLY DISINTEGRATING ORAL AS NEEDED
Qty: 9 TABLET | Refills: 2 | Status: SHIPPED | OUTPATIENT
Start: 2023-10-24

## 2023-10-24 RX ORDER — TOPIRAMATE 25 MG/1
25 TABLET ORAL
Qty: 5 TABLET | Refills: 0 | Status: SHIPPED | OUTPATIENT
Start: 2023-10-30

## 2023-10-24 NOTE — PROGRESS NOTES
Name: Estefanía Nieto      : 1965      MRN: [id number]  Encounter Provider: Elenita Au DO  Encounter Date: 10/24/2023   Encounter department: [address] [place] [address]     1. Worsening headaches  -     MRI brain w wo contrast; Future; Expected date: 10/24/2023    2. Migraine with aura and without status migrainosus, not intractable  -     propranolol (INDERAL LA) 80 mg 24 hr capsule; Take 1 capsule (80 mg total) by mouth daily Do not start before 2023.  -     topiramate (Topamax) 25 mg tablet; Take 1 tablet (25 mg total) by mouth daily at bedtime Do not start before 2023.  -     topiramate (TOPAMAX) 100 mg tablet; Wean off - take 50mg QHS for 5 days (10/24-10/29/2023) then start 25mg tabs  -     rizatriptan (MAXALT-MLT) 10 mg disintegrating tablet; Take 1 tablet (10 mg total) by mouth as needed for migraine Take at the onset of migraine; if symptoms continue or return, may take another dose at least 2 hours after first dose. Take no more than 2 doses in a day. 3. Acute breast pain  -     Mammo diagnostic right w 3d & cad; Future; Expected date: 10/24/2023  -     US breast right limited (diagnostic); Future; Expected date: 10/24/2023    4. Palpable mass of breast  -     Mammo diagnostic right w 3d & cad; Future; Expected date: 10/24/2023  -     US breast right limited (diagnostic); Future; Expected date: 10/24/2023    5. Malignant neoplasm of 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, estrogen receptor positive   -     Mammo diagnostic right w 3d & cad; Future; Expected date: 10/24/2023  -     US breast right limited (diagnostic); Future; Expected date: 10/24/2023  -     MRI brain w wo contrast; Future; Expected date: 10/24/2023    6. History of thyroid irradiation    7.  Encounter for immunization  -     influenza vaccine, quadrivalent, 0.5 mL, preservative-free, for adult and pediatric patients 6 mos+ (AFLURIA, [address], FLULAVAL, FLUZONE)    right breast pain, new right breast mass palpable on exam upper outer quadrant/axillary tail in pt with hx breast CA- requires immediate w/u  Advise pt that once she starts on b-blocker, take half dose of zanaflex   New rx given for Propranolol( Inderal LA) for Migraine Prophylaxis  · · (Extended-release) Initial, 80 mg orally once daily, titrate to maintenance; range 160 to 240 mg once daily    Subjective      Chief Complaint   Patient presents with   • Follow-up     follow up and also very tender under left armpit, started 4-5 days ago, hurts to lay on        Pain specialist whom she is seeing for her neck pain gave rx nortriptyline, but pt has not yet started- states she was instructed to take the nortirptyline at bedtime and then 2 hours later take her topamax dose (has been on 100mg topamax for migraines, managed here) - pt states she is afraid she will fall asleep after taking the nortryt and miss her topamax dose  Pt admits migraines are not controlled- pain spec going by thought that at least part of her HA pain is coming from her neck  Admits "I think so" worsening of the migraine sx "easily triggered"  Never has had any brain imaging  Also reports acute right axillary pain- onset 4 days ago "just rolled over in bed and it hurt"  Something's really tender over there  No injury or trauma  No swelling or lump        3/30/2021  [facility]   Patient is a 55 yo female who presents to [facility]. She has been on maxalt x 1 year for migraines. However, the headaches are not going away. She was started on topamax 25 mg since Feb by previous PCP. She has been having migraines weekly, last 2-4 days at a time. She has never seen neurology. She has not been on any other preventative migraine medications.     She also has had a pinched nerve in her neck, was seeing Dr Joaquin Mathew for injection and was last seen in Dec. She did have abnormal MRI completed in past. She has not completed any PT. Review of Systems    Current Outpatient Medications on File Prior to Visit   Medication Sig   • albuterol (Ventolin HFA) 90 mcg/act inhaler Inhale 2 puffs every 4 (four) hours as needed for wheezing   • anastrozole (ARIMIDEX) 1 mg tablet Take 1 tablet (1 mg total) by mouth daily   • B Complex Vitamins (B COMPLEX 1 PO) Take by mouth daily    • benralizumab (FASENRA) subcutaneous injection Inject 1 mL (30 mg total) under the skin every 56 days   • Budeson-Glycopyrrol-Formoterol (Breztri Aerosphere) 160-9-4.8 MCG/ACT AERO Inhale 2 puffs every 12 (twelve) hours Rinse mouth after use.    • Calcium-Magnesium-Vitamin D 185- MG-MG-UNIT CAPS Take by mouth daily    • cyanocobalamin (VITAMIN B-12) 500 MCG tablet Take 500 mcg by mouth daily   • Echinacea 400 MG CAPS Take by mouth daily    • EPINEPHrine (EPIPEN) 0.3 mg/0.3 mL SOAJ Inject 0.3 mL (0.3 mg total) into a muscle once for 1 dose   • Fasenra Pen 30 MG/ML SOAJ    • ipratropium-albuterol (DUO-NEB) 0.5-2.5 mg/3 mL nebulizer solution    • loperamide (IMODIUM) 2 mg capsule Take 2 mg by mouth 4 (four) times a day as needed for diarrhea   • Loratadine 10 MG CAPS Take 10 mg by mouth daily   • meloxicam (MOBIC) 15 mg tablet Take 1 tablet (15 mg total) by mouth in the morning   • Multiple Vitamins-Minerals (MULTIVITAMIN ADULT PO) Take by mouth daily    • tiZANidine (ZANAFLEX) 4 mg tablet TAKE 1 TABLET BY MOUTH DAILY AT BEDTIME   • TURMERIC PO Take by mouth   • Ascorbic Acid (vitamin C) 1000 MG tablet Take 1,000 mg by mouth daily   • nortriptyline (PAMELOR) 10 mg capsule Take 1 capsule (10 mg total) by mouth daily at bedtime (Patient not taking: Reported on 10/24/2023)   • [DISCONTINUED] budesonide-formoterol (Symbicort) 160-4.5 mcg/act inhaler Inhale 2 puffs 2 (two) times a day       Objective     /70 (BP Location: Left arm, Patient Position: Sitting, Cuff Size: Standard)   Pulse 73   Temp (!) 97.1 °F (36.2 °C) (Tympanic)   Ht 5' 6" (1.676 m)   Wt 81 kg (178 lb 9.6 oz)   LMP  (LMP Unknown)   SpO2 97%   BMI 28.83 kg/m²     Physical Exam  Vitals and nursing note reviewed. Constitutional:       General: She is not in acute distress. Appearance: She is well-groomed. She is not ill-appearing, toxic-appearing or diaphoretic. HENT:      Head: Normocephalic and atraumatic. Mouth/Throat:      Mouth: Mucous membranes are moist.      Pharynx: Oropharynx is clear. Uvula midline. Eyes:      General: Lids are normal.      Conjunctiva/sclera: Conjunctivae normal.   Neck:      Thyroid: No thyroid mass or thyromegaly. Trachea: Trachea and phonation normal.   Cardiovascular:      Rate and Rhythm: Normal rate and regular rhythm. Pulses: Normal pulses. Heart sounds: Normal heart sounds. Pulmonary:      Effort: Pulmonary effort is normal.      Breath sounds: Normal breath sounds and air entry. Chest:       Musculoskeletal:      Cervical back: Neck supple. Right lower leg: No edema. Left lower leg: No edema. Lymphadenopathy:      Cervical: No cervical adenopathy. Right cervical: No superficial, deep or posterior cervical adenopathy. Left cervical: No superficial, deep or posterior cervical adenopathy. Upper Body:      Right upper body: No supraclavicular, axillary, pectoral or epitrochlear adenopathy. Left upper body: No supraclavicular adenopathy. Skin:     Coloration: Skin is not pale. Neurological:      Mental Status: She is alert and oriented to person, place, and time. Gait: Gait normal.   Psychiatric:         Mood and Affect: Mood normal.         Behavior: Behavior normal. Behavior is cooperative.    Viviane Boyd DO

## 2023-10-25 ENCOUNTER — TELEPHONE (OUTPATIENT)
Age: 58
End: 2023-10-25

## 2023-10-25 NOTE — TELEPHONE ENCOUNTER
Caller: [facility]     Doctor: Isabella     Reason for call: [facility] calling regarding MRI cervical spine     Call back#: [phone] opt#3

## 2023-10-26 ENCOUNTER — TELEPHONE (OUTPATIENT)
Dept: PAIN MEDICINE | Facility: MEDICAL CENTER | Age: 58
End: 2023-10-26

## 2023-10-26 DIAGNOSIS — M54.12 CERVICAL RADICULOPATHY: Primary | ICD-10-CM

## 2023-10-26 NOTE — TELEPHONE ENCOUNTER
----- Message from Jean Claude Adorno PA-C sent at 10/26/2023  7:30 AM EDT -----  Please call patient to make her aware of her Cervical spine MRI findings:   Increase in size of C6-7 disc protrusion with increased spinal canal narrowing,Stable degenerative changes at C5-6 with moderate-severe foraminal narrowing,   Spinal cord signal abnormality consistent with edema/myelomalacia.     Lesion seen on the T1 vertebral body;  I will need her to have a repeat MRI with contrast.

## 2023-10-26 NOTE — TELEPHONE ENCOUNTER
RN s/w Yohana from radiology, per Yohana they just wanted to alert [facility] to the significant findings and they are in the chart.    --please see pt's MRI results regarding significant findings

## 2023-10-26 NOTE — TELEPHONE ENCOUNTER
Pt aware of same and requested results be sent to her mychart.       --please advise blood work needed due to MRI with Contrast?--

## 2023-10-27 ENCOUNTER — TELEPHONE (OUTPATIENT)
Dept: NEUROSURGERY | Facility: CLINIC | Age: 58
End: 2023-10-27

## 2023-10-27 ENCOUNTER — TELEPHONE (OUTPATIENT)
Age: 58
End: 2023-10-27

## 2023-10-27 NOTE — TELEPHONE ENCOUNTER
Patient also need right C4-6 MBB #1 scheduled. Messages wre left for her on 10/9 and 10/17. Will inquire with her if she wants to schedule this as well as rescheduling the BREE blocks.

## 2023-10-27 NOTE — TELEPHONE ENCOUNTER
Caller: Stan Curiel    Doctor: Anjel Quinones     Reason for call: pt calling to reschedule procedure appointments scheduled for 11/9 & 11/16. Please Advise.     Call back#: [phone]

## 2023-10-27 NOTE — TELEPHONE ENCOUNTER
Left message for patient to call office back to reschedule BREE blocks as well as the schedule the MBB #1. Left # 2x. Will cx BREE blocks.

## 2023-10-31 DIAGNOSIS — G43.109 MIGRAINE WITH AURA AND WITHOUT STATUS MIGRAINOSUS, NOT INTRACTABLE: ICD-10-CM

## 2023-10-31 DIAGNOSIS — M54.12 CERVICAL RADICULOPATHY: ICD-10-CM

## 2023-10-31 DIAGNOSIS — M48.02 CERVICAL SPINAL STENOSIS: ICD-10-CM

## 2023-10-31 DIAGNOSIS — M47.812 CERVICAL SPONDYLOSIS: ICD-10-CM

## 2023-10-31 RX ORDER — NORTRIPTYLINE HYDROCHLORIDE 10 MG/1
10 CAPSULE ORAL
Qty: 90 CAPSULE | Refills: 1 | Status: SHIPPED | OUTPATIENT
Start: 2023-10-31

## 2023-10-31 NOTE — TELEPHONE ENCOUNTER
Requested medication(s) are due for refill today: Yes  Patient has already received a courtesy refill: No  Other reason request has been forwarded to provider: Pharmacy requesting a 90 day supply

## 2023-11-01 ENCOUNTER — HOSPITAL ENCOUNTER (OUTPATIENT)
Dept: MRI IMAGING | Facility: HOSPITAL | Age: 58
Discharge: HOME | End: 2023-11-01
Payer: COMMERCIAL

## 2023-11-01 DIAGNOSIS — R51.9 WORSENING HEADACHES: ICD-10-CM

## 2023-11-01 DIAGNOSIS — C50.211 MALIGNANT NEOPLASM OF UPPER-INNER QUADRANT OF RIGHT BREAST IN FEMALE, ESTROGEN RECEPTOR POSITIVE: ICD-10-CM

## 2023-11-01 DIAGNOSIS — Z17.0 MALIGNANT NEOPLASM OF UPPER-INNER QUADRANT OF RIGHT BREAST IN FEMALE, ESTROGEN RECEPTOR POSITIVE: ICD-10-CM

## 2023-11-01 PROCEDURE — 70553 MRI BRAIN STEM W/O & W/DYE: CPT

## 2023-11-01 PROCEDURE — G1004 CDSM NDSC: HCPCS

## 2023-11-01 PROCEDURE — A9585 GADOBUTROL INJECTION: HCPCS | Performed by: FAMILY MEDICINE

## 2023-11-01 RX ORDER — GADOBUTROL 604.72 MG/ML
8 INJECTION INTRAVENOUS
Status: COMPLETED | OUTPATIENT
Start: 2023-11-01 | End: 2023-11-01

## 2023-11-01 RX ADMIN — GADOBUTROL 8 ML: 604.72 INJECTION INTRAVENOUS at 20:36

## 2023-11-02 RX ORDER — PROPRANOLOL HYDROCHLORIDE 80 MG/1
80 CAPSULE, EXTENDED RELEASE ORAL DAILY
Qty: 90 CAPSULE | Refills: 0 | Status: SHIPPED | OUTPATIENT
Start: 2023-11-04

## 2023-11-03 ENCOUNTER — TELEPHONE (OUTPATIENT)
Dept: FAMILY MEDICINE CLINIC | Facility: CLINIC | Age: 58
End: 2023-11-03

## 2023-11-03 NOTE — TELEPHONE ENCOUNTER
Caller: Ashutosh Hernandez    Doctor: Trey Monroy    Reason for call: Pt is calling to reschedule her procedure    Call back#: [phone]

## 2023-11-03 NOTE — TELEPHONE ENCOUNTER
Patient contacted the office this morning stating that Dr. Kiran Michelle had prescribed Propranolol for her. The pharmacy flagged this as she is on PRN Rizatriptan and Tizanidine. She wants to make sure this is okay for her to start the new medication while taking the other two PRN meds. Please advise.

## 2023-11-03 NOTE — TELEPHONE ENCOUNTER
This was discussed at the visit and documented- "Advise pt that once she starts on b-blocker, take half dose of zanaflex"

## 2023-11-06 NOTE — TELEPHONE ENCOUNTER
Patient rescheduled right and left sided BREE Block- USGI. Bakari did not want to schedule C4-6 MBB without discussing MRI results with Cathlyn Cranker. Scheduled folow up appointment for patient with MG.

## 2023-11-08 ENCOUNTER — OFFICE VISIT (OUTPATIENT)
Dept: SURGICAL ONCOLOGY | Facility: CLINIC | Age: 58
End: 2023-11-08
Payer: COMMERCIAL

## 2023-11-08 VITALS
TEMPERATURE: 98.2 F | HEART RATE: 83 BPM | WEIGHT: 179.6 LBS | DIASTOLIC BLOOD PRESSURE: 78 MMHG | SYSTOLIC BLOOD PRESSURE: 122 MMHG | BODY MASS INDEX: 28.87 KG/M2 | OXYGEN SATURATION: 96 % | RESPIRATION RATE: 16 BRPM | HEIGHT: 66 IN

## 2023-11-08 DIAGNOSIS — R92.30 DENSE BREAST TISSUE: ICD-10-CM

## 2023-11-08 DIAGNOSIS — Z17.0 MALIGNANT NEOPLASM OF UPPER-INNER QUADRANT OF RIGHT BREAST IN FEMALE, ESTROGEN RECEPTOR POSITIVE: Primary | ICD-10-CM

## 2023-11-08 DIAGNOSIS — C50.211 MALIGNANT NEOPLASM OF UPPER-INNER QUADRANT OF RIGHT BREAST IN FEMALE, ESTROGEN RECEPTOR POSITIVE: Primary | ICD-10-CM

## 2023-11-08 DIAGNOSIS — Z79.811 USE OF ANASTROZOLE: ICD-10-CM

## 2023-11-08 PROCEDURE — 99214 OFFICE O/P EST MOD 30 MIN: CPT | Performed by: SURGERY
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
## 2023-11-13 ENCOUNTER — TELEMEDICINE (OUTPATIENT)
Dept: FAMILY MEDICINE CLINIC | Facility: CLINIC | Age: 58
End: 2023-11-13
Payer: COMMERCIAL

## 2023-11-13 DIAGNOSIS — Z17.0 MALIGNANT NEOPLASM OF UPPER-INNER QUADRANT OF RIGHT BREAST IN FEMALE, ESTROGEN RECEPTOR POSITIVE: ICD-10-CM

## 2023-11-13 DIAGNOSIS — J44.9 COPD, SEVERE (HCC): ICD-10-CM

## 2023-11-13 DIAGNOSIS — M89.9 FRONTAL SKULL LESION: Primary | ICD-10-CM

## 2023-11-13 DIAGNOSIS — C50.211 MALIGNANT NEOPLASM OF UPPER-INNER QUADRANT OF RIGHT BREAST IN FEMALE, ESTROGEN RECEPTOR POSITIVE: ICD-10-CM

## 2023-11-13 DIAGNOSIS — R51.9 WORSENING HEADACHES: ICD-10-CM

## 2023-11-13 DIAGNOSIS — T88.7XXA MEDICATION SIDE EFFECTS: ICD-10-CM

## 2023-11-13 DIAGNOSIS — I73.9 MICROANGIOPATHY (HCC): ICD-10-CM

## 2023-11-13 DIAGNOSIS — G43.E09 CHRONIC MIGRAINE WITH AURA WITHOUT STATUS MIGRAINOSUS, NOT INTRACTABLE: ICD-10-CM

## 2023-11-13 PROBLEM — G43.909 MIGRAINE: Status: ACTIVE | Noted: 2023-11-13

## 2023-11-13 PROCEDURE — 99214 OFFICE O/P EST MOD 30 MIN: CPT | Performed by: FAMILY MEDICINE
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
## 2023-11-18 DIAGNOSIS — G43.109 MIGRAINE WITH AURA AND WITHOUT STATUS MIGRAINOSUS, NOT INTRACTABLE: ICD-10-CM

## 2023-11-19 PROBLEM — M89.9 FRONTAL SKULL LESION: Status: ACTIVE | Noted: 2023-11-19

## 2023-11-19 PROBLEM — T88.7XXA MEDICATION SIDE EFFECTS: Status: ACTIVE | Noted: 2023-11-19

## 2023-11-19 PROBLEM — R51.9 WORSENING HEADACHES: Status: ACTIVE | Noted: 2023-11-19

## 2023-11-19 PROBLEM — I73.9 MICROANGIOPATHY (HCC): Status: ACTIVE | Noted: 2023-11-19

## 2023-11-20 ENCOUNTER — HOSPITAL ENCOUNTER (OUTPATIENT)
Dept: MRI IMAGING | Facility: HOSPITAL | Age: 58
Discharge: HOME | End: 2023-11-20
Payer: COMMERCIAL

## 2023-11-20 DIAGNOSIS — M54.12 CERVICAL RADICULOPATHY: ICD-10-CM

## 2023-11-20 PROCEDURE — 72156 MRI NECK SPINE W/O & W/DYE: CPT

## 2023-11-20 PROCEDURE — G1004 CDSM NDSC: HCPCS

## 2023-11-20 PROCEDURE — A9585 GADOBUTROL INJECTION: HCPCS | Performed by: PHYSICIAN ASSISTANT

## 2023-11-20 RX ORDER — GADOBUTROL 604.72 MG/ML
8 INJECTION INTRAVENOUS
Status: COMPLETED | OUTPATIENT
Start: 2023-11-20 | End: 2023-11-20

## 2023-11-20 RX ADMIN — GADOBUTROL 8 ML: 604.72 INJECTION INTRAVENOUS at 19:40

## 2023-11-20 NOTE — TELEPHONE ENCOUNTER
Requested medication(s) are due for refill today: No  Patient has already received a courtesy refill: No  Other reason request has been forwarded to provider: med should not be due for a refill

## 2023-11-21 DIAGNOSIS — G43.109 MIGRAINE WITH AURA AND WITHOUT STATUS MIGRAINOSUS, NOT INTRACTABLE: ICD-10-CM

## 2023-11-21 RX ORDER — TOPIRAMATE 100 MG/1
TABLET, FILM COATED ORAL
Qty: 90 TABLET | Refills: 1 | OUTPATIENT
Start: 2023-11-21

## 2023-11-21 RX ORDER — TOPIRAMATE 100 MG/1
100 TABLET, FILM COATED ORAL DAILY
Qty: 90 TABLET | Refills: 1 | Status: SHIPPED | OUTPATIENT
Start: 2023-11-21

## 2023-11-24 ENCOUNTER — TELEPHONE (OUTPATIENT)
Dept: PAIN MEDICINE | Facility: MEDICAL CENTER | Age: 58
End: 2023-11-24

## 2023-11-24 NOTE — TELEPHONE ENCOUNTER
----- Message from Megan Rothman PA-C sent at 11/24/2023 10:26 AM EST -----  Please let patient know her cervical spine MRI shows multilevel degenerative changes most significant at C5-6 where there is moderate spinal canal narrowing and indentation of the cord. Findings have worsened since her last study. Please have her p  roceed with the neurosurgical eval as we discussed.

## 2023-11-24 NOTE — TELEPHONE ENCOUNTER
Caller: Wendy Martin PT    Doctor/Office: Debbie Messina     Call regarding :  MRI results     Call was transferred to: Nurse

## 2023-12-04 ENCOUNTER — TELEPHONE (OUTPATIENT)
Dept: FAMILY MEDICINE CLINIC | Facility: CLINIC | Age: 58
End: 2023-12-04

## 2023-12-04 ENCOUNTER — CONSULT (OUTPATIENT)
Dept: NEUROSURGERY | Facility: CLINIC | Age: 58
End: 2023-12-04
Payer: COMMERCIAL

## 2023-12-04 VITALS
TEMPERATURE: 97.6 F | WEIGHT: 174 LBS | OXYGEN SATURATION: 97 % | HEART RATE: 74 BPM | HEIGHT: 66 IN | DIASTOLIC BLOOD PRESSURE: 83 MMHG | BODY MASS INDEX: 27.97 KG/M2 | SYSTOLIC BLOOD PRESSURE: 145 MMHG

## 2023-12-04 DIAGNOSIS — M54.12 CERVICAL RADICULOPATHY: ICD-10-CM

## 2023-12-04 DIAGNOSIS — M48.02 CERVICAL SPINAL STENOSIS: ICD-10-CM

## 2023-12-04 DIAGNOSIS — Z01.818 PRE-PROCEDURAL EXAMINATION: Primary | ICD-10-CM

## 2023-12-04 PROCEDURE — 99244 OFF/OP CNSLTJ NEW/EST MOD 40: CPT | Performed by: STUDENT IN AN ORGANIZED HEALTH CARE EDUCATION/TRAINING PROGRAM

## 2023-12-04 RX ORDER — CHLORHEXIDINE GLUCONATE ORAL RINSE 1.2 MG/ML
15 SOLUTION DENTAL ONCE
OUTPATIENT
Start: 2023-12-04 | End: 2023-12-04

## 2023-12-04 NOTE — TELEPHONE ENCOUNTER
Seun Cobb from [facility] contacted the office this afternoon advising that a peer to peer must be completed for the patients imaging study of the NM PET CT skull base to mid thigh. It was originally scheduled for tomorrow 12/5 but needed to be canceled because of denial through the insurance and the peer to peer that is required now for further review. Phone number to contact Amy Powers to initiate peer to peer is [phone]. Case # [id number].

## 2023-12-04 NOTE — PROGRESS NOTES
Assessment/Plan:  55-year-old female history of COPD presents to clinic for neck pain and bilateral upper extremity pain that seems to fit a C6 dermatome. She also has signs and symptoms of cervical myelopathy as well. Symptoms of been progressively getting worse over the last 3 to 6 months. She has tried physical therapy and injections in the past.  Her MRI cervical spine shows cervical stenosis and cord compression at C5-6. I recommended a C5-6 arthroplasty for further treatment. I discussed risk benefits along with alternatives, all questions were answered. She consented for surgery and stated she would likely want to proceed with surgery however she wants to think about it for another day and talk with her family. I ordered an x-ray cervical spine flex ex to ensure no instability at this level. We will reach out in a few days for scheduling and if she would like to proceed at that time we will continue with scheduling. I spent 40 minutes obtaining history and physical exam, reviewing imaging, discussing treatment options, and coordinating care. Subjective:      Patient ID: Farzaneh Ch is a 62 y.o. female. HPI    55-year-old female with a history of COPD, who presents to clinic for evaluation of neck pain and bilateral upper extremity pain right greater than left that fits a C6 dermatome. She has had ongoing issues for several years and the symptoms have been progressively getting worse over the last 3 to 6 months. She also complains of decreased hand dexterity in both hands, dropping objects more frequently, numbness and the fingers, and difficulty with balance. She had an MRI cervical spine which showed multilevel degenerative changes worst at C5-6 with spinal canal stenosis and compression of the spinal cord at this level. She was referred to neurosurgery clinic for further evaluation.         Review of Systems      Objective:      /83 (BP Location: Left arm, Patient Position: Sitting, Cuff Size: Standard)   Pulse 74   Temp 97.6 °F (36.4 °C) (Temporal)   Ht 5' 6" (1.676 m)   Wt 78.9 kg (174 lb)   LMP  (LMP Unknown)   SpO2 97%   BMI 28.08 kg/m²          Physical Exam    A&OX3  Gaze conjugate  EOMI  FS  TM  Fcx4  5/5 BUE  5/5 BLE  Numbness RUE lateral forearm and thumb/index finger  +bue knight  No clonus  No babinski    Imaging and labs:  I reviewed the MRI cervical spine which showed multilevel degenerative changes worst at C5-6 with spinal canal stenosis and cord compression.

## 2023-12-05 NOTE — TELEPHONE ENCOUNTER
9:46-9:58 AM   I called for ycgk-co-rney and "Cody Marquez, nurse reviewer" answered, stated (re: the PET CT) "I see denying on 11/24 - looking for other imaging to be performed" then transferred me to physician reviewer, Dr. 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  Dr. Ashwin Melo initially asked "can the lesions be biopsied" - I advised that pt has not yet obtained the mammo+US, and I suppose the skull lesion could be biopsied given its frontal location, then Dr Ashwin Melo stated, "the reason (for the PET CT) is to see if the area of concern lights up" and gave authorization:    Auth # [id number]    Valid Nov 22, 2023 - Jan 20, 2024

## 2023-12-07 DIAGNOSIS — Z17.0 MALIGNANT NEOPLASM OF UPPER-INNER QUADRANT OF RIGHT BREAST IN FEMALE, ESTROGEN RECEPTOR POSITIVE: Primary | ICD-10-CM

## 2023-12-07 DIAGNOSIS — Z98.890 STATUS POST RIGHT BREAST LUMPECTOMY: ICD-10-CM

## 2023-12-07 DIAGNOSIS — C50.211 MALIGNANT NEOPLASM OF UPPER-INNER QUADRANT OF RIGHT BREAST IN FEMALE, ESTROGEN RECEPTOR POSITIVE: Primary | ICD-10-CM

## 2023-12-14 ENCOUNTER — TELEPHONE (OUTPATIENT)
Dept: NEUROSURGERY | Facility: CLINIC | Age: 58
End: 2023-12-14

## 2023-12-14 ENCOUNTER — PROCEDURE VISIT (OUTPATIENT)
Dept: PAIN MEDICINE | Facility: MEDICAL CENTER | Age: 58
End: 2023-12-14
Payer: COMMERCIAL

## 2023-12-14 ENCOUNTER — APPOINTMENT (OUTPATIENT)
Dept: LAB | Facility: CLINIC | Age: 58
End: 2023-12-14
Payer: COMMERCIAL

## 2023-12-14 ENCOUNTER — HOSPITAL ENCOUNTER (OUTPATIENT)
Dept: RADIOLOGY | Facility: HOSPITAL | Age: 58
Discharge: HOME | End: 2023-12-14
Attending: STUDENT IN AN ORGANIZED HEALTH CARE EDUCATION/TRAINING PROGRAM
Payer: COMMERCIAL

## 2023-12-14 VITALS
DIASTOLIC BLOOD PRESSURE: 70 MMHG | HEIGHT: 66 IN | HEART RATE: 82 BPM | SYSTOLIC BLOOD PRESSURE: 105 MMHG | BODY MASS INDEX: 28.08 KG/M2

## 2023-12-14 DIAGNOSIS — Z01.818 PRE-PROCEDURAL EXAMINATION: ICD-10-CM

## 2023-12-14 DIAGNOSIS — M54.81 OCCIPITAL NEURALGIA OF RIGHT SIDE: Primary | ICD-10-CM

## 2023-12-14 DIAGNOSIS — M48.02 CERVICAL SPINAL STENOSIS: ICD-10-CM

## 2023-12-14 DIAGNOSIS — M54.12 CERVICAL RADICULOPATHY: ICD-10-CM

## 2023-12-14 LAB
ALBUMIN SERPL BCP-MCNC: 4.5 G/DL (ref 3.5–5)
ALP SERPL-CCNC: 84 U/L (ref 34–104)
ALT SERPL W P-5'-P-CCNC: 24 U/L (ref 7–52)
AMORPH URATE CRY URNS QL MICRO: ABNORMAL
ANION GAP SERPL CALCULATED.3IONS-SCNC: 7 MMOL/L
APTT PPP: 31 SECONDS (ref 23–37)
AST SERPL W P-5'-P-CCNC: 21 U/L (ref 13–39)
BACTERIA UR QL AUTO: ABNORMAL /HPF
BASOPHILS # BLD MANUAL: 0.08 THOUSAND/UL (ref 0–0.1)
BASOPHILS NFR MAR MANUAL: 1 % (ref 0–1)
BILIRUB SERPL-MCNC: 0.47 MG/DL (ref 0.2–1)
BILIRUB UR QL STRIP: NEGATIVE
BUN SERPL-MCNC: 17 MG/DL (ref 5–25)
CALCIUM SERPL-MCNC: 9.8 MG/DL (ref 8.4–10.2)
CHLORIDE SERPL-SCNC: 104 MMOL/L (ref 96–108)
CLARITY UR: ABNORMAL
CO2 SERPL-SCNC: 28 MMOL/L (ref 21–32)
COLOR UR: YELLOW
CREAT SERPL-MCNC: 0.69 MG/DL (ref 0.6–1.3)
EOSINOPHIL # BLD MANUAL: 0 THOUSAND/UL (ref 0–0.4)
EOSINOPHIL NFR BLD MANUAL: 0 % (ref 0–6)
ERYTHROCYTE [DISTWIDTH] IN BLOOD BY AUTOMATED COUNT: 12.7 % (ref 11.6–15.1)
EST. AVERAGE GLUCOSE BLD GHB EST-MCNC: 117 MG/DL
GFR SERPL CREATININE-BSD FRML MDRD: 96 ML/MIN/1.73SQ M
GLUCOSE P FAST SERPL-MCNC: 101 MG/DL (ref 65–99)
GLUCOSE UR STRIP-MCNC: NEGATIVE MG/DL
HBA1C MFR BLD: 5.7 %
HCT VFR BLD AUTO: 44.3 % (ref 34.8–46.1)
HGB BLD-MCNC: 14.8 G/DL (ref 11.5–15.4)
HGB UR QL STRIP.AUTO: NEGATIVE
HYALINE CASTS #/AREA URNS LPF: ABNORMAL /LPF
INR PPP: 0.99 (ref 0.84–1.19)
KETONES UR STRIP-MCNC: NEGATIVE MG/DL
LEUKOCYTE ESTERASE UR QL STRIP: NEGATIVE
LYMPHOCYTES # BLD AUTO: 3.39 THOUSAND/UL (ref 0.6–4.47)
LYMPHOCYTES # BLD AUTO: 36 % (ref 14–44)
MCH RBC QN AUTO: 30.1 PG (ref 26.8–34.3)
MCHC RBC AUTO-ENTMCNC: 33.4 G/DL (ref 31.4–37.4)
MCV RBC AUTO: 90 FL (ref 82–98)
MONOCYTES # BLD AUTO: 0.23 THOUSAND/UL (ref 0–1.22)
MONOCYTES NFR BLD: 3 % (ref 4–12)
MUCOUS THREADS UR QL AUTO: ABNORMAL
NEUTROPHILS # BLD MANUAL: 4.01 THOUSAND/UL (ref 1.85–7.62)
NEUTS BAND NFR BLD MANUAL: 1 % (ref 0–8)
NEUTS SEG NFR BLD AUTO: 51 % (ref 43–75)
NITRITE UR QL STRIP: NEGATIVE
NON-SQ EPI CELLS URNS QL MICRO: ABNORMAL /HPF
PH UR STRIP.AUTO: 7 [PH]
PLATELET # BLD AUTO: 248 THOUSANDS/UL (ref 149–390)
PLATELET BLD QL SMEAR: ADEQUATE
PMV BLD AUTO: 10.7 FL (ref 8.9–12.7)
POTASSIUM SERPL-SCNC: 4.1 MMOL/L (ref 3.5–5.3)
PROT SERPL-MCNC: 7.3 G/DL (ref 6.4–8.4)
PROT UR STRIP-MCNC: ABNORMAL MG/DL
PROTHROMBIN TIME: 13 SECONDS (ref 11.6–14.5)
RBC # BLD AUTO: 4.91 MILLION/UL (ref 3.81–5.12)
RBC #/AREA URNS AUTO: ABNORMAL /HPF
RBC MORPH BLD: NORMAL
SODIUM SERPL-SCNC: 139 MMOL/L (ref 135–147)
SP GR UR STRIP.AUTO: 1.02 (ref 1–1.03)
UROBILINOGEN UR STRIP-ACNC: <2 MG/DL
VARIANT LYMPHS # BLD AUTO: 8 %
WBC # BLD AUTO: 7.71 THOUSAND/UL (ref 4.31–10.16)
WBC #/AREA URNS AUTO: ABNORMAL /HPF

## 2023-12-14 PROCEDURE — 80053 COMPREHEN METABOLIC PANEL: CPT

## 2023-12-14 PROCEDURE — 72050 X-RAY EXAM NECK SPINE 4/5VWS: CPT

## 2023-12-14 PROCEDURE — 81001 URINALYSIS AUTO W/SCOPE: CPT | Performed by: STUDENT IN AN ORGANIZED HEALTH CARE EDUCATION/TRAINING PROGRAM

## 2023-12-14 PROCEDURE — 85007 BL SMEAR W/DIFF WBC COUNT: CPT

## 2023-12-14 PROCEDURE — 83036 HEMOGLOBIN GLYCOSYLATED A1C: CPT

## 2023-12-14 PROCEDURE — 76942 ECHO GUIDE FOR BIOPSY: CPT | Performed by: PHYSICAL MEDICINE & REHABILITATION

## 2023-12-14 PROCEDURE — 85730 THROMBOPLASTIN TIME PARTIAL: CPT

## 2023-12-14 PROCEDURE — 36415 COLL VENOUS BLD VENIPUNCTURE: CPT

## 2023-12-14 PROCEDURE — 64405 NJX AA&/STRD GR OCPL NRV: CPT | Performed by: PHYSICAL MEDICINE & REHABILITATION

## 2023-12-14 PROCEDURE — 85610 PROTHROMBIN TIME: CPT

## 2023-12-14 PROCEDURE — 85027 COMPLETE CBC AUTOMATED: CPT

## 2023-12-14 RX ORDER — BUPIVACAINE HYDROCHLORIDE 2.5 MG/ML
10 INJECTION, SOLUTION EPIDURAL; INFILTRATION; INTRACAUDAL ONCE
Status: COMPLETED | OUTPATIENT
Start: 2023-12-14 | End: 2023-12-14

## 2023-12-14 RX ORDER — METHYLPREDNISOLONE ACETATE 40 MG/ML
40 INJECTION, SUSPENSION INTRA-ARTICULAR; INTRALESIONAL; INTRAMUSCULAR; SOFT TISSUE ONCE
Status: COMPLETED | OUTPATIENT
Start: 2023-12-14 | End: 2023-12-14

## 2023-12-14 RX ADMIN — BUPIVACAINE HYDROCHLORIDE 10 ML: 2.5 INJECTION, SOLUTION EPIDURAL; INFILTRATION; INTRACAUDAL at 14:42

## 2023-12-14 RX ADMIN — METHYLPREDNISOLONE ACETATE 40 MG: 40 INJECTION, SUSPENSION INTRA-ARTICULAR; INTRALESIONAL; INTRAMUSCULAR; SOFT TISSUE at 14:42

## 2023-12-14 NOTE — LETTER
December 14, 2023     Patient: Floyd Hylton  YOB: 1965  Date of Visit: 12/14/2023      To Whom it May Concern:    Danny Gonzales is under my professional care. Irvin Sutton was seen in my office on 12/14/2023. Irvin Sutton had an injection. If you have any questions or concerns, please don't hesitate to call.          Sincerely,          Bremen Shivani, DO        CC: No Recipients

## 2023-12-14 NOTE — TELEPHONE ENCOUNTER
12/14/23 - PT DROPPED OFF [facility] SHORT TERM DISABILITY FORM (6 PAGES). GIVEN TO Budd Litten.

## 2023-12-14 NOTE — PROGRESS NOTES
Indication: Occipital headaches. Preoperative diagnosis: Greater occipital neuralgia. Postoperative diagnosis: Greater occipital neuralgia. Procedure: Ultrasound-guided right greater occipital nerve block. After discussing the risks, benefits, and alternatives to the procedure, the patient expressed understanding and wished to proceed. The patient was brought to the procedure suite and placed in the prone position. A procedural pause was conducted to verify: correct patient identity, procedure to be performed and as applicable, correct side and site, correct patient position, and availability of implants, special equipment or special requirements. A simple surgical tray was used. Prior to the procedure, the upper cervical spine was examined with a 12 MHz linear transducer to visualize the spinous process, suboccipital musculature, greater occipital nerve, occipital artery, and to determine the optimal needle path. Following this, the area was prepared with a ChloraPrep scrub, then re-examined using the same transducer, a sterile ultrasound transducer cover, and sterile ultrasound transducer gel. Thereafter, using continuous ultrasound guidance, a 2.5-inch 25-gauge needle was advanced in close proximity to the greater occipital nerve. After visualization of the tip and negative aspiration for blood, a mixture of 20 mg of Depo-Medrol in 2 cc of 0.25% bupivacaine was injected into the target site. The patient tolerated the procedure well and there were no apparent complications. After an appropriate amount of observation, the patient was dismissed from the clinic in good condition under their own power.

## 2023-12-15 ENCOUNTER — OFFICE VISIT (OUTPATIENT)
Dept: LAB | Facility: HOSPITAL | Age: 58
End: 2023-12-15
Payer: COMMERCIAL

## 2023-12-15 DIAGNOSIS — M54.12 CERVICAL RADICULOPATHY: ICD-10-CM

## 2023-12-15 DIAGNOSIS — Z01.818 PRE-PROCEDURAL EXAMINATION: ICD-10-CM

## 2023-12-15 DIAGNOSIS — M48.02 CERVICAL SPINAL STENOSIS: ICD-10-CM

## 2023-12-15 PROBLEM — M54.81 OCCIPITAL NEURALGIA OF RIGHT SIDE: Status: ACTIVE | Noted: 2023-12-15

## 2023-12-15 PROCEDURE — 93005 ELECTROCARDIOGRAM TRACING: CPT

## 2023-12-16 LAB
ATRIAL RATE: 73 BPM
P AXIS: 49 DEGREES
PR INTERVAL: 202 MS
QRS AXIS: 72 DEGREES
QRSD INTERVAL: 88 MS
QT INTERVAL: 386 MS
QTC INTERVAL: 425 MS
T WAVE AXIS: 49 DEGREES
VENTRICULAR RATE: 73 BPM

## 2023-12-18 ENCOUNTER — OFFICE VISIT (OUTPATIENT)
Dept: FAMILY MEDICINE CLINIC | Facility: CLINIC | Age: 58
End: 2023-12-18
Payer: COMMERCIAL

## 2023-12-18 VITALS
HEIGHT: 66 IN | WEIGHT: 182.2 LBS | TEMPERATURE: 98 F | SYSTOLIC BLOOD PRESSURE: 130 MMHG | BODY MASS INDEX: 29.28 KG/M2 | DIASTOLIC BLOOD PRESSURE: 80 MMHG | HEART RATE: 80 BPM | OXYGEN SATURATION: 97 %

## 2023-12-18 DIAGNOSIS — Z79.811 LONG TERM CURRENT USE OF AROMATASE INHIBITOR: ICD-10-CM

## 2023-12-18 DIAGNOSIS — Z01.818 PREOP GENERAL PHYSICAL EXAM: Primary | ICD-10-CM

## 2023-12-18 DIAGNOSIS — M48.02 CERVICAL SPINAL STENOSIS: ICD-10-CM

## 2023-12-18 DIAGNOSIS — Z17.0 MALIGNANT NEOPLASM OF UPPER-INNER QUADRANT OF RIGHT BREAST IN FEMALE, ESTROGEN RECEPTOR POSITIVE: ICD-10-CM

## 2023-12-18 DIAGNOSIS — C50.211 MALIGNANT NEOPLASM OF UPPER-INNER QUADRANT OF RIGHT BREAST IN FEMALE, ESTROGEN RECEPTOR POSITIVE: ICD-10-CM

## 2023-12-18 DIAGNOSIS — E78.5 DYSLIPIDEMIA: ICD-10-CM

## 2023-12-18 DIAGNOSIS — J44.9 COPD, SEVERE (HCC): ICD-10-CM

## 2023-12-18 DIAGNOSIS — I73.9 MICROANGIOPATHY (HCC): ICD-10-CM

## 2023-12-18 PROBLEM — E87.8 HYPERCHLOREMIA: Status: RESOLVED | Noted: 2022-04-15 | Resolved: 2023-12-18

## 2023-12-18 PROCEDURE — 99214 OFFICE O/P EST MOD 30 MIN: CPT | Performed by: FAMILY MEDICINE
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
## 2023-12-20 ENCOUNTER — TELEPHONE (OUTPATIENT)
Dept: PAIN MEDICINE | Facility: CLINIC | Age: 58
End: 2023-12-20

## 2023-12-20 NOTE — TELEPHONE ENCOUNTER
Pt answered but couldn't talk because she was at work. Wasn't able to get a pain level or % of improvement.

## 2023-12-21 ENCOUNTER — PROCEDURE VISIT (OUTPATIENT)
Dept: PAIN MEDICINE | Facility: MEDICAL CENTER | Age: 58
End: 2023-12-21
Payer: COMMERCIAL

## 2023-12-21 VITALS
BODY MASS INDEX: 29.38 KG/M2 | SYSTOLIC BLOOD PRESSURE: 111 MMHG | HEIGHT: 66 IN | DIASTOLIC BLOOD PRESSURE: 73 MMHG | WEIGHT: 182.8 LBS | HEART RATE: 77 BPM

## 2023-12-21 DIAGNOSIS — M54.81 OCCIPITAL NEURALGIA OF RIGHT SIDE: Primary | ICD-10-CM

## 2023-12-21 DIAGNOSIS — M54.81 OCCIPITAL NEURALGIA OF LEFT SIDE: ICD-10-CM

## 2023-12-21 PROCEDURE — 76942 ECHO GUIDE FOR BIOPSY: CPT | Performed by: PHYSICAL MEDICINE & REHABILITATION

## 2023-12-21 PROCEDURE — 64405 NJX AA&/STRD GR OCPL NRV: CPT | Performed by: PHYSICAL MEDICINE & REHABILITATION

## 2023-12-21 RX ORDER — METHYLPREDNISOLONE ACETATE 40 MG/ML
40 INJECTION, SUSPENSION INTRA-ARTICULAR; INTRALESIONAL; INTRAMUSCULAR; SOFT TISSUE ONCE
Status: COMPLETED | OUTPATIENT
Start: 2023-12-21 | End: 2023-12-21

## 2023-12-21 RX ORDER — BUPIVACAINE HYDROCHLORIDE 2.5 MG/ML
10 INJECTION, SOLUTION EPIDURAL; INFILTRATION; INTRACAUDAL ONCE
Status: COMPLETED | OUTPATIENT
Start: 2023-12-21 | End: 2023-12-21

## 2023-12-21 RX ADMIN — BUPIVACAINE HYDROCHLORIDE 10 ML: 2.5 INJECTION, SOLUTION EPIDURAL; INFILTRATION; INTRACAUDAL at 14:56

## 2023-12-21 RX ADMIN — METHYLPREDNISOLONE ACETATE 40 MG: 40 INJECTION, SUSPENSION INTRA-ARTICULAR; INTRALESIONAL; INTRAMUSCULAR; SOFT TISSUE at 14:55

## 2023-12-21 NOTE — PROGRESS NOTES
Indication: Occipital headaches.   Preoperative diagnosis: Greater occipital neuralgia.  Postoperative diagnosis: Greater occipital neuralgia.  Procedure: Ultrasound-guided left greater occipital nerve block.     After discussing the risks, benefits, and alternatives to the procedure, the patient expressed understanding and wished to proceed. The patient was brought to the procedure suite and placed in the prone position. A procedural pause was conducted to verify: correct patient identity, procedure to be performed and as applicable, correct side and site, correct patient position, and availability of implants, special equipment or special requirements. A simple surgical tray was used. Prior to the procedure, the upper cervical spine was examined with a 12 MHz linear transducer to visualize the spinous process, suboccipital musculature, greater occipital nerve, occipital artery, and to determine the optimal needle path. Following this, the area was prepared with a ChloraPrep scrub, then re-examined using the same transducer, a sterile ultrasound transducer cover, and sterile ultrasound transducer gel. Thereafter, using continuous ultrasound guidance, a 2.5-inch 25-gauge needle was advanced in close proximity to the greater occipital nerve. After visualization of the tip and negative aspiration for blood, a mixture of 20 mg of Depo-Medrol in 2 cc of 0.25% bupivacaine was injected into the target site. The patient tolerated the procedure well and there were no apparent complications. After an appropriate amount of observation, the patient was dismissed from the clinic in good condition under their own power.

## 2023-12-26 NOTE — TELEPHONE ENCOUNTER
Patient report: 50% improvement post injection  Pain Level: 3/10    Doing much better with the second shot.

## 2023-12-26 NOTE — TELEPHONE ENCOUNTER
Pt called and stated that she needs a script for a chair for work that has back and neck support  She asked for the script at pain management and they told her that her PCP had to give her that  Bed/Stretcher in lowest position, wheels locked, appropriate side rails in place/Call bell, personal items and telephone in reach/Instruct patient to call for assistance before getting out of bed/chair/stretcher/Non-slip footwear applied when patient is off stretcher/[place] to call system/Physically safe environment - no spills, clutter or unnecessary equipment/Purposeful proactive rounding/Room/bathroom lighting operational, light cord in reach Bed/Stretcher in lowest position, wheels locked, appropriate side rails in place/Call bell, personal items and telephone in reach/Instruct patient to call for assistance before getting out of bed/chair/stretcher/Non-slip footwear applied when patient is off stretcher/[place] to call system/Physically safe environment - no spills, clutter or unnecessary equipment/Purposeful proactive rounding/Room/bathroom lighting operational, light cord in reach

## 2023-12-26 NOTE — PRE-PROCEDURE INSTRUCTIONS
Pre-Surgery Instructions:   Medication Instructions    albuterol (Ventolin HFA) 90 mcg/act inhaler Uses PRN- OK to take day of surgery    anastrozole (ARIMIDEX) 1 mg tablet Instructions provided by MD    Ascorbic Acid (vitamin C) 1000 MG tablet Stop taking 7 days prior to surgery.    B Complex Vitamins (B COMPLEX 1 PO) Stop taking 7 days prior to surgery.    benralizumab (FASENRA) subcutaneous injection Every 56 days, last taken 12/23/23    Budeson-Glycopyrrol-Formoterol (Breztri Aerosphere) 160-9-4.8 MCG/ACT AERO Take day of surgery.    Calcium-Magnesium-Vitamin D 185- MG-MG-UNIT CAPS Stop taking 7 days prior to surgery.    cyanocobalamin (VITAMIN B-12) 500 MCG tablet Stop taking 7 days prior to surgery.    Echinacea 400 MG CAPS Stop taking 7 days prior to surgery.    EPINEPHrine (EPIPEN) 0.3 mg/0.3 mL SOAJ Uses PRN- OK to take day of surgery    ipratropium-albuterol (DUO-NEB) 0.5-2.5 mg/3 mL nebulizer solution Uses PRN- OK to take day of surgery    loperamide (IMODIUM) 2 mg capsule Uses PRN- OK to take day of surgery    Loratadine 10 MG CAPS Hold day of surgery.    meloxicam (MOBIC) 15 mg tablet Stop taking 7 days prior to surgery.    Multiple Vitamins-Minerals (MULTIVITAMIN ADULT PO) Stop taking 7 days prior to surgery.    rizatriptan (MAXALT-MLT) 10 mg disintegrating tablet Uses PRN- OK to take day of surgery    topiramate (TOPAMAX) 100 mg tablet Take day of surgery.    TURMERIC PO Stop taking 7 days prior to surgery.        Medication instructions for day surgery reviewed. Please use only a sip of water to take your instructed medications. Avoid all over the counter vitamins, supplements and NSAIDS for one week prior to surgery per anesthesia guidelines. Tylenol is ok to take as needed.     You will receive a call one business day prior to surgery with an arrival time and hospital directions. If your surgery is scheduled on a Monday, the hospital will be calling you on the Friday prior to your surgery. If  you have not heard from anyone by 8pm, please call the hospital supervisor through the hospital  at [phone]. (Angel [phone]).    Do not eat or drink anything after midnight the night before your surgery, including candy, mints, lifesavers, or chewing gum. Do not drink alcohol 24hrs before your surgery. Try not to smoke at least 24hrs before your surgery.       Follow the pre surgery showering instructions as listed in the “My Surgical Experience Booklet” or otherwise provided by your surgeon's office. Do not use a blade to shave the surgical area 1 week before surgery. It is okay to use a clean electric clippers up to 24 hours before surgery. Do not apply any lotions, creams, including makeup, cologne, deodorant, or perfumes after showering on the day of your surgery. Do not use dry shampoo, hair spray, hair gel, or any type of hair products.     No contact lenses, eye make-up, or artificial eyelashes. Remove nail polish, including gel polish, and any artificial, gel, or acrylic nails if possible. Remove all jewelry including rings and body piercing jewelry.     Wear causal clothing that is easy to take on and off. Consider your type of surgery.    Keep any valuables, jewelry, piercings at home. Please bring any specially ordered equipment (sling, braces) if indicated.    Arrange for a responsible person to drive you to and from the hospital on the day of your surgery. Visitor Guidelines discussed.     Call the surgeon's office with any new illnesses, exposures, or additional questions prior to surgery.    Please reference your “My Surgical Experience Booklet” for additional information to prepare for your upcoming surgery.

## 2023-12-27 DIAGNOSIS — J44.9 COPD, SEVERE (HCC): ICD-10-CM

## 2023-12-28 ENCOUNTER — TELEPHONE (OUTPATIENT)
Dept: RADIOLOGY | Facility: MEDICAL CENTER | Age: 58
End: 2023-12-28

## 2023-12-28 RX ORDER — ALBUTEROL SULFATE 90 UG/1
AEROSOL, METERED RESPIRATORY (INHALATION)
Qty: 18 G | Refills: 3 | Status: SHIPPED | OUTPATIENT
Start: 2023-12-28

## 2023-12-28 NOTE — TELEPHONE ENCOUNTER
Pt called asking if this script can please be signed off on. She has surgery coming up next week and really needs to have this before then. Please advise

## 2023-12-28 NOTE — TELEPHONE ENCOUNTER
Patient Reports    50     %     improvement post injection    Pain Level     can go up to 6-7 but manageable /10

## 2024-01-01 ENCOUNTER — ANESTHESIA EVENT (OUTPATIENT)
Dept: PERIOP | Facility: HOSPITAL | Age: 59
End: 2024-01-01
Payer: COMMERCIAL

## 2024-01-02 ENCOUNTER — APPOINTMENT (OUTPATIENT)
Dept: RADIOLOGY | Facility: HOSPITAL | Age: 59
End: 2024-01-02
Payer: COMMERCIAL

## 2024-01-02 ENCOUNTER — ANESTHESIA (OUTPATIENT)
Dept: PERIOP | Facility: HOSPITAL | Age: 59
End: 2024-01-02
Payer: COMMERCIAL

## 2024-01-02 ENCOUNTER — HOSPITAL ENCOUNTER (OUTPATIENT)
Facility: HOSPITAL | Age: 59
Setting detail: OUTPATIENT SURGERY
Discharge: HOME | End: 2024-01-03
Attending: STUDENT IN AN ORGANIZED HEALTH CARE EDUCATION/TRAINING PROGRAM | Admitting: STUDENT IN AN ORGANIZED HEALTH CARE EDUCATION/TRAINING PROGRAM
Payer: COMMERCIAL

## 2024-01-02 DIAGNOSIS — M48.02 CERVICAL STENOSIS OF SPINAL CANAL: Primary | ICD-10-CM

## 2024-01-02 PROCEDURE — NC001 PR NO CHARGE: Performed by: PHYSICIAN ASSISTANT

## 2024-01-02 PROCEDURE — C1776 JOINT DEVICE (IMPLANTABLE): HCPCS | Performed by: STUDENT IN AN ORGANIZED HEALTH CARE EDUCATION/TRAINING PROGRAM

## 2024-01-02 PROCEDURE — 22856 TOT DISC ARTHRP 1NTRSPC CRV: CPT | Performed by: PHYSICIAN ASSISTANT

## 2024-01-02 PROCEDURE — NC001 PR NO CHARGE: Performed by: STUDENT IN AN ORGANIZED HEALTH CARE EDUCATION/TRAINING PROGRAM

## 2024-01-02 PROCEDURE — 22856 TOT DISC ARTHRP 1NTRSPC CRV: CPT | Performed by: STUDENT IN AN ORGANIZED HEALTH CARE EDUCATION/TRAINING PROGRAM

## 2024-01-02 PROCEDURE — 72040 X-RAY EXAM NECK SPINE 2-3 VW: CPT

## 2024-01-02 DEVICE — PRESTIGE LP DISC 6X18MM
Type: IMPLANTABLE DEVICE | Site: SPINE CERVICAL | Status: FUNCTIONAL
Brand: PRESTIGE® LP CERVICAL DISC SYSTEM

## 2024-01-02 RX ORDER — ALBUTEROL SULFATE 90 UG/1
2 AEROSOL, METERED RESPIRATORY (INHALATION) EVERY 4 HOURS PRN
Status: DISCONTINUED | OUTPATIENT
Start: 2024-01-02 | End: 2024-01-03 | Stop reason: HOSPADM

## 2024-01-02 RX ORDER — PROPOFOL 10 MG/ML
INJECTION, EMULSION INTRAVENOUS CONTINUOUS PRN
Status: DISCONTINUED | OUTPATIENT
Start: 2024-01-02 | End: 2024-01-02

## 2024-01-02 RX ORDER — ONDANSETRON 2 MG/ML
4 INJECTION INTRAMUSCULAR; INTRAVENOUS ONCE AS NEEDED
Status: DISCONTINUED | OUTPATIENT
Start: 2024-01-02 | End: 2024-01-02 | Stop reason: HOSPADM

## 2024-01-02 RX ORDER — LIDOCAINE HYDROCHLORIDE 10 MG/ML
INJECTION, SOLUTION EPIDURAL; INFILTRATION; INTRACAUDAL; PERINEURAL AS NEEDED
Status: DISCONTINUED | OUTPATIENT
Start: 2024-01-02 | End: 2024-01-02

## 2024-01-02 RX ORDER — METHOCARBAMOL 500 MG/1
500 TABLET, FILM COATED ORAL EVERY 6 HOURS PRN
Status: DISCONTINUED | OUTPATIENT
Start: 2024-01-02 | End: 2024-01-03 | Stop reason: HOSPADM

## 2024-01-02 RX ORDER — HYDROMORPHONE HYDROCHLORIDE 2 MG/ML
INJECTION, SOLUTION INTRAMUSCULAR; INTRAVENOUS; SUBCUTANEOUS AS NEEDED
Status: DISCONTINUED | OUTPATIENT
Start: 2024-01-02 | End: 2024-01-02

## 2024-01-02 RX ORDER — LIDOCAINE HYDROCHLORIDE AND EPINEPHRINE 10; 10 MG/ML; UG/ML
INJECTION, SOLUTION INFILTRATION; PERINEURAL AS NEEDED
Status: DISCONTINUED | OUTPATIENT
Start: 2024-01-02 | End: 2024-01-02 | Stop reason: HOSPADM

## 2024-01-02 RX ORDER — MIDAZOLAM HYDROCHLORIDE 2 MG/2ML
INJECTION, SOLUTION INTRAMUSCULAR; INTRAVENOUS AS NEEDED
Status: DISCONTINUED | OUTPATIENT
Start: 2024-01-02 | End: 2024-01-02

## 2024-01-02 RX ORDER — METOCLOPRAMIDE HYDROCHLORIDE 5 MG/ML
10 INJECTION INTRAMUSCULAR; INTRAVENOUS ONCE AS NEEDED
Status: DISCONTINUED | OUTPATIENT
Start: 2024-01-02 | End: 2024-01-02 | Stop reason: HOSPADM

## 2024-01-02 RX ORDER — IPRATROPIUM BROMIDE AND ALBUTEROL SULFATE 2.5; .5 MG/3ML; MG/3ML
3 SOLUTION RESPIRATORY (INHALATION) 3 TIMES DAILY PRN
Status: DISCONTINUED | OUTPATIENT
Start: 2024-01-02 | End: 2024-01-03 | Stop reason: HOSPADM

## 2024-01-02 RX ORDER — SUCCINYLCHOLINE/SOD CL,ISO/PF 100 MG/5ML
SYRINGE (ML) INTRAVENOUS AS NEEDED
Status: DISCONTINUED | OUTPATIENT
Start: 2024-01-02 | End: 2024-01-02

## 2024-01-02 RX ORDER — ANASTROZOLE 1 MG/1
1 TABLET ORAL DAILY
Status: DISCONTINUED | OUTPATIENT
Start: 2024-01-02 | End: 2024-01-03 | Stop reason: HOSPADM

## 2024-01-02 RX ORDER — ONDANSETRON 2 MG/ML
INJECTION INTRAMUSCULAR; INTRAVENOUS AS NEEDED
Status: DISCONTINUED | OUTPATIENT
Start: 2024-01-02 | End: 2024-01-02

## 2024-01-02 RX ORDER — METHOCARBAMOL 500 MG/1
500 TABLET, FILM COATED ORAL EVERY 6 HOURS SCHEDULED
Status: DISCONTINUED | OUTPATIENT
Start: 2024-01-02 | End: 2024-01-02

## 2024-01-02 RX ORDER — HEPARIN SODIUM 5000 [USP'U]/ML
5000 INJECTION, SOLUTION INTRAVENOUS; SUBCUTANEOUS EVERY 8 HOURS SCHEDULED
Status: DISCONTINUED | OUTPATIENT
Start: 2024-01-03 | End: 2024-01-03 | Stop reason: HOSPADM

## 2024-01-02 RX ORDER — SODIUM CHLORIDE 9 MG/ML
INJECTION, SOLUTION INTRAVENOUS CONTINUOUS PRN
Status: DISCONTINUED | OUTPATIENT
Start: 2024-01-02 | End: 2024-01-02

## 2024-01-02 RX ORDER — ASCORBIC ACID 500 MG
1000 TABLET ORAL DAILY
Status: DISCONTINUED | OUTPATIENT
Start: 2024-01-02 | End: 2024-01-03 | Stop reason: HOSPADM

## 2024-01-02 RX ORDER — CALCIUM/MAGNESIUM/ZINC 333-133 MG
TABLET ORAL DAILY
Status: DISCONTINUED | OUTPATIENT
Start: 2024-01-02 | End: 2024-01-02

## 2024-01-02 RX ORDER — PROPOFOL 10 MG/ML
INJECTION, EMULSION INTRAVENOUS AS NEEDED
Status: DISCONTINUED | OUTPATIENT
Start: 2024-01-02 | End: 2024-01-02

## 2024-01-02 RX ORDER — TIZANIDINE 2 MG/1
4 TABLET ORAL
Status: DISCONTINUED | OUTPATIENT
Start: 2024-01-02 | End: 2024-01-03 | Stop reason: HOSPADM

## 2024-01-02 RX ORDER — TOPIRAMATE 100 MG/1
100 TABLET, FILM COATED ORAL DAILY
Status: DISCONTINUED | OUTPATIENT
Start: 2024-01-02 | End: 2024-01-03 | Stop reason: HOSPADM

## 2024-01-02 RX ORDER — OXYCODONE HYDROCHLORIDE AND ACETAMINOPHEN 5; 325 MG/1; MG/1
1 TABLET ORAL EVERY 4 HOURS PRN
Status: DISCONTINUED | OUTPATIENT
Start: 2024-01-02 | End: 2024-01-03 | Stop reason: HOSPADM

## 2024-01-02 RX ORDER — HYDROMORPHONE HCL IN WATER/PF 6 MG/30 ML
0.2 PATIENT CONTROLLED ANALGESIA SYRINGE INTRAVENOUS
Status: DISCONTINUED | OUTPATIENT
Start: 2024-01-02 | End: 2024-01-02 | Stop reason: HOSPADM

## 2024-01-02 RX ORDER — CEFAZOLIN SODIUM 2 G/50ML
2000 SOLUTION INTRAVENOUS EVERY 8 HOURS
Qty: 150 ML | Refills: 0 | Status: COMPLETED | OUTPATIENT
Start: 2024-01-02 | End: 2024-01-03

## 2024-01-02 RX ORDER — LANOLIN ALCOHOL/MO/W.PET/CERES
1 CREAM (GRAM) TOPICAL
Status: DISCONTINUED | OUTPATIENT
Start: 2024-01-03 | End: 2024-01-03 | Stop reason: HOSPADM

## 2024-01-02 RX ORDER — FENTANYL CITRATE 50 UG/ML
INJECTION, SOLUTION INTRAMUSCULAR; INTRAVENOUS AS NEEDED
Status: DISCONTINUED | OUTPATIENT
Start: 2024-01-02 | End: 2024-01-02

## 2024-01-02 RX ORDER — CEFAZOLIN SODIUM 2 G/50ML
2000 SOLUTION INTRAVENOUS ONCE
Status: COMPLETED | OUTPATIENT
Start: 2024-01-02 | End: 2024-01-02

## 2024-01-02 RX ORDER — BUDESONIDE AND FORMOTEROL FUMARATE DIHYDRATE 160; 4.5 UG/1; UG/1
2 AEROSOL RESPIRATORY (INHALATION) 2 TIMES DAILY
Status: DISCONTINUED | OUTPATIENT
Start: 2024-01-02 | End: 2024-01-03 | Stop reason: HOSPADM

## 2024-01-02 RX ORDER — HYDROMORPHONE HCL/PF 1 MG/ML
0.5 SYRINGE (ML) INJECTION
Status: DISCONTINUED | OUTPATIENT
Start: 2024-01-02 | End: 2024-01-03 | Stop reason: HOSPADM

## 2024-01-02 RX ORDER — CHLORHEXIDINE GLUCONATE ORAL RINSE 1.2 MG/ML
15 SOLUTION DENTAL ONCE
Status: COMPLETED | OUTPATIENT
Start: 2024-01-02 | End: 2024-01-02

## 2024-01-02 RX ORDER — SUMATRIPTAN 50 MG/1
25 TABLET, FILM COATED ORAL ONCE AS NEEDED
Status: COMPLETED | OUTPATIENT
Start: 2024-01-02 | End: 2024-01-02

## 2024-01-02 RX ORDER — SODIUM CHLORIDE, SODIUM LACTATE, POTASSIUM CHLORIDE, CALCIUM CHLORIDE 600; 310; 30; 20 MG/100ML; MG/100ML; MG/100ML; MG/100ML
INJECTION, SOLUTION INTRAVENOUS CONTINUOUS PRN
Status: DISCONTINUED | OUTPATIENT
Start: 2024-01-02 | End: 2024-01-02

## 2024-01-02 RX ORDER — EPHEDRINE SULFATE 50 MG/ML
INJECTION INTRAVENOUS AS NEEDED
Status: DISCONTINUED | OUTPATIENT
Start: 2024-01-02 | End: 2024-01-02

## 2024-01-02 RX ORDER — FENTANYL CITRATE/PF 50 MCG/ML
25 SYRINGE (ML) INJECTION
Status: DISCONTINUED | OUTPATIENT
Start: 2024-01-02 | End: 2024-01-02 | Stop reason: HOSPADM

## 2024-01-02 RX ORDER — DEXAMETHASONE SODIUM PHOSPHATE 10 MG/ML
INJECTION, SOLUTION INTRAMUSCULAR; INTRAVENOUS AS NEEDED
Status: DISCONTINUED | OUTPATIENT
Start: 2024-01-02 | End: 2024-01-02

## 2024-01-02 RX ADMIN — SODIUM CHLORIDE: 0.9 INJECTION, SOLUTION INTRAVENOUS at 07:39

## 2024-01-02 RX ADMIN — ANASTROZOLE 1 MG: 1 TABLET, COATED ORAL at 16:23

## 2024-01-02 RX ADMIN — SUMATRIPTAN SUCCINATE 25 MG: 50 TABLET ORAL at 16:28

## 2024-01-02 RX ADMIN — Medication 100 MG: at 07:37

## 2024-01-02 RX ADMIN — FENTANYL CITRATE 25 MCG: 50 INJECTION, SOLUTION INTRAMUSCULAR; INTRAVENOUS at 10:01

## 2024-01-02 RX ADMIN — OXYCODONE HYDROCHLORIDE AND ACETAMINOPHEN 1 TABLET: 5; 325 TABLET ORAL at 20:23

## 2024-01-02 RX ADMIN — CEFAZOLIN SODIUM 2000 MG: 2 SOLUTION INTRAVENOUS at 16:22

## 2024-01-02 RX ADMIN — DEXAMETHASONE SODIUM PHOSPHATE 10 MG: 10 INJECTION, SOLUTION INTRAMUSCULAR; INTRAVENOUS at 07:41

## 2024-01-02 RX ADMIN — PHENYLEPHRINE HYDROCHLORIDE 20 MCG/MIN: 10 INJECTION, SOLUTION INTRAVENOUS at 08:04

## 2024-01-02 RX ADMIN — ONDANSETRON 4 MG: 2 INJECTION INTRAMUSCULAR; INTRAVENOUS at 10:36

## 2024-01-02 RX ADMIN — EPHEDRINE SULFATE 5 MG: 50 INJECTION INTRAVENOUS at 07:58

## 2024-01-02 RX ADMIN — OXYCODONE HYDROCHLORIDE AND ACETAMINOPHEN 1 TABLET: 5; 325 TABLET ORAL at 14:30

## 2024-01-02 RX ADMIN — LIDOCAINE HYDROCHLORIDE 50 MG: 10 INJECTION, SOLUTION EPIDURAL; INFILTRATION; INTRACAUDAL; PERINEURAL at 07:37

## 2024-01-02 RX ADMIN — HYDROMORPHONE HYDROCHLORIDE 0.5 MG: 2 INJECTION, SOLUTION INTRAMUSCULAR; INTRAVENOUS; SUBCUTANEOUS at 10:46

## 2024-01-02 RX ADMIN — BUDESONIDE AND FORMOTEROL FUMARATE DIHYDRATE 2 PUFF: 160; 4.5 AEROSOL RESPIRATORY (INHALATION) at 18:50

## 2024-01-02 RX ADMIN — FENTANYL CITRATE 50 MCG: 50 INJECTION, SOLUTION INTRAMUSCULAR; INTRAVENOUS at 07:37

## 2024-01-02 RX ADMIN — REMIFENTANIL HYDROCHLORIDE 0.2 MCG/KG/MIN: 1 INJECTION, POWDER, LYOPHILIZED, FOR SOLUTION INTRAVENOUS at 07:38

## 2024-01-02 RX ADMIN — FENTANYL CITRATE 25 MCG: 50 INJECTION, SOLUTION INTRAMUSCULAR; INTRAVENOUS at 09:52

## 2024-01-02 RX ADMIN — SODIUM CHLORIDE, SODIUM LACTATE, POTASSIUM CHLORIDE, AND CALCIUM CHLORIDE: .6; .31; .03; .02 INJECTION, SOLUTION INTRAVENOUS at 06:30

## 2024-01-02 RX ADMIN — HYDROMORPHONE HYDROCHLORIDE 0.5 MG: 1 INJECTION, SOLUTION INTRAMUSCULAR; INTRAVENOUS; SUBCUTANEOUS at 11:57

## 2024-01-02 RX ADMIN — PROPOFOL 100 MCG/KG/MIN: 10 INJECTION, EMULSION INTRAVENOUS at 07:38

## 2024-01-02 RX ADMIN — MIDAZOLAM 2 MG: 1 INJECTION INTRAMUSCULAR; INTRAVENOUS at 07:31

## 2024-01-02 RX ADMIN — CEFAZOLIN SODIUM 2000 MG: 2 SOLUTION INTRAVENOUS at 07:41

## 2024-01-02 RX ADMIN — PROPOFOL 200 MG: 10 INJECTION, EMULSION INTRAVENOUS at 07:37

## 2024-01-02 RX ADMIN — CHLORHEXIDINE GLUCONATE 15 ML: 1.2 SOLUTION ORAL at 06:44

## 2024-01-02 NOTE — PLAN OF CARE
Problem: PAIN - ADULT  Goal: Verbalizes/displays adequate comfort level or baseline comfort level  Description: Interventions:  - Encourage patient to monitor pain and request assistance  - Assess pain using appropriate pain scale  - Administer analgesics based on type and severity of pain and evaluate response  - Implement non-pharmacological measures as appropriate and evaluate response  - Consider cultural and social influences on pain and pain management  - Notify physician/advanced practitioner if interventions unsuccessful or patient reports new pain  1/2/2024 1807 by Yuridia Perkins RN  Outcome: Progressing  1/2/2024 1806 by Yuridia Perkins RN  Outcome: Progressing     Problem: INFECTION - ADULT  Goal: Absence or prevention of progression during hospitalization  Description: INTERVENTIONS:  - Assess and monitor for signs and symptoms of infection  - Monitor lab/diagnostic results  - Monitor all insertion sites, i.e. indwelling lines, tubes, and drains  - Monitor endotracheal if appropriate and nasal secretions for changes in amount and color  - [place] appropriate cooling/warming therapies per order  - Administer medications as ordered  - Instruct and encourage patient and family to use good hand hygiene technique  - Identify and instruct in appropriate isolation precautions for identified infection/condition  1/2/2024 1807 by Yuridia Perkins RN  Outcome: Progressing  1/2/2024 1806 by Yuridia Perkins RN  Outcome: Progressing  Goal: Absence of fever/infection during neutropenic period  Description: INTERVENTIONS:  - Monitor WBC    1/2/2024 1807 by Yuridia Perkins RN  Outcome: Progressing  1/2/2024 1806 by Yuridia Perkins RN  Outcome: Progressing     Problem: SAFETY ADULT  Goal: Patient will remain free of falls  Description: INTERVENTIONS:  - Educate patient/family on patient safety including physical limitations  - Instruct patient to call for assistance with activity   - Consult OT/PT to assist with  strengthening/mobility   - Keep Call bell within reach  - Keep bed low and locked with side rails adjusted as appropriate  - Keep care items and personal belongings within reach  - Initiate and maintain comfort rounds  - Make Fall Risk Sign visible to staff  - Offer Toileting every 2 Hours, in advance of need  - Initiate/Maintain alarm  - Obtain necessary fall risk management equipment:   - Apply yellow socks and bracelet for high fall risk patients  - Consider moving patient to room near nurses station  1/2/2024 1807 by Yuridia Perkins RN  Outcome: Progressing  1/2/2024 1806 by Yuridia Perkins RN  Outcome: Progressing  Goal: Maintain or return to baseline ADL function  Description: INTERVENTIONS:  -  Assess patient's ability to carry out ADLs; assess patient's baseline for ADL function and identify physical deficits which impact ability to perform ADLs (bathing, care of mouth/teeth, toileting, grooming, dressing, etc.)  - Assess/evaluate cause of self-care deficits   - Assess range of motion  - Assess patient's mobility; develop plan if impaired  - Assess patient's need for assistive devices and provide as appropriate  - Encourage maximum independence but intervene and supervise when necessary  - Involve family in performance of ADLs  - Assess for home care needs following discharge   - Consider OT consult to assist with ADL evaluation and planning for discharge  - Provide patient education as appropriate  1/2/2024 1807 by Yuridia Perkins RN  Outcome: Progressing  1/2/2024 1806 by Yuridia Perkins RN  Outcome: Progressing  Goal: Maintains/Returns to pre admission functional level  Description: INTERVENTIONS:  - Perform AM-PAC 6 Click Basic Mobility/ Daily Activity assessment daily.  - Set and communicate daily mobility goal to care team and patient/family/caregiver.   - Collaborate with rehabilitation services on mobility goals if consulted  - Perform Range of Motion 6 times a day.  - Reposition patient every 2  hours.  - Dangle patient 4 times a day  - Stand patient 6 times a day  - Ambulate patient 6 times a day  - Out of bed to chair 3 times a day   - Out of bed for meals 3 times a day  - Out of bed for toileting  - Record patient progress and toleration of activity level   1/2/2024 1807 by Yuridia Perkins RN  Outcome: Progressing  1/2/2024 1806 by Yuridia Perkins RN  Outcome: Progressing     Problem: DISCHARGE PLANNING  Goal: Discharge to home or other facility with appropriate resources  Description: INTERVENTIONS:  - Identify barriers to discharge w/patient and caregiver  - Arrange for needed discharge resources and transportation as appropriate  - Identify discharge learning needs (meds, wound care, etc.)  - Arrange for interpretive services to assist at discharge as needed  - Refer to Case Management Department for coordinating discharge planning if the patient needs post-hospital services based on physician/advanced practitioner order or complex needs related to functional status, cognitive ability, or social support system  1/2/2024 1807 by Yuridia Perkins RN  Outcome: Progressing  1/2/2024 1806 by Yuridia Perkins RN  Outcome: Progressing     Problem: Knowledge Deficit  Goal: Patient/family/caregiver demonstrates understanding of disease process, treatment plan, medications, and discharge instructions  Description: Complete learning assessment and assess knowledge base.  Interventions:  - Provide teaching at level of understanding  - Provide teaching via preferred learning methods  1/2/2024 1807 by Yuridia Perkins RN  Outcome: Progressing  1/2/2024 1806 by Yuridia Perkins RN  Outcome: Progressing

## 2024-01-02 NOTE — ANESTHESIA POSTPROCEDURE EVALUATION
Post-Op Assessment Note    CV Status:  Stable  Pain Score: 0    Pain management: adequate       Mental Status:  Sleepy and arousable   Hydration Status:  Euvolemic   PONV Controlled:  Controlled   Airway Patency:  Patent     Post Op Vitals Reviewed: Yes      Staff: CRNA               /75 (01/02/24 1108)    Temp 98.4 °F (36.9 °C) (01/02/24 1107)    Pulse 86 (01/02/24 1108)   Resp (!) 28 (01/02/24 1108)    SpO2 97 % (01/02/24 1108)

## 2024-01-02 NOTE — H&P
"H&P reviewed. After examining the patient I find no changes in the patients condition since the H&P had been written.    Patient personally seen and examined.  Neurological examination unchanged compared to last office/progress note, with the following exceptions:    /66   Pulse 65   Temp (!) 97.2 °F (36.2 °C) (Temporal)   Resp 18   Ht 5' 6\" (1.676 m)   Wt 82.3 kg (181 lb 6.4 oz)   LMP  (LMP Unknown)   SpO2 99%   BMI 29.28 kg/m²      A&OX3  Gaze conjugate  EOMI  FS  TM  Fcx4  5/5 BUE  5/5 BLE  Numbness RUE lateral forearm and thumb/index finger  +bue knight  No clonus  No babinski.  Regular cardiac rate and rhythm.  No respiratory distress.  Abdomen nontender.  Normocephalic.    Post operative instructions and medications have been reviewed with the patient and family.    Assessment and Plan:    All questions have been answered to the patient and family satisfaction.  Plan to proceed with C5-6 arthroplasty. They are in agreement with proceeding.  "

## 2024-01-02 NOTE — PROGRESS NOTES
"Progress Note - Khushboo Pichardo 58 y.o. female MRN: [id number]    Unit/Bed#: OR POOL Encounter: [id number]      Assessment:    POD 1 C5-6 Arthroplasty  -pain control  -xrays look good.   -oob   -Drain dc'ed  -NV q 4 hours  -IV abx  -Tolerating po, needs to ambulate  -pt to be discharged later this afternoon as long as she ambulates well.     COPD  -home inhalers  -IS  -monitor    Chronic headaches/Migrane hx  -currently on medications  -cont to monitor      Subjective:   C/o muscle soreness in the back of her neck  Swallowing ok, but does admit to some sore throat  Pt asking about blood over her scalp-explained to her that this was from neuromonitoring needles    Objective:     Vitals: Blood pressure 141/69, pulse 78, temperature 98.4 °F (36.9 °C), resp. rate (!) 27, height 5' 6\" (1.676 m), weight 82.3 kg (181 lb 6.4 oz), SpO2 90%.,Body mass index is 29.28 kg/m².      Intake/Output Summary (Last 24 hours) at 1/2/2024 1437  Last data filed at 1/2/2024 1256  Gross per 24 hour   Intake 1200 ml   Output 1 ml   Net 1199 ml       Physical Exam:   General Appearance: NAD, cooperative, alert  Eyes: Anicteric, conjunctiva clear  HENT:  Normocephalic, atraumatic, normal mucosa.  external ears normal, external nose normal, no drainage  Neck:    Supple, symmetrical, trachea midline  Incision with mepilex, drain in place.  Drain dc'ed, dressing changed.  Resp:  Clear to auscultation bilaterally; no rales, rhonchi or wheezing; respirations unlabored   CV:  S1 S2, Regular rate and rhythm; no murmur, rub, or gallop.  GI:  Soft, non-tender, non-distended; normal bowel sounds; no masses, no organomegaly   Musculoskeletal: No cyanosis, clubbing or edema. Normal ROM.  Skin:   No jaundice, rashes, or lesions   Psych: Normal affect, good eye contact  Neuro: No gross deficits, AAOx3, speech nl, mccarthy  Strength is 5/5 in upper extremities, able to lift legs off the bed      Invasive Devices       Peripheral Intravenous Line  Duration          "    Peripheral IV 01/02/24 Left;Ventral (anterior) Hand <1 day    Peripheral IV 01/02/24 Right Hand <1 day              Drain  Duration             Closed/Suction Drain Anterior;Left Neck Accordion 10 Fr. <1 day                    Lab, Imaging and other studies: I have personally reviewed pertinent reports.    VTE Pharmacologic Prophylaxis: Heparin  VTE Mechanical Prophylaxis: sequential compression device

## 2024-01-02 NOTE — ANESTHESIA PREPROCEDURE EVALUATION
Procedure:  C5-6 ARTHROPLASTY ANTERIOR (Spine Cervical)    Relevant Problems   CARDIO   (+) Chronic migraine with aura without status migrainosus, not intractable   (+) Dyspnea on exertion      MUSCULOSKELETAL   (+) DDD (degenerative disc disease), cervical   (+) Myofascial pain syndrome   (+) Spinal osteoarthritis      NEURO/PSYCH   (+) Chronic migraine with aura without status migrainosus, not intractable   (+) Myofascial pain syndrome   (+) Occipital neuralgia of left side   (+) Worsening headaches      PULMONARY   (+) COPD, severe (HCC)   (+) Dyspnea on exertion   (+) Moderate persistent asthma without complication      Nervous and Auditory   (+) Cervical disc disorder with radiculopathy of mid-cervical region      Musculoskeletal and Integument   (+) Seborrheic keratosis      Other   (+) Cervical spinal stenosis      Slow to wake up from anesthesia  Hypothyroidsm     Physical Exam    Airway    Mallampati score: II  TM Distance: <3 FB  Neck ROM: full     Dental   Comment: None loose     Cardiovascular      Pulmonary      Other Findings  post-pubertal.     Latest Reference Range & Units 12/14/23 09:37   Sodium 135 - 147 mmol/L 139   Potassium 3.5 - 5.3 mmol/L 4.1   Chloride 96 - 108 mmol/L 104   CO2 21 - 32 mmol/L 28   Anion Gap mmol/L 7   BUN 5 - 25 mg/dL 17   Creatinine 0.60 - 1.30 mg/dL 0.69   GLUCOSE FASTING 65 - 99 mg/dL 101 (H)   Calcium 8.4 - 10.2 mg/dL 9.8   AST 13 - 39 U/L 21   ALT 7 - 52 U/L 24   (H): Data is abnormally high       Latest Reference Range & Units 12/14/23 09:37   WBC 4.31 - 10.16 Thousand/uL 7.71   Red Blood Cell Count 3.81 - 5.12 Million/uL 4.91   Hemoglobin 11.5 - 15.4 g/dL 14.8   HCT 34.8 - 46.1 % 44.3   MCV 82 - 98 fL 90   MCH 26.8 - 34.3 pg 30.1   MCHC 31.4 - 37.4 g/dL 33.4   RDW 11.6 - 15.1 % 12.7   Platelet Count 149 - 390 Thousands/uL 248   MPV 8.9 - 12.7 fL 10.7   Platelet Estimate Adequate  Adequate       EKG (12/2023)  Normal sinus rhythm  Normal ECG  When compared with ECG of  06-JUL-2020 10:28,  No significant change was found      Anesthesia Plan  ASA Score- 2     Anesthesia Type- general with ASA Monitors.         Additional Monitors:     Airway Plan: ETT.    Comment: Npo after MN    Preop clearance reviewed - patient does not require any additional testing.       Plan Factors-Exercise tolerance (METS): >4 METS.    Chart reviewed. EKG reviewed.  Existing labs reviewed. Patient summary reviewed.    Patient is not a current smoker.              Induction- intravenous.    Postoperative Plan- Plan for postoperative opioid use. Planned trial extubation    Informed Consent- Anesthetic plan and risks discussed with patient.  I personally reviewed this patient with the CRNA. Discussed and agreed on the Anesthesia Plan with the CRNA..

## 2024-01-02 NOTE — OP NOTE
OPERATIVE REPORT  PATIENT NAME: Khushboo Pichardo    :  1965  MRN: [id number]  Pt Location: [facility] OR ROOM 03    SURGERY DATE: 2024    Surgeons and Role:     * Lasha Lopez MD - Primary     * Mikayla Pompa PA-C - Assisting    There was no qualified resident aid in this case.  As such, due to its complex nature Mikayla Pompa PA-C, was present and assisted for the duration of the case including exposure, hardware placement, and closure.      Preop Diagnosis:  Cervical spinal stenosis [M48.02]    Post-Op Diagnosis Codes:     * Cervical spinal stenosis [M48.02]    Procedure(s):  C5-6 Arthroplasty ([employer] cervical disc, size 6mm x 18mm)  Use of intraoperative microscope  Use of intraoperative neuromonitoring  Use of intraoperative fluoro    Specimen(s):  * No specimens in log *    Estimated Blood Loss:   Minimal    Drains:  Closed/Suction Drain Anterior;Left Neck Accordion 10 Fr. (Active)   Number of days: 0       Anesthesia Type:   General    Operative Indications:  Cervical spinal stenosis [M48.02]    Complications:   None    Procedure and Technique:  58-year-old female who presented with chronic history of neck pain and bilateral upper extremity radiculopathy that has been getting progressively worse over the last 3 to 4 months.  She also complained of symptoms of cervical myelopathy such as gait instability, numbness in her arms, and decreased hand dexterity.  She was myelopathic on exam.  MRI cervical spine showed multilevel degenerative disease however most severe at C5-6 with severe stenosis at this level.  Overall she had good alignment of her cervical spine, minimal facet arthropathy, and no instability on cervical spine flex ex films.  I recommended a C5-6 arthroplasty for further treatment.  I discussed risk and benefits along with alternatives, all questions were answered, she agreed to proceed and consent was obtained.    Patient was brought back to main operating room general endotracheal  anesthesia was induced.  Appropriate lines were placed.  Preoperative antibiotics given were Ancef.  She is placed supine on the operating room table and all pressure points were properly padded.  Intraoperative fluoroscopy was brought onto the field to verify the C5-6 level.  Anterior cervical area was prepped and draped in usual sterile fashion timeout was performed.  Linear incision was made using 10 blade scalpel dissection was carried down to the platysma using monopolar electrocautery.  Platysma was divided using monopolar electrocautery.  Subplatysmal flaps were made using monopolar electrocautery.  The fascia medial to the sternocleidomastoid was divided bluntly using a tonsil.  The carotid artery was identified and retracted laterally.  The trachea and esophagus were identified and retracted medially.  The anterior cervical fascia was bluntly dissected using a peanut.  Torres-Miles retractors were used to maintain the operative field.  The C5-6 disc space was identified using fluoroscopy.  Monopolar electrocautery was used to incise the disc as well as dissect the longus coli muscles out laterally and these were retracted using Torres-Miles retractors.  The majority of the disc removal was completed using upgoing and backward angled curettes as well as pituitaries.  Kerrison rongeurs were used to remove the anterior osteophyte.  M8 drill bit was used to remove the posterior osteophyte.  The PLL was resected using a sharp nerve hook and Kerrison 1 rongeurs followed by Kerrison 2 rongeur's.  Once were happy with the decompression of the thecal sac, the Kerrison rongeurs were were used to complete bilateral foraminotomies at C5-6.  Hemostasis was achieved using Floseal and thrombin-soaked cotton patties.  The arthroplasty disc was sized to a size of 6 x 18 mm.  It was placed using intraoperative fluoroscopy into the C5-6 disc space.  There were no changes in neuromonitoring throughout the case.  Once we  confirmed placement using fluoroscopy, hemostasis was achieved using Floseal and bipolar electrocautery.  The wound was irrigated with large amount sterile saline.  Postoperative drain was left in the field.  The platysma was closed with inverted 2-0 Vicryl sutures.  The dermis was closed with inverted 3-0 Vicryl sutures.  The skin was closed with 4-0 Monocryl.  After procedure was done a count was performed and all sponge instrument needle counts verified to be correct.  Patient was transported to PACU in stable condition.     I was present for the entire procedure.    Patient Disposition:  PACU         SIGNATURE: Lasha Lopez MD  DATE: January 2, 2024  TIME: 10:48 AM

## 2024-01-03 VITALS
BODY MASS INDEX: 29.15 KG/M2 | HEIGHT: 66 IN | RESPIRATION RATE: 18 BRPM | SYSTOLIC BLOOD PRESSURE: 146 MMHG | OXYGEN SATURATION: 96 % | WEIGHT: 181.4 LBS | TEMPERATURE: 97.5 F | DIASTOLIC BLOOD PRESSURE: 89 MMHG | HEART RATE: 91 BPM

## 2024-01-03 DIAGNOSIS — C50.211 MALIGNANT NEOPLASM OF UPPER-INNER QUADRANT OF RIGHT BREAST IN FEMALE, ESTROGEN RECEPTOR POSITIVE: ICD-10-CM

## 2024-01-03 DIAGNOSIS — Z17.0 MALIGNANT NEOPLASM OF UPPER-INNER QUADRANT OF RIGHT BREAST IN FEMALE, ESTROGEN RECEPTOR POSITIVE: ICD-10-CM

## 2024-01-03 RX ORDER — OXYCODONE HYDROCHLORIDE 5 MG/1
5 TABLET ORAL EVERY 6 HOURS PRN
Qty: 15 TABLET | Refills: 0 | Status: SHIPPED | OUTPATIENT
Start: 2024-01-03 | End: 2024-01-13

## 2024-01-03 RX ORDER — METHOCARBAMOL 500 MG/1
500 TABLET, FILM COATED ORAL 4 TIMES DAILY
Qty: 20 TABLET | Refills: 0 | Status: SHIPPED | OUTPATIENT
Start: 2024-01-03

## 2024-01-03 RX ADMIN — Medication 500 MCG: at 07:30

## 2024-01-03 RX ADMIN — CEFAZOLIN SODIUM 2000 MG: 2 SOLUTION INTRAVENOUS at 07:54

## 2024-01-03 RX ADMIN — ALBUTEROL SULFATE 2 PUFF: 90 AEROSOL, METERED RESPIRATORY (INHALATION) at 08:29

## 2024-01-03 RX ADMIN — METHOCARBAMOL 500 MG: 500 TABLET ORAL at 07:54

## 2024-01-03 RX ADMIN — OXYCODONE HYDROCHLORIDE AND ACETAMINOPHEN 1 TABLET: 5; 325 TABLET ORAL at 07:29

## 2024-01-03 RX ADMIN — TOPIRAMATE 100 MG: 100 TABLET, FILM COATED ORAL at 07:30

## 2024-01-03 RX ADMIN — HYDROMORPHONE HYDROCHLORIDE 0.5 MG: 1 INJECTION, SOLUTION INTRAMUSCULAR; INTRAVENOUS; SUBCUTANEOUS at 00:10

## 2024-01-03 RX ADMIN — CEFAZOLIN SODIUM 2000 MG: 2 SOLUTION INTRAVENOUS at 00:10

## 2024-01-03 RX ADMIN — Medication 1 TABLET: at 07:29

## 2024-01-03 RX ADMIN — ANASTROZOLE 1 MG: 1 TABLET, COATED ORAL at 07:29

## 2024-01-03 RX ADMIN — MULTIPLE VITAMINS W/ MINERALS TAB 1 TABLET: TAB ORAL at 07:29

## 2024-01-03 RX ADMIN — BUDESONIDE AND FORMOTEROL FUMARATE DIHYDRATE 2 PUFF: 160; 4.5 AEROSOL RESPIRATORY (INHALATION) at 07:30

## 2024-01-03 RX ADMIN — OXYCODONE HYDROCHLORIDE AND ACETAMINOPHEN 1000 MG: 500 TABLET ORAL at 07:29

## 2024-01-03 NOTE — PLAN OF CARE
Problem: PAIN - ADULT  Goal: Verbalizes/displays adequate comfort level or baseline comfort level  Description: Interventions:  - Encourage patient to monitor pain and request assistance  - Assess pain using appropriate pain scale  - Administer analgesics based on type and severity of pain and evaluate response  - Implement non-pharmacological measures as appropriate and evaluate response  - Consider cultural and social influences on pain and pain management  - Notify physician/advanced practitioner if interventions unsuccessful or patient reports new pain  Outcome: Progressing     Problem: INFECTION - ADULT  Goal: Absence or prevention of progression during hospitalization  Description: INTERVENTIONS:  - Assess and monitor for signs and symptoms of infection  - Monitor lab/diagnostic results  - Monitor all insertion sites, i.e. indwelling lines, tubes, and drains  - Monitor endotracheal if appropriate and nasal secretions for changes in amount and color  - [place] appropriate cooling/warming therapies per order  - Administer medications as ordered  - Instruct and encourage patient and family to use good hand hygiene technique  - Identify and instruct in appropriate isolation precautions for identified infection/condition  Outcome: Progressing  Goal: Absence of fever/infection during neutropenic period  Description: INTERVENTIONS:  - Monitor WBC    Outcome: Progressing     Problem: SAFETY ADULT  Goal: Patient will remain free of falls  Description: INTERVENTIONS:  - Educate patient/family on patient safety including physical limitations  - Instruct patient to call for assistance with activity   - Consult OT/PT to assist with strengthening/mobility   - Keep Call bell within reach  - Keep bed low and locked with side rails adjusted as appropriate  - Keep care items and personal belongings within reach  - Initiate and maintain comfort rounds  - Make Fall Risk Sign visible to staff  - Offer Toileting every 2 Hours,  in advance of need  - Initiate/Maintain alarm  - Obtain necessary fall risk management equipment:   - Apply yellow socks and bracelet for high fall risk patients  - Consider moving patient to room near nurses station  Outcome: Progressing  Goal: Maintain or return to baseline ADL function  Description: INTERVENTIONS:  -  Assess patient's ability to carry out ADLs; assess patient's baseline for ADL function and identify physical deficits which impact ability to perform ADLs (bathing, care of mouth/teeth, toileting, grooming, dressing, etc.)  - Assess/evaluate cause of self-care deficits   - Assess range of motion  - Assess patient's mobility; develop plan if impaired  - Assess patient's need for assistive devices and provide as appropriate  - Encourage maximum independence but intervene and supervise when necessary  - Involve family in performance of ADLs  - Assess for home care needs following discharge   - Consider OT consult to assist with ADL evaluation and planning for discharge  - Provide patient education as appropriate  Outcome: Progressing  Goal: Maintains/Returns to pre admission functional level  Description: INTERVENTIONS:  - Perform AM-PAC 6 Click Basic Mobility/ Daily Activity assessment daily.  - Set and communicate daily mobility goal to care team and patient/family/caregiver.   - Collaborate with rehabilitation services on mobility goals if consulted  - Perform Range of Motion 6 times a day.  - Reposition patient every 2 hours.  - Dangle patient 4 times a day  - Stand patient 6 times a day  - Ambulate patient 6 times a day  - Out of bed to chair 3 times a day   - Out of bed for meals 3 times a day  - Out of bed for toileting  - Record patient progress and toleration of activity level   Outcome: Progressing

## 2024-01-03 NOTE — DISCHARGE INSTR - AVS FIRST PAGE
Discharge instructions  Anterior cervical decompression and fixation/fusion      Surgical incisional care:  Keep incision clean and dry. Avoid applying creams, lotion or antiseptic to incision area.  Allow steri-strips to fall off. If still in place at two week postoperative visit, we will remove them.  Check the wound daily. If the incision becomes red, swollen, tender, warm, or has increased drainage please notify physician immediately.  May shower 3 days after surgery, but do not soak in a tub and no swimming.  Use mild antimicrobial soap and water with a clean washcloth. Pat incision dry after showering and a clean towel daily.       Activity Restrictions:  No heavy lifting greater than 5 - 10lbs. No strenuous activities.  May walk as tolerated. Encourage at least 4 short walks per day.   No driving while requiring cervical collar, anticipated six weeks.  No significant neck movement.  Diet: consider soft minced food with gravy. Recommend small bites with sips of water between.     Postoperative medication:  Take pain medications to relieve incision pain, and muscle relaxants to prevent spasms as directed. Please see after visit summary (AVS) for details.   Take over the counter stool softeners such as colace or senna-s to avoid constipation while on narcotics. Intake water and fiber intake.   You may nsaids one week after surgery. You can take tylenol right away.  If taking Coumadin, Aspirin, or Plavix, you may resume these medications when cleared by Neurosurgery.    Follow-up as scheduled for a 2 week incision check. Follow-up 6 weeks after surgery with a repeat cervical spine upright x-rays to be completed prior to visit.    **Please notify MD immediately if you experience a fever of 101F, have increased neck or arm pain, new numbness and/or weakness in your arms/hands, difficulty swallowing or breathing especially while lying down, numbness or weakness in your legs.**     Offered and patient declined

## 2024-01-03 NOTE — UTILIZATION REVIEW
"Initial Clinical Review    Elective outpatient surgical procedure  Age/Sex: 58 y.o. female  Surgery Date: 1/2/24  Procedure: C5-6 Arthroplasty (medtronic prestige LP cervical disc, size 6mm x 18mm)  Use of intraoperative microscope  Use of intraoperative neuromonitoring  Use of intraoperative fluoro  Anesthesia: general  Operative Findings: Osteophyte. Cervical Spine Stenosis.     POD#1 Progress Note:   1/3/24 POD 1     Admission Orders: Date/Time/Statement:  1/2/24 1058 observation and CHANGED 1/2/24 1226 TO OUTPATIENT NO CHARGE BED DUE TO NEED EXTENDED POST OP CARE FOR PAIN CONTROL, DRAIN MONITORING, IV ANTIBIOTICS  Admission Orders (From admission, onward)       Ordered        01/02/24 1058  Place in Observation  Once                          Orders Placed This Encounter   Procedures    Place in Observation     Standing Status:   Standing     Number of Occurrences:   1     Order Specific Question:   Level of Care     Answer:   Med Surg [16]     Vital Signs: /77   Pulse 82   Temp 97.5 °F (36.4 °C) (Temporal)   Resp 18   Ht 5' 6\" (1.676 m)   Wt 82.3 kg (181 lb 6.4 oz)   LMP  (LMP Unknown)   SpO2 94%   BMI 29.28 kg/m²   1/03/24 0015 97.5 °F (36.4 °C) -- -- -- -- -- -- -- -- -- --   01/03/24 00:14:08 -- 82 -- 124/77 93 94 % -- -- -- -- --   01/02/24 20:45:52 -- 75 -- 131/70 90 93 % -- -- -- -- --   01/02/24 2042 97.1 °F (36.2 °C) Abnormal  -- -- -- -- -- -- -- -- -- --   01/02/24 20:40:55 -- 94 18 131/70 90 94 % -- -- -- -- --   01/02/24 18:20:57 96.8 °F (36 °C) Abnormal  84 14 138/73 95 95 % -- -- -- -- --   01/02/24 17:19:03 -- 85 -- 154/83 107 95 % -- -- -- -- --   01/02/24 16:21:30 -- 91 -- 134/80 98 94 % -- -- -- -- --   01/02/24 15:17:58 97.2 °F (36.2 °C) Abnormal  96 16 168/85 113 97 % -- -- None (Room air)       Pertinent Labs/Diagnostic Test Results:   XR spine cervical 2 or 3 vw injury   Final Result by Uriel Fowler MD (01/02 3290)      Fluoroscopic guidance provided for surgical procedure.  " Please refer to the separate procedure notes for additional details.      Workstation performed: [id number]         XR spine cervical 2 or 3 vw injury    (Results Pending)     Diet: Regular.   Mobility: ambulatory   DVT Prophylaxis: Bilateral SCDs    Medications/Pain Control:   Scheduled Medications:  anastrozole, 1 mg, Oral, Daily  vitamin C, 1,000 mg, Oral, Daily  budesonide-formoterol, 2 puff, Inhalation, BID  calcium carbonate-vitamin D, 1 tablet, Oral, Daily With Breakfast  cefazolin, 2,000 mg, Intravenous, Q8H  cyanocobalamin, 500 mcg, Oral, Daily  heparin (porcine), 5,000 Units, Subcutaneous, Q8H LARA  multivitamin-minerals, 1 tablet, Oral, Daily  tiZANidine, 4 mg, Oral, HS  topiramate, 100 mg, Oral, Daily    vancomycin (VANCOCIN) 1,000 mg in sodium chloride 0.9 % 1,000 mL irrigation  Dose: 1,000 mg  Freq: On call Route: IR  Start: 01/02/24 0730 End: 01/02/24 1026     Continuous IV Infusions: none      PRN Meds:  albuterol, 2 puff, Inhalation, Q4H PRN  HYDROmorphone, 0.5 mg, Intravenous, Q3H PRN x 1 1/2/24.   X 1 1/3/24  ipratropium-albuterol, 3 mL, Nebulization, TID PRN  lactated ringers, 1,000 mL, Intravenous, Once PRN   And  lactated ringers, 1,000 mL, Intravenous, Once PRN  methocarbamol, 500 mg, Oral, Q6H PRN  oxyCODONE-acetaminophen, 1 tablet, Oral, Q4H PRN  x 2 1/2/24  phenol, 1 spray, Mouth/Throat, Q2H PRN  sodium chloride, 1,000 mL, Intravenous, Once PRN   And  sodium chloride, 1,000 mL, Intravenous, Once PRN    Neuro vascular checks every 4 hours.   [facility]      Network Utilization Review Department  ATTENTION: Please call with any questions or concerns to [phone] and carefully listen to the prompts so that you are directed to the right person. All voicemails are confidential.   For Discharge needs, contact Care Management DC Support Team at [phone] opt. 2  Send all requests for admission clinical reviews, approved or denied determinations and any other requests to dedicated fax  number below belonging to the campus where the patient is receiving treatment. List of dedicated fax numbers for the Facilities:  FACILITY NAME UR FAX NUMBER   ADMISSION DENIALS (Administrative/Medical Necessity) [phone]   DISCHARGE SUPPORT TEAM (NETWORK) [phone]   PARENT CHILD HEALTH (Maternity/NICU/Pediatrics) [phone]   [facility] [phone]   [facility] [phone]   [facility] [phone]   [facility] [phone]   [facility] [phone]   [facility] [phone]   [facility] [phone]   [facility] [phone]   [facility] [phone]   [facility] [phone]   [facility] [phone]

## 2024-01-03 NOTE — PLAN OF CARE
Problem: PAIN - ADULT  Goal: Verbalizes/displays adequate comfort level or baseline comfort level  Description: Interventions:  - Encourage patient to monitor pain and request assistance  - Assess pain using appropriate pain scale  - Administer analgesics based on type and severity of pain and evaluate response  - Implement non-pharmacological measures as appropriate and evaluate response  - Consider cultural and social influences on pain and pain management  - Notify physician/advanced practitioner if interventions unsuccessful or patient reports new pain  Outcome: Progressing     Problem: INFECTION - ADULT  Goal: Absence or prevention of progression during hospitalization  Description: INTERVENTIONS:  - Assess and monitor for signs and symptoms of infection  - Monitor lab/diagnostic results  - Monitor all insertion sites, i.e. indwelling lines, tubes, and drains  - Monitor endotracheal if appropriate and nasal secretions for changes in amount and color  - [place] appropriate cooling/warming therapies per order  - Administer medications as ordered  - Instruct and encourage patient and family to use good hand hygiene technique  - Identify and instruct in appropriate isolation precautions for identified infection/condition  Outcome: Progressing  Goal: Absence of fever/infection during neutropenic period  Description: INTERVENTIONS:  - Monitor WBC    Outcome: Progressing     Problem: SAFETY ADULT  Goal: Patient will remain free of falls  Description: INTERVENTIONS:  - Educate patient/family on patient safety including physical limitations  - Instruct patient to call for assistance with activity   - Consult OT/PT to assist with strengthening/mobility   - Keep Call bell within reach  - Keep bed low and locked with side rails adjusted as appropriate  - Keep care items and personal belongings within reach  - Initiate and maintain comfort rounds  - Make Fall Risk Sign visible to staff  - Offer Toileting every 2 Hours,  in advance of need  - Initiate/Maintain alarm  - Obtain necessary fall risk management equipment:   - Apply yellow socks and bracelet for high fall risk patients  - Consider moving patient to room near nurses station  Outcome: Progressing  Goal: Maintain or return to baseline ADL function  Description: INTERVENTIONS:  -  Assess patient's ability to carry out ADLs; assess patient's baseline for ADL function and identify physical deficits which impact ability to perform ADLs (bathing, care of mouth/teeth, toileting, grooming, dressing, etc.)  - Assess/evaluate cause of self-care deficits   - Assess range of motion  - Assess patient's mobility; develop plan if impaired  - Assess patient's need for assistive devices and provide as appropriate  - Encourage maximum independence but intervene and supervise when necessary  - Involve family in performance of ADLs  - Assess for home care needs following discharge   - Consider OT consult to assist with ADL evaluation and planning for discharge  - Provide patient education as appropriate  Outcome: Progressing  Goal: Maintains/Returns to pre admission functional level  Description: INTERVENTIONS:  - Perform AM-PAC 6 Click Basic Mobility/ Daily Activity assessment daily.  - Set and communicate daily mobility goal to care team and patient/family/caregiver.   - Collaborate with rehabilitation services on mobility goals if consulted  - Perform Range of Motion 6 times a day.  - Reposition patient every 2 hours.  - Dangle patient 4 times a day  - Stand patient 6 times a day  - Ambulate patient 6 times a day  - Out of bed to chair 3 times a day   - Out of bed for meals 3 times a day  - Out of bed for toileting  - Record patient progress and toleration of activity level   Outcome: Progressing     Problem: DISCHARGE PLANNING  Goal: Discharge to home or other facility with appropriate resources  Description: INTERVENTIONS:  - Identify barriers to discharge w/patient and caregiver  -  Arrange for needed discharge resources and transportation as appropriate  - Identify discharge learning needs (meds, wound care, etc.)  - Arrange for interpretive services to assist at discharge as needed  - Refer to Case Management Department for coordinating discharge planning if the patient needs post-hospital services based on physician/advanced practitioner order or complex needs related to functional status, cognitive ability, or social support system  Outcome: Progressing     Problem: Knowledge Deficit  Goal: Patient/family/caregiver demonstrates understanding of disease process, treatment plan, medications, and discharge instructions  Description: Complete learning assessment and assess knowledge base.  Interventions:  - Provide teaching at level of understanding  - Provide teaching via preferred learning methods  Outcome: Progressing

## 2024-01-10 ENCOUNTER — TELEPHONE (OUTPATIENT)
Dept: NEUROSURGERY | Facility: CLINIC | Age: 59
End: 2024-01-10

## 2024-01-10 ENCOUNTER — TELEPHONE (OUTPATIENT)
Dept: NEUROLOGY | Facility: CLINIC | Age: 59
End: 2024-01-10

## 2024-01-10 NOTE — TELEPHONE ENCOUNTER
Pt called and left a VM on 1/8 at 3:04 pm stating that she had surgery last Tuesday and is concerned about her incision. Pt stated that she was calling for Dr. Lopez. Called pt back and left her a message with the phone number for Dr. Lopez's office.

## 2024-01-10 NOTE — TELEPHONE ENCOUNTER
Called patient to see how she is doing after surgery. Patient reports she is doing well overall and denies any incisional issues or fevers. Patient is able to ambulate around the house and complete ADLs. Educated the patient about the importance of preventing blood clots and provided measures how to prevent them.     Patient has moved her bowels since the surgery. Encouraged patient to take an over the counter stool softener, if she is taking narcotic pain medication. Encouraged fiber intake and fluids.    Reviewed incision care with the patient. Advised that after three days she may take a shower and gently wash the surgical site with soap and water. Use clean wash cloth, towels, and clothing. Do not submerge in water until cleared by the surgeon. Do not apply any creams, ointments, or lotions to the site.  Patient is aware to call the office if any redness, swelling, drainage, dehiscence of incision, or fever >100 F occurs.    Patient is aware to call the office if any concerns or questions may arise. Reminded patient of her upcoming appointments with the date/time/location. Patient was appreciative for the call.   PAST MEDICAL HISTORY:  CAD in native artery PCI 6/20    COVID-19     Hyperlipidemia     Hypertension     Obesity     Type 2 diabetes mellitus

## 2024-01-11 RX ORDER — ANASTROZOLE 1 MG/1
1 TABLET ORAL DAILY
Qty: 90 TABLET | Refills: 3 | Status: SHIPPED | OUTPATIENT
Start: 2024-01-11

## 2024-01-17 ENCOUNTER — OFFICE VISIT (OUTPATIENT)
Dept: NEUROSURGERY | Facility: CLINIC | Age: 59
End: 2024-01-17

## 2024-01-17 VITALS
OXYGEN SATURATION: 97 % | HEIGHT: 66 IN | HEART RATE: 86 BPM | WEIGHT: 174 LBS | BODY MASS INDEX: 27.97 KG/M2 | DIASTOLIC BLOOD PRESSURE: 84 MMHG | TEMPERATURE: 97.5 F | RESPIRATION RATE: 16 BRPM | SYSTOLIC BLOOD PRESSURE: 126 MMHG

## 2024-01-17 DIAGNOSIS — M47.12 OSTEOARTHRITIS OF CERVICAL SPINE WITH MYELOPATHY: Primary | ICD-10-CM

## 2024-01-17 PROCEDURE — 99024 POSTOP FOLLOW-UP VISIT: CPT | Performed by: STUDENT IN AN ORGANIZED HEALTH CARE EDUCATION/TRAINING PROGRAM

## 2024-01-17 RX ORDER — METHOCARBAMOL 500 MG/1
500 TABLET, FILM COATED ORAL 4 TIMES DAILY PRN
Qty: 30 TABLET | Refills: 1 | Status: SHIPPED | OUTPATIENT
Start: 2024-01-17

## 2024-01-17 NOTE — PROGRESS NOTES
"Assessment/Plan:  58-year-old female with a history of COPD, with history of cervical stenosis who is status post C5-6 arthroplasty on 1/2/2024.  She presents to clinic for 2-week postop visit.  Overall she is doing okay.  She has neck pain and shoulder pain however this has improved since immediately postop.  Her incisions clean dry and intact.  I stated that is okay to start working with physical therapy and doing exercises in the gym however to still avoid lifting over 15 pounds or doing strenuous activity.  I refilled her prescription for Robaxin.  Return to clinic at 6 weeks postop with x-ray cervical spine.      Subjective:      Patient ID: Khushboo Pichardo is a 58 y.o. female.    HPI    58-year-old female with a history of COPD, with history of cervical stenosis who is status post C5-6 arthroplasty on 1/2/2024.  She presents to clinic for 2-week postop visit.  She is continuing to have shoulder pain and neck pain from the surgery.  She is eating well with overall no swallowing difficulty.  She states that she still has numbness in her hands and weakness in her hands as well.  I reassured her that her symptoms of cervical myelopathy can take several months to improve.  Her incision is clean dry and intact.  She wishes to start working out in the gym and I cleared her for that.  I instructed her to avoid any heavy lifting over 15 pounds.    Review of Systems      Objective:      /84 (BP Location: Left arm, Patient Position: Sitting, Cuff Size: Standard)   Pulse 86   Temp 97.5 °F (36.4 °C) (Temporal)   Resp 16   Ht 5' 6\" (1.676 m)   Wt 78.9 kg (174 lb)   LMP  (LMP Unknown)   SpO2 97%   BMI 28.08 kg/m²          Physical Exam    A&OX3  Gaze conjugate  EOMI  FS  TM  Fcx4  5/5 BUE  5/5 BLE  Numbness RUE lateral forearm and thumb/index finger  +bue knight  No clonus  No babinski.  "

## 2024-01-19 ENCOUNTER — HOSPITAL ENCOUNTER (OUTPATIENT)
Dept: NUCLEAR MEDICINE | Facility: HOSPITAL | Age: 59
End: 2024-01-19
Payer: COMMERCIAL

## 2024-01-19 DIAGNOSIS — M89.9 FRONTAL SKULL LESION: ICD-10-CM

## 2024-01-19 DIAGNOSIS — Z17.0 MALIGNANT NEOPLASM OF UPPER-INNER QUADRANT OF RIGHT BREAST IN FEMALE, ESTROGEN RECEPTOR POSITIVE: ICD-10-CM

## 2024-01-19 DIAGNOSIS — C50.211 MALIGNANT NEOPLASM OF UPPER-INNER QUADRANT OF RIGHT BREAST IN FEMALE, ESTROGEN RECEPTOR POSITIVE: ICD-10-CM

## 2024-01-19 LAB — GLUCOSE SERPL-MCNC: 102 MG/DL (ref 65–140)

## 2024-01-19 PROCEDURE — A9552 F18 FDG: HCPCS

## 2024-01-19 PROCEDURE — G1004 CDSM NDSC: HCPCS

## 2024-01-19 PROCEDURE — 78815 PET IMAGE W/CT SKULL-THIGH: CPT

## 2024-01-19 PROCEDURE — 82948 REAGENT STRIP/BLOOD GLUCOSE: CPT

## 2024-01-21 DIAGNOSIS — E04.1 LEFT THYROID NODULE: Primary | ICD-10-CM

## 2024-01-21 DIAGNOSIS — R68.89 ABNORMAL NECK FINDING: ICD-10-CM

## 2024-01-21 DIAGNOSIS — R94.02 ABNORMAL PET SCAN OF HEAD: ICD-10-CM

## 2024-01-21 DIAGNOSIS — R51.9 WORSENING HEADACHES: ICD-10-CM

## 2024-02-01 ENCOUNTER — TELEPHONE (OUTPATIENT)
Age: 59
End: 2024-02-01

## 2024-02-02 ENCOUNTER — OFFICE VISIT (OUTPATIENT)
Dept: FAMILY MEDICINE CLINIC | Facility: CLINIC | Age: 59
End: 2024-02-02
Payer: COMMERCIAL

## 2024-02-02 VITALS
OXYGEN SATURATION: 94 % | HEART RATE: 77 BPM | SYSTOLIC BLOOD PRESSURE: 120 MMHG | TEMPERATURE: 96.6 F | WEIGHT: 180.4 LBS | HEIGHT: 66 IN | DIASTOLIC BLOOD PRESSURE: 58 MMHG | BODY MASS INDEX: 28.99 KG/M2

## 2024-02-02 DIAGNOSIS — R09.81 CHRONIC NASAL CONGESTION: ICD-10-CM

## 2024-02-02 DIAGNOSIS — E04.1 LEFT THYROID NODULE: Primary | ICD-10-CM

## 2024-02-02 DIAGNOSIS — R68.89 ABNORMAL NECK FINDING: ICD-10-CM

## 2024-02-02 DIAGNOSIS — M89.9 FRONTAL SKULL LESION: ICD-10-CM

## 2024-02-02 DIAGNOSIS — J44.9 COPD, SEVERE (HCC): ICD-10-CM

## 2024-02-02 DIAGNOSIS — R94.8 ABNORMAL POSITRON EMISSION TOMOGRAPHY (PET) SCAN: ICD-10-CM

## 2024-02-02 PROCEDURE — 99214 OFFICE O/P EST MOD 30 MIN: CPT | Performed by: FAMILY MEDICINE

## 2024-02-02 NOTE — PROGRESS NOTES
"Name: Khushboo Pichardo      : 1965      MRN: [id number]  Encounter Provider: Leidy Morrow DO  Encounter Date: 2024   Encounter department: [facility] [place]    Assessment & Plan     1. Left thyroid nodule    2. Abnormal positron emission tomography (PET) scan    3. Chronic nasal congestion    4. Abnormal neck finding    5. Frontal skull lesion    6. 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\"  I ask and pt states re: the nasal rinses \"the [employer] rinse in the morning in the shower\"  Pt has not yet obtained the thyroid US or repeat thyroid labs that were ordered, or scheduled appt with ENT   Also states she needs to drop off intermittent leave FMLA forms that we fill out- her neurosurg had filled out short term disability \"over-ride\" that will end 2024        NM PET CT skull base to mid thigh: Result Notes   Vaishali Parnell  2024  8:10 AM EST   Send pt message on [employer] regarding these lab results   Leidy Morrow DO  2024 10:39 PM EST   Please let pt know that PET CT shows  NO abnormal uptake associated with the right frontal brain lesion that was seen on the MRI. Follow-up MRI in 3 months is advised to confirm stability.  A nodule in the left thyroid is showing up on this test - recommending further imaging with thyroid ultrasound, and we will recheck thyroid labwork (last thyroid labs done 2023 and were normal) - orders placed  There was " "also seen \"ill-defined soft tissue activity\" of the neck above the left thyroid - uncertain cause, but may be inflammatory; And also nonspecific activity in the right maxillary area (behind cheekbone)- may also just be inflammatory - sinuses are located here.  Radiologist recommends attention to this maxillary area on the 3 month follow-up MRI if possible. I am recommending evaluation by ENT specialist for these areas of possible inflammation - order placed  Otherwise there is no evidence of cancer spreading to any bone or soft tissue.          Review of Systems    Current Outpatient Medications on File Prior to Visit   Medication Sig    albuterol (PROVENTIL HFA,VENTOLIN HFA) 90 mcg/act inhaler TAKE 2 PUFFS BY MOUTH EVERY 4 HOURS AS NEEDED FOR WHEEZE    anastrozole (ARIMIDEX) 1 mg tablet TAKE 1 TABLET BY MOUTH EVERY DAY    Ascorbic Acid (vitamin C) 1000 MG tablet Take 1,000 mg by mouth daily    B Complex Vitamins (B COMPLEX 1 PO) Take by mouth daily     benralizumab (FASENRA) subcutaneous injection Inject 1 mL (30 mg total) under the skin every 56 days    Budeson-Glycopyrrol-Formoterol (Breztri Aerosphere) 160-9-4.8 MCG/ACT AERO Inhale 2 puffs every 12 (twelve) hours Rinse mouth after use.    Calcium-Magnesium-Vitamin D 185- MG-MG-UNIT CAPS Take by mouth daily     cyanocobalamin (VITAMIN B-12) 500 MCG tablet Take 500 mcg by mouth daily    Echinacea 400 MG CAPS Take by mouth daily     EPINEPHrine (EPIPEN) 0.3 mg/0.3 mL SOAJ Inject 0.3 mL (0.3 mg total) into a muscle once for 1 dose    ipratropium-albuterol (DUO-NEB) 0.5-2.5 mg/3 mL nebulizer solution     loperamide (IMODIUM) 2 mg capsule Take 2 mg by mouth 4 (four) times a day as needed for diarrhea    Loratadine 10 MG CAPS Take 10 mg by mouth daily    methocarbamol (ROBAXIN) 500 mg tablet Take 1 tablet (500 mg total) by mouth 4 (four) times a day    methocarbamol (ROBAXIN) 500 mg tablet Take 1 tablet (500 mg total) by mouth 4 (four) times a day as needed for " "muscle spasms    Multiple Vitamins-Minerals (MULTIVITAMIN ADULT PO) Take by mouth daily     rizatriptan (MAXALT-MLT) 10 mg disintegrating tablet Take 1 tablet (10 mg total) by mouth as needed for migraine Take at the onset of migraine; if symptoms continue or return, may take another dose at least 2 hours after first dose. Take no more than 2 doses in a day.    topiramate (TOPAMAX) 100 mg tablet Take 1 tablet (100 mg total) by mouth daily    TURMERIC PO Take by mouth    [DISCONTINUED] budesonide-formoterol (Symbicort) 160-4.5 mcg/act inhaler Inhale 2 puffs 2 (two) times a day       Objective     /58 (BP Location: Left arm, Patient Position: Sitting, Cuff Size: Standard)   Pulse 77   Temp (!) 96.6 °F (35.9 °C) (Tympanic)   Ht 5' 6\" (1.676 m)   Wt 81.8 kg (180 lb 6.4 oz)   LMP  (LMP Unknown)   SpO2 94%   BMI 29.12 kg/m²     Physical Exam  Vitals and nursing note reviewed.   Constitutional:       General: She is not in acute distress.     Appearance: She is well-developed. She is not ill-appearing, toxic-appearing or diaphoretic.   HENT:      Head: Normocephalic and atraumatic.      Nose: No congestion.      Right Sinus: No maxillary sinus tenderness or frontal sinus tenderness.      Left Sinus: No maxillary sinus tenderness or frontal sinus tenderness.      Mouth/Throat:      Pharynx: Uvula midline.   Neck:      Trachea: Phonation normal.   Pulmonary:      Effort: Pulmonary effort is normal.   Skin:     Coloration: Skin is not pale.   Neurological:      Mental Status: She is alert and oriented to person, place, and time.   Psychiatric:         Behavior: Behavior is cooperative.       Leidy Morrow, DO    "

## 2024-02-03 ENCOUNTER — APPOINTMENT (OUTPATIENT)
Dept: LAB | Facility: CLINIC | Age: 59
End: 2024-02-03
Payer: COMMERCIAL

## 2024-02-03 DIAGNOSIS — E04.1 LEFT THYROID NODULE: ICD-10-CM

## 2024-02-03 LAB — TSH SERPL DL<=0.05 MIU/L-ACNC: 2.84 UIU/ML (ref 0.45–4.5)

## 2024-02-03 PROCEDURE — 36415 COLL VENOUS BLD VENIPUNCTURE: CPT

## 2024-02-03 PROCEDURE — 84443 ASSAY THYROID STIM HORMONE: CPT

## 2024-02-06 PROBLEM — M75.20 BICEPS TENDINITIS: Status: RESOLVED | Noted: 2018-01-15 | Resolved: 2024-02-06

## 2024-02-06 PROBLEM — R09.81 CHRONIC NASAL CONGESTION: Status: ACTIVE | Noted: 2024-02-06

## 2024-02-07 ENCOUNTER — APPOINTMENT (OUTPATIENT)
Dept: RADIOLOGY | Facility: MEDICAL CENTER | Age: 59
End: 2024-02-07
Payer: COMMERCIAL

## 2024-02-07 ENCOUNTER — TELEPHONE (OUTPATIENT)
Dept: HEMATOLOGY ONCOLOGY | Facility: CLINIC | Age: 59
End: 2024-02-07

## 2024-02-07 DIAGNOSIS — M47.12 OSTEOARTHRITIS OF CERVICAL SPINE WITH MYELOPATHY: ICD-10-CM

## 2024-02-07 PROCEDURE — 72040 X-RAY EXAM NECK SPINE 2-3 VW: CPT

## 2024-02-07 NOTE — TELEPHONE ENCOUNTER
Appointment Change  Cancel, Reschedule, Change to Virtual      Who are you speaking with? Patient   If it is not the patient, is the caller listed on the communication consent form? Yes   Which provider is the appointment scheduled with? Dr. Field   When was the original appointment scheduled?    Please list date and time 2/5   At which location is the appointment scheduled to take place? [place]   Was the appointment rescheduled?     Was the appointment changed from an in person visit to a virtual visit?    If so, please list the details of the change. 4/1 3:40pm   What is the reason for the appointment change? Missed appmt       Was STAR transport scheduled? No   Does STAR transport need to be scheduled for the new visit (if applicable) No   Does the patient need an infusion appointment rescheduled? No   Does the patient have an upcoming infusion appointment scheduled? If so, when? No   Is the patient undergoing chemotherapy? No   For appointments cancelled with less than 24 hours:  Was the no-show policy reviewed? Yes

## 2024-02-08 DIAGNOSIS — G43.109 MIGRAINE WITH AURA AND WITHOUT STATUS MIGRAINOSUS, NOT INTRACTABLE: ICD-10-CM

## 2024-02-08 RX ORDER — RIZATRIPTAN BENZOATE 10 MG/1
10 TABLET, ORALLY DISINTEGRATING ORAL AS NEEDED
Qty: 9 TABLET | Refills: 2 | Status: SHIPPED | OUTPATIENT
Start: 2024-02-08

## 2024-02-09 ENCOUNTER — HOSPITAL ENCOUNTER (OUTPATIENT)
Dept: ULTRASOUND IMAGING | Facility: HOSPITAL | Age: 59
End: 2024-02-09
Payer: COMMERCIAL

## 2024-02-09 DIAGNOSIS — E04.1 LEFT THYROID NODULE: ICD-10-CM

## 2024-02-09 PROCEDURE — 76536 US EXAM OF HEAD AND NECK: CPT

## 2024-02-14 ENCOUNTER — OFFICE VISIT (OUTPATIENT)
Dept: NEUROSURGERY | Facility: CLINIC | Age: 59
End: 2024-02-14

## 2024-02-14 VITALS
WEIGHT: 180 LBS | HEIGHT: 66 IN | OXYGEN SATURATION: 97 % | TEMPERATURE: 97.7 F | BODY MASS INDEX: 28.93 KG/M2 | DIASTOLIC BLOOD PRESSURE: 86 MMHG | HEART RATE: 76 BPM | SYSTOLIC BLOOD PRESSURE: 128 MMHG

## 2024-02-14 DIAGNOSIS — M54.2 NECK PAIN: Primary | ICD-10-CM

## 2024-02-14 PROCEDURE — 99024 POSTOP FOLLOW-UP VISIT: CPT | Performed by: STUDENT IN AN ORGANIZED HEALTH CARE EDUCATION/TRAINING PROGRAM

## 2024-02-14 NOTE — PROGRESS NOTES
"Assessment/Plan:  58-year-old female with a history of COPD, with history of cervical stenosis who is status post C5-6 arthroplasty on 1/2/2024.  She presents for 6-week postop visit.  X-ray shows stable C5-6 arthroplasty hardware.  Incisions clean dry and intact.  Her numbness and weakness has improved but not back to baseline.  She is continuing to do activities with physical therapy for range of motion and go to the gym.  She has stopped taking her opioids and muscle relaxants.  She uses ice packs and has lidocaine patches available when the neck pain gets severe.  I placed a referral to pain management for trigger point injections into the trapezius as she had significant tenderness to palpation in her trapezius muscle on exam.  Return to clinic at 3 months postop with x-ray cervical spine.      Subjective:      Patient ID: Khushboo Pichardo is a 58 y.o. female.    HPI    Status post C5-6 arthroplasty on 1/2/2024.  Presenting for 6-week postop visit.  She is begun physical therapy and is doing exercises in the gym including biking and walking.  She states that she does about 3 miles of exercise in the gym when she goes.  No dysphagia.  The numbness has improved but is still present in the right index finger primarily.  The strength is getting better however she states that she does fatigue easily.  Incisions clean dry and intact.  She has been having issues with pain still and it is mainly worse at night when she is trying to get comfortable to sleep.  Her trapezius is tender to palpation on exam.    The following portions of the patient's history were reviewed and updated as appropriate: allergies, current medications, past family history, past medical history, past social history, past surgical history, and problem list.    Review of Systems      Objective:      /86 (BP Location: Left arm, Patient Position: Sitting, Cuff Size: Standard)   Pulse 76   Temp 97.7 °F (36.5 °C) (Temporal)   Ht 5' 6\" (1.676 m) "   Wt 81.6 kg (180 lb)   LMP  (LMP Unknown)   SpO2 97%   BMI 29.05 kg/m²          Physical Exam    A&OX3  Gaze conjugate  EOMI  FS  TM  Fcx4  5/5 BUE  5/5 BLE  Numbness in RUE index finger (improved from preop)  No knight (improved from preop)  No clonus  No babinski

## 2024-02-15 ENCOUNTER — TELEPHONE (OUTPATIENT)
Age: 59
End: 2024-02-15

## 2024-02-15 NOTE — TELEPHONE ENCOUNTER
Patient scheduled for 30 minute office visit with PA 2/22/24 for trapezius trigger point injections.

## 2024-02-15 NOTE — TELEPHONE ENCOUNTER
Pt was seen by neuro and referred back for a trapezius Trigger point injection     Please advise on scheduling

## 2024-02-22 ENCOUNTER — OFFICE VISIT (OUTPATIENT)
Dept: PAIN MEDICINE | Facility: MEDICAL CENTER | Age: 59
End: 2024-02-22
Payer: COMMERCIAL

## 2024-02-22 VITALS
WEIGHT: 180 LBS | DIASTOLIC BLOOD PRESSURE: 79 MMHG | OXYGEN SATURATION: 96 % | BODY MASS INDEX: 28.93 KG/M2 | HEART RATE: 78 BPM | HEIGHT: 66 IN | SYSTOLIC BLOOD PRESSURE: 137 MMHG

## 2024-02-22 DIAGNOSIS — Z98.890 HISTORY OF CERVICAL SPINAL SURGERY: ICD-10-CM

## 2024-02-22 DIAGNOSIS — M79.18 MYOFASCIAL PAIN SYNDROME: Primary | ICD-10-CM

## 2024-02-22 DIAGNOSIS — M54.2 NECK PAIN: ICD-10-CM

## 2024-02-22 DIAGNOSIS — M89.9 FRONTAL SKULL LESION: Primary | ICD-10-CM

## 2024-02-22 DIAGNOSIS — Z17.0 MALIGNANT NEOPLASM OF UPPER-INNER QUADRANT OF RIGHT BREAST IN FEMALE, ESTROGEN RECEPTOR POSITIVE: ICD-10-CM

## 2024-02-22 DIAGNOSIS — C50.211 MALIGNANT NEOPLASM OF UPPER-INNER QUADRANT OF RIGHT BREAST IN FEMALE, ESTROGEN RECEPTOR POSITIVE: ICD-10-CM

## 2024-02-22 PROCEDURE — 20552 NJX 1/MLT TRIGGER POINT 1/2: CPT

## 2024-02-22 PROCEDURE — 99214 OFFICE O/P EST MOD 30 MIN: CPT

## 2024-02-22 RX ORDER — LIDOCAINE HYDROCHLORIDE 10 MG/ML
2 INJECTION, SOLUTION INFILTRATION; PERINEURAL ONCE
Status: COMPLETED | OUTPATIENT
Start: 2024-02-22 | End: 2024-02-22

## 2024-02-22 RX ADMIN — LIDOCAINE HYDROCHLORIDE 2 ML: 10 INJECTION, SOLUTION INFILTRATION; PERINEURAL at 09:12
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
## 2024-02-23 ENCOUNTER — TELEPHONE (OUTPATIENT)
Age: 59
End: 2024-02-23

## 2024-02-23 NOTE — TELEPHONE ENCOUNTER
vd call from patient wanting to know why she needs another MRI. Told her that it looks like they want to follow up on the frontal lesion but she doesn't think it is necessary b/c her pet scan was fine. Please follow up with patient for clarification and she also wants to know if her [facility] paperwork has been completed.

## 2024-02-27 ENCOUNTER — TELEPHONE (OUTPATIENT)
Age: 59
End: 2024-02-27

## 2024-02-27 NOTE — TELEPHONE ENCOUNTER
Pt dropped ppwk off over a week ago to the  for dr cool. She was wondering if / whne completed can she  a copy as well as jessica faxed number required.    [phone]

## 2024-02-29 ENCOUNTER — TELEPHONE (OUTPATIENT)
Dept: NEUROSURGERY | Facility: CLINIC | Age: 59
End: 2024-02-29

## 2024-02-29 NOTE — TELEPHONE ENCOUNTER
"Radiologist recommended follow-up MRI     (Result of PET CT: \"No hypermetabolism associated with right frontal calvarial lesion seen on prior MRI. Follow-up MRI in 3 months suggested to confirm stability. Focal hypermetabolism along the posterior right maxillary wall and base of the pterygoid plates is nonspecific but may just be inflammatory. Correlate clinically. Attention on follow-up MRI if possible.\")  "

## 2024-02-29 NOTE — TELEPHONE ENCOUNTER
Received a call from patient looking for a clearance letter to have a massage.    Returned call to patient and advised this RN will provide letter and upload to [employer].     She was appreciative of the call back.

## 2024-03-01 ENCOUNTER — TELEPHONE (OUTPATIENT)
Dept: NEUROSURGERY | Facility: CLINIC | Age: 59
End: 2024-03-01

## 2024-03-01 NOTE — TELEPHONE ENCOUNTER
Spoke with pt.  She needed the letter for return to work to be dated as it was written on 3/1/15744.  Letter ammended and emailed to patient as well as available in her mychart.

## 2024-03-01 NOTE — TELEPHONE ENCOUNTER
Patient phoned stating she could not seem to access her letter for massage clearance. This RN to email to patient.      Patient also requesting revised letter for her return to work to say return on 3/4/2024 and no more than 40 hours a week.  Lrtter on file seems to already say that. That letter emailed to patient as well.

## 2024-03-20 ENCOUNTER — HOSPITAL ENCOUNTER (OUTPATIENT)
Dept: RADIOLOGY | Facility: HOSPITAL | Age: 59
Discharge: HOME | End: 2024-03-20
Payer: COMMERCIAL

## 2024-03-20 DIAGNOSIS — M54.2 NECK PAIN: ICD-10-CM

## 2024-03-20 PROCEDURE — 72040 X-RAY EXAM NECK SPINE 2-3 VW: CPT
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
## 2024-03-28 ENCOUNTER — OFFICE VISIT (OUTPATIENT)
Dept: NEUROSURGERY | Facility: CLINIC | Age: 59
End: 2024-03-28

## 2024-03-28 VITALS
RESPIRATION RATE: 16 BRPM | DIASTOLIC BLOOD PRESSURE: 88 MMHG | HEART RATE: 72 BPM | WEIGHT: 180 LBS | SYSTOLIC BLOOD PRESSURE: 142 MMHG | HEIGHT: 66 IN | TEMPERATURE: 99 F | BODY MASS INDEX: 28.93 KG/M2 | OXYGEN SATURATION: 99 %

## 2024-03-28 DIAGNOSIS — M48.02 CERVICAL SPINAL STENOSIS: Primary | ICD-10-CM

## 2024-03-28 DIAGNOSIS — J44.9 COPD, SEVERE (HCC): ICD-10-CM

## 2024-03-28 DIAGNOSIS — J44.9 CHRONIC OBSTRUCTIVE PULMONARY DISEASE, UNSPECIFIED COPD TYPE (HCC): ICD-10-CM

## 2024-03-28 PROCEDURE — 99024 POSTOP FOLLOW-UP VISIT: CPT | Performed by: STUDENT IN AN ORGANIZED HEALTH CARE EDUCATION/TRAINING PROGRAM

## 2024-03-28 NOTE — PROGRESS NOTES
"Assessment/Plan:  58-year-old female with a history of COPD, with history of cervical stenosis who is status post C5-6 arthroplasty on 1/2/2024.   She presents today for her 3 month postop visit.  Overall she is doing well.  Incisions clean dry and intact.  X-ray shows stable hardware.  Return to clinic in 3 months for 6-month postop visit with x-ray cervical spine.      Subjective:      Patient ID: Khushboo Pichardo is a 58 y.o. female.    HPI    58-year-old female with a history of COPD, with history of cervical stenosis who is status post C5-6 arthroplasty on 1/2/2024.   She presents today for her 3 month postop visit.  Overall she is doing well.  Incisions clean dry and intact.  X-ray shows stable hardware.  Her neck pain has improved with the trigger point injections and continuing physical therapy.  She is working out at the gym.  She is walking independently.  She states she had a flareup of neck pain recently after doing yard work for several hours outside.  Otherwise she is doing well.    The following portions of the patient's history were reviewed and updated as appropriate: allergies, current medications, past family history, past medical history, past social history, past surgical history, and problem list.    Review of Systems      Objective:      /88 (BP Location: Left arm, Patient Position: Sitting)   Pulse 72   Temp 99 °F (37.2 °C) (Temporal)   Resp 16   Ht 5' 6\" (1.676 m)   Wt 81.6 kg (180 lb)   LMP  (LMP Unknown)   SpO2 99%   BMI 29.05 kg/m²          Physical Exam    A&OX3  Gaze conjugate  EOMI  FS  TM  Fcx4  5/5 BUE  5/5 BLE  SILT  No clonus  No knight  No babinski  "

## 2024-03-29 RX ORDER — BUDESONIDE, GLYCOPYRROLATE, AND FORMOTEROL FUMARATE 160; 9; 4.8 UG/1; UG/1; UG/1
2 AEROSOL, METERED RESPIRATORY (INHALATION) EVERY 12 HOURS
Qty: 32.1 G | Refills: 0 | Status: SHIPPED | OUTPATIENT
Start: 2024-03-29

## 2024-03-29 RX ORDER — ALBUTEROL SULFATE 90 UG/1
AEROSOL, METERED RESPIRATORY (INHALATION)
Qty: 6.7 G | Refills: 5 | Status: SHIPPED | OUTPATIENT
Start: 2024-03-29

## 2024-04-01 ENCOUNTER — OFFICE VISIT (OUTPATIENT)
Dept: HEMATOLOGY ONCOLOGY | Facility: CLINIC | Age: 59
End: 2024-04-01
Payer: COMMERCIAL

## 2024-04-01 VITALS
HEART RATE: 96 BPM | HEIGHT: 66 IN | BODY MASS INDEX: 30.13 KG/M2 | SYSTOLIC BLOOD PRESSURE: 108 MMHG | DIASTOLIC BLOOD PRESSURE: 78 MMHG | WEIGHT: 187.5 LBS | OXYGEN SATURATION: 98 % | TEMPERATURE: 97.8 F

## 2024-04-01 DIAGNOSIS — Z17.0 MALIGNANT NEOPLASM OF UPPER-INNER QUADRANT OF RIGHT BREAST IN FEMALE, ESTROGEN RECEPTOR POSITIVE (HCC): Primary | ICD-10-CM

## 2024-04-01 DIAGNOSIS — Z79.811 AROMATASE INHIBITOR USE: ICD-10-CM

## 2024-04-01 DIAGNOSIS — C50.211 MALIGNANT NEOPLASM OF UPPER-INNER QUADRANT OF RIGHT BREAST IN FEMALE, ESTROGEN RECEPTOR POSITIVE (HCC): Primary | ICD-10-CM

## 2024-04-01 PROCEDURE — 99215 OFFICE O/P EST HI 40 MIN: CPT | Performed by: INTERNAL MEDICINE

## 2024-04-01 RX ORDER — ANASTROZOLE 1 MG/1
1 TABLET ORAL DAILY
Qty: 90 TABLET | Refills: 3 | Status: SHIPPED | OUTPATIENT
Start: 2024-04-01

## 2024-04-07 DIAGNOSIS — G43.109 MIGRAINE WITH AURA AND WITHOUT STATUS MIGRAINOSUS, NOT INTRACTABLE: ICD-10-CM

## 2024-04-07 RX ORDER — TOPIRAMATE 100 MG/1
100 TABLET, FILM COATED ORAL DAILY
Qty: 90 TABLET | Refills: 1 | Status: SHIPPED | OUTPATIENT
Start: 2024-04-07

## 2024-04-15 ENCOUNTER — TELEPHONE (OUTPATIENT)
Age: 59
End: 2024-04-15

## 2024-04-15 ENCOUNTER — HOSPITAL ENCOUNTER (OUTPATIENT)
Dept: MRI IMAGING | Facility: HOSPITAL | Age: 59
Discharge: HOME | End: 2024-04-15
Payer: COMMERCIAL

## 2024-04-15 DIAGNOSIS — M89.9 FRONTAL SKULL LESION: ICD-10-CM

## 2024-04-15 DIAGNOSIS — C50.211 MALIGNANT NEOPLASM OF UPPER-INNER QUADRANT OF RIGHT BREAST IN FEMALE, ESTROGEN RECEPTOR POSITIVE (HCC): ICD-10-CM

## 2024-04-15 DIAGNOSIS — Z17.0 MALIGNANT NEOPLASM OF UPPER-INNER QUADRANT OF RIGHT BREAST IN FEMALE, ESTROGEN RECEPTOR POSITIVE (HCC): ICD-10-CM

## 2024-04-15 DIAGNOSIS — J44.9 COPD, SEVERE (HCC): Primary | ICD-10-CM

## 2024-04-15 PROCEDURE — A9585 GADOBUTROL INJECTION: HCPCS | Performed by: FAMILY MEDICINE

## 2024-04-15 PROCEDURE — 70553 MRI BRAIN STEM W/O & W/DYE: CPT

## 2024-04-15 RX ORDER — GADOBUTROL 604.72 MG/ML
8 INJECTION INTRAVENOUS
Status: COMPLETED | OUTPATIENT
Start: 2024-04-15 | End: 2024-04-15

## 2024-04-15 RX ADMIN — GADOBUTROL 8 ML: 604.72 INJECTION INTRAVENOUS at 18:46

## 2024-04-15 NOTE — TELEPHONE ENCOUNTER
Patient received a letter from their insurance company stating that the ins was no longer going to cover the albuterol inhaler and they need to have that switched to something that their ins will cover. Please advise the patient thank you.

## 2024-04-16 RX ORDER — ALBUTEROL SULFATE 90 UG/1
2 AEROSOL, METERED RESPIRATORY (INHALATION) EVERY 6 HOURS PRN
Qty: 8.5 G | Refills: 6 | Status: SHIPPED | OUTPATIENT
Start: 2024-04-16

## 2024-04-16 NOTE — TELEPHONE ENCOUNTER
Pt stated Pulm Provider:  Dr. Paulino    Actionable item: Prescribe Alterative to Albuterol rescue inhaler    What is the reason for the call/chief complaint? Insurance sent letter stating Albuterol/Proventil no longer covered. Per online formulary PROAIR HFA/PROAIR RESPICLICK is plan alternative. Advised patient if this is not covered/denied she will need to call insurance company and ask what is the formulary alternative to Albuterol.    Priority:  HIGH

## 2024-04-17 ENCOUNTER — TELEPHONE (OUTPATIENT)
Dept: FAMILY MEDICINE CLINIC | Facility: CLINIC | Age: 59
End: 2024-04-17

## 2024-04-17 NOTE — TELEPHONE ENCOUNTER
Patient dropped ppwk off to be filled out by Dr. Morrow. Placed in her bin. Informed her that we would give her a phone call when it was done.

## 2024-04-22 ENCOUNTER — TELEPHONE (OUTPATIENT)
Dept: FAMILY MEDICINE CLINIC | Facility: CLINIC | Age: 59
End: 2024-04-22

## 2024-04-22 DIAGNOSIS — Q28.9: ICD-10-CM

## 2024-04-22 DIAGNOSIS — R90.89 ABNORMAL FINDING ON MRI OF BRAIN: Primary | ICD-10-CM

## 2024-04-22 NOTE — TELEPHONE ENCOUNTER
Appointment scheduled with provider.    Reason:annual p/e (overdue)    Symptoms: none    Provider: Dr. Morrow    Date/Time: 6/5/2024 @ 3 pm

## 2024-04-22 NOTE — TELEPHONE ENCOUNTER
Call placed to patient to inform her that her ppwk is done and ready to be picked up. Patient was also made aware that she needs to schedule her annual physical due to being overdue. Patient stated that she will make an appointment following her CT scan

## 2024-04-26 ENCOUNTER — HOSPITAL ENCOUNTER (OUTPATIENT)
Dept: CT IMAGING | Facility: HOSPITAL | Age: 59
End: 2024-04-26
Payer: COMMERCIAL

## 2024-04-26 DIAGNOSIS — R90.89 ABNORMAL FINDING ON MRI OF BRAIN: ICD-10-CM

## 2024-04-26 DIAGNOSIS — Q28.9: ICD-10-CM

## 2024-04-26 PROCEDURE — 70496 CT ANGIOGRAPHY HEAD: CPT

## 2024-04-26 RX ADMIN — IOHEXOL 85 ML: 350 INJECTION, SOLUTION INTRAVENOUS at 18:37

## 2024-04-29 NOTE — TELEPHONE ENCOUNTER
Patient called, states employment put patient on a 30 day MAY, and request more forms to be addressed by PCP. Patient request an earlier Annual Physical appointment Date. Upon chart review/appointments, confirmed Dr. Morrow availability . Contacted practice/clerical, to confirm schedule options,no response. Patient request a call back would like to speak with Dr. Morrow face to face, Please advise Patient at [phone], if any further questions.

## 2024-04-30 NOTE — TELEPHONE ENCOUNTER
"Pt called back to follow up.  Stated her employer is being very difficult.  Told her to \"go home for 30 days and look for a new job online\".  She requires her FMLA to be in by the 19th so they do not fire her.    Instead of trying to reschedule her physical, I scheduled an OVS on Fri, 5/3 for forms.  She kept the Physical appt for 6/5.  "

## 2024-05-02 NOTE — PROGRESS NOTES
"Name: Khushboo Pichardo      : 1965      MRN: [id number]  Encounter Provider: Leidy Morrow DO  Encounter Date: 5/3/2024   Encounter department: [facility]    Assessment & Plan     1. Encounter for completion of form with patient    2. Occupational problem    I advise pt that I cannot fill out the FMLA forms that her employer has recently sent her in a way that indicates that I or any of her other physicians have advised medical leave at this time, since it was her employer that sent her home for 30 days. The forms will be filled out documenting that her date of RTW without restrictions still stands as 2024 as previously documented on FMLA, and also the previously documented request for reasonable workplace accommodations is still applicable.  Unrelated to her FMLA presenting problem, I also separately escribed 3 tablets of xanax for pt's fear of flying; she was previously given rx once in the past for a plane flight, and pt is thinking of taking a trip now that she was put on leave by her employer. 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 a different employee had a eucalyptus vaporizer that I had a problem and it had to be removed - when I went back to work (3/4/24) could feel it, didn't see the diffuser, but went home sick every day for a week with my migraines and breathing issues, thought I might have been the dust, then saw the diffuser, asked her to remove it, also the cleaning staff was using straight bleach to clean the bathrooms (which immediately triggered headaches and breathing issues when " "she would go into the bathroom per pt), and some coworkers were wearing strong cologne as well (couple of episodes around coworker with strong cologne), so HR talked to them, manager unplugged the diffuser, but didn't remove it, said to me need to go to HR and get accomodation for the workplace, which I did (previous forms per chart) then there was a meeting with HR leave services and they sent me home for 30 days and said I should look online on their job site and find one that was hybrid that I could work from home\" - \"I'm a nervous wreck, worried I won't have a job, I'll be 59; contacted a  -  said more or less have to lose my job before I can do anything about it\"  Pt also asks for a rx for the \"medicine you gave me before for fear of flying\" (was one-time rx 3 tablets of xanax given in July last year) - states she is thinking of taking a trip      All Conversations: annual physical scheduled  (Oldest Message First)  April 22, 2024  Lisa Skinner  AF    4/22/24  4:13 PM  Note     Call placed to patient to inform her that her ppwk is done and ready to be picked up. Patient was also made aware that she needs to schedule her annual physical due to being overdue. Patient stated that she will make an appointment following her CT scan     4/22/24  4:29 PM  Khushboo Pichardo contacted [facility]    4/22/24  4:30 PM  Note     Appointment scheduled with provider  Reason:annual p/e (overdue)  Symptoms: none  Provider: Dr. Morrow  Date/Time: 6/5/2024 @ 3 pm  April 29, 2024  MARIA ESTHER FERNANDEZ      4/29/24  3:53 PM  Note     Patient called, states employment put patient on a 30 day MAY, and request more forms to be addressed by PCP. Patient request an earlier Annual Physical appointment Date. Upon chart review/appointments, confirmed Dr. Morrow availability . Contacted practice/clerical, to confirm schedule options,no response. Patient request a call back would like to speak with  " "Cristhian face to face, Please advise Patient at [phone], if any further questions.      4/29/24  3:53 PM  MARIA ESTHER FERNANDEZ routed this conversation to [facility] Clerical  April 30, 2024 4/30/24  4:01 PM  Khushboo Pichardo contacted Lindy Sierra DC    4/30/24  4:03 PM  Note     Pt called back to follow up.  Stated her employer is being very difficult.  Told her to \"go home for 30 days and look for a new job online\".  She requires her FMLA to be in by the 19th so they do not fire her.  Instead of trying to reschedule her physical, I scheduled an OVS on Fri, 5/3 for forms.  She kept the Physical appt for 6/5.  DC    4/30/24  4:03 PM  Lindy Sierra routed this conversation to [facility] Clerical        Review of Systems    Current Outpatient Medications on File Prior to Visit   Medication Sig    albuterol (ProAir HFA) 90 mcg/act inhaler Inhale 2 puffs every 6 (six) hours as needed for wheezing    anastrozole (ARIMIDEX) 1 mg tablet Take 1 tablet (1 mg total) by mouth daily    Ascorbic Acid (vitamin C) 1000 MG tablet Take 1,000 mg by mouth daily    B Complex Vitamins (B COMPLEX 1 PO) Take by mouth daily     benralizumab (FASENRA) subcutaneous injection Inject 1 mL (30 mg total) under the skin every 56 days    Budeson-Glycopyrrol-Formoterol (Breztri Aerosphere) 160-9-4.8 MCG/ACT AERO INHALE 2 PUFFS EVERY 12 (TWELVE) HOURS RINSE MOUTH AFTER USE.    Calcium-Magnesium-Vitamin D 185- MG-MG-UNIT CAPS Take by mouth daily     cyanocobalamin (VITAMIN B-12) 500 MCG tablet Take 500 mcg by mouth daily    Echinacea 400 MG CAPS Take by mouth daily     EPINEPHrine (EPIPEN) 0.3 mg/0.3 mL SOAJ Inject 0.3 mL (0.3 mg total) into a muscle once for 1 dose (Patient taking differently: Inject 0.3 mg into a muscle once when needed)    ipratropium-albuterol (DUO-NEB) 0.5-2.5 mg/3 mL nebulizer solution     loperamide (IMODIUM) 2 mg capsule Take 2 mg by mouth 4 (four) times a day as " "needed for diarrhea    Loratadine 10 MG CAPS Take 10 mg by mouth daily    methocarbamol (ROBAXIN) 500 mg tablet Take 1 tablet (500 mg total) by mouth 4 (four) times a day    methocarbamol (ROBAXIN) 500 mg tablet Take 1 tablet (500 mg total) by mouth 4 (four) times a day as needed for muscle spasms    Multiple Vitamins-Minerals (MULTIVITAMIN ADULT PO) Take by mouth daily     rizatriptan (MAXALT-MLT) 10 mg disintegrating tablet TAKE 1 TABLET (10 MG TOTAL) BY MOUTH AS NEEDED FOR MIGRAINE TAKE AT THE ONSET OF MIGRAINE IF SYMPTOMS CONTINUE OR RETURN, MAY TAKE ANOTHER DOSE AT LEAST 2 HOURS AFTER FIRST DOSE. TAKE NO MORE THAN 2 DOSES IN A DAY.    topiramate (TOPAMAX) 100 mg tablet TAKE 1 TABLET BY MOUTH EVERY DAY    TURMERIC PO Take by mouth    [DISCONTINUED] budesonide-formoterol (Symbicort) 160-4.5 mcg/act inhaler Inhale 2 puffs 2 (two) times a day       Objective     /78 (BP Location: Left arm, Patient Position: Sitting, Cuff Size: Standard)   Pulse 73   Temp (!) 97.2 °F (36.2 °C) (Tympanic)   Ht 5' 6\" (1.676 m)   Wt 80.7 kg (178 lb)   LMP  (LMP Unknown)   SpO2 97%   BMI 28.73 kg/m²     Physical Exam  Vitals and nursing note reviewed.   Constitutional:       General: She is not in acute distress.     Appearance: She is well-developed. She is not ill-appearing, toxic-appearing or diaphoretic.   HENT:      Head: Normocephalic and atraumatic.      Mouth/Throat:      Pharynx: Uvula midline.   Neck:      Trachea: Phonation normal.   Pulmonary:      Effort: Pulmonary effort is normal.   Skin:     Coloration: Skin is not pale.   Neurological:      Mental Status: She is alert and oriented to person, place, and time.   Psychiatric:         Behavior: Behavior is cooperative.       Leidy Morrow DO    "

## 2024-05-03 ENCOUNTER — OFFICE VISIT (OUTPATIENT)
Dept: FAMILY MEDICINE CLINIC | Facility: CLINIC | Age: 59
End: 2024-05-03
Payer: COMMERCIAL

## 2024-05-03 VITALS
SYSTOLIC BLOOD PRESSURE: 118 MMHG | DIASTOLIC BLOOD PRESSURE: 78 MMHG | HEIGHT: 66 IN | HEART RATE: 73 BPM | TEMPERATURE: 97.2 F | WEIGHT: 178 LBS | BODY MASS INDEX: 28.61 KG/M2 | OXYGEN SATURATION: 97 %

## 2024-05-03 DIAGNOSIS — Z02.89 ENCOUNTER FOR COMPLETION OF FORM WITH PATIENT: Primary | ICD-10-CM

## 2024-05-03 DIAGNOSIS — Z56.9 OCCUPATIONAL PROBLEM: ICD-10-CM

## 2024-05-03 PROCEDURE — 99214 OFFICE O/P EST MOD 30 MIN: CPT | Performed by: FAMILY MEDICINE

## 2024-05-03 SDOH — ECONOMIC STABILITY - INCOME SECURITY: UNSPECIFIED PROBLEMS RELATED TO EMPLOYMENT: Z56.9

## 2024-05-04 PROBLEM — I65.23 ATHEROSCLEROSIS OF BOTH CAROTID ARTERIES: Status: ACTIVE | Noted: 2024-05-04

## 2024-05-04 PROBLEM — I70.0 ATHEROSCLEROSIS OF AORTIC ARCH (HCC): Status: ACTIVE | Noted: 2024-05-04

## 2024-05-05 DIAGNOSIS — F40.243 FEAR OF FLYING: Primary | ICD-10-CM

## 2024-05-05 PROBLEM — Z56.9 OCCUPATIONAL PROBLEM: Status: ACTIVE | Noted: 2024-05-05

## 2024-05-05 PROBLEM — Z02.89 ENCOUNTER FOR COMPLETION OF FORM WITH PATIENT: Status: ACTIVE | Noted: 2024-05-05

## 2024-05-05 RX ORDER — ALPRAZOLAM 0.5 MG/1
TABLET ORAL
Qty: 3 TABLET | Refills: 0 | Status: SHIPPED | OUTPATIENT
Start: 2024-05-05

## 2024-05-06 ENCOUNTER — TELEPHONE (OUTPATIENT)
Age: 59
End: 2024-05-06

## 2024-05-06 NOTE — TELEPHONE ENCOUNTER
Call placed to patient informing her of the ppwk. Stated that Dr. Morrow did have it filled out by that it was due to pt's employer requesting leave.

## 2024-05-06 NOTE — TELEPHONE ENCOUNTER
Forms received and filled out. Will provide courtesy phone call to patient to inform her of ppwk.

## 2024-05-06 NOTE — TELEPHONE ENCOUNTER
Pt called to see if Dr. Morrow had made a decision whether or not she will fill out pt's FMLA paperwork.  She would like a return call, so she knows how to proceed.

## 2024-05-09 ENCOUNTER — TELEPHONE (OUTPATIENT)
Dept: PULMONOLOGY | Facility: CLINIC | Age: 59
End: 2024-05-09

## 2024-05-09 ENCOUNTER — TELEPHONE (OUTPATIENT)
Age: 59
End: 2024-05-09

## 2024-05-09 DIAGNOSIS — J45.40 MODERATE PERSISTENT ASTHMA WITHOUT COMPLICATION: Primary | ICD-10-CM

## 2024-05-09 DIAGNOSIS — J44.9 STAGE 3 SEVERE COPD BY GOLD CLASSIFICATION (HCC): ICD-10-CM

## 2024-05-09 NOTE — TELEPHONE ENCOUNTER
PT came into office regarding her leave of absence/ job accommodations, and short term disability. PT wants forms signed by  as that was the last provider seen 6M ago.

## 2024-05-09 NOTE — TELEPHONE ENCOUNTER
Can we call patient to schedule a follow up? She is overdue for an appointment.    I spoke with her about the inhaler and sent [facility] for her to try. She should come in for an appt since she was last seen in August and having increasing symptoms.

## 2024-05-09 NOTE — TELEPHONE ENCOUNTER
Patient will be dropping paperwork off at the office today to have doctor complete so that she will get pain for a 30 medical leave.      Patient also asking if she would benefit from using AirSupra inhaler. Patient states she is using the albuterol inhaler a lot.

## 2024-05-09 NOTE — TELEPHONE ENCOUNTER
Got an In Basket message regarding scheduling a FU with Isac Bass which there was no available slots available. I placed PT with a NP so she can be seen sooner. PT was okay with that and scheduled to be seen on 5/20

## 2024-05-10 ENCOUNTER — TELEPHONE (OUTPATIENT)
Age: 59
End: 2024-05-10

## 2024-05-10 NOTE — TELEPHONE ENCOUNTER
Please advise if patient will need labs prior to her appt on the 15th.    Finasteride Pregnancy And Lactation Text: This medication is absolutely contraindicated during pregnancy. It is unknown if it is excreted in breast milk.

## 2024-05-10 NOTE — TELEPHONE ENCOUNTER
Patient called checking to see if she had any labs to complete before physical on 5/15/24; I advised that I did not see any labs from Dr Morrow that she needed to compete .

## 2024-05-13 DIAGNOSIS — E78.5 DYSLIPIDEMIA: Primary | ICD-10-CM

## 2024-05-13 DIAGNOSIS — R73.03 PREDIABETES: ICD-10-CM

## 2024-05-14 ENCOUNTER — APPOINTMENT (OUTPATIENT)
Dept: LAB | Facility: CLINIC | Age: 59
End: 2024-05-14
Payer: COMMERCIAL

## 2024-05-14 ENCOUNTER — HOSPITAL ENCOUNTER (OUTPATIENT)
Dept: RADIOLOGY | Facility: IMAGING CENTER | Age: 59
Discharge: HOME | End: 2024-05-14
Payer: COMMERCIAL

## 2024-05-14 DIAGNOSIS — E78.5 DYSLIPIDEMIA: ICD-10-CM

## 2024-05-14 DIAGNOSIS — Z79.811 AROMATASE INHIBITOR USE: ICD-10-CM

## 2024-05-14 DIAGNOSIS — R73.03 PREDIABETES: ICD-10-CM

## 2024-05-14 LAB
CHOLEST SERPL-MCNC: 221 MG/DL
EST. AVERAGE GLUCOSE BLD GHB EST-MCNC: 114 MG/DL
HBA1C MFR BLD: 5.6 %
HDLC SERPL-MCNC: 56 MG/DL
LDLC SERPL CALC-MCNC: 141 MG/DL (ref 0–100)
TRIGL SERPL-MCNC: 122 MG/DL

## 2024-05-14 PROCEDURE — 83036 HEMOGLOBIN GLYCOSYLATED A1C: CPT

## 2024-05-14 PROCEDURE — 36415 COLL VENOUS BLD VENIPUNCTURE: CPT

## 2024-05-14 PROCEDURE — 77080 DXA BONE DENSITY AXIAL: CPT

## 2024-05-14 PROCEDURE — 80061 LIPID PANEL: CPT

## 2024-05-15 ENCOUNTER — OFFICE VISIT (OUTPATIENT)
Dept: FAMILY MEDICINE CLINIC | Facility: CLINIC | Age: 59
End: 2024-05-15
Payer: COMMERCIAL

## 2024-05-15 VITALS
BODY MASS INDEX: 28.77 KG/M2 | TEMPERATURE: 97 F | WEIGHT: 179 LBS | HEART RATE: 74 BPM | OXYGEN SATURATION: 98 % | SYSTOLIC BLOOD PRESSURE: 100 MMHG | HEIGHT: 66 IN | DIASTOLIC BLOOD PRESSURE: 68 MMHG

## 2024-05-15 DIAGNOSIS — E78.5 DYSLIPIDEMIA: ICD-10-CM

## 2024-05-15 DIAGNOSIS — M79.631 RIGHT FOREARM PAIN: ICD-10-CM

## 2024-05-15 DIAGNOSIS — I70.0 ATHEROSCLEROSIS OF AORTIC ARCH (HCC): ICD-10-CM

## 2024-05-15 DIAGNOSIS — I65.23 ATHEROSCLEROSIS OF BOTH CAROTID ARTERIES: ICD-10-CM

## 2024-05-15 DIAGNOSIS — G43.109 MIGRAINE WITH AURA AND WITHOUT STATUS MIGRAINOSUS, NOT INTRACTABLE: ICD-10-CM

## 2024-05-15 DIAGNOSIS — Z00.00 ANNUAL PHYSICAL EXAM: Primary | ICD-10-CM

## 2024-05-15 DIAGNOSIS — J44.9 COPD, SEVERE (HCC): ICD-10-CM

## 2024-05-15 DIAGNOSIS — Z59.89 HAS HEALTH INSURANCE WITH INADEQUATE COVERAGE OF HEALTH EXPENSES: ICD-10-CM

## 2024-05-15 PROBLEM — Z59.71 HAS HEALTH INSURANCE WITH INADEQUATE COVERAGE OF HEALTH EXPENSES: Status: ACTIVE | Noted: 2024-05-15

## 2024-05-15 PROCEDURE — 99396 PREV VISIT EST AGE 40-64: CPT | Performed by: FAMILY MEDICINE

## 2024-05-15 PROCEDURE — 99214 OFFICE O/P EST MOD 30 MIN: CPT | Performed by: FAMILY MEDICINE

## 2024-05-15 RX ORDER — TOPIRAMATE 100 MG/1
100 TABLET, FILM COATED ORAL DAILY
Qty: 90 TABLET | Refills: 1 | Status: SHIPPED | OUTPATIENT
Start: 2024-05-15

## 2024-05-15 RX ORDER — ALBUTEROL SULFATE 90 UG/1
2 AEROSOL, METERED RESPIRATORY (INHALATION) EVERY 6 HOURS PRN
Qty: 18 G | Refills: 2 | Status: SHIPPED | OUTPATIENT
Start: 2024-05-15 | End: 2024-05-21 | Stop reason: ALTCHOICE

## 2024-05-15 SDOH — ECONOMIC STABILITY - INCOME SECURITY: OTHER PROBLEMS RELATED TO HOUSING AND ECONOMIC CIRCUMSTANCES: Z59.89

## 2024-05-15 NOTE — PROGRESS NOTES
"Adult Annual Physical  Name: Khushboo Pichardo      : 1965      MRN: [id number]  Encounter Provider: Leidy Morrow DO  Encounter Date: 5/15/2024   Encounter department: [facility] AT [facility]    Assessment & Plan   1. Annual physical exam  2. Migraine with aura and without status migrainosus, not intractable  -     topiramate (TOPAMAX) 100 mg tablet; Take 1 tablet (100 mg total) by mouth daily  3. COPD, severe (HCC)  Comments:  managed by pulmonologist  Orders:  -     albuterol (ProAir HFA) 90 mcg/act inhaler; Inhale 2 puffs every 6 (six) hours as needed for wheezing  4. Has health insurance with inadequate coverage of health expenses  5. Atherosclerosis of aortic arch (HCC)  6. Atherosclerosis of both carotid arteries  7. Dyslipidemia  8. Right forearm pain    Immunizations and preventive care screenings were discussed with patient today. Appropriate education was printed on patient's after visit summary.    Counseling:  Anticipatory guidance       Chief Complaint   Patient presents with    Physical Exam    Pain     Pain in right wrist ever since she mowed the lawn 2 weeks ago       History of Present Illness     Adult Annual Physical:  Patient presents for annual physical. States she did not go to see the (breast cancer) survivor nurse as was advised, because she already had over $7000 of out of pocket medical bills that need to be paid, also right now she has no income, because her employer told her to take 30day leave  Also reports 2 weeks ago was in a hurry to get lawn mowed with push-mower and next day right lower forearm \"was swollen and it hurt, was sore\"  Has been applying ice and wearing a wrist splint with benefit  No fall or injury.     Diet and Physical Activity:  - Diet/Nutrition: well balanced diet.  - Exercise: walking.    General Health:  - Sleep: sleeps well.  - Hearing: normal hearing bilateral ears.  - Vision: goes for regular eye exams.  - Dental: regular dental " "visits.    /GYN Health:  - Follows with GYN: yes.   - Menopause: postmenopausal.     Advanced Care Planning:  - Has an advanced directive?: no    - Has a durable medical POA?: no    - ACP document given to patient?: yes      Review of Systems   Constitutional: Negative.    HENT:  Negative for nosebleeds.    Eyes: Negative.    Respiratory:  Negative for apnea, choking, chest tightness and stridor.    Cardiovascular: Negative.    Gastrointestinal: Negative.    Endocrine: Negative.    Genitourinary:  Negative for hematuria.   Neurological:  Negative for dizziness, tremors, seizures, syncope, facial asymmetry, speech difficulty, weakness, light-headedness and numbness.   Hematological: Negative.          Objective     /68 (BP Location: Left arm, Patient Position: Sitting, Cuff Size: Standard)   Pulse 74   Temp (!) 97 °F (36.1 °C) (Tympanic)   Ht 5' 6\" (1.676 m)   Wt 81.2 kg (179 lb)   LMP  (LMP Unknown)   SpO2 98%   BMI 28.89 kg/m²     Physical Exam  Vitals and nursing note reviewed.   Constitutional:       General: She is not in acute distress.     Appearance: She is well-groomed. She is not ill-appearing, toxic-appearing or diaphoretic.   HENT:      Head: Normocephalic and atraumatic.      Right Ear: Tympanic membrane, ear canal and external ear normal.      Left Ear: Tympanic membrane, ear canal and external ear normal.      Nose: Nose normal.      Mouth/Throat:      Lips: Pink.      Mouth: Mucous membranes are moist.      Pharynx: Oropharynx is clear. Uvula midline.      Tonsils: 0 on the right. 0 on the left.   Eyes:      General: Lids are normal.      Extraocular Movements: Extraocular movements intact.      Conjunctiva/sclera: Conjunctivae normal.      Pupils: Pupils are equal, round, and reactive to light.   Neck:      Thyroid: No thyroid mass, thyromegaly or thyroid tenderness.      Vascular: No JVD.      Trachea: Trachea and phonation normal.   Cardiovascular:      Rate and Rhythm: Normal rate " and regular rhythm.      Pulses: Normal pulses.      Heart sounds: Normal heart sounds.   Pulmonary:      Effort: Pulmonary effort is normal.      Breath sounds: Normal breath sounds and air entry.   Abdominal:      General: Bowel sounds are normal. There is no distension or abdominal bruit.      Palpations: Abdomen is soft. There is no hepatomegaly, splenomegaly or mass.      Tenderness: There is no abdominal tenderness.      Hernia: There is no hernia in the ventral area.   Musculoskeletal:      Right forearm: Tenderness present. No swelling, edema, deformity, lacerations or bony tenderness.      Right wrist: Normal.      Right hand: Normal.      Cervical back: Neck supple.      Right lower leg: No edema.      Left lower leg: No edema.   Lymphadenopathy:      Cervical: No cervical adenopathy.   Skin:     General: Skin is warm and dry.      Capillary Refill: Capillary refill takes less than 2 seconds.      Coloration: Skin is not pale.   Neurological:      General: No focal deficit present.      Mental Status: She is alert and oriented to person, place, and time.      Cranial Nerves: Cranial nerves 2-12 are intact.      Sensory: Sensation is intact.      Motor: Motor function is intact.      Coordination: Coordination is intact.      Gait: Gait normal.      Deep Tendon Reflexes: Reflexes are normal and symmetric.   Psychiatric:         Mood and Affect: Mood normal.         Behavior: Behavior normal. Behavior is cooperative.         Cognition and Memory: Cognition and memory normal.         Judgment: Judgment normal.       Administrative Statements

## 2024-05-15 NOTE — PATIENT INSTRUCTIONS
Low Fat Diet   WHAT YOU NEED TO KNOW:   What is a low-fat diet?  A low-fat diet is an eating plan that is low in total fat, unhealthy fat, and cholesterol. You may need to follow a low-fat diet if you have trouble digesting or absorbing fat. You may also need to follow this diet if you have high cholesterol. You can also lower your cholesterol by increasing the amount of fiber in your diet. Soluble fiber is a type of fiber that helps to decrease cholesterol levels.  What do I need to know about the different types of fat in food?   Limit unhealthy fats.  A diet that is high in cholesterol, saturated fat, and trans fat may cause unhealthy cholesterol levels. Unhealthy cholesterol levels increase your risk of heart disease.    Cholesterol:  Limit intake of cholesterol to less than 200 mg per day. Cholesterol is found in meat, eggs, and dairy.    Saturated fat:  Limit saturated fat to less than 7% of your total daily calories. Ask your dietitian how many calories you need each day. Saturated fat is found in butter, cheese, ice cream, whole milk, and palm oil. Saturated fat is also found in meat, such as beef, pork, chicken skin, and processed meats. Processed meats include sausage, hot dogs, and bologna.     Trans fat:  Avoid trans fat as much as possible. Trans fat is used in fried and baked foods. Foods that say trans fat free on the label may still have up to 0.5 grams of trans fat per serving.    Include healthy fats.  Replace foods that are high in saturated and trans fat with foods high in healthy fats. This may help to decrease high cholesterol levels.     Monounsaturated fats:  These are found in avocados, nuts, and vegetable oils, such as olive, canola, and sunflower oil.    Polyunsaturated fats:  These can be found in vegetable oils, such as soybean or corn oil. Omega-3 fats can help to decrease the risk of heart disease. Omega-3 fats are found in fish, such as salmon, herring, trout, and tuna. Omega-3 fats  can also be found in plant foods, such as walnuts, flaxseed, soybeans, and canola oil.       What foods should I limit or avoid?   Grains:      Snacks that are made with partially hydrogenated oils, such as chips, regular crackers, and butter-flavored popcorn    High-fat baked goods, such as biscuits, croissants, doughnuts, pies, cookies, and pastries    Dairy:      Whole milk, 2% milk, and yogurt and ice cream made with whole milk    Half and half creamer, heavy cream, and whipping cream    Cheese, cream cheese, and sour cream    Meats and proteins:      High-fat cuts of meat (T-bone steak, regular hamburger, and ribs)    Fried meat, poultry (turkey and chicken), and fish    Poultry (chicken and turkey) with skin    Cold cuts (salami or bologna), hot dogs, cano, and sausage    Whole eggs and egg yolks    Vegetables and fruits with added fat:      Fried vegetables or vegetables in butter or high-fat sauces, such as cream or cheese sauces    Fried fruit or fruit served with butter or cream    Fats:      Butter, stick margarine, and shortening    Coconut, palm oil, and palm kernel oil    What foods should I include?       Grains:      Whole-grain breads, cereals, pasta, and brown rice    Low-fat crackers and pretzels    Vegetables and fruits:      Fresh, frozen, or canned vegetables (no salt or low-sodium)    Fresh, frozen, dried, or canned fruit (canned in light syrup or fruit juice)    Avocado    Low-fat dairy products:      Nonfat (skim) or 1% milk    Nonfat or low-fat cheese, yogurt, and cottage cheese    Meats and proteins:      Chicken or turkey with no skin    Baked or broiled fish    Lean beef and pork (loin, round, extra lean hamburger)    Beans and peas, unsalted nuts, soy products    Egg whites and substitutes    Seeds and nuts    Fats:      Unsaturated oil, such as canola, olive, peanut, soybean, or sunflower oil    Soft or liquid margarine and vegetable oil spread    Low-fat salad dressing  What are some  other ways I can decrease fat?   Read food labels before you buy foods.  Choose foods that have less than 30% of calories from fat. Choose low-fat or fat-free dairy products. Remember that fat free does not mean calorie free. These foods still contain calories, and too many calories can lead to weight gain.     Trim fat from meat and avoid fried food.  Trim all visible fat from meat before you cook it. Remove the skin from poultry. Do not leach meat, fish, or poultry. Bake, roast, boil, or broil these foods instead. Avoid fried foods. Eat a baked potato instead of French fries. Steam vegetables instead of sautéing them in butter.     Add less fat to foods.  Use imitation cano bits on salads and baked potatoes instead of regular cano bits. Use fat-free or low-fat salad dressings instead of regular dressings. Use low-fat or nonfat butter-flavored topping instead of regular butter or margarine on popcorn and other foods.    How can I decrease fat in recipes?  Replace high-fat ingredients with low-fat or nonfat ones. This may cause baked goods to be drier than usual. You may need to use nonfat cooking spray on pans to prevent food from sticking. You also may need to change the amount of other ingredients, such as water, in the recipe. Try the following:  Use low-fat or light margarine instead of regular margarine or shortening.     Use lean ground turkey breast or chicken, or lean ground beef (less than 5% fat) instead of hamburger.     Add 1 teaspoon of canola oil to 8 ounces of skim milk instead of using cream or half and half.     Use grated zucchini, carrots, or apples in breads instead of coconut.    Use blenderized, low-fat cottage cheese, plain tofu, or low-fat ricotta cheese instead of cream cheese.     Use 1 egg white and 1 teaspoon of canola oil, or use ¼ cup (2 ounces) of fat-free egg substitute instead of a whole egg.     Replace half of the oil that is called for in a recipe with applesauce when you bake.  Use 3 tablespoons of cocoa powder and 1 tablespoon of canola oil instead of a square of baking chocolate.    How can I increase fiber?  Eat enough high-fiber foods to get 20 to 30 grams of fiber every day. Slowly increase your fiber intake to avoid stomach cramps, gas, and other problems.  Eat 3 ounces of whole-grain foods each day.  An ounce is about 1 slice of bread. Eat whole-grain breads, such as whole-wheat bread. Whole wheat, whole-wheat flour, or other whole grains should be listed as the first ingredient on the food label. Replace white flour with whole-grain flour or use half of each in recipes. Whole-grain flour is heavier than white flour, so you may have to add more yeast or baking powder.     Eat a high-fiber cereal for breakfast.  Oatmeal is a good source of soluble fiber. Look for cereals that have bran or fiber in the name. Choose whole-grain products, such as brown rice, barley, and whole-wheat pasta.     Eat more beans, peas, and lentils.  For example, add beans to soups or salads. Eat at least 5 cups of fruits and vegetables each day. Eat fruits and vegetables with the peel because the peel is high in fiber.       CARE AGREEMENT:   You have the right to help plan your care. Discuss treatment options with your healthcare provider to decide what care you want to receive. You always have the right to refuse treatment. The above information is an  only. It is not intended as medical advice for individual conditions or treatments. Talk to your doctor, nurse or pharmacist before following any medical regimen to see if it is safe and effective for you.  © Copyright [employer] 2023 Information is for End User's use only and may not be sold, redistributed or otherwise used for commercial purposes.  Wellness Visit for Adults   AMBULATORY CARE:   A wellness visit  is when you see your healthcare provider to get screened for health problems. Your healthcare provider will also give you advice on  how to stay healthy. Write down your questions so you remember to ask them. Ask your healthcare provider how often you should have a wellness visit.  What happens at a wellness visit:  Your healthcare provider will ask about your health, and your family history of health problems. This includes high blood pressure, heart disease, and cancer. He or she will ask if you have symptoms that concern you, if you smoke, and about your mood. You may also be asked about your intake of medicines, supplements, food, and alcohol. Any of the following may be done:  Your weight  will be checked. Your height may also be checked so your body mass index (BMI) can be calculated. Your BMI shows if you are at a healthy weight.    Your blood pressure  and heart rate will be checked. Your temperature may also be checked.    Blood and urine tests  may be done. Blood tests may be done to check your cholesterol levels. Abnormal cholesterol levels increase your risk for heart disease and stroke. You may also need a blood or urine test to check for diabetes if you are at increased risk. Urine tests may be done to look for signs of an infection or kidney disease.    A physical exam  includes checking your heartbeat and lungs with a stethoscope. Your healthcare provider may also check your skin to look for sun damage.    Screening tests  may be recommended. A screening test is done to check for diseases that may not cause symptoms. The screening tests you may need depend on your age, gender, family history, and lifestyle habits. For example, colorectal screening may be recommended if you are 50 years old or older.    Screening tests you need if you are a woman:   A Pap smear  is used to screen for cervical cancer. Pap smears are usually done every 3 to 5 years depending on your age. You may need them more often if you have had abnormal Pap smear test results in the past. Ask your healthcare provider how often you should have a Pap smear.    A  mammogram  is an x-ray of your breasts to screen for breast cancer. Experts recommend mammograms every 2 years starting at age 50 years. You may need a mammogram at age 49 years or younger if you have an increased risk for breast cancer. Talk to your healthcare provider about when you should start having mammograms and how often you need them.    Vaccines you may need:   Get an influenza vaccine  every year. The influenza vaccine protects you from the flu. Several types of viruses cause the flu. The viruses change over time, so new vaccines are made each year.    Get a tetanus-diphtheria (Td) booster vaccine  every 10 years. This vaccine protects you against tetanus and diphtheria. Tetanus is a severe infection that may cause painful muscle spasms and lockjaw. Diphtheria is a severe bacterial infection that causes a thick covering in the back of your mouth and throat.    Get a human papillomavirus (HPV) vaccine  if you are female and aged 19 to 26 or male 19 to 21 and never received it. This vaccine protects you from HPV infection. HPV is the most common infection spread by sexual contact. HPV may also cause vaginal, penile, and anal cancers.    Get a pneumococcal vaccine  if you are aged 65 years or older. The pneumococcal vaccine is an injection given to protect you from pneumococcal disease. Pneumococcal disease is an infection caused by pneumococcal bacteria. The infection may cause pneumonia, meningitis, or an ear infection.    Get a shingles vaccine  if you are 60 or older, even if you have had shingles before. The shingles vaccine is an injection to protect you from the varicella-zoster virus. This is the same virus that causes chickenpox. Shingles is a painful rash that develops in people who had chickenpox or have been exposed to the virus.    How to eat healthy:  My Plate is a model for planning healthy meals. It shows the types and amounts of foods that should go on your plate. Fruits and vegetables make  up about half of your plate, and grains and protein make up the other half. A serving of dairy is included on the side of your plate. The amount of calories and serving sizes you need depends on your age, gender, weight, and height. Examples of healthy foods are listed below:  Eat a variety of vegetables  such as dark green, red, and orange vegetables. You can also include canned vegetables low in sodium (salt) and frozen vegetables without added butter or sauces.    Eat a variety of fresh fruits , canned fruit in 100% juice, frozen fruit, and dried fruit.    Include whole grains.  At least half of the grains you eat should be whole grains. Examples include whole-wheat bread, wheat pasta, brown rice, and whole-grain cereals such as oatmeal.    Eat a variety of protein foods such as seafood (fish and shellfish), lean meat, and poultry without skin (turkey and chicken). Examples of lean meats include pork leg, shoulder, or tenderloin, and beef round, sirloin, tenderloin, and extra lean ground beef. Other protein foods include eggs and egg substitutes, beans, peas, soy products, nuts, and seeds.    Choose low-fat dairy products such as skim or 1% milk or low-fat yogurt, cheese, and cottage cheese.    Limit unhealthy fats  such as butter, hard margarine, and shortening.       Exercise:  Exercise at least 30 minutes per day on most days of the week. Some examples of exercise include walking, biking, dancing, and swimming. You can also fit in more physical activity by taking the stairs instead of the elevator or parking farther away from stores. Include muscle strengthening activities 2 days each week. Regular exercise provides many health benefits. It helps you manage your weight, and decreases your risk for type 2 diabetes, heart disease, stroke, and high blood pressure. Exercise can also help improve your mood. Ask your healthcare provider about the best exercise plan for you.       General health and safety  guidelines:   Do not smoke.  Nicotine and other chemicals in cigarettes and cigars can cause lung damage. Ask your healthcare provider for information if you currently smoke and need help to quit. E-cigarettes or smokeless tobacco still contain nicotine. Talk to your healthcare provider before you use these products.    Limit alcohol.  A drink of alcohol is 12 ounces of beer, 5 ounces of wine, or 1½ ounces of liquor.    Lose weight, if needed.  Being overweight increases your risk of certain health conditions. These include heart disease, high blood pressure, type 2 diabetes, and certain types of cancer.    Protect your skin.  Do not sunbathe or use tanning beds. Use sunscreen with a SPF 15 or higher. Apply sunscreen at least 15 minutes before you go outside. Reapply sunscreen every 2 hours. Wear protective clothing, hats, and sunglasses when you are outside.    Drive safely.  Always wear your seatbelt. Make sure everyone in your car wears a seatbelt. A seatbelt can save your life if you are in an accident. Do not use your cell phone when you are driving. This could distract you and cause an accident. Pull over if you need to make a call or send a text message.    Practice safe sex.  Use latex condoms if are sexually active and have more than one partner. Your healthcare provider may recommend screening tests for sexually transmitted infections (STIs).    Wear helmets, lifejackets, and protective gear.  Always wear a helmet when you ride a bike or motorcycle, go skiing, or play sports that could cause a head injury. Wear protective equipment when you play sports. Wear a lifejacket when you are on a boat or doing water sports.    © Copyright [employer] 2023 Information is for End User's use only and may not be sold, redistributed or otherwise used for commercial purposes.  The above information is an  only. It is not intended as medical advice for individual conditions or treatments. Talk to your doctor,  nurse or pharmacist before following any medical regimen to see if it is safe and effective for you.

## 2024-05-15 NOTE — TELEPHONE ENCOUNTER
Patient is calling in regards to the papers they dropped off on Thursday at the [place] office for Jefferson Dunaway to sign and wanted to know if they were completed yet please advise the patient on status of paperwork thank you

## 2024-05-16 NOTE — TELEPHONE ENCOUNTER
Received signed documents from . Forms were faxed to designated department. Documents will be uploaded to PT chart so PT can  a copy at her next visit which is on 5/20/2024 with Thais Dunn.     A copy will also be mailed to PT also.     PLEASE PRINT AND GIVE TO PT WHEN SHE COMES IN FOR HER APPT ON 5/20/2024

## 2024-05-16 NOTE — TELEPHONE ENCOUNTER
Pt calling in to check on the status of her paperwork. Spoke with MR at [facility]. Dr. Paulino will be in the office today and they will have the paperwork completed. Pt would like us to please fax the completed paperwork to the fax number on th form. Pt would also like a call once paperwork has been completed so that she can follow up with the company.     Pt asked if we can upload completed forms to her my chart just so she has a copy of it.

## 2024-05-17 NOTE — TELEPHONE ENCOUNTER
Pt called regarding the email she received of her forms . Pt stated the [employer] disability and leave statement form she received is  blank , I did advise the patient the form was completed by doctor Paulino . Pt would like the form to be re emailed to her     Please advise

## 2024-05-21 ENCOUNTER — OFFICE VISIT (OUTPATIENT)
Dept: PULMONOLOGY | Facility: CLINIC | Age: 59
End: 2024-05-21
Payer: COMMERCIAL

## 2024-05-21 ENCOUNTER — DOCUMENTATION (OUTPATIENT)
Dept: PULMONOLOGY | Facility: CLINIC | Age: 59
End: 2024-05-21

## 2024-05-21 VITALS
SYSTOLIC BLOOD PRESSURE: 104 MMHG | HEART RATE: 76 BPM | DIASTOLIC BLOOD PRESSURE: 60 MMHG | OXYGEN SATURATION: 96 % | TEMPERATURE: 98 F

## 2024-05-21 DIAGNOSIS — J44.9 STAGE 3 SEVERE COPD BY GOLD CLASSIFICATION (HCC): ICD-10-CM

## 2024-05-21 DIAGNOSIS — J45.40 MODERATE PERSISTENT ASTHMA WITHOUT COMPLICATION: ICD-10-CM

## 2024-05-21 DIAGNOSIS — J44.9 COPD, SEVERE (HCC): Primary | ICD-10-CM

## 2024-05-21 DIAGNOSIS — J30.1 SEASONAL ALLERGIC RHINITIS DUE TO POLLEN: ICD-10-CM

## 2024-05-21 DIAGNOSIS — F17.211 CIGARETTE NICOTINE DEPENDENCE IN REMISSION: ICD-10-CM

## 2024-05-21 DIAGNOSIS — J44.9 CHRONIC OBSTRUCTIVE PULMONARY DISEASE, UNSPECIFIED COPD TYPE (HCC): ICD-10-CM

## 2024-05-21 PROBLEM — Z02.89 ENCOUNTER FOR COMPLETION OF FORM WITH PATIENT: Status: RESOLVED | Noted: 2024-05-05 | Resolved: 2024-05-21

## 2024-05-21 PROBLEM — T88.7XXA MEDICATION SIDE EFFECTS: Status: RESOLVED | Noted: 2023-11-19 | Resolved: 2024-05-21

## 2024-05-21 PROBLEM — R06.09 DYSPNEA ON EXERTION: Status: RESOLVED | Noted: 2022-05-19 | Resolved: 2024-05-21

## 2024-05-21 PROBLEM — Z86.16 PERSONAL HISTORY OF COVID-19: Status: RESOLVED | Noted: 2022-10-12 | Resolved: 2024-05-21

## 2024-05-21 PROBLEM — N64.4 ACUTE BREAST PAIN: Status: RESOLVED | Noted: 2023-10-24 | Resolved: 2024-05-21

## 2024-05-21 PROCEDURE — 99214 OFFICE O/P EST MOD 30 MIN: CPT | Performed by: INTERNAL MEDICINE

## 2024-05-21 RX ORDER — BUDESONIDE, GLYCOPYRROLATE, AND FORMOTEROL FUMARATE 160; 9; 4.8 UG/1; UG/1; UG/1
2 AEROSOL, METERED RESPIRATORY (INHALATION) EVERY 12 HOURS
Qty: 32.1 G | Refills: 3 | Status: SHIPPED | OUTPATIENT
Start: 2024-05-21

## 2024-05-21 NOTE — PROGRESS NOTES
Pt was in office today, stated she is out of refills for her Fasenra. Called [facility] pharmacy, gave verbal script with refills.

## 2024-05-21 NOTE — ASSESSMENT & PLAN NOTE
Asthma symptomatology has been more prominent lately  Recently given Airsupra to replace albuterol HFA.  This does add dual ICS but she has noticed benefit and is typically only using this once daily prior to activity, prescription for Airsupra renewed  Remains on Fasenra injections

## 2024-05-21 NOTE — ASSESSMENT & PLAN NOTE
Patient has been doing gardening lately  Does have sensitivities to certain pollens  Taking over-the-counter Claritin, continue antihistamine

## 2024-05-21 NOTE — ASSESSMENT & PLAN NOTE
Quit smoking about 7 years ago, does continue to meet lung cancer screening criteria with a 73-pack-year history  Recently had PET CT in January for follow-up of her breast cancer.  No need for additional lung cancer screening.  No concerning findings.  Would be next due in January 2025 unless other CT imaging of the chest is performed in the interim

## 2024-05-21 NOTE — PROGRESS NOTES
Progress note - Pulmonary Medicine   Khushboo Pichardo 58 y.o. female MRN: [id number]       Impression & Plan:     COPD, severe (HCC)  On Breztri  Continue present therapy  Does have overlap with asthma    Moderate persistent asthma without complication  Asthma symptomatology has been more prominent lately  Recently given Airsupra to replace albuterol HFA.  This does add dual ICS but she has noticed benefit and is typically only using this once daily prior to activity, prescription for Airsupra renewed  Remains on Fasenra injections    Allergic rhinitis  Patient has been doing gardening lately  Does have sensitivities to certain pollens  Taking over-the-counter Claritin, continue antihistamine    Nicotine dependence in remission  Quit smoking about 7 years ago, does continue to meet lung cancer screening criteria with a 73-pack-year history  Recently had PET CT in January for follow-up of her breast cancer.  No need for additional lung cancer screening.  No concerning findings.  Would be next due in January 2025 unless other CT imaging of the chest is performed in the interim    Return in about 6 months (around 11/21/2024).  She is typically followed longitudinally by Dr. Paulino      ______________________________________________________________________    HPI:    Khushboo Pichardo presents today for follow-up of asthma with COPD overlap.  She was having increased symptoms and was using her albuterol inhaler very frequently.  She recently changed the albuterol inhaler to Airsupra and has noticed benefit.  She is currently using the Airsupra only once daily  prior to planned activity.  She is chronically maintained on Breztri for her COPD.  She does report daily compliance with the Breztri as well.    No chest pain or cardiac complaints.  No abdominal pain or GERD.  She does have allergies.  She is taking over-the-counter antihistamine..    Current Medications:    Current Outpatient Medications:      Albuterol-Budesonide 90-80 MCG/ACT AERO, Inhale 2 puffs every 6 (six) hours as needed (shortness of breath/wheezing), Disp: 32.1 g, Rfl: 3    ALPRAZolam (XANAX) 0.5 mg tablet, Take 1 tab (0.5mg) 20-30 minutes prior to plane flight as needed for fear of flying, Disp: 3 tablet, Rfl: 0    anastrozole (ARIMIDEX) 1 mg tablet, Take 1 tablet (1 mg total) by mouth daily, Disp: 90 tablet, Rfl: 3    Ascorbic Acid (vitamin C) 1000 MG tablet, Take 1,000 mg by mouth daily, Disp: , Rfl:     B Complex Vitamins (B COMPLEX 1 PO), Take by mouth daily , Disp: , Rfl:     benralizumab (FASENRA) subcutaneous injection, Inject 1 mL (30 mg total) under the skin every 56 days, Disp: 1 mL, Rfl: 5    Budeson-Glycopyrrol-Formoterol (Breztri Aerosphere) 160-9-4.8 MCG/ACT AERO, Inhale 2 puffs every 12 (twelve) hours Rinse mouth after use., Disp: 32.1 g, Rfl: 3    Calcium-Magnesium-Vitamin D 185- MG-MG-UNIT CAPS, Take by mouth daily , Disp: , Rfl:     cyanocobalamin (VITAMIN B-12) 500 MCG tablet, Take 500 mcg by mouth daily, Disp: , Rfl:     Echinacea 400 MG CAPS, Take by mouth daily , Disp: , Rfl:     loperamide (IMODIUM) 2 mg capsule, Take 2 mg by mouth 4 (four) times a day as needed for diarrhea, Disp: , Rfl:     Loratadine 10 MG CAPS, Take 10 mg by mouth daily, Disp: , Rfl:     methocarbamol (ROBAXIN) 500 mg tablet, Take 1 tablet (500 mg total) by mouth 4 (four) times a day, Disp: 20 tablet, Rfl: 0    methocarbamol (ROBAXIN) 500 mg tablet, Take 1 tablet (500 mg total) by mouth 4 (four) times a day as needed for muscle spasms, Disp: 30 tablet, Rfl: 1    Multiple Vitamins-Minerals (MULTIVITAMIN ADULT PO), Take by mouth daily , Disp: , Rfl:     rizatriptan (MAXALT-MLT) 10 mg disintegrating tablet, TAKE 1 TABLET (10 MG TOTAL) BY MOUTH AS NEEDED FOR MIGRAINE TAKE AT THE ONSET OF MIGRAINE IF SYMPTOMS CONTINUE OR RETURN, MAY TAKE ANOTHER DOSE AT LEAST 2 HOURS AFTER FIRST DOSE. TAKE NO MORE THAN 2 DOSES IN A DAY., Disp: 9 tablet, Rfl: 2     topiramate (TOPAMAX) 100 mg tablet, Take 1 tablet (100 mg total) by mouth daily, Disp: 90 tablet, Rfl: 1    TURMERIC PO, Take by mouth, Disp: , Rfl:     EPINEPHrine (EPIPEN) 0.3 mg/0.3 mL SOAJ, Inject 0.3 mL (0.3 mg total) into a muscle once for 1 dose (Patient not taking: Reported on 5/15/2024), Disp: 0.6 mL, Rfl: 0    ipratropium-albuterol (DUO-NEB) 0.5-2.5 mg/3 mL nebulizer solution, , Disp: , Rfl:     Review of Systems:    Aside from what is mentioned in the HPI, the review of systems is otherwise negative    Past medical history, surgical history, and family history were reviewed and updated as appropriate    Social history updates:  Social History     Tobacco Use   Smoking Status Former    Current packs/day: 0.00    Average packs/day: 2.0 packs/day for 36.5 years (73.0 ttl pk-yrs)    Types: Cigarettes    Start date:     Quit date: 2016    Years since quittin.8   Smokeless Tobacco Never       PhysicalExamination:  Vitals:   /60 (BP Location: Left arm, Patient Position: Sitting, Cuff Size: Standard)   Pulse 76   Temp 98 °F (36.7 °C) (Tympanic)   LMP  (LMP Unknown)   SpO2 96%     Gen:  Comfortable on room air.  No conversational dyspnea  HEENT:  Conjugate gaze.  sclerae anicteric.  Oropharynx moist  Neck: Trachea is midline. No JVD. No adenopathy  Chest: Excursion is symmetric with slightly distant breath sounds.  No wheezing or crackles  Cardiac: Regular. no murmur  Abdomen:  benign  Extremities: No edema  Neuro:  Normal speech and mentation    Diagnostic Data:      Imaging:  I personally reviewed the images on the PAC system pertinent to today's visit  PET scan from January was unremarkable from a pulmonary perspective.  No significant nodules identified      Cm Woody MD

## 2024-05-23 ENCOUNTER — TELEPHONE (OUTPATIENT)
Dept: PULMONOLOGY | Facility: CLINIC | Age: 59
End: 2024-05-23

## 2024-05-23 ENCOUNTER — TELEPHONE (OUTPATIENT)
Age: 59
End: 2024-05-23

## 2024-05-23 NOTE — TELEPHONE ENCOUNTER
Spoke with patient to clarify disability paperwork.  Additional forms were filled out and faxed as requested

## 2024-05-23 NOTE — TELEPHONE ENCOUNTER
CONCURRENT DISABILITY AND LEAVE STATEMENT OF INCAPACITY PAPERWORK    Documents were faxed and uploaded to media for viewing

## 2024-05-23 NOTE — TELEPHONE ENCOUNTER
Caller: Khushboo     Doctor: Donny     Reason for call: Patient calling asking to schedule TPI patient states 7/10 pain level please advise     Call back#: [phone]

## 2024-05-23 NOTE — TELEPHONE ENCOUNTER
S/W pt.  Pt wants to repeat the trapezius trigger point injection like she had on 2/22/24.  She has pain in her shoulder blades up to her neck- mostly on the right side.  Please advise.

## 2024-05-30 NOTE — TELEPHONE ENCOUNTER
Patient calling asking to speak to Tammy regarding her FML paperwork. She states it is very complicated and would only like to speak with her as she has been handling this. Please call back at [phone]

## 2024-05-30 NOTE — TELEPHONE ENCOUNTER
Pt called the [facility] asking to speak to me regarding her FMLA/ Concurrent disability paperwork. I called the pt which she informed me that the papers might have to be filled out again with a new end date on it since her current job wants her to find a work from home job. Pt was provided with my email for when she gets everything figured out and will send me the documents that need to be refilled out.

## 2024-06-02 DIAGNOSIS — G43.109 MIGRAINE WITH AURA AND WITHOUT STATUS MIGRAINOSUS, NOT INTRACTABLE: ICD-10-CM

## 2024-06-02 RX ORDER — RIZATRIPTAN BENZOATE 10 MG/1
10 TABLET, ORALLY DISINTEGRATING ORAL AS NEEDED
Qty: 9 TABLET | Refills: 5 | Status: SHIPPED | OUTPATIENT
Start: 2024-06-02

## 2024-06-10 ENCOUNTER — OFFICE VISIT (OUTPATIENT)
Dept: PAIN MEDICINE | Facility: MEDICAL CENTER | Age: 59
End: 2024-06-10
Payer: COMMERCIAL

## 2024-06-10 VITALS
WEIGHT: 177 LBS | BODY MASS INDEX: 28.45 KG/M2 | HEART RATE: 81 BPM | HEIGHT: 66 IN | DIASTOLIC BLOOD PRESSURE: 71 MMHG | SYSTOLIC BLOOD PRESSURE: 110 MMHG

## 2024-06-10 DIAGNOSIS — M54.2 CERVICALGIA: ICD-10-CM

## 2024-06-10 DIAGNOSIS — Z98.890 HISTORY OF CERVICAL SPINAL SURGERY: ICD-10-CM

## 2024-06-10 DIAGNOSIS — M79.18 MYOFASCIAL PAIN SYNDROME: Primary | ICD-10-CM

## 2024-06-10 DIAGNOSIS — M54.81 BILATERAL OCCIPITAL NEURALGIA: ICD-10-CM

## 2024-06-10 PROCEDURE — 20553 NJX 1/MLT TRIGGER POINTS 3/>: CPT

## 2024-06-10 RX ORDER — LIDOCAINE HYDROCHLORIDE 10 MG/ML
2 INJECTION, SOLUTION EPIDURAL; INFILTRATION; INTRACAUDAL; PERINEURAL ONCE
Status: CANCELLED | OUTPATIENT
Start: 2024-06-10 | End: 2024-06-10

## 2024-06-10 RX ORDER — LIDOCAINE HYDROCHLORIDE 10 MG/ML
4 INJECTION, SOLUTION EPIDURAL; INFILTRATION; INTRACAUDAL; PERINEURAL ONCE
Status: COMPLETED | OUTPATIENT
Start: 2024-06-10 | End: 2024-06-10

## 2024-06-10 RX ADMIN — LIDOCAINE HYDROCHLORIDE 4 ML: 10 INJECTION, SOLUTION EPIDURAL; INFILTRATION; INTRACAUDAL; PERINEURAL at 10:13
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
## 2024-06-10 NOTE — PATIENT INSTRUCTIONS
Trigger Point Injection   AMBULATORY CARE:   A trigger point injection  is used to relax a muscle knot. This helps relieve pain. You may be able to have more than one trigger point treated during a session.  How to prepare for a trigger point injection:   Your healthcare provider will tell you how to prepare. Arrange to have someone drive you home after the injection.    Tell your provider about all medicines you take, including pain medicine, blood thinners, and muscle relaxers. He or she will tell you if you need to stop any medicine for the injection, and when to stop. He or she will tell you which medicines to take or not take on the day of the injection.    Tell your provider about all your allergies, including to any pain medicine.    What will happen during a trigger point injection:   You may be sitting or lying, depending on where the trigger point is located. Your healthcare provider will feel for a knot in the muscle. He or she may petra your skin over the knot.    Your provider will put a needle through your skin and into the trigger point. Saline (salt solution), pain relievers, or other medicines may be pushed through the needle into the trigger point. Your provider may use only a dry needle (no medicine). He or she will pull the needle almost all the way out and then push it in again. He or she will repeat this several times until the muscle stops twitching or feels relaxed.    Your provider will remove the needle and stretch the muscle area. He or she may apply pressure to the area for 2 minutes. A bandage will be put over the injection site to prevent bleeding or an infection.    What to expect after a trigger point injection:   You may feel pain relief right away. It is normal for some pain to start again 2 hours later. An ice pack or over-the-counter pain medicine can help lower the pain.    You may feel sore in the injection site for a few days. If you need another injection in the same area, wait  until the area is not sore.    Your healthcare provider may give you specific activity instructions to follow at home or recommend physical therapy. In general, you should try to stay active. Avoid strenuous activity for the first 3 or 4 days after the injection.    Do not have more injections if you still have trigger point pain after 2 or 3 injections.    Risks of a trigger point injection:  You may have a severe allergic reaction to pain medicine injected. The injection may be painful, or you may be sore where you got the injection. You may bleed, bruise, or develop an infection in the injection area. The injection may cause you to feel faint. Rarely, the needle may cause muscle or blood vessel damage or your lung may collapse if you get the injection near your chest.  Call your local emergency number (911 in the [place]), or have someone call if:   Your mouth and throat are swollen.    You are wheezing or have trouble breathing.    You have chest pain or your heart is beating faster than usual.    You feel like you are going to faint.    When should I seek immediate care?   Your face is red or swollen.    You have hives that spread over your body.    You feel weak or dizzy.    Call your doctor or pain specialist if:   You have a fever within 1 week of the injection.    You have redness or swelling within 1 week of the injection.    You have new or worsening pain near the injection site.    You have questions or concerns about your condition or care.    Self-care:   Stay active after you have trigger point injections.  Gently move your joints through their full range of motion during the first week. Avoid strenuous activity for 3 or 4 days.    Do regular stretches of the trigger point muscle.  Place gentle pressure on the trigger point, and then stretch the muscle. Ask your healthcare provider for more information about how to stretch and apply pressure.    Apply ice to the injection site.  Use an ice pack, or put ice  in a plastic bag. Cover the bag with a towel before you apply it. Apply ice for 15 to 20 minutes every hour, or as directed.    Apply heat to trigger point sites.  Heat can help relax muscles and relieve trigger point pain. Use a heat pack or a heating pad set on low. Apply heat for 15 minutes every hour, or as directed.    Follow up with your doctor or pain specialist as directed:  Write down your questions so you remember to ask them during your visits.  © Copyright [employer] 2023 Information is for End User's use only and may not be sold, redistributed or otherwise used for commercial purposes.  The above information is an  only. It is not intended as medical advice for individual conditions or treatments. Talk to your doctor, nurse or pharmacist before following any medical regimen to see if it is safe and effective for you.

## 2024-06-13 ENCOUNTER — TELEPHONE (OUTPATIENT)
Dept: PULMONOLOGY | Facility: CLINIC | Age: 59
End: 2024-06-13

## 2024-06-25 ENCOUNTER — TELEPHONE (OUTPATIENT)
Age: 59
End: 2024-06-25

## 2024-06-25 NOTE — TELEPHONE ENCOUNTER
Pt is needing her yearly diagnostic mammogram for both breasts. Can updated order be placed and c/b pt to confirm she can proceed to sched.  thankyou

## 2024-06-25 NOTE — TELEPHONE ENCOUNTER
Please review chart prior to routing message - there are active orders in pt's chart for her diagnostic mammo +US

## 2024-06-25 NOTE — TELEPHONE ENCOUNTER
Call placed to pt-- pt states the mammo ordered is for the right, she needs it to be left and also the yearly mammo for both sides.

## 2024-06-26 ENCOUNTER — OFFICE VISIT (OUTPATIENT)
Dept: URGENT CARE | Facility: CLINIC | Age: 59
End: 2024-06-26
Payer: COMMERCIAL

## 2024-06-26 ENCOUNTER — APPOINTMENT (OUTPATIENT)
Dept: RADIOLOGY | Facility: CLINIC | Age: 59
End: 2024-06-26
Payer: COMMERCIAL

## 2024-06-26 VITALS
HEIGHT: 66 IN | SYSTOLIC BLOOD PRESSURE: 106 MMHG | HEART RATE: 77 BPM | DIASTOLIC BLOOD PRESSURE: 54 MMHG | WEIGHT: 177.2 LBS | RESPIRATION RATE: 17 BRPM | TEMPERATURE: 98.2 F | BODY MASS INDEX: 28.48 KG/M2 | OXYGEN SATURATION: 93 %

## 2024-06-26 DIAGNOSIS — C50.211 MALIGNANT NEOPLASM OF UPPER-INNER QUADRANT OF RIGHT BREAST IN FEMALE, ESTROGEN RECEPTOR POSITIVE (HCC): ICD-10-CM

## 2024-06-26 DIAGNOSIS — J44.1 COPD WITH ACUTE EXACERBATION (HCC): ICD-10-CM

## 2024-06-26 DIAGNOSIS — N63.0 PALPABLE MASS OF BREAST: Primary | ICD-10-CM

## 2024-06-26 DIAGNOSIS — M54.50 ACUTE RIGHT-SIDED LOW BACK PAIN WITHOUT SCIATICA: ICD-10-CM

## 2024-06-26 DIAGNOSIS — N30.01 ACUTE CYSTITIS WITH HEMATURIA: ICD-10-CM

## 2024-06-26 DIAGNOSIS — N64.4 ACUTE BREAST PAIN: ICD-10-CM

## 2024-06-26 DIAGNOSIS — J44.1 COPD WITH ACUTE EXACERBATION (HCC): Primary | ICD-10-CM

## 2024-06-26 DIAGNOSIS — Z17.0 MALIGNANT NEOPLASM OF UPPER-INNER QUADRANT OF RIGHT BREAST IN FEMALE, ESTROGEN RECEPTOR POSITIVE (HCC): ICD-10-CM

## 2024-06-26 LAB
SL AMB  POCT GLUCOSE, UA: ABNORMAL
SL AMB LEUKOCYTE ESTERASE,UA: ABNORMAL
SL AMB POCT BILIRUBIN,UA: ABNORMAL
SL AMB POCT BLOOD,UA: ABNORMAL
SL AMB POCT CLARITY,UA: CLEAR
SL AMB POCT COLOR,UA: YELLOW
SL AMB POCT KETONES,UA: ABNORMAL
SL AMB POCT NITRITE,UA: ABNORMAL
SL AMB POCT PH,UA: 5
SL AMB POCT SPECIFIC GRAVITY,UA: 1.03
SL AMB POCT URINE PROTEIN: ABNORMAL
SL AMB POCT UROBILINOGEN: 0.2

## 2024-06-26 PROCEDURE — G0382 LEV 3 HOSP TYPE B ED VISIT: HCPCS

## 2024-06-26 PROCEDURE — 71046 X-RAY EXAM CHEST 2 VIEWS: CPT

## 2024-06-26 PROCEDURE — 81002 URINALYSIS NONAUTO W/O SCOPE: CPT

## 2024-06-26 PROCEDURE — 87086 URINE CULTURE/COLONY COUNT: CPT

## 2024-06-26 PROCEDURE — S9083 URGENT CARE CENTER GLOBAL: HCPCS

## 2024-06-26 RX ORDER — PREDNISONE 10 MG/1
TABLET ORAL
Qty: 42 TABLET | Refills: 0 | Status: SHIPPED | OUTPATIENT
Start: 2024-06-26

## 2024-06-26 RX ORDER — SULFAMETHOXAZOLE AND TRIMETHOPRIM 800; 160 MG/1; MG/1
1 TABLET ORAL EVERY 12 HOURS SCHEDULED
Qty: 14 TABLET | Refills: 0 | Status: SHIPPED | OUTPATIENT
Start: 2024-06-26 | End: 2024-07-03

## 2024-06-26 RX ORDER — BENZONATATE 100 MG/1
100 CAPSULE ORAL 3 TIMES DAILY PRN
Qty: 20 CAPSULE | Refills: 0 | Status: SHIPPED | OUTPATIENT
Start: 2024-06-26
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
## 2024-06-26 NOTE — TELEPHONE ENCOUNTER
Pt called, once mammogram order is placed for the Right and Left breast, kindly call patient.  She prefers a phone call since she will not have access to my chart today.

## 2024-06-26 NOTE — PATIENT INSTRUCTIONS
Xray initial interpretation: no acute abnormaility  Official radiology read pending - We will only notify you if there needs to be a change in your treatment plan.     Urine culture pending - We will only notify you if there needs to be a change in your treatment plan.     Take Bactrim, tessalon perles and prednisone as prescribed  Continue albuterol nebulizer treatments every 4-6 hours, and use albuterol inhaler only if needed    Drink plenty of water   Cranberry supplements  Urinate within 5 minutes following intercourse     Follow up with PCP in 3-5 days.  Proceed to  ER if symptoms worsen.    If tests are performed, our office will contact you with results only if changes need to made to the care plan discussed with you at the visit. You can review your full results on [facility].    Patient Education     Exacerbation of COPD Discharge Instructions   About this topic   You have chronic bronchitis or emphysema and your symptoms are getting worse. COPD, or chronic obstructive pulmonary disease, is another name for these problems. This is often caused by inflammation of the bronchial tubes that carry air into your lungs or by infection. Chronic bronchitis and emphysema are mostly caused by smoking. It can also be caused by breathing in toxic fumes or gases. With chronic bronchitis, your cough will bring up mucus and can last for months.     What care is needed at home?   Ask your doctor what you need to do when you go home. Make sure you ask questions if you do not understand what the doctor says.  If you smoke, try to quit. Your doctor or nurse can help you with this.  Stay away from smoke-filled places. Avoid other things that may cause breathing problems like fumes, pollution, dust, and other common allergens.  If you have an inhaler to take when you are feeling short of breath, be sure to carry it with you all the time. Then, you can take it when needed. Take all your other medicines as directed.  The  doctor may have ordered antibiotics to treat an infection. It is important to take all of your antibiotics even if you start to feel better.  Work to get as healthy as possible.  Be active at home by:  Walking. Ask your doctor how far you can walk and how often you should walk.  Exercising your arms, shoulders, and legs. Ask your doctor or therapist about what exercises are best for you.  Do breathing exercises 2 to 3 times each day.  Wear a mask when you are around dust or pollution.  Protect yourself from getting sick.  Wash your hands often.  Ask visitors with a cold to wear a mask or reschedule their visit.  Save your energy.  Place the things you use within easy reach where you do not have to bend over or stretch to get them.  Use a cart with wheels to move things around your house.  Avoid heavy activities unless your doctor tells you it is OK.  Use oxygen if you need it. Your doctor may give you oxygen to use at home. You will be taught how to use the oxygen at home by the staff that brings it to your home. Follow your doctor's advice on using it.  Never change the amount of oxygen flowing without talking to your doctor.  Always have a back-up oxygen supply at home or when you go out.  No candles, matches, cigarettes, or open flame should ever come near your oxygen.  Never smoke when using oxygen - this can cause severe burns to your face, mouth, throat, and lungs.       What follow-up care is needed?   Your doctor may ask you to make visits to the office to check on your progress. Be sure to keep these visits.  What drugs may be needed?   The doctor may order drugs to:  Make breathing easier  Relieve coughing  Reduce swelling in your airways  Prevent or fight an infection  Will physical activity be limited?   Your physical activities may be limited as long as your COPD is worse. Avoid heavy and tiring activities. Talk to your doctor about the right amount of activity for you.  What changes to diet are needed?    Eat a balanced diet including food rich in vitamin C. This will help boost your body's ability to fight against infection and help you maintain a healthy weight.  What problems could happen?   Lung infection  Heart problems  Weight loss  Coughing up blood  What can be done to prevent this health problem?   If you smoke, quit. The more you smoke, the more likely your COPD will get worse.  Avoid being around anyone who smokes. This is secondhand smoke.  Take your medicine each day. If you have an inhaler, make sure you know how to use it properly.  Avoid being around any air pollution or irritants such as cleaning solutions or chemicals.  Wear a mask and other protective items if you work around chemicals, irritants, or toxins.  Stay away from crowded places.  Get a flu shot each year. Ask your doctor if you need a pneumonia shot.     When do I need to call the doctor?   Activate the emergency medical system right away if you have signs of a heart attack. Call 911 in the [place] or [place]. The sooner treatment begins, the better your chances for recovery. Call for emergency help right away if you have:  Signs of heart attack which may include:  Severe chest pain, pressure, or discomfort with:  Breathing trouble; sweating; upset stomach; or cold, clammy skin.  Pain in your arms, back, or jaw.  Worse pain with activity like walking up stairs.  Fast or irregular heartbeat.  Feeling dizzy, faint, or weak.  Call your regular doctor if:  You are having so much trouble breathing that you can only say one or two words at a time.  You need to sit upright at all times to be able to breathe and or cannot lie down.  You are very tired from working to catch your breath or you are sweating from trying to breathe.  You have trouble breathing when talking or sitting still.  You cough up blood.  You have a fever of 100.4°F (38°C) or higher or chills.  You are feeling weak or short of breath more than normal when doing your  activities.  You have new or worsening cough.  You cough up more mucus.  Teach Back: Helping You Understand   The Teach Back Method helps you understand the information we are giving you. After you talk with the staff, tell them in your own words what you learned. This helps to make sure the staff has described each thing clearly. It also helps to explain things that may have been confusing. Before going home, make sure you can do these:  I can tell you about my condition.  I can tell you what I can do to help protect my lungs and save my strength.  I can tell you what I will do if I have chest tightness, wheezing, a cough that does not go away, or trouble breathing.  Last Reviewed Date   2022-02-11  Consumer Information Use and Disclaimer   This generalized information is a limited summary of diagnosis, treatment, and/or medication information. It is not meant to be comprehensive and should be used as a tool to help the user understand and/or assess potential diagnostic and treatment options. It does NOT include all information about conditions, treatments, medications, side effects, or risks that may apply to a specific patient. It is not intended to be medical advice or a substitute for the medical advice, diagnosis, or treatment of a health care provider based on the health care provider's examination and assessment of a patient’s specific and unique circumstances. Patients must speak with a health care provider for complete information about their health, medical questions, and treatment options, including any risks or benefits regarding use of medications. This information does not endorse any treatments or medications as safe, effective, or approved for treating a specific patient. UpToDate, Inc. and its affiliates disclaim any warranty or liability relating to this information or the use thereof. The use of this information is governed by the Terms of Use, available at  "https://www.woltersVolleeuwer.com/en/know/clinical-effectiveness-terms   Copyright   Copyright © 2024 UpToDate, Inc. and its affiliates and/or licensors. All rights reserved.    Patient Education     Urinary tract infection - Discharge instructions   The Basics   Written by the doctors and editors at [employer]   What are discharge instructions? -- Discharge instructions are information about how to take care of yourself after getting medical care for a health problem.  What is a urinary tract infection? -- A urinary tract infection (\"UTI\") is an infection that affects either the bladder or the kidneys (figure 1). A kidney infection is more serious, and can lead to other serious problems if it is not treated properly.  You need to take antibiotics to treat a UTI. It is important to take all of your antibiotics, even if you start to feel better.  How do I care for myself at home? -- Ask the doctor or nurse what you should do when you go home. Make sure that you understand exactly what you need to do to care for yourself. Ask questions if there is anything you do not understand.  You should also:   Take all of your medicines as instructed.   Take phenazopyridine (sample brand name: AZO Urinary Pain Relief) for the first day or so, if you choose. This is an over-the-counter medicine. It will help numb your bladder and decrease the urge to urinate. This medicine causes your urine and tears to look orange.   Take acetaminophen (sample brand name: Tylenol) if needed for pain.   Drink extra fluids. This can help prevent more bladder infections. If you have sex, these things might also help:   Urinate right afterward.   If you use birth control, use a form that does not contain spermicide.  When should I call the doctor? -- Call for advice if:   You have pain in your back, shoulder, or belly.   You have a fever, shaking chills, or sweats even though you are taking antibiotics.   You notice more blood in your urine.   Your " symptoms get worse or do not get better within 24 hours of starting antibiotics.   Your symptoms come back after finishing treatment.   You have any new or worrying symptoms.  All topics are updated as new evidence becomes available and our peer review process is complete.  This topic retrieved from [employer] on: Feb 26, 2024.  Topic 569109 Version 1.0  Release: 32.2.4 - C32.56  © 2024 UpToDate, Inc. and/or its affiliates. All rights reserved.  figure 1: Anatomy of the urinary tract     Urine is made by the kidneys. It passes from the kidneys into the bladder through 2 tubes called the ureters. Then, it leaves the bladder through another tube called the urethra.  Graphic 60828 Version 8.0  Consumer Information Use and Disclaimer   Disclaimer: This generalized information is a limited summary of diagnosis, treatment, and/or medication information. It is not meant to be comprehensive and should be used as a tool to help the user understand and/or assess potential diagnostic and treatment options. It does NOT include all information about conditions, treatments, medications, side effects, or risks that may apply to a specific patient. It is not intended to be medical advice or a substitute for the medical advice, diagnosis, or treatment of a health care provider based on the health care provider's examination and assessment of a patient's specific and unique circumstances. Patients must speak with a health care provider for complete information about their health, medical questions, and treatment options, including any risks or benefits regarding use of medications. This information does not endorse any treatments or medications as safe, effective, or approved for treating a specific patient. UpToDate, Inc. and its affiliates disclaim any warranty or liability relating to this information or the use thereof.The use of this information is governed by the Terms of Use, available at  https://www.woltersAdvanced Proteome Therapeuticsuwer.com/en/know/clinical-effectiveness-terms. 2024© [employer] and its affiliates and/or licensors. All rights reserved.  Copyright   © 2024 [employer] and/or its affiliates. All rights reserved.

## 2024-06-26 NOTE — TELEPHONE ENCOUNTER
On review of chart, I see that pt never obtained the right breast diagnostic mammo + US that was ordered at visit last October for acute breast pain and palpable mass.  I am placing diagnostic bilateral mammo order and re-ordering the right breast diagnostic US as well         10/24/2023  Acute breast pain  -     Mammo diagnostic right w 3d & cad; Future; Expected date: 10/24/2023  -     US breast right limited (diagnostic); Future; Expected date: 10/24/2023  Palpable mass of breast  -     Mammo diagnostic right w 3d & cad; Future; Expected date: 10/24/2023  -     US breast right limited (diagnostic); Future; Expected date: 10/24/2023  Malignant neoplasm of 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, estrogen receptor positive   -     Mammo diagnostic right w 3d & cad; Future; Expected date: 10/24/2023  -     US breast right limited (diagnostic); Future; Expected date: 10/24/2023  -     MRI brain w wo contrast; Future; Expected date: 10/24/2023  right breast pain, new right breast mass palpable on exam upper outer quadrant/axillary tail in pt with hx breast CA- requires immediate w/u   tapped 16 cc's of old blood 8/6/18. tapped 11 cc's of blood 8/7/18.

## 2024-06-28 ENCOUNTER — TELEPHONE (OUTPATIENT)
Age: 59
End: 2024-06-28

## 2024-06-28 DIAGNOSIS — J44.9 COPD, SEVERE (HCC): Primary | ICD-10-CM

## 2024-06-28 LAB — BACTERIA UR CULT: NORMAL

## 2024-06-28 RX ORDER — IPRATROPIUM BROMIDE AND ALBUTEROL SULFATE 2.5; .5 MG/3ML; MG/3ML
3 SOLUTION RESPIRATORY (INHALATION) EVERY 6 HOURS PRN
Qty: 180 ML | Refills: 1 | Status: SHIPPED | OUTPATIENT
Start: 2024-06-28 | End: 2024-07-28

## 2024-06-28 NOTE — TELEPHONE ENCOUNTER
Patient requesting refill of duo-miranda. Dr. Paulino's last note states patient no longer needing nebulizer.     Last refill 2022 from outside source.     Please advise.   Dr. Paulino on vacation.   Saw Dr. Annalise richard.

## 2024-06-28 NOTE — TELEPHONE ENCOUNTER
Pt calling in and states that she went to urgent care 2 days ago and she was placed on steroid s for 11 days. She is asking if we an refill any of her nebulizer solutions. The ones she has at home and have been using are .

## 2024-06-30 ENCOUNTER — APPOINTMENT (EMERGENCY)
Dept: RADIOLOGY | Facility: HOSPITAL | Age: 59
End: 2024-06-30
Payer: COMMERCIAL

## 2024-06-30 ENCOUNTER — HOSPITAL ENCOUNTER (EMERGENCY)
Facility: HOSPITAL | Age: 59
Discharge: HOME | End: 2024-06-30
Attending: EMERGENCY MEDICINE | Admitting: EMERGENCY MEDICINE
Payer: COMMERCIAL

## 2024-06-30 VITALS
TEMPERATURE: 97.6 F | OXYGEN SATURATION: 96 % | RESPIRATION RATE: 19 BRPM | SYSTOLIC BLOOD PRESSURE: 132 MMHG | HEART RATE: 94 BPM | DIASTOLIC BLOOD PRESSURE: 61 MMHG

## 2024-06-30 DIAGNOSIS — J44.1 COPD WITH ACUTE EXACERBATION (HCC): Primary | ICD-10-CM

## 2024-06-30 LAB
ALBUMIN SERPL BCG-MCNC: 4.4 G/DL (ref 3.5–5)
ALP SERPL-CCNC: 81 U/L (ref 34–104)
ALT SERPL W P-5'-P-CCNC: 24 U/L (ref 7–52)
ANION GAP SERPL CALCULATED.3IONS-SCNC: 8 MMOL/L (ref 4–13)
APTT PPP: 26 SECONDS (ref 23–37)
AST SERPL W P-5'-P-CCNC: 15 U/L (ref 13–39)
BASOPHILS # BLD AUTO: 0.06 THOUSANDS/ÂΜL (ref 0–0.1)
BASOPHILS NFR BLD AUTO: 1 % (ref 0–1)
BILIRUB SERPL-MCNC: 0.28 MG/DL (ref 0.2–1)
BNP SERPL-MCNC: 47 PG/ML (ref 0–100)
BUN SERPL-MCNC: 19 MG/DL (ref 5–25)
CALCIUM SERPL-MCNC: 9.5 MG/DL (ref 8.4–10.2)
CARDIAC TROPONIN I PNL SERPL HS: <2 NG/L
CHLORIDE SERPL-SCNC: 107 MMOL/L (ref 96–108)
CO2 SERPL-SCNC: 21 MMOL/L (ref 21–32)
CREAT SERPL-MCNC: 0.8 MG/DL (ref 0.6–1.3)
D DIMER PPP FEU-MCNC: <0.27 UG/ML FEU
EOSINOPHIL # BLD AUTO: 0.05 THOUSAND/ÂΜL (ref 0–0.61)
EOSINOPHIL NFR BLD AUTO: 0 % (ref 0–6)
ERYTHROCYTE [DISTWIDTH] IN BLOOD BY AUTOMATED COUNT: 13.6 % (ref 11.6–15.1)
FLUAV RNA RESP QL NAA+PROBE: NEGATIVE
FLUBV RNA RESP QL NAA+PROBE: NEGATIVE
GFR SERPL CREATININE-BSD FRML MDRD: 81 ML/MIN/1.73SQ M
GLUCOSE SERPL-MCNC: 115 MG/DL (ref 65–140)
HCT VFR BLD AUTO: 40.3 % (ref 34.8–46.1)
HGB BLD-MCNC: 13.2 G/DL (ref 11.5–15.4)
IMM GRANULOCYTES # BLD AUTO: 0.09 THOUSAND/UL (ref 0–0.2)
IMM GRANULOCYTES NFR BLD AUTO: 1 % (ref 0–2)
INR PPP: 0.97 (ref 0.84–1.19)
LYMPHOCYTES # BLD AUTO: 1.13 THOUSANDS/ÂΜL (ref 0.6–4.47)
LYMPHOCYTES NFR BLD AUTO: 10 % (ref 14–44)
MCH RBC QN AUTO: 30 PG (ref 26.8–34.3)
MCHC RBC AUTO-ENTMCNC: 32.8 G/DL (ref 31.4–37.4)
MCV RBC AUTO: 92 FL (ref 82–98)
MONOCYTES # BLD AUTO: 0.45 THOUSAND/ÂΜL (ref 0.17–1.22)
MONOCYTES NFR BLD AUTO: 4 % (ref 4–12)
NEUTROPHILS # BLD AUTO: 9.8 THOUSANDS/ÂΜL (ref 1.85–7.62)
NEUTS SEG NFR BLD AUTO: 84 % (ref 43–75)
NRBC BLD AUTO-RTO: 0 /100 WBCS
PLATELET # BLD AUTO: 215 THOUSANDS/UL (ref 149–390)
PMV BLD AUTO: 10.3 FL (ref 8.9–12.7)
POTASSIUM SERPL-SCNC: 4.4 MMOL/L (ref 3.5–5.3)
PROT SERPL-MCNC: 7.2 G/DL (ref 6.4–8.4)
PROTHROMBIN TIME: 13.1 SECONDS (ref 11.6–14.5)
RBC # BLD AUTO: 4.4 MILLION/UL (ref 3.81–5.12)
RSV RNA RESP QL NAA+PROBE: NEGATIVE
SARS-COV-2 RNA RESP QL NAA+PROBE: NEGATIVE
SODIUM SERPL-SCNC: 136 MMOL/L (ref 135–147)
WBC # BLD AUTO: 11.58 THOUSAND/UL (ref 4.31–10.16)

## 2024-06-30 PROCEDURE — 83880 ASSAY OF NATRIURETIC PEPTIDE: CPT | Performed by: EMERGENCY MEDICINE

## 2024-06-30 PROCEDURE — 71045 X-RAY EXAM CHEST 1 VIEW: CPT

## 2024-06-30 PROCEDURE — 85379 FIBRIN DEGRADATION QUANT: CPT | Performed by: EMERGENCY MEDICINE

## 2024-06-30 PROCEDURE — 84484 ASSAY OF TROPONIN QUANT: CPT | Performed by: EMERGENCY MEDICINE

## 2024-06-30 PROCEDURE — 94664 DEMO&/EVAL PT USE INHALER: CPT

## 2024-06-30 PROCEDURE — 94760 N-INVAS EAR/PLS OXIMETRY 1: CPT

## 2024-06-30 PROCEDURE — 93005 ELECTROCARDIOGRAM TRACING: CPT

## 2024-06-30 PROCEDURE — 85730 THROMBOPLASTIN TIME PARTIAL: CPT | Performed by: EMERGENCY MEDICINE

## 2024-06-30 PROCEDURE — 80053 COMPREHEN METABOLIC PANEL: CPT | Performed by: EMERGENCY MEDICINE

## 2024-06-30 PROCEDURE — 85610 PROTHROMBIN TIME: CPT | Performed by: EMERGENCY MEDICINE

## 2024-06-30 PROCEDURE — 94644 CONT INHLJ TX 1ST HOUR: CPT

## 2024-06-30 PROCEDURE — 85025 COMPLETE CBC W/AUTO DIFF WBC: CPT | Performed by: EMERGENCY MEDICINE

## 2024-06-30 PROCEDURE — 0241U HB NFCT DS VIR RESP RNA 4 TRGT: CPT | Performed by: EMERGENCY MEDICINE

## 2024-06-30 PROCEDURE — 36415 COLL VENOUS BLD VENIPUNCTURE: CPT | Performed by: EMERGENCY MEDICINE

## 2024-06-30 PROCEDURE — 99284 EMERGENCY DEPT VISIT MOD MDM: CPT | Performed by: EMERGENCY MEDICINE

## 2024-06-30 PROCEDURE — 99285 EMERGENCY DEPT VISIT HI MDM: CPT

## 2024-06-30 RX ORDER — SODIUM CHLORIDE FOR INHALATION 0.9 %
12 VIAL, NEBULIZER (ML) INHALATION ONCE
Status: COMPLETED | OUTPATIENT
Start: 2024-06-30 | End: 2024-06-30

## 2024-06-30 RX ADMIN — IPRATROPIUM BROMIDE 1 MG: 0.5 SOLUTION RESPIRATORY (INHALATION) at 17:00

## 2024-06-30 RX ADMIN — ALBUTEROL SULFATE 10 MG: 2.5 SOLUTION RESPIRATORY (INHALATION) at 17:00

## 2024-06-30 RX ADMIN — ISODIUM CHLORIDE 12 ML: 0.03 SOLUTION RESPIRATORY (INHALATION) at 17:00

## 2024-06-30 NOTE — ED PROVIDER NOTES
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  6:42 PM        CONTINUE these medications which have NOT CHANGED    Details   Albuterol-Budesonide 90-80 MCG/ACT AERO Inhale 2 puffs every 6 (six) hours as needed (shortness of breath/wheezing), Starting Tue 5/21/2024, Normal      ALPRAZolam (XANAX) 0.5 mg tablet Take 1 tab (0.5mg) 20-30 minutes prior to plane flight as needed for fear of flying, Normal      anastrozole (ARIMIDEX) 1 mg tablet Take 1 tablet (1 mg total) by mouth daily, Starting Mon 4/1/2024, Normal      Ascorbic Acid (vitamin C) 1000 MG tablet Take 1,000 mg by mouth daily, Historical Med      B Complex Vitamins (B COMPLEX 1 PO) Take by mouth daily , Historical Med      benralizumab (FASENRA) subcutaneous injection Inject 1 mL (30 mg total) under the skin every 56 days, Starting Tue 8/15/2023, Normal      benzonatate (TESSALON PERLES) 100 mg capsule Take 1 capsule (100 mg total) by mouth 3 (three) times a day as needed for cough, Starting Wed 6/26/2024, Normal      Budeson-Glycopyrrol-Formoterol (Breztri Aerosphere) 160-9-4.8 MCG/ACT AERO Inhale 2 puffs every 12 (twelve) hours Rinse mouth after use., Starting Tue 5/21/2024, Normal      Calcium-Magnesium-Vitamin D 185- MG-MG-UNIT CAPS Take by mouth daily , Historical Med      cyanocobalamin (VITAMIN B-12) 500 MCG tablet Take 500 mcg by  mouth daily, Historical Med      Echinacea 400 MG CAPS Take by mouth daily , Historical Med      EPINEPHrine (EPIPEN) 0.3 mg/0.3 mL SOAJ Inject 0.3 mL (0.3 mg total) into a muscle once for 1 dose, Starting Wed 2/22/2023, Normal      ipratropium-albuterol (DUO-NEB) 0.5-2.5 mg/3 mL nebulizer solution Take 3 mL by nebulization every 6 (six) hours as needed for wheezing or shortness of breath, Starting Fri 6/28/2024, Until Sun 7/28/2024 at 2359, Normal      loperamide (IMODIUM) 2 mg capsule Take 2 mg by mouth 4 (four) times a day as needed for diarrhea, Historical Med      Loratadine 10 MG CAPS Take 10 mg by mouth daily, Historical Med      !! methocarbamol (ROBAXIN) 500 mg tablet Take 1 tablet (500 mg total) by mouth 4 (four) times a day, Starting Wed 1/3/2024, Normal      !! methocarbamol (ROBAXIN) 500 mg tablet Take 1 tablet (500 mg total) by mouth 4 (four) times a day as needed for muscle spasms, Starting Wed 1/17/2024, Normal      Multiple Vitamins-Minerals (MULTIVITAMIN ADULT PO) Take by mouth daily , Historical Med      predniSONE 10 mg tablet Take 60 mg on day one and two, Take 50 mg on day 3 - 4, Take 40 mg on day 5-6, Take 30 mg on day 7-8, Take 20 mg on day 8-9, Take 10 mg on day 10-11, Normal      rizatriptan (MAXALT-MLT) 10 mg disintegrating tablet TAKE 1 TABLET (10 MG TOTAL) BY MOUTH AS NEEDED FOR MIGRAINE TAKE AT THE ONSET OF MIGRAINE IF SYMPTOMS CONTINUE OR RETURN, MAY TAKE ANOTHER DOSE AT LEAST 2 HOURS AFTER FIRST DOSE. TAKE NO MORE THAN 2 DOSES IN A DAY., Starting Sun 6/2/2024, Normal      sulfamethoxazole-trimethoprim (BACTRIM DS) 800-160 mg per tablet Take 1 tablet by mouth every 12 (twelve) hours for 7 days, Starting Wed 6/26/2024, Until Wed 7/3/2024, Normal      topiramate (TOPAMAX) 100 mg tablet Take 1 tablet (100 mg total) by mouth daily, Starting Wed 5/15/2024, Normal      TURMERIC PO Take by mouth, Historical Med       !! - Potential duplicate medications found. Please discuss with provider.           No discharge procedures on file.    PDMP Review         Value Time User    PDMP Reviewed  Yes 5/5/2024  5:53 PM Leidy Morrow DO            ED Provider  Electronically Signed by             Paolo Canchola III, DO  06/30/24 [id number]

## 2024-07-01 LAB
ATRIAL RATE: 73 BPM
P AXIS: 72 DEGREES
PR INTERVAL: 190 MS
QRS AXIS: 78 DEGREES
QRSD INTERVAL: 78 MS
QT INTERVAL: 388 MS
QTC INTERVAL: 427 MS
T WAVE AXIS: 73 DEGREES
VENTRICULAR RATE: 73 BPM

## 2024-07-01 PROCEDURE — 93010 ELECTROCARDIOGRAM REPORT: CPT | Performed by: INTERNAL MEDICINE

## 2024-07-05 ENCOUNTER — TELEPHONE (OUTPATIENT)
Dept: NEUROSURGERY | Facility: CLINIC | Age: 59
End: 2024-07-05

## 2024-07-05 NOTE — TELEPHONE ENCOUNTER
Called patient to confirm appt as well as remind to get xrs needed prior to appt on 07/08, no answer lvm with info and [phone] number

## 2024-07-07 ENCOUNTER — HOSPITAL ENCOUNTER (OUTPATIENT)
Dept: RADIOLOGY | Facility: HOSPITAL | Age: 59
Discharge: HOME | End: 2024-07-07
Payer: COMMERCIAL

## 2024-07-07 DIAGNOSIS — M48.02 CERVICAL SPINAL STENOSIS: ICD-10-CM

## 2024-07-07 PROCEDURE — 72040 X-RAY EXAM NECK SPINE 2-3 VW: CPT

## 2024-07-08 ENCOUNTER — NURSE TRIAGE (OUTPATIENT)
Age: 59
End: 2024-07-08

## 2024-07-08 ENCOUNTER — TELEMEDICINE (OUTPATIENT)
Dept: NEUROSURGERY | Facility: CLINIC | Age: 59
End: 2024-07-08
Payer: COMMERCIAL

## 2024-07-08 DIAGNOSIS — M48.02 CERVICAL SPINAL STENOSIS: Primary | ICD-10-CM

## 2024-07-08 PROCEDURE — 99212 OFFICE O/P EST SF 10 MIN: CPT | Performed by: STUDENT IN AN ORGANIZED HEALTH CARE EDUCATION/TRAINING PROGRAM

## 2024-07-08 NOTE — TELEPHONE ENCOUNTER
Regarding: copd exac concerns, follow up  ----- Message from Yolanda UNDERWOOD sent at 7/8/2024  2:07 PM EDT -----  Patient called asking to follow up with  and I was able to schedule her for this Friday in [place] at 2:20 PM. She will call back later today or tomorrow if she cannot get a ride there.    Patient had went to Urgent Care on 6/26 and the ED on 6/30, for COPD Exacerbations. She was prescribed tessalon perles and prednisone, she finished the steroids 2 days ago and is not feeling any better.    Please advise

## 2024-07-08 NOTE — TELEPHONE ENCOUNTER
"Pt stated Pulm Provider: Dr. Paulino    Actionable item: Advise Chest Tightness - [facility] as per EMR. Appt 7/12 [facility]    What is the reason for the call/chief complaint? Pt to UC 6/26 and ED 6/30 for COPD Exacerbations. Rx'd Tessalon Perles and Prednisone, states did not help her. Pt states under a lot of stress, not sure if chest tightness COPD or stress related. Chest tightness feels like she can't take a deep breath in. Reviewed breathing techniques. Advised as per protocol, pt had no further questions. Appt made for 7/12 Dr. Paulino. Please advise further.    Priority: HIGH    Reason for Disposition   MILD longstanding difficulty breathing (e.g., speaks in phrases, SOB even at rest, pulse 100-120) and SAME as normal    Answer Assessment - Initial Assessment Questions  1. RESPIRATORY STATUS: \"Describe your breathing?\" (e.g., wheezing, shortness of breath, unable to speak, severe coughing)       Shortness of breath, chest tightness  2. ONSET: \"When did this breathing problem begin?\"       End of June  3. PATTERN \"Does the difficult breathing come and go, or has it been constant since it started?\"       Comes and goes, with activity  4. SEVERITY: \"How bad is your breathing?\" (e.g., mild, moderate, severe)     - MILD: No SOB at rest, mild SOB with walking, speaks normally in sentences, can lay down, no retractions, pulse < 100.     - MODERATE: SOB at rest, SOB with minimal exertion and prefers to sit, cannot lie down flat, speaks in phrases, mild retractions, audible wheezing, pulse 100-120.     - SEVERE: Very SOB at rest, speaks in single words, struggling to breathe, sitting hunched forward, retractions, pulse > 120       Mild  5. RECURRENT SYMPTOM: \"Have you had difficulty breathing before?\" If Yes, ask: \"When was the last time?\" and \"What happened that time?\"       End of June x2  6. CARDIAC HISTORY: \"Do you have any history of heart disease?\" (e.g., heart attack, angina, bypass surgery, angioplasty) " "      Denies  7. LUNG HISTORY: \"Do you have any history of lung disease?\"  (e.g., pulmonary embolus, asthma, emphysema)      /Asthma  8. CAUSE: \"What do you think is causing the breathing problem?\"       COPD Exacerbation  9. OTHER SYMPTOMS: \"Do you have any other symptoms? (e.g., dizziness, runny nose, cough, chest pain, fever)      Chest tightness    Protocols used: Breathing Difficulty-ADULT-[place]    "
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
## 2024-07-12 ENCOUNTER — OFFICE VISIT (OUTPATIENT)
Dept: PULMONOLOGY | Facility: CLINIC | Age: 59
End: 2024-07-12
Payer: COMMERCIAL

## 2024-07-12 VITALS
OXYGEN SATURATION: 96 % | HEART RATE: 80 BPM | BODY MASS INDEX: 28.96 KG/M2 | WEIGHT: 179.4 LBS | TEMPERATURE: 97.3 F | SYSTOLIC BLOOD PRESSURE: 120 MMHG | DIASTOLIC BLOOD PRESSURE: 70 MMHG

## 2024-07-12 DIAGNOSIS — J45.40 MODERATE PERSISTENT ASTHMA WITHOUT COMPLICATION: ICD-10-CM

## 2024-07-12 DIAGNOSIS — J44.9 COPD, SEVERE (HCC): Primary | ICD-10-CM

## 2024-07-12 PROCEDURE — 99214 OFFICE O/P EST MOD 30 MIN: CPT | Performed by: INTERNAL MEDICINE

## 2024-07-12 RX ORDER — AZITHROMYCIN 250 MG/1
250 TABLET, FILM COATED ORAL EVERY 24 HOURS
Qty: 30 TABLET | Refills: 2 | Status: SHIPPED | OUTPATIENT
Start: 2024-07-12 | End: 2024-10-10
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
## 2024-07-12 NOTE — ASSESSMENT & PLAN NOTE
She continues to struggle with shortness of breath despite utilization of Breztri twice daily.  She has required several courses of prednisone for exacerbation of underlying lung disease.  She developed significant negative side effects from the prednisone and would rather avoid taking it.  I certainly agree and we will try adding azithromycin 250 mg daily in an effort to reduce exacerbation rate.  In the meantime, she will have updated PFTs and chest CT so that we can evaluate if there has been progression of her disease and whether she may be a candidate to consider BLVR.  Prior analysis showed her emphysema destruction scores all following below threshold.

## 2024-07-12 NOTE — ASSESSMENT & PLAN NOTE
She will continue with Fasenra every 8 weeks.  She has been trying to avoid exposures, even relocating her dog from the home in an effort to reduce any potential noxious stimuli

## 2024-07-23 ENCOUNTER — HOSPITAL ENCOUNTER (OUTPATIENT)
Dept: MAMMOGRAPHY | Facility: HOSPITAL | Age: 59
Discharge: HOME | End: 2024-07-23
Payer: COMMERCIAL

## 2024-07-23 DIAGNOSIS — C50.211 CARCINOMA OF UPPER-INNER QUADRANT OF FEMALE BREAST, RIGHT (HCC): ICD-10-CM

## 2024-07-23 PROCEDURE — 77066 DX MAMMO INCL CAD BI: CPT

## 2024-07-23 PROCEDURE — G0279 TOMOSYNTHESIS, MAMMO: HCPCS

## 2024-07-31 ENCOUNTER — HOSPITAL ENCOUNTER (OUTPATIENT)
Dept: PULMONOLOGY | Facility: HOSPITAL | Age: 59
Discharge: HOME | End: 2024-07-31
Attending: INTERNAL MEDICINE
Payer: COMMERCIAL

## 2024-07-31 ENCOUNTER — HOSPITAL ENCOUNTER (OUTPATIENT)
Dept: CT IMAGING | Facility: HOSPITAL | Age: 59
Discharge: HOME | End: 2024-07-31
Attending: INTERNAL MEDICINE
Payer: COMMERCIAL

## 2024-07-31 DIAGNOSIS — J44.9 COPD, SEVERE (HCC): ICD-10-CM

## 2024-07-31 PROCEDURE — 94060 EVALUATION OF WHEEZING: CPT

## 2024-07-31 PROCEDURE — 94726 PLETHYSMOGRAPHY LUNG VOLUMES: CPT

## 2024-07-31 PROCEDURE — 71250 CT THORAX DX C-: CPT

## 2024-07-31 PROCEDURE — 94726 PLETHYSMOGRAPHY LUNG VOLUMES: CPT | Performed by: INTERNAL MEDICINE

## 2024-07-31 PROCEDURE — 94618 PULMONARY STRESS TESTING: CPT

## 2024-07-31 PROCEDURE — 94729 DIFFUSING CAPACITY: CPT | Performed by: INTERNAL MEDICINE

## 2024-07-31 PROCEDURE — 94729 DIFFUSING CAPACITY: CPT

## 2024-07-31 PROCEDURE — 94618 PULMONARY STRESS TESTING: CPT | Performed by: INTERNAL MEDICINE

## 2024-07-31 PROCEDURE — 94060 EVALUATION OF WHEEZING: CPT | Performed by: INTERNAL MEDICINE

## 2024-07-31 RX ORDER — ALBUTEROL SULFATE 2.5 MG/3ML
2.5 SOLUTION RESPIRATORY (INHALATION) ONCE AS NEEDED
Status: COMPLETED | OUTPATIENT
Start: 2024-07-31 | End: 2024-07-31

## 2024-07-31 RX ADMIN — ALBUTEROL SULFATE 2.5 MG: 2.5 SOLUTION RESPIRATORY (INHALATION) at 16:06

## 2024-08-09 ENCOUNTER — DOCUMENTATION (OUTPATIENT)
Dept: PULMONOLOGY | Facility: CLINIC | Age: 59
End: 2024-08-09
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
## 2024-08-09 NOTE — PROGRESS NOTES
ATTENTION: This CT scan contains motion artifacts that may affect the accuracy of voxel density, fissure completeness  and inspiratory volume data present in the report. Please obtain a CT scan without motion artifacts and resubmit.

## 2024-08-16 ENCOUNTER — TELEPHONE (OUTPATIENT)
Age: 59
End: 2024-08-16

## 2024-08-16 PROBLEM — K62.89 ANAL PAIN: Status: RESOLVED | Noted: 2021-06-10 | Resolved: 2024-08-16

## 2024-08-16 PROBLEM — R94.8 ABNORMAL POSITRON EMISSION TOMOGRAPHY (PET) SCAN: Status: RESOLVED | Noted: 2024-02-02 | Resolved: 2024-08-16

## 2024-08-16 PROBLEM — R68.89 ABNORMAL NECK FINDING: Status: RESOLVED | Noted: 2024-02-02 | Resolved: 2024-08-16

## 2024-08-16 PROBLEM — R51.9 WORSENING HEADACHES: Status: RESOLVED | Noted: 2023-11-19 | Resolved: 2024-08-16

## 2024-08-16 NOTE — TELEPHONE ENCOUNTER
Prior Auth for Fasenra home injections has been re-newed via CoverMyMeds. Will wait for determination.

## 2024-08-20 ENCOUNTER — OFFICE VISIT (OUTPATIENT)
Dept: PULMONOLOGY | Facility: CLINIC | Age: 59
End: 2024-08-20
Payer: COMMERCIAL

## 2024-08-20 VITALS
WEIGHT: 180 LBS | SYSTOLIC BLOOD PRESSURE: 126 MMHG | HEART RATE: 89 BPM | OXYGEN SATURATION: 97 % | TEMPERATURE: 97.1 F | RESPIRATION RATE: 16 BRPM | BODY MASS INDEX: 28.93 KG/M2 | HEIGHT: 66 IN | DIASTOLIC BLOOD PRESSURE: 84 MMHG

## 2024-08-20 DIAGNOSIS — J44.9 COPD, SEVERE (HCC): Primary | ICD-10-CM

## 2024-08-20 DIAGNOSIS — J45.40 MODERATE PERSISTENT ASTHMA WITHOUT COMPLICATION: ICD-10-CM

## 2024-08-20 DIAGNOSIS — F17.211 CIGARETTE NICOTINE DEPENDENCE IN REMISSION: ICD-10-CM

## 2024-08-20 PROCEDURE — 99214 OFFICE O/P EST MOD 30 MIN: CPT | Performed by: INTERNAL MEDICINE

## 2024-08-20 RX ORDER — IPRATROPIUM BROMIDE AND ALBUTEROL SULFATE 2.5; .5 MG/3ML; MG/3ML
SOLUTION RESPIRATORY (INHALATION)
COMMUNITY
Start: 2024-08-12 | End: 2024-08-30 | Stop reason: SDUPTHER

## 2024-08-20 RX ORDER — FLUTICASONE FUROATE, UMECLIDINIUM BROMIDE AND VILANTEROL TRIFENATATE 100; 62.5; 25 UG/1; UG/1; UG/1
1 POWDER RESPIRATORY (INHALATION) DAILY
Qty: 60 BLISTER | Refills: 5 | Status: SHIPPED | OUTPATIENT
Start: 2024-08-20 | End: 2025-02-16
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
## 2024-08-20 NOTE — ASSESSMENT & PLAN NOTE
She will continue with Fasenra every 8 weeks.  This has helped with the asthma component of her lung disease, in terms of reducing the severity of her reaction when facing noxious stimuli

## 2024-08-20 NOTE — ASSESSMENT & PLAN NOTE
Patient has a 70+ pack year smoking history, quit 2016.  CT of the chest shows stable pulmonary nodules.  She will continue with lung cancer screening which will be due in July 2025

## 2024-08-28 NOTE — TELEPHONE ENCOUNTER
Received fax from appeals that documentation within the past year was needed. Faxed all 3 office visit notes to [phone].

## 2024-08-30 ENCOUNTER — TELEPHONE (OUTPATIENT)
Age: 59
End: 2024-08-30

## 2024-08-30 DIAGNOSIS — J44.9 COPD, SEVERE (HCC): Primary | ICD-10-CM

## 2024-08-30 RX ORDER — IPRATROPIUM BROMIDE AND ALBUTEROL SULFATE 2.5; .5 MG/3ML; MG/3ML
3 SOLUTION RESPIRATORY (INHALATION) EVERY 6 HOURS PRN
Qty: 180 ML | Refills: 5 | Status: SHIPPED | OUTPATIENT
Start: 2024-08-30 | End: 2024-11-28

## 2024-08-30 NOTE — TELEPHONE ENCOUNTER
Incoming call:    Patient states that her insurance is requiring a 90 day supply of the nebulized medication or else she will need to pay out of pocket.     Requesting Duo-Neb to be reordered with 90 day supply.

## 2024-09-11 ENCOUNTER — TELEPHONE (OUTPATIENT)
Dept: PULMONOLOGY | Facility: CLINIC | Age: 59
End: 2024-09-11

## 2024-09-11 DIAGNOSIS — J44.9 COPD, SEVERE (HCC): Primary | ICD-10-CM

## 2024-09-11 RX ORDER — BUDESONIDE, GLYCOPYRROLATE, AND FORMOTEROL FUMARATE 160; 9; 4.8 UG/1; UG/1; UG/1
2 AEROSOL, METERED RESPIRATORY (INHALATION)
Qty: 31.1 G | Refills: 3 | Status: SHIPPED | OUTPATIENT
Start: 2024-09-11

## 2024-09-11 NOTE — TELEPHONE ENCOUNTER
Hi Dr. Paulino,    Good day,      Called patient and she stated that the previous sample of Trelegy was not working for her. She requested if you can put her back on [facility] for her prescriptions .    Please advise.    Thank you in advance.

## 2024-09-11 NOTE — TELEPHONE ENCOUNTER
Please let patient know that I have sent a new prescription in for Breztri and have discontinued Trelegy

## 2024-09-26 ENCOUNTER — TELEPHONE (OUTPATIENT)
Age: 59
End: 2024-09-26

## 2024-09-26 NOTE — TELEPHONE ENCOUNTER
Pt calls in and states that Her insurance is going to be switched over to new insurance in mid October. Her insurance will be [employer]. ID # [id number]    She states her next Farensa injection is due in November and will need a prior auth. Please advise

## 2024-10-02 DIAGNOSIS — G43.109 MIGRAINE WITH AURA AND WITHOUT STATUS MIGRAINOSUS, NOT INTRACTABLE: ICD-10-CM

## 2024-10-07 DIAGNOSIS — G43.109 MIGRAINE WITH AURA AND WITHOUT STATUS MIGRAINOSUS, NOT INTRACTABLE: ICD-10-CM

## 2024-10-07 RX ORDER — SUMATRIPTAN 100 MG/1
100 TABLET, FILM COATED ORAL ONCE AS NEEDED
Qty: 10 TABLET | Refills: 1 | Status: SHIPPED | OUTPATIENT
Start: 2024-10-07 | End: 2024-10-07 | Stop reason: SDUPTHER

## 2024-10-07 RX ORDER — SUMATRIPTAN 100 MG/1
100 TABLET, FILM COATED ORAL ONCE AS NEEDED
Qty: 10 TABLET | Refills: 1 | Status: SHIPPED | OUTPATIENT
Start: 2024-10-07

## 2024-10-07 NOTE — TELEPHONE ENCOUNTER
Rizatriptan      Pharmacy comment: Product Backordered/Unavailable:MEDICATION IS ON A BACKORDER.     Sumatriptan rx'd as replacement

## 2024-10-17 ENCOUNTER — TELEPHONE (OUTPATIENT)
Age: 59
End: 2024-10-17

## 2024-10-17 ENCOUNTER — CLINICAL SUPPORT (OUTPATIENT)
Dept: PULMONOLOGY | Facility: HOSPITAL | Age: 59
End: 2024-10-17
Attending: INTERNAL MEDICINE
Payer: COMMERCIAL

## 2024-10-17 DIAGNOSIS — J44.9 COPD, SEVERE (HCC): Primary | ICD-10-CM

## 2024-10-17 PROCEDURE — 94626 PHY/QHP OP PULM RHB W/MNTR: CPT

## 2024-10-17 NOTE — PROGRESS NOTES
Pulmonary Rehabilitation   Assessment and Individualized Treatment Plan  INITIAL       Today's date: 2024  # of Exercise Sessions Completed: 1  Patient name: Khushboo Pichardo     : 1965       MRN: [id number]  Referring Physician: Gwen Paulino DO  Pulmonologist: Gwen Paulino DO  Provider: Denise  Clinician: Erika Sacks, MS    Dx:    Encounter Diagnosis   Name Primary?    COPD, severe (HCC)      Date of onset: Patient states she was first diagnosed in , has been getting worse since 2024      Treatment is tailored to this patient's individual needs.  The ITP was reviewed with the patient and all questions were answered to their satisfaction.  Additional ITP documentation can be found electronically including daily and monthly exercise summaries, daily session notes with ECG summaries, education notes, daily medication reconciliation, and daily physician supervision.      COMMENTS:  Khushboo was seen today for her initial evaluation to begin pulmonary rehab. She completed an initial 6MWT (845 ft = 2.23 METs) while maintaining SpO2 94-96%. She rated her BRENNAN a 4/10 during the test.     She is currently out of work and is actively looking for a new job. She states this has been very stressful for her.     She states she may be in the process of moving but is worried because the house she would be moving into has mold in it.     She will attend [facility] 2x/week for 24 sessions beginning on Monday, 10/21/2024.      ASSESSMENT      Medical History:   Past Medical History:   Diagnosis Date    Abnormal neck finding 2024    Abnormal positron emission tomography (PET) scan 2024    Anal pain 06/10/2021    Arthritis     Biceps tendinitis 01/15/2018    Cancer (HCC)     breast    Claustrophobia     Colon polyps     COPD (chronic obstructive pulmonary disease) ([facility])     Depression 10/01/2014    Headache     Hyperchloremia 04/15/2022    Hypothyroidism 10/01/2014    Irritable bowel      Migraine     Neck pain     Personal history of COVID-19 10/12/2022    Date of positive COVID-19 test: 10/11/2022. Type of test: Home antigen. Patient with typical symptoms of COVID-19; pt deferred antiviral rx      Pinched nerve in neck     Seasonal allergies     Shortness of breath     Wears glasses     Worsening headaches 11/19/2023       Family History:  Family History   Problem Relation Age of Onset    Thyroid disease Mother     Colon polyps Mother     No Known Problems Father     No Known Problems Sister     Lymphoma Brother         non hodgkins  age 35    No Known Problems Daughter     No Known Problems Daughter     No Known Problems Maternal Aunt     Breast cancer Maternal Aunt 60    No Known Problems Maternal Aunt     No Known Problems Maternal Aunt     No Known Problems Maternal Aunt     Colon cancer Maternal Uncle         age unk    Colon cancer Maternal Uncle         age unk    Colon cancer Maternal Grandfather         age unk    Pancreatic cancer Cousin     Cancer Other         glioma age 13       Allergies:   Patient has no known allergies.    Current Medications:   Current Outpatient Medications   Medication Sig Dispense Refill    Albuterol-Budesonide 90-80 MCG/ACT AERO Inhale 2 puffs every 6 (six) hours as needed (shortness of breath/wheezing) 32.1 g 3    ALPRAZolam (XANAX) 0.5 mg tablet Take 1 tab (0.5mg) 20-30 minutes prior to plane flight as needed for fear of flying (Patient not taking: Reported on 8/20/2024) 3 tablet 0    anastrozole (ARIMIDEX) 1 mg tablet Take 1 tablet (1 mg total) by mouth daily 90 tablet 3    Ascorbic Acid (vitamin C) 1000 MG tablet Take 1,000 mg by mouth daily      B Complex Vitamins (B COMPLEX 1 PO) Take by mouth daily       benralizumab (FASENRA) subcutaneous injection Inject 1 mL (30 mg total) under the skin every 56 days 1 mL 5    benzonatate (TESSALON PERLES) 100 mg capsule Take 1 capsule (100 mg total) by mouth 3 (three) times a day as needed for cough (Patient not  taking: Reported on 7/12/2024) 20 capsule 0    Budeson-Glycopyrrol-Formoterol (Breztri Aerosphere) 160-9-4.8 MCG/ACT AERO Inhale 2 puffs 2 (two) times a day Rinse mouth after use. 31.1 g 3    Calcium-Magnesium-Vitamin D 185- MG-MG-UNIT CAPS Take by mouth daily       cyanocobalamin (VITAMIN B-12) 500 MCG tablet Take 500 mcg by mouth daily      Echinacea 400 MG CAPS Take by mouth daily       EPINEPHrine (EPIPEN) 0.3 mg/0.3 mL SOAJ Inject 0.3 mL (0.3 mg total) into a muscle once for 1 dose 0.6 mL 0    ipratropium-albuterol (DUO-NEB) 0.5-2.5 mg/3 mL nebulizer solution Take 3 mL by nebulization every 6 (six) hours as needed for wheezing or shortness of breath 180 mL 5    loperamide (IMODIUM) 2 mg capsule Take 2 mg by mouth 4 (four) times a day as needed for diarrhea      Loratadine 10 MG CAPS Take 10 mg by mouth daily      methocarbamol (ROBAXIN) 500 mg tablet Take 1 tablet (500 mg total) by mouth 4 (four) times a day 20 tablet 0    methocarbamol (ROBAXIN) 500 mg tablet Take 1 tablet (500 mg total) by mouth 4 (four) times a day as needed for muscle spasms (Patient not taking: Reported on 7/12/2024) 30 tablet 1    Multiple Vitamins-Minerals (MULTIVITAMIN ADULT PO) Take by mouth daily       predniSONE 10 mg tablet Take 60 mg on day one and two, Take 50 mg on day 3 - 4, Take 40 mg on day 5-6, Take 30 mg on day 7-8, Take 20 mg on day 8-9, Take 10 mg on day 10-11 (Patient not taking: Reported on 7/8/2024) 42 tablet 0    SUMAtriptan (IMITREX) 100 mg tablet Take 1 tablet (100 mg total) by mouth once as needed for migraine may repeat in 2 hours if necessary. Max dose 200mg in 24 hour period. 10 tablet 1    topiramate (TOPAMAX) 100 mg tablet Take 1 tablet (100 mg total) by mouth daily 90 tablet 1    TURMERIC PO Take by mouth       No current facility-administered medications for this visit.       Physical Limitations: SOB    Fall Risk: Low   Comments: Ambulates with a steady gait with no assist device    Cultural needs:  none    PFT:  Yes   FEV1/FVC ratio of 69%   FEV1 of 40% predicted  severe obstruction.    Medication compliance: Yes  Comments: Pt reports to be compliant with medications      Pulmonary Disease Risk Factors:  occupational exposure to workplace  chemicals, fumes, and aerosols (which led to patients termination     Sleep Disorders:   None      EXERCISE ASSESSMENT:      Initial Fitness Assessment:   6MWT:  845 ft = 2.23 METs  Rest: HR 90, /68, SpO2 95%, 0/10 SOB  Exercise: , /70, SpO2 94-96%, 4/10 BRENNAN  Recovery: HR 81, /66, SpO2 96%, 0/10 SOB      Current Functional Status:  Occupation: unemployed  Recreation/Physical activity: Sedentary  ADL’s:Capable of performing light to moderate ADLs  [place]: Capable of performing light to moderate ADLs  Exercise:  Walks her dog when able  Home exercise equipment: None  Other Comments: None    SMART Exercise Goals:   reduced score on CAT, improved 6MWT distance, reduced dyspnea during exercise, and improved exercise tolerance based on peak METs tolerated in pulmonary rehab exercise session    Patient Specific EXERCISE GOALS:     reduced number of COPD exacerbations, attend pulmonary rehab regularly, begin a consistent exercise regimen , increased muscular strength, increased energy, and increased stamina with ADLs    PSYCHOSOCIAL ASSESSMENT:    Date of last assessment: 10/17/2024  Depression screening:  PHQ-9 = 6    Interpretation:  5-9 = Mild Depression  Anxiety screening:  JD-7 = 6    Interpretation: 5-9 = Mild anxiety    Pt self-report of depression and anxiety   Patient has a history of depression and anxiety. She is prescribed Xanax. She has been having increased feelings of stress, depression, and anxiety since she was terminated from her job and is currently unemployed. DC staff will offer support as needed.     Self-reported stress level:  8-9/10  Stress Management: practice Relaxation Techniques and keep a positive mindset    Quality of  "Life Screen:  (Higher score indicates disease impact on QOL)  [facility] COOP score: 27/40      CAT: 24/40      Shortness of breath questionnaire: 90/120    Social Support:   Children, significant other, parents, friends  Community/Social Activities: Spend time with family    SMART Goals:    Reduce perceived stress to 1-3/10, improved [facility] QOL < 27, PHQ-9 - reduced severity by one level, and feel less tired with more energy    Patient Specific PSYCHOCOSOCIAL GOALS:    maintain compliance with medical therapy, begin using [employer], and spend time with family     Psychosocial Assessment as it relates to rehabilitation: Patient denies issues with his/her family or home life that may affect their rehabilitation efforts.       NUTRITION ASSESSMENT:    Initial Weight:  180  Current Weight:     Height:   Ht Readings from Last 1 Encounters:   08/20/24 5' 6\" (1.676 m)       Rate Your Plate Score: 49/81    Self-reported Current Dietary Habits:  Patient states she needs help to make healthy dietary habits.     ETOH:   Social History     Substance and Sexual Activity   Alcohol Use Not Currently       SMART Goals:   reduced BMI to < 25, LDL <100, CHOL <200, Improved Rate Your Plate score  >58, and 2.5-5%  wt loss    Patient Specific NUTRITION GOALS:    eat smaller, more frequent meals decrease caloric intake improve hydration weight loss      OTHER CORE COMPONENT ASSESSMENT:    Hospitalizations in the past year: ED visit 6/30/2024    Oxygen needs:   Rest:  room air  Exercise/physical activity:  room air  Sleep:   room air    Does Pt monitor home SpO2? No - patient states she does not have a pulse ox at home    Use of Rescue Inhaler: Yes, as needed. Patient states she uses it seasonally (summer) and when she is sick.    Use of Maintenance Inhaler: Yes:  2 times per day (1 morning and 1 night)    Use of Nebulizer Treatments:  Yes, as needed. Patient states she only uses it when she is sick.    Patient practices breathing " techniques at home:  Reviewed with patient on:  10/17/2024    CAD Risk Factors:  Cholesterol: Yes  HTN: Yes  DM: No  Obesity: Yes   Smoking: Former user    SMART Core Component Goals:   consistent, controlled resting BP < 130/80, medication compliance, abstain from smoking, and demonstrate pursed lipped breathing    Patient Specific CORE COMPONENT GOALS:    medication compliance, Abstain from smoking, and monitor home pulse oximetry      Blood Pressure:    Restin/68  Exercise: 148/70  Recovery: 128/66      INDIVIDUALIZED TREATMENT PLAN    The patient is agreeable to attend 24 pulmonary rehab exercise sessions.      EXERCISE GOALS AND PLAN    PHYSCIAN PRESCRIBED EXERCISE    Current Aerobic Exercise Prescription       Frequency: 2 days/week   Supplement with home exercise 2+ days/wk as tolerated        Minutes: 25-35         METS: 2-3              SpO2: >88%              RPD: 4-6                      HR: RHR +30-40bpm   RPE: 4-5         Modalities: Treadmill, UBE, NuStep, and Recumbent bike      Aerobic Exercise Prescription Plan for Progression:    Frequency: 2 days/week    Supplement with home exercise 2+ days/wk as tolerated       Minutes: 35-45        METS: 3-4              SpO2: >88%              RPD: 4-6                      HR:RHR +30-40bpm   RPE: 4-5        Modalities: Treadmill, UBE, NuStep, and Recumbent bike        Supplemental Oxygen Needs with Exercise:  room air.    Strength trainin-3 days / week  12-15 repetitions  1-2 sets per modality    Modalities: Chest Press and Pull Downs    Education: pursed lipped breathing, diaphragmatic breathing, relaxation breathing, home exercise guidelines, benefits of exercise for pulmonary disease, RPE scale, RPD scale, and O2 saturation monitoring    Progress toward Exercise Goals:    Reviewed Pt goals and determined plan of care, Will continue to educate and progress as tolerated.    Exercise Plan:  Titrate supplemental oxygen as needed to maintain SpO2>88%  with exercise, practice diaphragmatic breathing, reduce time sitting at home, utilize PLB with physical activity, and begin a home exercise program     Readiness to change: Preparation:  (Getting ready to change)       NUTRITION GOALS AND PLAN    Progress toward Nutritional goals:    Reviewed Pt goals and determined plan of care, Will continue to educate and progress as tolerated.    Nutrition Plan: group class: Reading Food Labels and group Class: Heart Healthy Eating    SMART goals are based Rate Your Plate Dietary Self-Assessment. These are the areas in which the patient could score higher on the assessment.  Goals include recommendations for a heart healthy diet based on American Heart Association.    Nutrition Education: heart healthy eating principles  weight loss and management strategies  low sodium diet  maintaining hydration  nutrition for  lipid management  group class: Heart Healthy Eating  group class:  Label Reading    Readiness to change: Preparation:  (Getting ready to change)       PSYCHOSOCIAL GOALS AND PLAN    Information to begin using [employer] was provided as well as contact information for counseling through  [facility].    Progress toward Psychosocial goals:    Reviewed Pt goals and determined plan of care, Will continue to educate and progress as tolerated.    Psychosocial: Class: Stress and Your Health, Class:  Stress and Pulmonary Disease, Enroll in [employer], Keep a positive mindset, and Enjoy family    Psychosocial Education: stress management techniques, benefits of enrolling in [employer], depression and pulmonary disease, tools to manage fear/anxiety related to becoming SOB, class:  Stress and Your Health , and class:  Stress and Pulmonary Disease    Readiness to change: Preparation:  (Getting ready to change)       OTHER CORE COMPONENTS GOALS AND PLAN    Tobacco Use Intervention:     Pt quit in 2017   and has abstained    Oxygen Goal: Maintain SpO2>88% during  exercise or as advised by pulmonolgist    Progress toward Core Component goals:    Reviewed Pt goals and determined plan of care, Will continue to educate and progress as tolerated.    Core Component Plan: medication compliance, complete abstention from smoking, avoid places with second hand smoke, engage in regular exercise, group class: Pulmonary Anatomy and Physiology, and group class:  Pulmonary Medications    Core Component Education:  pathophysiology of pulmonary disease, preventing infections, control coughing, inspiratory muscle training, nebulizer use, bronchodilators, education: Pulmonary Anatomy and Physiology, education:  Pulmonary Medications, education:  Clearing Secretions, and education: Oxygen    Readiness to change: Preparation:  (Getting ready to change)

## 2024-10-17 NOTE — LETTER
ATTN: [facility] appeals department     Patient: Khushboo Pichardo :1965 Member ID:[id number]    Re: Request for Reconsideration of Breztri 160      To Whom It May Concern:   Khushboo Pichardo 1965 was denied coverage of Breztri  on 10/21/24. The denial reason was stated as not covered due to the patient not having tried formulary alternative such as Trelegy. I am requesting a redetermination of the denial of coverage for Breztri  due to the patient has tried and failed Trelegy and was not successful with therapy.      In conclusion, please reconsider the denial of Breztri  for my patient. I would appreciate prompt review of this appeal and approval of this FDA-approved medication. I can be reached by phone at [phone] or via fax at [phone] for additional information and discussion. Thank you.      Sincerely,   DENNIS Ch

## 2024-10-18 ENCOUNTER — OFFICE VISIT (OUTPATIENT)
Dept: URGENT CARE | Facility: CLINIC | Age: 59
End: 2024-10-18
Payer: COMMERCIAL

## 2024-10-18 VITALS
HEART RATE: 80 BPM | TEMPERATURE: 98.2 F | WEIGHT: 179 LBS | HEIGHT: 66 IN | BODY MASS INDEX: 28.77 KG/M2 | SYSTOLIC BLOOD PRESSURE: 131 MMHG | OXYGEN SATURATION: 96 % | DIASTOLIC BLOOD PRESSURE: 60 MMHG | RESPIRATION RATE: 18 BRPM

## 2024-10-18 DIAGNOSIS — L03.113 CELLULITIS OF RIGHT UPPER EXTREMITY: Primary | ICD-10-CM

## 2024-10-18 PROCEDURE — G0382 LEV 3 HOSP TYPE B ED VISIT: HCPCS | Performed by: NURSE PRACTITIONER

## 2024-10-18 PROCEDURE — 99283 EMERGENCY DEPT VISIT LOW MDM: CPT | Performed by: NURSE PRACTITIONER

## 2024-10-18 RX ORDER — CEPHALEXIN 500 MG/1
500 CAPSULE ORAL 4 TIMES DAILY
Qty: 40 CAPSULE | Refills: 0 | Status: SHIPPED | OUTPATIENT
Start: 2024-10-18 | End: 2024-10-28

## 2024-10-18 NOTE — PATIENT INSTRUCTIONS
After 24 hours, symptoms should not get worse.  It may take a few days to start to see improvement.  If symptoms are worsening after 24 hours or if you are not seeing improvement, you should be seen again.   Patient Education     Cellulitis (skin infection) in adults - Discharge instructions   The Basics   Written by the doctors and editors at [facility]   What are discharge instructions? -- Discharge instructions are information about how to take care of yourself after getting medical care for a health problem.  What is cellulitis? -- Cellulitis is a skin infection (figure 1). It can happen when germs get into the skin. Normally, different types of germs live on a person's skin. Most of the time, these germs do not cause any problems. But if a person gets a cut or a break in the skin, the germs can get into the skin and cause an infection.  You need antibiotics to treat cellulitis. Usually, this involves taking antibiotic pills. Just putting antibiotic ointment on the skin does not work. It is important to take all of your antibiotics even if you start to feel better.  How do I care for myself at home? -- Ask the doctor or nurse what you should do when you go home. Make sure that you understand exactly what you need to do to care for yourself. Ask questions if there is anything you do not understand.  You should also:   Prop your painful body part on pillows, keeping it above the level of your heart. This might help lessen pain and swelling.   Keep the infected area clean and dry. Do not squeeze, scratch, or rub it. You can gently wash the area with soap and water or take a shower. Pat the area dry with a clean towel.   Wash your hands before and after you touch the infected area.  When should I call the doctor? -- Call for advice if:   You have a fever of 101°F (38.4°C) or higher, or chills.   The area becomes more red, swollen, or painful, or the redness or swelling spreads up your leg or arm or to a larger area.    The infected area is not better after 3 days of taking antibiotics.  All topics are updated as new evidence becomes available and our peer review process is complete.  This topic retrieved from [employer] on: Mar 06, 2024.  Topic 276306 Version 1.0  Release: 32.2.4 - C32.64  © 2024 UpToDate, Inc. and/or its affiliates. All rights reserved.  figure 1: Cellulitis     Cellulitis is an infection of the skin. These infections can cause redness, pain, and/or swelling.  Graphic 407455 Version 1.0  Consumer Information Use and Disclaimer   Disclaimer: This generalized information is a limited summary of diagnosis, treatment, and/or medication information. It is not meant to be comprehensive and should be used as a tool to help the user understand and/or assess potential diagnostic and treatment options. It does NOT include all information about conditions, treatments, medications, side effects, or risks that may apply to a specific patient. It is not intended to be medical advice or a substitute for the medical advice, diagnosis, or treatment of a health care provider based on the health care provider's examination and assessment of a patient's specific and unique circumstances. Patients must speak with a health care provider for complete information about their health, medical questions, and treatment options, including any risks or benefits regarding use of medications. This information does not endorse any treatments or medications as safe, effective, or approved for treating a specific patient. UpToDate, Inc. and its affiliates disclaim any warranty or liability relating to this information or the use thereof.The use of this information is governed by the Terms of Use, available at https://www.wolterskluwer.com/en/know/clinical-effectiveness-terms. 2024© UpToDate, Inc. and its affiliates and/or licensors. All rights reserved.  Copyright   © 2024 UpToDate, Inc. and/or its affiliates. All rights reserved.
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
## 2024-10-21 ENCOUNTER — TELEPHONE (OUTPATIENT)
Age: 59
End: 2024-10-21

## 2024-10-21 ENCOUNTER — APPOINTMENT (OUTPATIENT)
Dept: PULMONOLOGY | Facility: HOSPITAL | Age: 59
End: 2024-10-21
Attending: INTERNAL MEDICINE
Payer: COMMERCIAL

## 2024-10-21 DIAGNOSIS — J44.9 COPD, SEVERE (HCC): ICD-10-CM

## 2024-10-21 RX ORDER — BUDESONIDE, GLYCOPYRROLATE, AND FORMOTEROL FUMARATE 160; 9; 4.8 UG/1; UG/1; UG/1
AEROSOL, METERED RESPIRATORY (INHALATION)
Qty: 10.7 G | Refills: 12 | Status: SHIPPED | OUTPATIENT
Start: 2024-10-21

## 2024-10-21 NOTE — TELEPHONE ENCOUNTER
Pt calls in and states that breztri was denied and they suggested Trelegy pt states that trelegy does not work for her. Pharmacy advised the provider mention that in PA so they may be able to approve then.

## 2024-10-21 NOTE — TELEPHONE ENCOUNTER
Duplicate encounter created, please see telephone encounter from 10/17 regarding breztri PA status. Please review patient's chart to see if there is already an encounter regarding the medication in question and to document anything regarding this medication in regards to anything regarding the authorization process etc before creating another encounter Thank You.

## 2024-10-21 NOTE — TELEPHONE ENCOUNTER
PA for (Breztri Aerosphere) 160-9-4.8 SUBMITTED     via    [x][facility]-KEY: [id number]  []Surescripts-Case ID #   []Availity-Auth ID # NDC #   []Faxed to plan   []Other website   []Phone call Case ID #     Office notes sent, clinical questions answered. Awaiting determination    Turnaround time for your insurance to make a decision on your Prior Authorization can take 7-21 business days.

## 2024-10-21 NOTE — LETTER
ATTN: [employer] appeals department     Patient: Khushboo Pichardo :1965 Member ID: [id number]   Re: Request for Reconsideration of Airsupra      To Whom It May Concern:   Khushboo Pichardo 1965 was denied coverage of Airsupra on 10/24/24. The denial reason was stated as not covered due to the patient not having a diagnosis of Asthma. I am requesting a redetermination of the denial of coverage for Airsupra due to the patient does have a diagnosis of asthma. The patient has also tried and failed alternatives such as Albuterol HFA.      In conclusion, please reconsider the denial of Airsupra for my patient. I would appreciate prompt review of this appeal and approval of this FDA-approved medication. I can be reached by phone at [phone] or via fax at [phone] for additional information and discussion. Thank you.      Sincerely,   DENNIS Ch

## 2024-10-22 NOTE — TELEPHONE ENCOUNTER
PA for (Brezi Aerosphere) 160-9-4.8  DENIED    Reason:(Screenshot if applicable)        Message sent to office clinical pool Yes    Denial letter scanned into Media Yes    Appeal started No (Provider will need to decide if appeal is warranted and send clinical documentation to Prior Authorization Team for initiation.)    **Please follow up with your patient regarding denial and next steps**

## 2024-10-23 ENCOUNTER — CLINICAL SUPPORT (OUTPATIENT)
Dept: PULMONOLOGY | Facility: HOSPITAL | Age: 59
End: 2024-10-23
Attending: INTERNAL MEDICINE
Payer: COMMERCIAL

## 2024-10-23 DIAGNOSIS — J44.9 COPD, SEVERE (HCC): Primary | ICD-10-CM

## 2024-10-23 PROCEDURE — 94625 PHY/QHP OP PULM RHB W/O MNTR: CPT

## 2024-10-24 ENCOUNTER — TELEPHONE (OUTPATIENT)
Age: 59
End: 2024-10-24

## 2024-10-24 NOTE — TELEPHONE ENCOUNTER
Caller: darwin Cuello    Doctor: Donny    Reason for call: pt calling to schedule a TPI with Narcisa.  Pt health insurance will be changing Jan. 1 and is looking to get the injection before her insurance change    Call back#: [phone]

## 2024-10-24 NOTE — TELEPHONE ENCOUNTER
Breztri Prior Auth - Resubmission    Pt called insurance.     Insurance states to resubmit Prior Auth indicating pt has tried Trelegy, did not work, please see provider note(s), and resubmit.

## 2024-10-24 NOTE — TELEPHONE ENCOUNTER
PA for BREZTRI APPEALED via     []CMM  []SS  []Letter sent to insurance via fax [phone]  []Other site or means     All necessary records sent. Will await response from insurance company    Turnaround time for a decision to be made on an appeal could take up to 30 business days

## 2024-10-24 NOTE — TELEPHONE ENCOUNTER
Susan from [employer] is calling to speak with Anna Erickson can she please give her a call back in reference to this patient its a grievance do to the denial of the breztri and she wanted to us to know that the member was approved from 10/24/24-10/24/25.

## 2024-10-24 NOTE — TELEPHONE ENCOUNTER
PA for AIRSUPRA DENIED    Reason:(Screenshot if applicable)        Message sent to office clinical pool Yes    Denial letter scanned into Media YES    Appeal started No (Provider will need to decide if appeal is warranted and send clinical documentation to Prior Authorization Team for initiation.)    **Please follow up with your patient regarding denial and next steps**

## 2024-10-24 NOTE — TELEPHONE ENCOUNTER
2nd Request PRIOR AUTH    AirSupra    Pt called back, Prior Auth not sent per insurance.     Please process.

## 2024-10-24 NOTE — TELEPHONE ENCOUNTER
PA for AIRSUPRA SUBMITTED     via    [x][facility]-KEY:  [id number]  []Surescripts-Case ID #   []Availity-Auth ID # NDC #   []Faxed to plan   []Other website   []Phone call Case ID #     Office notes sent, clinical questions answered. Awaiting determination    Turnaround time for your insurance to make a decision on your Prior Authorization can take 7-21 business days.

## 2024-10-25 ENCOUNTER — TELEPHONE (OUTPATIENT)
Age: 59
End: 2024-10-25

## 2024-10-25 ENCOUNTER — CLINICAL SUPPORT (OUTPATIENT)
Dept: PULMONOLOGY | Facility: HOSPITAL | Age: 59
End: 2024-10-25
Attending: INTERNAL MEDICINE
Payer: COMMERCIAL

## 2024-10-25 DIAGNOSIS — J44.9 COPD, SEVERE (HCC): Primary | ICD-10-CM

## 2024-10-25 PROCEDURE — 94625 PHY/QHP OP PULM RHB W/O MNTR: CPT

## 2024-10-25 NOTE — TELEPHONE ENCOUNTER
PRIOR AUTH - AirSupra Denial    Pt called stating she does have asthma, doesn't understand why rx denied.    Office visits notes do indicate Asthma dx.    Please advise.

## 2024-10-25 NOTE — TELEPHONE ENCOUNTER
PA for AIRSUPRA APPEALED via     []CMM  []SS  [x]Letter sent to insurance via fax [phone]  []Other site or means     All necessary records sent. Will await response from insurance company    Turnaround time for a decision to be made on an appeal could take up to 30 business days

## 2024-10-25 NOTE — TELEPHONE ENCOUNTER
Caller: darwin Cuello     Doctor: Farzana     Reason for call: pt returning nurses call     Call back#: [phone]

## 2024-10-25 NOTE — TELEPHONE ENCOUNTER
Fasenra     Per patient Insurance denying medication due to dx code.     Please resubmit Prior Auth with her dx of Asthma: J45.40

## 2024-10-25 NOTE — TELEPHONE ENCOUNTER
Have been working with patient on her medication denials.    LM for that our PRIOR AUTH Dept is working to get her AirSupra approved. It is documented in her chart that she does have asthma.

## 2024-10-25 NOTE — TELEPHONE ENCOUNTER
[employer] calling to update stating the appeal has been received and is under review. Will receive a call tomorrow when the medical director has come with an answer. No further questions at this time.

## 2024-10-25 NOTE — TELEPHONE ENCOUNTER
PA Appeal for BREZTRI APPROVED     Date(s) approved 10/24/24-10/24/25    Case #     Patient advised by          [x]MyChart Message  []Phone call   []LMOM  []L/M to call office as no active Communication consent on file  [x]Unable to leave detailed message as VM not approved on Communication consent       Pharmacy advised by    [x]Fax  []Phone call    Message sent to office clinical pool Yes    Approval letter scanned into Media Yes

## 2024-10-28 ENCOUNTER — APPOINTMENT (OUTPATIENT)
Dept: PULMONOLOGY | Facility: HOSPITAL | Age: 59
End: 2024-10-28
Attending: INTERNAL MEDICINE
Payer: COMMERCIAL

## 2024-10-28 DIAGNOSIS — J44.9 COPD, SEVERE (HCC): Primary | ICD-10-CM

## 2024-10-28 PROCEDURE — 94626 PHY/QHP OP PULM RHB W/MNTR: CPT

## 2024-10-28 NOTE — TELEPHONE ENCOUNTER
[facility] called and stated Breztri aerosphere inhaler is in a pending denied status and is moving forward with an appeals hearing Nov 20 @ 12PM. Renata stated the provider may attend this hearing and any further information can be obtained through the provider help line, which she would not disclose what that phone number is. I stated that there was a previous call confirming the approval of this medication, but she denied any previous call approving Breztri. Thank you.

## 2024-10-28 NOTE — TELEPHONE ENCOUNTER
PA Appeal for AIRSUPRA DENIED    Reason:(Screenshot if applicable)        Message sent to office clinical pool Yes    Denial letter scanned into Media Yes

## 2024-10-28 NOTE — TELEPHONE ENCOUNTER
Received approval for Fasenra pens through [employer]. Approved 10/26/24-10/26/25. Scanned into this encounter.

## 2024-10-28 NOTE — TELEPHONE ENCOUNTER
Pt called in asking for Heidi CLEMENTE Tried to offer assistance, but she states would only like to speak with Heidi CLEMENTE

## 2024-10-28 NOTE — TELEPHONE ENCOUNTER
Called pt.    [facility] approved and shipping.  Expected arrival, Tuesday, 10/29/24.

## 2024-10-28 NOTE — TELEPHONE ENCOUNTER
Dr. Paulino    AirSupra DENIED    Please advise. Pt tried Albuterol previously and states has nebulizer solution. Insurance needs OV note indicating how these helped or not helped patient. OR P2P/Grievance completed.

## 2024-10-28 NOTE — TELEPHONE ENCOUNTER
Patient calling stating she only has half air supra left and she would like a rescue inhaler such as Albuterol sent to her Pharmacy on file. Please assist.

## 2024-10-29 ENCOUNTER — TELEPHONE (OUTPATIENT)
Age: 59
End: 2024-10-29

## 2024-10-29 DIAGNOSIS — J44.9 COPD, SEVERE (HCC): ICD-10-CM

## 2024-10-29 DIAGNOSIS — J44.89 ASTHMA-COPD OVERLAP SYNDROME (HCC): Primary | ICD-10-CM

## 2024-10-29 DIAGNOSIS — J44.9 COPD, SEVERE (HCC): Primary | ICD-10-CM

## 2024-10-29 RX ORDER — ALBUTEROL SULFATE 90 UG/1
2 INHALANT RESPIRATORY (INHALATION) EVERY 6 HOURS PRN
Qty: 18 G | Refills: 3 | Status: SHIPPED | OUTPATIENT
Start: 2024-10-29

## 2024-10-29 RX ORDER — ALBUTEROL SULFATE 90 UG/1
2 INHALANT RESPIRATORY (INHALATION) EVERY 6 HOURS PRN
Qty: 18 G | Refills: 3 | Status: SHIPPED | OUTPATIENT
Start: 2024-10-29 | End: 2024-10-29 | Stop reason: SDUPTHER

## 2024-10-29 RX ORDER — ALBUTEROL SULFATE 90 UG/1
2 INHALANT RESPIRATORY (INHALATION) EVERY 6 HOURS PRN
Qty: 18 G | Refills: 3 | Status: SHIPPED | OUTPATIENT
Start: 2024-10-29 | End: 2024-10-29

## 2024-10-29 NOTE — TELEPHONE ENCOUNTER
Albuterol not at pharmacy per patient  _________________    Called [facility]- Albuterol rx not received  _________________    Please resend Albuterol Inhaler

## 2024-10-29 NOTE — TELEPHONE ENCOUNTER
Pt calling requesting to speak with Heidi TAYLOR. Informed her Heidi will be giving her a dylan right back. Pt verbalizes understanding gives thanks

## 2024-10-29 NOTE — TELEPHONE ENCOUNTER
Caller: Khushboo    Doctor/Office: [facility]    Call regarding :  Appointment     Call was transferred to: [facility]

## 2024-10-29 NOTE — TELEPHONE ENCOUNTER
Caller: Khushboo     Doctor: Donny    Reason for call: Patient calling to notify us that TPI (11/25) will need prior authorization.     Please contact nadja at [phone]    Call back#: [phone]     [employer] send to PEC team

## 2024-10-29 NOTE — TELEPHONE ENCOUNTER
Notified RX resent to pharmacy.    Patient had no further questions and/or concerns at this time.

## 2024-10-29 NOTE — TELEPHONE ENCOUNTER
Please see previous request to repeat akira TPI RT and LT Trapezius. Pt is requesting to be scheduled as soon as possible. Her insurance changing soon.

## 2024-10-29 NOTE — TELEPHONE ENCOUNTER
Repeat Injections  Injection type: TPI injections RT and LT Trapezius  Last injection date:6/10/24  Last office visit: 6/10/24  Where is pain located: [place] shoulders and lower neck area  Is the pain the same area as before: Yes  Current pain level:7/10  How much % of relief did the last injection provide:90%  Duration of relief from last injection:Approximately 3 months  When did pain return: Late September   Is the pain effecting your ADLs: Yes    Blood thinners/NSAIDS? N/A-Pt takes Meloxicam prn   Diabetes? No                   CGM? N/A

## 2024-10-30 ENCOUNTER — APPOINTMENT (OUTPATIENT)
Dept: PULMONOLOGY | Facility: HOSPITAL | Age: 59
End: 2024-10-30
Attending: INTERNAL MEDICINE
Payer: COMMERCIAL

## 2024-10-30 DIAGNOSIS — J44.9 COPD, SEVERE (HCC): ICD-10-CM

## 2024-10-30 PROCEDURE — 94625 PHY/QHP OP PULM RHB W/O MNTR: CPT

## 2024-11-04 ENCOUNTER — CLINICAL SUPPORT (OUTPATIENT)
Dept: PULMONOLOGY | Facility: HOSPITAL | Age: 59
End: 2024-11-04
Attending: INTERNAL MEDICINE
Payer: COMMERCIAL

## 2024-11-04 DIAGNOSIS — J44.9 COPD, SEVERE (HCC): Primary | ICD-10-CM

## 2024-11-04 PROCEDURE — 94626 PHY/QHP OP PULM RHB W/MNTR: CPT

## 2024-11-06 ENCOUNTER — CLINICAL SUPPORT (OUTPATIENT)
Dept: PULMONOLOGY | Facility: HOSPITAL | Age: 59
End: 2024-11-06
Attending: INTERNAL MEDICINE
Payer: COMMERCIAL

## 2024-11-06 DIAGNOSIS — J44.9 COPD, SEVERE (HCC): ICD-10-CM

## 2024-11-06 PROCEDURE — 94626 PHY/QHP OP PULM RHB W/MNTR: CPT

## 2024-11-06 NOTE — TELEPHONE ENCOUNTER
Caller: darwin Cuello    Doctor: Donny    Reason for call: pt calling to confirm if her TPI has been approved.  Pt will call back next week    Call back#: [phone]

## 2024-11-11 ENCOUNTER — APPOINTMENT (OUTPATIENT)
Dept: PULMONOLOGY | Facility: HOSPITAL | Age: 59
End: 2024-11-11
Attending: INTERNAL MEDICINE
Payer: COMMERCIAL

## 2024-11-13 ENCOUNTER — CLINICAL SUPPORT (OUTPATIENT)
Dept: PULMONOLOGY | Facility: HOSPITAL | Age: 59
End: 2024-11-13
Attending: INTERNAL MEDICINE
Payer: COMMERCIAL

## 2024-11-13 DIAGNOSIS — J44.9 COPD, SEVERE (HCC): Primary | ICD-10-CM

## 2024-11-13 PROCEDURE — 94625 PHY/QHP OP PULM RHB W/O MNTR: CPT

## 2024-11-14 ENCOUNTER — TELEPHONE (OUTPATIENT)
Age: 59
End: 2024-11-14

## 2024-11-14 NOTE — TELEPHONE ENCOUNTER
Patient has an upcoming appointment on 11/18/24 and would like a call back to advise if her PCP would like her to have any labs done prior. No active labs seen. Patient just wanted to confirm with PCP.

## 2024-11-14 NOTE — PROGRESS NOTES
Pulmonary Rehabilitation   Assessment and Individualized Treatment Plan  30 DAY      Today's date: 2024  # of Exercise Sessions Completed: 8  Patient name: Khushboo Pichardo     : 1965       MRN: [id number]  Referring Physician: Gwen Paulino DO  Pulmonologist: Gwen Paulino DO  Provider: Denise  Clinician: Erika Sacks, MS    Dx:    Encounter Diagnosis   Name Primary?    COPD, severe (HCC)      Date of onset: Patient states she was first diagnosed in , has been getting worse since 2024      Treatment is tailored to this patient's individual needs.  The ITP was reviewed with the patient and all questions were answered to their satisfaction.  Additional ITP documentation can be found electronically including daily and monthly exercise summaries, daily session notes, education notes, daily medication reconciliation, and daily physician supervision.      COMMENTS:  Khushboo has completed 8 sessions of pulmonary rehab and has been working at a 3.93 METs. He SpO2 has been > 90% during exercise. She has been receiving educational materials, please refer to LSI document for list of topics. Her program will be progressed as tolerated.       ASSESSMENT      Medical History:   Past Medical History:   Diagnosis Date    Abnormal neck finding 2024    Abnormal positron emission tomography (PET) scan 2024    Anal pain 06/10/2021    Arthritis     Biceps tendinitis 01/15/2018    Cancer (HCC)     breast    Claustrophobia     Colon polyps     COPD (chronic obstructive pulmonary disease) (HCC)     Depression 10/01/2014    Headache     Hyperchloremia 04/15/2022    Hypothyroidism 10/01/2014    Irritable bowel     Migraine     Neck pain     Personal history of COVID-19 10/12/2022    Date of positive COVID-19 test: 10/11/2022. Type of test: Home antigen. Patient with typical symptoms of COVID-19; pt deferred antiviral rx      Pinched nerve in neck     Seasonal allergies     Shortness  of breath     Wears glasses     Worsening headaches 11/19/2023       Family History:  Family History   Problem Relation Age of Onset    Thyroid disease Mother     Colon polyps Mother     No Known Problems Father     No Known Problems Sister     Lymphoma Brother         non hodgkins  age 35    No Known Problems Daughter     No Known Problems Daughter     No Known Problems Maternal Aunt     Breast cancer Maternal Aunt 60    No Known Problems Maternal Aunt     No Known Problems Maternal Aunt     No Known Problems Maternal Aunt     Colon cancer Maternal Uncle         age unk    Colon cancer Maternal Uncle         age unk    Colon cancer Maternal Grandfather         age unk    Pancreatic cancer Cousin     Cancer Other         glioma age 13       Allergies:   Patient has no known allergies.    Current Medications:   Current Outpatient Medications   Medication Sig Dispense Refill    albuterol (Ventolin HFA) 90 mcg/act inhaler Inhale 2 puffs every 6 (six) hours as needed for wheezing 18 g 3    Albuterol-Budesonide 90-80 MCG/ACT AERO Inhale 2 puffs every 6 (six) hours as needed (shortness of breath/wheezing) 32.1 g 3    anastrozole (ARIMIDEX) 1 mg tablet Take 1 tablet (1 mg total) by mouth daily 90 tablet 3    Ascorbic Acid (vitamin C) 1000 MG tablet Take 1,000 mg by mouth daily      B Complex Vitamins (B COMPLEX 1 PO) Take by mouth daily       benralizumab (FASENRA) subcutaneous injection Inject 1 mL (30 mg total) under the skin every 56 days 1 mL 5    Budeson-Glycopyrrol-Formoterol (Breztri Aerosphere) 160-9-4.8 MCG/ACT AERO INHALE 2 PUFFS BY MOUTH 2 TIMES A DAY RINSE MOUTH AFTER USE. 10.7 g 12    Calcium-Magnesium-Vitamin D 185- MG-MG-UNIT CAPS Take by mouth daily       cyanocobalamin (VITAMIN B-12) 500 MCG tablet Take 500 mcg by mouth daily      Echinacea 400 MG CAPS Take by mouth daily       EPINEPHrine (EPIPEN) 0.3 mg/0.3 mL SOAJ Inject 0.3 mL (0.3 mg total) into a muscle once for 1 dose (Patient not taking:  Reported on 10/18/2024) 0.6 mL 0    ipratropium-albuterol (DUO-NEB) 0.5-2.5 mg/3 mL nebulizer solution Take 3 mL by nebulization every 6 (six) hours as needed for wheezing or shortness of breath 180 mL 5    loperamide (IMODIUM) 2 mg capsule Take 2 mg by mouth 4 (four) times a day as needed for diarrhea      Loratadine 10 MG CAPS Take 10 mg by mouth daily      Multiple Vitamins-Minerals (MULTIVITAMIN ADULT PO) Take by mouth daily       SUMAtriptan (IMITREX) 100 mg tablet Take 1 tablet (100 mg total) by mouth once as needed for migraine may repeat in 2 hours if necessary. Max dose 200mg in 24 hour period. 10 tablet 1    topiramate (TOPAMAX) 100 mg tablet Take 1 tablet (100 mg total) by mouth daily 90 tablet 1    TURMERIC PO Take by mouth       No current facility-administered medications for this visit.       Physical Limitations: SOB    Fall Risk: Low   Comments: Ambulates with a steady gait with no assist device    Cultural needs: none    PFT:  Yes   FEV1/FVC ratio of 69%   FEV1 of 40% predicted  severe obstruction.    Medication compliance: Yes  Comments: Pt reports to be compliant with medications      Pulmonary Disease Risk Factors:  occupational exposure to workplace  chemicals, fumes, and aerosols (which led to patients termination     Sleep Disorders:   None      EXERCISE ASSESSMENT:      Initial Fitness Assessment:   6MWT:  845 ft = 2.23 METs  Rest: HR 90, /68, SpO2 95%, 0/10 SOB  Exercise: , /70, SpO2 94-96%, 4/10 BRENNAN  Recovery: HR 81, /66, SpO2 96%, 0/10 SOB      Current Functional Status:  Occupation: unemployed  Recreation/Physical activity: Sedentary  ADL’s:Capable of performing light to moderate ADLs  [place]: Capable of performing light to moderate ADLs  Exercise:  Walks her dog when able  Home exercise equipment: None  Other Comments: None    SMART Exercise Goals:   reduced score on CAT, improved 6MWT distance, reduced dyspnea during exercise, and improved exercise  "tolerance based on peak METs tolerated in pulmonary rehab exercise session    Patient Specific EXERCISE GOALS:     reduced number of COPD exacerbations, attend pulmonary rehab regularly, begin a consistent exercise regimen , increased muscular strength, increased energy, and increased stamina with ADLs    PSYCHOSOCIAL ASSESSMENT:    Date of last assessment: 10/17/2024  Depression screening:  PHQ-9 = 6    Interpretation:  5-9 = Mild Depression  Anxiety screening:  JD-7 = 6    Interpretation: 5-9 = Mild anxiety    Pt self-report of depression and anxiety   Patient has a history of depression and anxiety. She is prescribed Xanax. She has been having increased feelings of stress, depression, and anxiety since she was terminated from her job and is currently unemployed. NE staff will offer support as needed.     Self-reported stress level:  8-9/10  Stress Management: practice Relaxation Techniques and keep a positive mindset    Quality of Life Screen:  (Higher score indicates disease impact on QOL)  [facility] COOP score: 27/40      CAT: 24/40      Shortness of breath questionnaire: 90/120    Social Support:   Children, significant other, parents, friends  Community/Social Activities: Spend time with family    SMART Goals:    Reduce perceived stress to 1-3/10, improved [facility] QOL < 27, PHQ-9 - reduced severity by one level, and feel less tired with more energy    Patient Specific PSYCHOCOSOCIAL GOALS:    maintain compliance with medical therapy, begin using [employer], and spend time with family     Psychosocial Assessment as it relates to rehabilitation: Patient denies issues with his/her family or home life that may affect their rehabilitation efforts.       NUTRITION ASSESSMENT:    Initial Weight:  180  Current Weight: 179    Height:   Ht Readings from Last 1 Encounters:   10/18/24 5' 6\" (1.676 m)       Rate Your Plate Score: 49/81    Self-reported Current Dietary Habits:  Patient states she needs help to make " healthy dietary habits.     ETOH:   Social History     Substance and Sexual Activity   Alcohol Use Not Currently       SMART Goals:   reduced BMI to < 25, LDL <100, CHOL <200, Improved Rate Your Plate score  >58, and 2.5-5%  wt loss    Patient Specific NUTRITION GOALS:    eat smaller, more frequent meals decrease caloric intake improve hydration weight loss      OTHER CORE COMPONENT ASSESSMENT:    Hospitalizations in the past year: ED visit 2024    Oxygen needs:   Rest:  room air  Exercise/physical activity:  room air  Sleep:   room air    Does Pt monitor home SpO2? No - patient states she does not have a pulse ox at home    Use of Rescue Inhaler: Yes, as needed. Patient states she uses it seasonally (summer) and when she is sick.    Use of Maintenance Inhaler: Yes:  2 times per day (1 morning and 1 night)    Use of Nebulizer Treatments:  Yes, as needed. Patient states she only uses it when she is sick.    Patient practices breathing techniques at home:  Reviewed with patient on:  10/17/2024    CAD Risk Factors:  Cholesterol: Yes  HTN: Yes  DM: No  Obesity: Yes   Smoking: Former user    SMART Core Component Goals:   consistent, controlled resting BP < 130/80, medication compliance, abstain from smoking, and demonstrate pursed lipped breathing    Patient Specific CORE COMPONENT GOALS:    medication compliance, Abstain from smoking, and monitor home pulse oximetry      Blood Pressure:    Restin/68  Exercise: 146/68  Recovery: 120/66      INDIVIDUALIZED TREATMENT PLAN      EXERCISE GOALS AND PLAN    PHYSCIAN PRESCRIBED EXERCISE    Current Aerobic Exercise Prescription       Frequency: 2 days/week   Supplement with home exercise 2+ days/wk as tolerated    Minutes: 35-40         METS: 3.93              SpO2: >88%              RPD: 4-6                      HR: RHR +30-40bpm   RPE: 4-5         Modalities: Treadmill, Airdyne bike, UBE, NuStep, and Recumbent bike      Aerobic Exercise Prescription Plan for  Progression:    Frequency: 2 days/week    Supplement with home exercise 2+ days/wk as tolerated     Minutes: 40-45        METS: 4-5              SpO2: >88%              RPD: 4-6                      HR:RHR +30-40bpm   RPE: 4-5        Modalities: Treadmill, Airdyne bike, UBE, NuStep, and Recumbent bike        Supplemental Oxygen Needs with Exercise:  room air.    Strength trainin-3 days / week  12-15 repetitions  1-2 sets per modality    Modalities: Chest Press and Pull Downs    Education: pursed lipped breathing, diaphragmatic breathing, relaxation breathing, home exercise guidelines, benefits of exercise for pulmonary disease, RPE scale, RPD scale, and O2 saturation monitoring    Progress toward Exercise Goals:    Pt is progressing and showing improvement  toward the following goals:  She has attended 8 sessions of [place] and is working at a 3.93 METs. Her SpO2 remains > 88% during exercise.  , Will continue to educate and progress as tolerated.    Exercise Plan:  Titrate supplemental oxygen as needed to maintain SpO2>88% with exercise, practice diaphragmatic breathing, reduce time sitting at home, utilize PLB with physical activity, and begin a home exercise program     Readiness to change: Action:  (Changing behavior)      NUTRITION GOALS AND PLAN    Progress toward Nutritional goals:    Pt is progressing and showing improvement  toward the following goals:  She had 1# weight loss in this 30 day reporting window.  , Will continue to educate and progress as tolerated.    Nutrition Plan: group class: Reading Food Labels and group Class: Heart Healthy Eating    SMART goals are based Rate Your Plate Dietary Self-Assessment. These are the areas in which the patient could score higher on the assessment.  Goals include recommendations for a heart healthy diet based on American Heart Association.    Nutrition Education: heart healthy eating principles  weight loss and management strategies  low sodium diet  maintaining  hydration  nutrition for  lipid management  group class: Heart Healthy Eating  group class:  Label Reading    Readiness to change: Preparation:  (Getting ready to change)       PSYCHOSOCIAL GOALS AND PLAN    Information to begin using [employer] was provided as well as contact information for counseling through  [facility].    Progress toward Psychosocial goals:    Pt has not made progress toward the following goals: Stress level still remains elevated due to being out of work. , Will continue to educate and progress as tolerated.    Psychosocial: Class: Stress and Your Health, Class:  Stress and Pulmonary Disease, Enroll in [employer], Keep a positive mindset, and Enjoy family    Psychosocial Education: stress management techniques, benefits of enrolling in [employer], depression and pulmonary disease, tools to manage fear/anxiety related to becoming SOB, class:  Stress and Your Health , and class:  Stress and Pulmonary Disease    Readiness to change: Preparation:  (Getting ready to change)       OTHER CORE COMPONENTS GOALS AND PLAN    Tobacco Use Intervention:     Pt quit in 2017   and has abstained    Oxygen Goal: Maintain SpO2>88% during exercise or as advised by pulmonolgist    Progress toward Core Component goals:    Pt is progressing and showing improvement  toward the following goals:  SpO2 remains > 88% during exercise.  , Pt has not made progress toward the following goals: She occ has SBP > 130. , Will continue to educate and progress as tolerated.    Core Component Plan: medication compliance, complete abstention from smoking, avoid places with second hand smoke, engage in regular exercise, group class: Pulmonary Anatomy and Physiology, and group class:  Pulmonary Medications    Core Component Education:  pathophysiology of pulmonary disease, preventing infections, control coughing, inspiratory muscle training, nebulizer use, bronchodilators, education: Pulmonary Anatomy and Physiology,  education:  Pulmonary Medications, education:  Clearing Secretions, and education: Oxygen    Readiness to change: Preparation:  (Getting ready to change)

## 2024-11-15 ENCOUNTER — CLINICAL SUPPORT (OUTPATIENT)
Dept: PULMONOLOGY | Facility: HOSPITAL | Age: 59
End: 2024-11-15
Attending: INTERNAL MEDICINE
Payer: COMMERCIAL

## 2024-11-15 DIAGNOSIS — J44.9 COPD, SEVERE (HCC): ICD-10-CM

## 2024-11-15 PROCEDURE — 94625 PHY/QHP OP PULM RHB W/O MNTR: CPT

## 2024-11-18 ENCOUNTER — CLINICAL SUPPORT (OUTPATIENT)
Dept: PULMONOLOGY | Facility: HOSPITAL | Age: 59
End: 2024-11-18
Attending: INTERNAL MEDICINE
Payer: COMMERCIAL

## 2024-11-18 ENCOUNTER — OFFICE VISIT (OUTPATIENT)
Dept: FAMILY MEDICINE CLINIC | Facility: CLINIC | Age: 59
End: 2024-11-18
Payer: COMMERCIAL

## 2024-11-18 VITALS
HEART RATE: 84 BPM | SYSTOLIC BLOOD PRESSURE: 130 MMHG | OXYGEN SATURATION: 92 % | DIASTOLIC BLOOD PRESSURE: 70 MMHG | HEIGHT: 66 IN | WEIGHT: 178 LBS | BODY MASS INDEX: 28.61 KG/M2 | TEMPERATURE: 97.3 F

## 2024-11-18 DIAGNOSIS — B07.0 PLANTAR WARTS: Primary | ICD-10-CM

## 2024-11-18 DIAGNOSIS — G43.109 MIGRAINE WITH AURA AND WITHOUT STATUS MIGRAINOSUS, NOT INTRACTABLE: ICD-10-CM

## 2024-11-18 DIAGNOSIS — Z86.0100 HISTORY OF COLONIC POLYPS: ICD-10-CM

## 2024-11-18 DIAGNOSIS — Z23 ENCOUNTER FOR IMMUNIZATION: ICD-10-CM

## 2024-11-18 DIAGNOSIS — Z12.11 COLON CANCER SCREENING: ICD-10-CM

## 2024-11-18 DIAGNOSIS — J44.9 COPD, SEVERE (HCC): ICD-10-CM

## 2024-11-18 PROCEDURE — 94626 PHY/QHP OP PULM RHB W/MNTR: CPT

## 2024-11-18 PROCEDURE — 90673 RIV3 VACCINE NO PRESERV IM: CPT

## 2024-11-18 PROCEDURE — 99214 OFFICE O/P EST MOD 30 MIN: CPT | Performed by: FAMILY MEDICINE

## 2024-11-18 PROCEDURE — 90471 IMMUNIZATION ADMIN: CPT

## 2024-11-18 RX ORDER — RIZATRIPTAN BENZOATE 10 MG/1
10 TABLET, ORALLY DISINTEGRATING ORAL ONCE AS NEEDED
Qty: 10 TABLET | Refills: 1 | Status: SHIPPED | OUTPATIENT
Start: 2024-11-18 | End: 2024-11-22 | Stop reason: RX

## 2024-11-18 NOTE — PROGRESS NOTES
"Name: Khushboo Pichardo      : 1965      MRN: [id number]  Encounter Provider: Leidy Morrow DO  Encounter Date: 2024   Encounter department: [facility] AT [place]  :  Assessment & Plan  Plantar warts    Orders:    Ambulatory Referral to Podiatry; Future    Migraine with aura and without status migrainosus, not intractable    Orders:    rizatriptan (MAXALT-MLT) 10 mg disintegrating tablet; Take 1 tablet (10 mg total) by mouth once as needed for migraine for up to 1 dose may repeat in 2 hours if necessary    Encounter for immunization    Orders:    influenza vaccine, recombinant, PF, 0.5 mL IM (Flublok)    History of colonic polyps    Orders:    Ambulatory Referral to Gastroenterology; Future    Colon cancer screening    Orders:    Ambulatory Referral to Gastroenterology; Future           Chief Complaint   Patient presents with    Follow-up     6 mo f/u    Plantar Warts     has a plantar wart on bottom of left foot, painful        History of Present Illness     Scheduled f/u  Plantar wart actually right foot- 2 months \"keep chiseling away at it\" with toenail clippers and  for calluses\" - admits has had in the past and had to see podiatrist - more than 10-15 year ago        Review of Systems       Objective   /70 (BP Location: Left arm, Patient Position: Sitting, Cuff Size: Standard)   Pulse 84   Temp (!) 97.3 °F (36.3 °C) (Tympanic)   Ht 5' 6\" (1.676 m)   Wt 80.7 kg (178 lb)   LMP  (LMP Unknown)   SpO2 92%   BMI 28.73 kg/m²      Physical Exam  Vitals and nursing note reviewed.   Constitutional:       General: She is not in acute distress.     Appearance: She is well-groomed. She is not ill-appearing, toxic-appearing or diaphoretic.   HENT:      Head: Normocephalic and atraumatic.   Eyes:      Conjunctiva/sclera: Conjunctivae normal.   Cardiovascular:      Rate and Rhythm: Normal rate and regular rhythm.      Pulses: Normal pulses.      Heart sounds: Normal heart sounds. "   Pulmonary:      Effort: Pulmonary effort is normal.      Breath sounds: Normal breath sounds and air entry.   Musculoskeletal:      Right lower leg: No edema.      Left lower leg: No edema.        Feet:    Skin:     General: Skin is warm and dry.      Coloration: Skin is not pale.   Neurological:      Mental Status: She is alert and oriented to person, place, and time.   Psychiatric:         Mood and Affect: Mood normal.         Behavior: Behavior normal. Behavior is cooperative.         Cognition and Memory: Cognition and memory normal.

## 2024-11-20 ENCOUNTER — APPOINTMENT (OUTPATIENT)
Dept: PULMONOLOGY | Facility: HOSPITAL | Age: 59
End: 2024-11-20
Attending: INTERNAL MEDICINE
Payer: COMMERCIAL

## 2024-11-22 ENCOUNTER — CLINICAL SUPPORT (OUTPATIENT)
Dept: PULMONOLOGY | Facility: HOSPITAL | Age: 59
End: 2024-11-22
Attending: INTERNAL MEDICINE
Payer: COMMERCIAL

## 2024-11-22 DIAGNOSIS — G43.109 MIGRAINE WITH AURA AND WITHOUT STATUS MIGRAINOSUS, NOT INTRACTABLE: Primary | ICD-10-CM

## 2024-11-22 DIAGNOSIS — J44.9 COPD, SEVERE (HCC): ICD-10-CM

## 2024-11-22 PROCEDURE — 94626 PHY/QHP OP PULM RHB W/MNTR: CPT

## 2024-11-22 RX ORDER — SUMATRIPTAN SUCCINATE 100 MG/1
100 TABLET ORAL ONCE AS NEEDED
Qty: 10 TABLET | Refills: 5 | Status: SHIPPED | OUTPATIENT
Start: 2024-11-22

## 2024-11-25 ENCOUNTER — APPOINTMENT (OUTPATIENT)
Dept: PULMONOLOGY | Facility: HOSPITAL | Age: 59
End: 2024-11-25
Attending: INTERNAL MEDICINE
Payer: COMMERCIAL

## 2024-11-25 ENCOUNTER — TELEPHONE (OUTPATIENT)
Age: 59
End: 2024-11-25

## 2024-11-25 ENCOUNTER — OFFICE VISIT (OUTPATIENT)
Dept: PAIN MEDICINE | Facility: MEDICAL CENTER | Age: 59
End: 2024-11-25
Payer: COMMERCIAL

## 2024-11-25 VITALS
HEART RATE: 76 BPM | HEIGHT: 66 IN | SYSTOLIC BLOOD PRESSURE: 117 MMHG | DIASTOLIC BLOOD PRESSURE: 76 MMHG | BODY MASS INDEX: 28.45 KG/M2 | WEIGHT: 177 LBS

## 2024-11-25 DIAGNOSIS — Z98.890 HISTORY OF CERVICAL SPINAL SURGERY: ICD-10-CM

## 2024-11-25 DIAGNOSIS — M54.12 CERVICAL RADICULOPATHY: ICD-10-CM

## 2024-11-25 DIAGNOSIS — J44.9 COPD, SEVERE (HCC): ICD-10-CM

## 2024-11-25 DIAGNOSIS — M79.18 MYOFASCIAL PAIN SYNDROME: Primary | ICD-10-CM

## 2024-11-25 PROCEDURE — 99214 OFFICE O/P EST MOD 30 MIN: CPT

## 2024-11-25 PROCEDURE — 20552 NJX 1/MLT TRIGGER POINT 1/2: CPT

## 2024-11-25 RX ORDER — AZITHROMYCIN 250 MG/1
250 TABLET, FILM COATED ORAL EVERY 24 HOURS
Qty: 30 TABLET | Refills: 2 | Status: SHIPPED | OUTPATIENT
Start: 2024-11-25 | End: 2025-02-23

## 2024-11-25 RX ORDER — LIDOCAINE HYDROCHLORIDE 10 MG/ML
4 INJECTION, SOLUTION EPIDURAL; INFILTRATION; INTRACAUDAL; PERINEURAL ONCE
Status: COMPLETED | OUTPATIENT
Start: 2024-11-25 | End: 2024-11-25

## 2024-11-25 RX ADMIN — LIDOCAINE HYDROCHLORIDE 4 ML: 10 INJECTION, SOLUTION EPIDURAL; INFILTRATION; INTRACAUDAL; PERINEURAL at 12:59

## 2024-11-25 NOTE — TELEPHONE ENCOUNTER
All Conversations: Med Change Request  (Oldest Message First)November 20, 2024         11/20/24  2:56 PM  Interface, Surescripts In routed this conversation to Primary Care [facility] Pod Clinical  Deysi Alfonso MA to [facility] Clinical Regarding result: rizatriptan (MAXALT-MLT) 10 mg disintegrating tablet [Pharmacy Med Name: RIZATRIPTAN 10 MG ODT]       11/20/24  3:02 PM  Refill must be reviewed and completed by the office or provider. The refill is unable to be approved or denied by the medication management team.   Please see pharmacy note,  product is backordered  Vaishali Parnell MA to [facility]    11/20/24  3:05 PM  Note      Please review.         November 22, 2024  Me       11/22/24 11:19 AM  Note      Please advise pt that we are getting notification from the pharmacy that the rizatriptan is is still backordered and not available, so I need to put it back to a refill of the sumatriptan instead.            11/22/24 11:19 AM  You routed this conversation to [facility] Clinical  [facility] Clerical      Interface, Surescripts Out to [facility] Care [facility] Pod Clinical Regarding result: rizatriptan (MAXALT-MLT) 10 mg disintegrating tablet [Pharmacy Med Name: RIZATRIPTAN 10 MG ODT]       11/22/24 11:23 AM   An error occurred while processing the e-prescribing message.    The refill or change request for this medication was refused, but the message was not sent electronically to the requested pharmacy. Call in the refusal to the pharmacy.        Thais Barton MA to Me Regarding result: rizatriptan (MAXALT-MLT) 10 mg disintegrating tablet [Pharmacy Med Name: RIZATRIPTAN 10 MG ODT]   AS    11/22/24 11:28 AM  Please advise    Me to [facility] Clinical Regarding result: rizatriptan (MAXALT-MLT) 10 mg disintegrating tablet [Pharmacy Med Name: RIZATRIPTAN 10 MG ODT]       11/22/24 11:42 AM  Not sure why this was routed back to me, this was a message I  routed to clinical with information to call pt about her rx

## 2024-11-25 NOTE — TELEPHONE ENCOUNTER
Called and spoke to pt. Pt states the pharmacist told her they have it in stock now. Pt was informed PCP ONATHANAEL the rest of the day today and will return tomorrow.
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
## 2024-11-27 ENCOUNTER — CLINICAL SUPPORT (OUTPATIENT)
Dept: PULMONOLOGY | Facility: HOSPITAL | Age: 59
End: 2024-11-27
Attending: INTERNAL MEDICINE
Payer: COMMERCIAL

## 2024-11-27 DIAGNOSIS — J44.9 COPD, SEVERE (HCC): ICD-10-CM

## 2024-11-27 PROCEDURE — 94626 PHY/QHP OP PULM RHB W/MNTR: CPT

## 2024-11-29 ENCOUNTER — CLINICAL SUPPORT (OUTPATIENT)
Dept: PULMONOLOGY | Facility: HOSPITAL | Age: 59
End: 2024-11-29
Attending: INTERNAL MEDICINE
Payer: COMMERCIAL

## 2024-11-29 DIAGNOSIS — J44.9 COPD, SEVERE (HCC): Primary | ICD-10-CM

## 2024-11-29 DIAGNOSIS — G43.109 MIGRAINE WITH AURA AND WITHOUT STATUS MIGRAINOSUS, NOT INTRACTABLE: Primary | ICD-10-CM

## 2024-11-29 PROCEDURE — 94626 PHY/QHP OP PULM RHB W/MNTR: CPT

## 2024-11-29 RX ORDER — RIZATRIPTAN BENZOATE 10 MG/1
10 TABLET, ORALLY DISINTEGRATING ORAL ONCE AS NEEDED
Qty: 10 TABLET | Refills: 2 | Status: SHIPPED | OUTPATIENT
Start: 2024-11-29 | End: 2024-12-04 | Stop reason: RX

## 2024-12-03 NOTE — TELEPHONE ENCOUNTER
Pt's pharmacy is no longer able to get the rizatriptan, Pt stated her PCP suggested another disintegrating tablet med and she would like that.    Please advise pt if this is possible

## 2024-12-04 ENCOUNTER — TELEPHONE (OUTPATIENT)
Dept: HEMATOLOGY ONCOLOGY | Facility: CLINIC | Age: 59
End: 2024-12-04

## 2024-12-04 DIAGNOSIS — G43.109 MIGRAINE WITH AURA AND WITHOUT STATUS MIGRAINOSUS, NOT INTRACTABLE: Primary | ICD-10-CM

## 2024-12-04 RX ORDER — SUMATRIPTAN SUCCINATE 100 MG/1
100 TABLET ORAL ONCE AS NEEDED
Qty: 10 TABLET | Refills: 5 | Status: SHIPPED | OUTPATIENT
Start: 2024-12-04

## 2024-12-04 NOTE — TELEPHONE ENCOUNTER
left  for PT on 12/4/24 @ 2:05 PM for introductions and to touch base regarding her current PA MA insurance through [employer]. Jayshreer informed PT that "[employer] will no longer be [facility] with [facility] come 2025. Jayshreer informed PT that the process to switch PA MA plans is not very difficult and that he can aid in this process if she needs it.  provided contact information to PT for call back and encouraged it.              Gilmer Gallagher  Phone:[phone]  Email:[email or web address]

## 2024-12-04 NOTE — TELEPHONE ENCOUNTER
"Just to document: Not sure how/why pharmacist keeps giving pt inaccurate information about rizatriptan availability, but this will now be the 5th time I have switched the rx back and forth since we were first notified in early October that rizatriptan was backordered. Each time I've rx'd the sumatriptan as replacement, pt has sent message that her pharmacist told her it was \"now in stock.\"  "

## 2024-12-05 ENCOUNTER — TELEPHONE (OUTPATIENT)
Age: 59
End: 2024-12-05

## 2024-12-05 NOTE — TELEPHONE ENCOUNTER
Patient would like a disintegrating tablet instead of the sumatriptan if available. Please call patient to discuss for any questions.

## 2024-12-05 NOTE — TELEPHONE ENCOUNTER
Patient returning call, advised Heidi is on the other line at the moment. She requested a call back.

## 2024-12-05 NOTE — TELEPHONE ENCOUNTER
New Insurance - [facility] #[id number]; [address] [place] [id number]    Fasenra - Process Prior Auth ASAP, above insurance dropping her 12/12/24.

## 2024-12-05 NOTE — TELEPHONE ENCOUNTER
Pt calling to speak to Heidi TAYLOR. When asking if I could help with anything she stated she prefer to speak to Heidi and will call back later.

## 2024-12-06 NOTE — PROGRESS NOTES
[facility]'s Cardiopulmonary Rehab  [place]    Pulmonary Rehabilitation      Dear Dr. Paulino,     Thank you for referring your patient to our Pulmonary Rehabilitation program. The patient has completed 12  visits. However, rehab is being discontinued at this time.    The patient has chosen to be discharged at this time due to her new insurance and its coverage for pulmonary rehab.       Please contact us at [phone] if you have any questions about this patent’s case.  Thank you for your continued support of cardiac rehabilitation.       Sincerely,    Erika Sacks, MS

## 2024-12-09 ENCOUNTER — TELEPHONE (OUTPATIENT)
Age: 59
End: 2024-12-09

## 2024-12-09 DIAGNOSIS — J44.89 ASTHMA-COPD OVERLAP SYNDROME (HCC): Primary | ICD-10-CM

## 2024-12-09 NOTE — TELEPHONE ENCOUNTER
PA for BREZTRI SUBMITTED to [employer]    via    [x]CMM-KEY:  [id number]  []Surescripts-Case ID #   []Availity-Auth ID # NDC #   []Faxed to plan   []Other website   []Phone call Case ID #     [x]PA sent as URGENT    All office notes, labs and other pertaining documents and studies sent. Clinical questions answered. Awaiting determination from insurance company.     Turnaround time for your insurance to make a decision on your Prior Authorization can take 7-21 business days.

## 2024-12-10 ENCOUNTER — OFFICE VISIT (OUTPATIENT)
Age: 59
End: 2024-12-10
Payer: MEDICARE

## 2024-12-10 ENCOUNTER — TELEPHONE (OUTPATIENT)
Age: 59
End: 2024-12-10

## 2024-12-10 VITALS
DIASTOLIC BLOOD PRESSURE: 72 MMHG | HEART RATE: 80 BPM | WEIGHT: 177 LBS | BODY MASS INDEX: 28.45 KG/M2 | HEIGHT: 66 IN | SYSTOLIC BLOOD PRESSURE: 108 MMHG

## 2024-12-10 DIAGNOSIS — L84 CALLUS OF FOOT: Primary | ICD-10-CM

## 2024-12-10 DIAGNOSIS — B07.0 PLANTAR WARTS: ICD-10-CM

## 2024-12-10 PROCEDURE — 99203 OFFICE O/P NEW LOW 30 MIN: CPT

## 2024-12-10 PROCEDURE — 11055 PARING/CUTG B9 HYPRKER LES 1: CPT

## 2024-12-10 RX ORDER — IPRATROPIUM BROMIDE AND ALBUTEROL SULFATE 2.5; .5 MG/3ML; MG/3ML
SOLUTION RESPIRATORY (INHALATION)
COMMUNITY
Start: 2024-11-30

## 2024-12-10 NOTE — PROGRESS NOTES
Name: Khushboo Pichardo      : 1965      MRN: [id number]  Encounter Provider: Shoaib Figueroa DPM  Encounter Date: 12/10/2024   Encounter department: [facility]OD[facility]  :  Assessment & Plan  Plantar warts    Orders:  •  Ambulatory Referral to Podiatry    Callus of foot         -Patient was educated regarding their condition  -Hyperkeratotic tissue was pared utilizing a #15 blade x1 without incident to level of healthy epidermis  -Patient was provided with padding to offload area  -Recommend use of moisturizer to feet daily  -Recommend use of pumice stone to callused areas  -Return to clinic as needed    History of Present Illness   HPI  Khushboo Pichardo is a 59 y.o. female who presents with painful skin lesion on bottom of right foot.  Patient reports she has had warts before and this feels like a similar pain.  She denies any redness swelling or drainage from foot.  Denies any recent fever or chills.    History obtained from: patient    Review of Systems  Current Outpatient Medications on File Prior to Visit   Medication Sig Dispense Refill   • albuterol (Ventolin HFA) 90 mcg/act inhaler Inhale 2 puffs every 6 (six) hours as needed for wheezing 18 g 3   • Albuterol-Budesonide 90-80 MCG/ACT AERO Inhale 2 puffs every 6 (six) hours as needed (shortness of breath/wheezing) 32.1 g 3   • anastrozole (ARIMIDEX) 1 mg tablet Take 1 tablet (1 mg total) by mouth daily 90 tablet 3   • Ascorbic Acid (vitamin C) 1000 MG tablet Take 1,000 mg by mouth daily     • azithromycin (ZITHROMAX) 250 mg tablet Take 1 tablet (250 mg total) by mouth every 24 hours 30 tablet 2   • B Complex Vitamins (B COMPLEX 1 PO) Take by mouth daily      • benralizumab (FASENRA) subcutaneous injection Inject 1 mL (30 mg total) under the skin every 56 days 1 mL 5   • Budeson-Glycopyrrol-Formoterol (Breztri Aerosphere) 160-9-4.8 MCG/ACT AERO INHALE 2 PUFFS BY MOUTH 2 TIMES A DAY RINSE MOUTH AFTER USE. 10.7 g 12   •  "Calcium-Magnesium-Vitamin D 185- MG-MG-UNIT CAPS Take by mouth daily      • cyanocobalamin (VITAMIN B-12) 500 MCG tablet Take 500 mcg by mouth daily     • Echinacea 400 MG CAPS Take by mouth daily      • ipratropium-albuterol (DUO-NEB) 0.5-2.5 mg/3 mL nebulizer solution INHALE 3 ML BY NEBULIZATION EVERY 6 HOURS AS NEEDED FOR WHEEZE OR FOR SHORTNESS OF BREATH     • loperamide (IMODIUM) 2 mg capsule Take 2 mg by mouth 4 (four) times a day as needed for diarrhea     • Loratadine 10 MG CAPS Take 10 mg by mouth daily     • Multiple Vitamins-Minerals (MULTIVITAMIN ADULT PO) Take by mouth daily      • SUMAtriptan (IMITREX) 100 mg tablet Take 1 tablet (100 mg total) by mouth once as needed for migraine for up to 1 dose may repeat in 2 hours if necessary. Max dose 200mg in 24 hour period. 10 tablet 5   • topiramate (TOPAMAX) 100 mg tablet Take 1 tablet (100 mg total) by mouth daily 90 tablet 1   • TURMERIC PO Take by mouth     • [DISCONTINUED] budesonide-formoterol (Symbicort) 160-4.5 mcg/act inhaler Inhale 2 puffs 2 (two) times a day       No current facility-administered medications on file prior to visit.         Objective   /72 (BP Location: Left arm, Patient Position: Sitting, Cuff Size: Large)   Pulse 80   Ht 5' 6\" (1.676 m)   Wt 80.3 kg (177 lb)   LMP  (LMP Unknown)   BMI 28.57 kg/m²      Physical Exam  Cardiovascular:      Pulses:           Dorsalis pedis pulses are 2+ on the right side.        Posterior tibial pulses are 2+ on the right side.   Musculoskeletal:      Right foot: Prominent metatarsal heads present.        Feet:    Feet:      Right foot:      Protective Sensation: 10 sites tested.  10 sites sensed.      Skin integrity: Callus present.      Toenail Condition: Right toenails are normal.           "

## 2024-12-10 NOTE — TELEPHONE ENCOUNTER
PA for BREZTRI APPROVED     Date(s) approved 12/10/24-12/10/25    Case #    Patient advised by          []Nexihart Message  [x]Phone call   []LMOM  []L/M to call office as no active Communication consent on file  []Unable to leave detailed message as VM not approved on Communication consent       Pharmacy advised by    [x]Fax  []Phone call    Approval letter scanned into Media No

## 2024-12-10 NOTE — TELEPHONE ENCOUNTER
Per patient, [facility] will be providing Fasenra and can take up to 5-7 days to process authorization.

## 2024-12-10 NOTE — TELEPHONE ENCOUNTER
Returned patient's call.    Pt states pharmacy ordered and supposed to be in tomorrow, 12/10/24.    Patient had no further questions and/or concerns at this time.

## 2024-12-16 DIAGNOSIS — J45.40 MODERATE PERSISTENT ASTHMA WITHOUT COMPLICATION: ICD-10-CM

## 2024-12-16 DIAGNOSIS — J44.89 ASTHMA-COPD OVERLAP SYNDROME (HCC): ICD-10-CM

## 2024-12-16 NOTE — TELEPHONE ENCOUNTER
Reason for call:   [x] Refill   [] Prior Auth  [] Other:     Office:   [] PCP/Provider -   [x] Specialty/Provider - pulm     Medication: benralizumab (FASENRA) subcutaneous injection     Dose/Frequency:  Inject 1 mL (30 mg total) under the skin every 56 day     Quantity: 1 mL     Pharmacy: [address]      Does the patient have enough for 3 days?   [x] Yes   [] No - Send as HP to POD

## 2024-12-20 ENCOUNTER — PATIENT MESSAGE (OUTPATIENT)
Dept: PULMONOLOGY | Facility: CLINIC | Age: 59
End: 2024-12-20

## 2024-12-20 ENCOUNTER — TELEPHONE (OUTPATIENT)
Dept: PULMONOLOGY | Facility: CLINIC | Age: 59
End: 2024-12-20

## 2024-12-20 RX ORDER — BENRALIZUMAB 30 MG/ML
30 INJECTION, SOLUTION SUBCUTANEOUS
Qty: 1 ML | Refills: 8 | Status: SHIPPED | OUTPATIENT
Start: 2024-12-20

## 2024-12-20 NOTE — TELEPHONE ENCOUNTER
[employer] PATIENT ASSISTANCE FORM    Sent message through [employer] at the request of Constance. Blank form was uploaded into media for patient and attached to my patient message for patient to fill out. Provided a the office phone number in case of any questions or concerns regarding the forms.

## 2024-12-20 NOTE — TELEPHONE ENCOUNTER
Dr. Jefferson Rubio sent in as Syringes was supposed to be autoinjector.    Please correct prescription.     Once corrected.Allison MENDOZA/ Auth Dept to call pharmacy and correct prescription.

## 2024-12-23 ENCOUNTER — APPOINTMENT (OUTPATIENT)
Dept: RADIOLOGY | Facility: CLINIC | Age: 59
End: 2024-12-23
Payer: MEDICARE

## 2024-12-23 ENCOUNTER — OFFICE VISIT (OUTPATIENT)
Dept: URGENT CARE | Facility: CLINIC | Age: 59
End: 2024-12-23
Payer: MEDICARE

## 2024-12-23 VITALS
TEMPERATURE: 98 F | SYSTOLIC BLOOD PRESSURE: 128 MMHG | WEIGHT: 177 LBS | RESPIRATION RATE: 18 BRPM | HEART RATE: 90 BPM | DIASTOLIC BLOOD PRESSURE: 74 MMHG | OXYGEN SATURATION: 96 % | BODY MASS INDEX: 28.45 KG/M2 | HEIGHT: 66 IN

## 2024-12-23 DIAGNOSIS — J01.90 ACUTE BACTERIAL SINUSITIS: Primary | ICD-10-CM

## 2024-12-23 DIAGNOSIS — R05.1 ACUTE COUGH: ICD-10-CM

## 2024-12-23 DIAGNOSIS — B96.89 ACUTE BACTERIAL SINUSITIS: Primary | ICD-10-CM

## 2024-12-23 PROCEDURE — 99213 OFFICE O/P EST LOW 20 MIN: CPT | Performed by: ORTHOPAEDIC SURGERY

## 2024-12-23 PROCEDURE — 71046 X-RAY EXAM CHEST 2 VIEWS: CPT

## 2024-12-23 RX ORDER — PREDNISONE 20 MG/1
40 TABLET ORAL DAILY
Qty: 10 TABLET | Refills: 0 | Status: SHIPPED | OUTPATIENT
Start: 2024-12-23 | End: 2024-12-28

## 2024-12-23 NOTE — TELEPHONE ENCOUNTER
Fabiana From [facility] called, they need the rx for benralizumab (FASENRA) subcutaneous injection PEN  Please resend to [facility] - was sent to wrong phamacy on 12/20/24
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
## 2024-12-23 NOTE — PATIENT INSTRUCTIONS
Fever and pain control:  Ibuprofen (Motrin) 600mg every 6 hours for fever, headaches, body aches   Ibuprofen is an NSAID. Please stop medication if you experience stomach/abdominal pain and report to your primary care provider.   Ask your primary care provider before you take NSAIDs if you are on any blood thinners, or if you have a history of heart disease, kidney disease, gastric bypass surgery, GI bleed, or poorly controlled high blood pressure.   May use acetaminophen (Tylenol) as directed on the bottle between doses of ibuprofen. Do not exceed 4,000mg of Tylenol a day.   Cough & Congestion:  Guaifenesin (Mucinex) as directed on the bottle for congestion and mucous-y cough.   Dextromethorphan (Delsym, Robitussin) for dry cough and cough suppression   Pseudoephedrine (Sudafed) for congestion and sinus pressure   Sudafed may cause increased heart rate, irregular heart rate, and an increase in blood pressure. Please do not take Sudafed if you have a history of heart disease or high blood pressure.   Sudafed should not be taken if you are on anti-depressants such as those belonging to the class MAOIs or tricyclics.  Coricidin HBP (chlorpheniramine maleate) can be used as a decongestant in place of other options for those unable to take Sudafed.   Combination cough and cold such as Dimetapp and Mucinex DM also available  Sudafed PE Head Congestion +Flu Severe contains a combination of Sudafed, Tylenol, Mucinex, and Delsym  If prescribed, take Tessalon Pearles or Bromfed/Phenergan DM as directed  Avoid taking prescription cough/congestion medication and OTC options at the same time  Sore Throat:  Cepacol lozenges  Chloraseptic spray  Throat Coat tea  Warm salt water gargles   Vitamin/Minerals:  Vitamin D3 2,000 IU daily  Vitamin C 1000mg twice a day  Some studies suggest that Zinc 12.5-15mg every 2 hours while awake for 5 days may shorten symptom duration by 1-2 days  Other:   Plenty of fluids and rest  Cool mist  humidifiers  Nasal sinus rinses such as NettiPot, Neimed, or Navage can be used to help flush out sinuses  Please only use distilled/sterile water that can be purchased at your local pharmacy  Nasal spray options:  Nasal steroid sprays such as Flonase, Nasonex, Nasacort may help with sinus congestion, itchy/watery eyes, clogged ears  These options must be used consistently for at least 2 weeks for full effect  Afrin nasal spray for quick acting congestion relief  Saline nasal spray for dry nose, irritation of the nasal passages  Follow up with PCP in 3-5 days  Proceed to the ED if symptoms worsen

## 2024-12-26 DIAGNOSIS — C50.211 MALIGNANT NEOPLASM OF UPPER-INNER QUADRANT OF RIGHT BREAST IN FEMALE, ESTROGEN RECEPTOR POSITIVE (HCC): ICD-10-CM

## 2024-12-26 DIAGNOSIS — Z17.0 MALIGNANT NEOPLASM OF UPPER-INNER QUADRANT OF RIGHT BREAST IN FEMALE, ESTROGEN RECEPTOR POSITIVE (HCC): ICD-10-CM

## 2024-12-26 RX ORDER — BENRALIZUMAB 30 MG/ML
30 INJECTION, SOLUTION SUBCUTANEOUS
Qty: 1 ML | Refills: 0 | Status: SHIPPED | OUTPATIENT
Start: 2024-12-26

## 2024-12-26 RX ORDER — ANASTROZOLE 1 MG/1
1 TABLET ORAL DAILY
Qty: 90 TABLET | Refills: 0 | Status: SHIPPED | OUTPATIENT
Start: 2024-12-26

## 2024-12-27 ENCOUNTER — TELEPHONE (OUTPATIENT)
Dept: PULMONOLOGY | Facility: CLINIC | Age: 59
End: 2024-12-27

## 2024-12-27 DIAGNOSIS — J44.9 STAGE 3 SEVERE COPD BY GOLD CLASSIFICATION (HCC): ICD-10-CM

## 2024-12-27 DIAGNOSIS — J45.40 MODERATE PERSISTENT ASTHMA WITHOUT COMPLICATION: ICD-10-CM

## 2024-12-27 RX ORDER — ALBUTEROL SULFATE AND BUDESONIDE 90; 80 UG/1; UG/1
2 AEROSOL, METERED RESPIRATORY (INHALATION)
Qty: 10.7 G | Refills: 5 | Status: SHIPPED | OUTPATIENT
Start: 2024-12-27

## 2024-12-31 ENCOUNTER — HOSPITAL ENCOUNTER (OUTPATIENT)
Dept: RADIOLOGY | Facility: HOSPITAL | Age: 59
Discharge: HOME | End: 2024-12-31
Payer: MEDICARE

## 2024-12-31 DIAGNOSIS — M48.02 CERVICAL SPINAL STENOSIS: ICD-10-CM

## 2024-12-31 PROCEDURE — 72040 X-RAY EXAM NECK SPINE 2-3 VW: CPT

## 2025-01-06 ENCOUNTER — APPOINTMENT (OUTPATIENT)
Dept: PULMONOLOGY | Facility: HOSPITAL | Age: 60
End: 2025-01-06
Attending: INTERNAL MEDICINE
Payer: COMMERCIAL

## 2025-01-08 ENCOUNTER — APPOINTMENT (OUTPATIENT)
Dept: PULMONOLOGY | Facility: HOSPITAL | Age: 60
End: 2025-01-08
Attending: INTERNAL MEDICINE
Payer: COMMERCIAL

## 2025-01-09 ENCOUNTER — OFFICE VISIT (OUTPATIENT)
Dept: NEUROSURGERY | Facility: CLINIC | Age: 60
End: 2025-01-09
Payer: COMMERCIAL

## 2025-01-09 ENCOUNTER — TELEPHONE (OUTPATIENT)
Dept: NEUROSURGERY | Facility: CLINIC | Age: 60
End: 2025-01-09

## 2025-01-09 VITALS
WEIGHT: 175 LBS | RESPIRATION RATE: 18 BRPM | SYSTOLIC BLOOD PRESSURE: 126 MMHG | HEIGHT: 66 IN | HEART RATE: 87 BPM | TEMPERATURE: 97.5 F | OXYGEN SATURATION: 96 % | DIASTOLIC BLOOD PRESSURE: 86 MMHG | BODY MASS INDEX: 28.12 KG/M2

## 2025-01-09 DIAGNOSIS — Z98.890 S/P CERVICAL DISC REPLACEMENT: Primary | ICD-10-CM

## 2025-01-09 PROCEDURE — 99212 OFFICE O/P EST SF 10 MIN: CPT | Performed by: STUDENT IN AN ORGANIZED HEALTH CARE EDUCATION/TRAINING PROGRAM

## 2025-01-09 NOTE — PROGRESS NOTES
"Name: Khushboo Pichardo      : 1965      MRN: [id number]  Encounter Provider: Dipak Etienne MD  Encounter Date: 2025   Encounter department: [facility]  :  Assessment & Plan  S/P cervical disc replacement         This is a 59-year-old female status post C5-6 disc arthroplasty by Dr. Lopez presenting for her 1 year postsurgical follow-up visit. X-rays of her cervical spine demonstrate hardware in place without any evidence of hardware failure. The patient is doing very well and does not have any issues or complaints. From my standpoint, she does not require any further neurosurgical intervention. I will see her on a as needed basis.     History of Present Illness   HPI  The patient states she is doing very well. All her symptoms have completely resolved since the surgery. She is back to performing her usual activities without any issues. She does not have any complaints.     Review of Systems   Constitutional: Negative.    Eyes: Negative.    Respiratory: Negative.          COPD    Cardiovascular: Negative.    Gastrointestinal: Negative.    Genitourinary: Negative.    Musculoskeletal:  Positive for neck stiffness. Negative for gait problem and neck pain.   Neurological:  Positive for weakness (right arm- slight improvment). Negative for numbness.    I have personally reviewed the MA's review of systems and made changes as necessary.         Objective   Pulse 87   Temp 97.5 °F (36.4 °C) (Temporal)   Resp 18   Ht 5' 6\" (1.676 m)   Wt 79.4 kg (175 lb)   LMP  (LMP Unknown)   SpO2 96%   BMI 28.25 kg/m²     Physical Exam  Neurological Exam  General:  Normal, well developed, not in distress/pain     Skin:   No issues, no rashes noted     Musculoskeletal:   5/5 strength throughout all muscle groups  No tenderness to palpation of the spine       Neurologic Exam:  Awake and alert  Oriented x3  Speech clear and fluent  DAVID   Sensation to light touch and pin prick intact " throughout  No knight's  No clonus  2+ patellar reflexes     Gait:   normal gait, normal posture        Administrative Statements   I have spent a total time of 10 minutes in caring for this patient on the day of the visit/encounter including Diagnostic results, Instructions for management, Patient and family education, Importance of tx compliance, Impressions, Counseling / Coordination of care, Documenting in the medical record, Reviewing / ordering tests, medicine, procedures  , and Obtaining or reviewing history  .

## 2025-01-09 NOTE — TELEPHONE ENCOUNTER
"Pt came in for appt today, [facility] [employer], spoke with Tayler, ref# [id number], per rep, we are not in network, but according to our \"list\" we do take this [employer] H[employer] policy, hospital does not. Notified pt about this, and will bill out, pt might need to switch insurance.  "

## 2025-01-13 ENCOUNTER — APPOINTMENT (OUTPATIENT)
Dept: PULMONOLOGY | Facility: HOSPITAL | Age: 60
End: 2025-01-13
Attending: INTERNAL MEDICINE
Payer: COMMERCIAL

## 2025-01-15 ENCOUNTER — APPOINTMENT (OUTPATIENT)
Dept: PULMONOLOGY | Facility: HOSPITAL | Age: 60
End: 2025-01-15
Attending: INTERNAL MEDICINE
Payer: COMMERCIAL

## 2025-01-20 ENCOUNTER — APPOINTMENT (OUTPATIENT)
Dept: PULMONOLOGY | Facility: HOSPITAL | Age: 60
End: 2025-01-20
Attending: INTERNAL MEDICINE
Payer: COMMERCIAL

## 2025-01-22 ENCOUNTER — APPOINTMENT (OUTPATIENT)
Dept: PULMONOLOGY | Facility: HOSPITAL | Age: 60
End: 2025-01-22
Attending: INTERNAL MEDICINE
Payer: COMMERCIAL

## 2025-01-24 ENCOUNTER — TELEPHONE (OUTPATIENT)
Age: 60
End: 2025-01-24

## 2025-01-24 ENCOUNTER — PREP FOR PROCEDURE (OUTPATIENT)
Age: 60
End: 2025-01-24

## 2025-01-24 DIAGNOSIS — Z12.11 SCREENING FOR COLON CANCER: Primary | ICD-10-CM

## 2025-01-24 NOTE — TELEPHONE ENCOUNTER
Scheduled date of colonoscopy (as of today):2/5/2025  Physician performing colonoscopy:Dr Sarabia  Location of colonoscopy:[place]  Bowel prep reviewed with patient:Miralax/Dulcolax  Instructions reviewed with patient by:sent via letter  Clearances: N/A

## 2025-01-24 NOTE — TELEPHONE ENCOUNTER
01/24/25  Screened by: Neli Huitron    Referring Provider     Pre- Screening:     There is no height or weight on file to calculate BMI.175lbs 28.25  Has patient been referred for a routine screening Colonoscopy? yes  Is the patient between 45-75 years old? yes      Previous Colonoscopy yes   If yes:4 years    Date:     Facility:     Reason:     Does the patient want to see a Gastroenterologist prior to their procedure OR are they having any GI symptoms? no    Has the patient been hospitalized or had abdominal surgery in the past 6 months? no    Does the patient use supplemental oxygen? no    Does the patient take Coumadin, Lovenox, Plavix, Elliquis, Xarelto, or other blood thinning medication? no    Has the patient had a stroke, cardiac event, or stent placed in the past year? no        If patient is between 45yrs - 49yrs, please advise patient that we will have to confirm benefits & coverage with their insurance company for a routine screening colonoscopy.

## 2025-01-24 NOTE — LETTER
Attached are your prep instructions for your upcoming procedure on 2/5/2025. If you have any questions or concerns please contact us at [phone].                Thank you,      [facility]

## 2025-01-27 ENCOUNTER — APPOINTMENT (OUTPATIENT)
Dept: PULMONOLOGY | Facility: HOSPITAL | Age: 60
End: 2025-01-27
Attending: INTERNAL MEDICINE
Payer: COMMERCIAL

## 2025-01-29 ENCOUNTER — APPOINTMENT (OUTPATIENT)
Dept: PULMONOLOGY | Facility: HOSPITAL | Age: 60
End: 2025-01-29
Attending: INTERNAL MEDICINE
Payer: COMMERCIAL

## 2025-02-04 ENCOUNTER — ANESTHESIA EVENT (OUTPATIENT)
Dept: ANESTHESIOLOGY | Facility: HOSPITAL | Age: 60
End: 2025-02-04

## 2025-02-04 ENCOUNTER — ANESTHESIA (OUTPATIENT)
Dept: ANESTHESIOLOGY | Facility: HOSPITAL | Age: 60
End: 2025-02-04

## 2025-02-04 NOTE — ANESTHESIA PREPROCEDURE EVALUATION
Procedure:  PRE-OP ONLY  Colonoscopy    ECG: NSR  TTE: LVEF 55%, RV wnl  Stress: No evidence of ischemia    Severe COPD with asthma - Trelegy outpatient pulm rehab - follows with [facility]  Relevant Problems   CARDIO   (+) Atherosclerosis of aortic arch (HCC)   (+) Atherosclerosis of both carotid arteries   (+) Chronic migraine with aura without status migrainosus, not intractable   (+) 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HCC)      /RENAL   (+) Renal cyst      GYN   (+) Malignant neoplasm of 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, estrogen receptor positive (HCC)      MUSCULOSKELETAL   (+) DDD (degenerative disc disease), cervical   (+) Myofascial pain syndrome   (+) Spinal osteoarthritis      NEURO/PSYCH   (+) Chronic migraine with aura without status migrainosus, not intractable   (+) Myofascial pain syndrome   (+) Occipital neuralgia of left side      PULMONARY   (+) COPD, severe (HCC)   (+) Moderate persistent asthma without complication             Anesthesia Plan  ASA Score- 3     Anesthesia Type- IV sedation with anesthesia with ASA Monitors.         Additional Monitors:     Airway Plan:            Plan Factors-    Chart reviewed. EKG reviewed.  Existing labs reviewed. Patient summary reviewed.                  Induction-     Postoperative Plan-         Informed Consent-       NPO Status:  No vitals data found for the desired time range.

## 2025-02-05 ENCOUNTER — HOSPITAL ENCOUNTER (OUTPATIENT)
Dept: GASTROENTEROLOGY | Facility: HOSPITAL | Age: 60
Setting detail: OUTPATIENT SURGERY
Discharge: HOME | End: 2025-02-05
Attending: INTERNAL MEDICINE
Payer: COMMERCIAL

## 2025-02-05 ENCOUNTER — ANESTHESIA EVENT (OUTPATIENT)
Dept: GASTROENTEROLOGY | Facility: HOSPITAL | Age: 60
End: 2025-02-05
Payer: COMMERCIAL

## 2025-02-05 ENCOUNTER — ANESTHESIA (OUTPATIENT)
Dept: GASTROENTEROLOGY | Facility: HOSPITAL | Age: 60
End: 2025-02-05
Payer: COMMERCIAL

## 2025-02-05 VITALS
HEART RATE: 79 BPM | SYSTOLIC BLOOD PRESSURE: 120 MMHG | TEMPERATURE: 96.9 F | OXYGEN SATURATION: 97 % | DIASTOLIC BLOOD PRESSURE: 61 MMHG | RESPIRATION RATE: 16 BRPM

## 2025-02-05 DIAGNOSIS — Z12.11 SCREENING FOR COLON CANCER: ICD-10-CM

## 2025-02-05 DIAGNOSIS — Z86.0100 HISTORY OF COLON POLYPS: ICD-10-CM

## 2025-02-05 DIAGNOSIS — Z80.0 FAMILY HISTORY OF COLON CANCER: ICD-10-CM

## 2025-02-05 PROCEDURE — 45378 DIAGNOSTIC COLONOSCOPY: CPT | Performed by: INTERNAL MEDICINE

## 2025-02-05 RX ORDER — PROPOFOL 10 MG/ML
INJECTION, EMULSION INTRAVENOUS AS NEEDED
Status: DISCONTINUED | OUTPATIENT
Start: 2025-02-05 | End: 2025-02-05

## 2025-02-05 RX ORDER — GLYCOPYRROLATE 0.2 MG/ML
INJECTION INTRAMUSCULAR; INTRAVENOUS AS NEEDED
Status: DISCONTINUED | OUTPATIENT
Start: 2025-02-05 | End: 2025-02-05

## 2025-02-05 RX ORDER — PROPOFOL 10 MG/ML
INJECTION, EMULSION INTRAVENOUS CONTINUOUS PRN
Status: DISCONTINUED | OUTPATIENT
Start: 2025-02-05 | End: 2025-02-05

## 2025-02-05 RX ORDER — SODIUM CHLORIDE, SODIUM LACTATE, POTASSIUM CHLORIDE, CALCIUM CHLORIDE 600; 310; 30; 20 MG/100ML; MG/100ML; MG/100ML; MG/100ML
INJECTION, SOLUTION INTRAVENOUS CONTINUOUS PRN
Status: DISCONTINUED | OUTPATIENT
Start: 2025-02-05 | End: 2025-02-05

## 2025-02-05 RX ADMIN — GLYCOPYRROLATE 0.2 MG: 0.2 INJECTION INTRAMUSCULAR; INTRAVENOUS at 09:32

## 2025-02-05 RX ADMIN — PROPOFOL 100 MCG/KG/MIN: 10 INJECTION, EMULSION INTRAVENOUS at 09:35

## 2025-02-05 RX ADMIN — PROPOFOL 100 MG: 10 INJECTION, EMULSION INTRAVENOUS at 09:35

## 2025-02-05 RX ADMIN — SODIUM CHLORIDE, SODIUM LACTATE, POTASSIUM CHLORIDE, AND CALCIUM CHLORIDE: .6; .31; .03; .02 INJECTION, SOLUTION INTRAVENOUS at 09:31

## 2025-02-05 NOTE — ANESTHESIA POSTPROCEDURE EVALUATION
Post-Op Assessment Note    CV Status:  Stable  Pain Score: 0    Pain management: adequate       Mental Status:  Alert and awake   Hydration Status:  Euvolemic   PONV Controlled:  Controlled   Airway Patency:  Patent     Post Op Vitals Reviewed: Yes    No anethesia notable event occurred.    Staff: CRNA           Last Filed PACU Vitals:  Vitals Value Taken Time   Temp 96.7    Pulse 70    /56    Resp 12    SpO2 99

## 2025-02-05 NOTE — ANESTHESIA PREPROCEDURE EVALUATION
Procedure:  COLONOSCOPY  Colonoscopy     ECG: NSR  TTE: LVEF 55%, RV wnl  Stress: No evidence of ischemia     Severe COPD with asthma - Trelegy outpatient pulm rehab - follows with [facility] - completed outpatient rehab. Recently very well controlled no breathing issues today. Took all pulm medications this am    Denies the following: CP/SOB with exertion, DEEPTHI, stroke/TIA, seizure     Relevant Problems   CARDIO   (+) Atherosclerosis of aortic arch (HCC)   (+) Atherosclerosis of both carotid arteries   (+) Chronic migraine with aura without status migrainosus, not intractable   (+) 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HCC)      /RENAL   (+) Renal cyst      GYN   (+) Malignant neoplasm of 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, estrogen receptor positive (HCC)      MUSCULOSKELETAL   (+) DDD (degenerative disc disease), cervical   (+) Myofascial pain syndrome   (+) Spinal osteoarthritis      NEURO/PSYCH   (+) Chronic migraine with aura without status migrainosus, not intractable   (+) Myofascial pain syndrome   (+) Occipital neuralgia of left side      PULMONARY   (+) COPD, severe (HCC)   (+) Moderate persistent asthma without complication        Physical Exam    Airway    Mallampati score: I  TM Distance: >3 FB  Neck ROM: full     Dental        Cardiovascular      Pulmonary      Other Findings  post-pubertal.      Anesthesia Plan  ASA Score- 3     Anesthesia Type- IV sedation with anesthesia with ASA Monitors.         Additional Monitors:     Airway Plan:            Plan Factors-Exercise tolerance (METS): >4 METS.    Chart reviewed. EKG reviewed.  Existing labs reviewed. Patient summary reviewed.    Patient is not a current smoker.      Obstructive sleep apnea risk education given perioperatively.        Induction-     Postoperative Plan-         Informed Consent- Anesthetic plan and risks discussed with patient.  I personally reviewed this patient with the CRNA. Discussed and agreed on the Anesthesia Plan with the  CRNA..      NPO Status:  No vitals data found for the desired time range.
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
## 2025-02-12 ENCOUNTER — TELEPHONE (OUTPATIENT)
Age: 60
End: 2025-02-12

## 2025-02-13 NOTE — TELEPHONE ENCOUNTER
Submitted auth fro [employer] [place] [employer] [place] via [facility]. Key: [id number]

## 2025-02-17 DIAGNOSIS — J44.89 ASTHMA-COPD OVERLAP SYNDROME (HCC): ICD-10-CM

## 2025-02-17 RX ORDER — BENRALIZUMAB 30 MG/ML
30 INJECTION, SOLUTION SUBCUTANEOUS
Qty: 1 ML | Refills: 11 | Status: SHIPPED | OUTPATIENT
Start: 2025-02-17

## 2025-02-20 DIAGNOSIS — Z17.0 MALIGNANT NEOPLASM OF UPPER-INNER QUADRANT OF RIGHT BREAST IN FEMALE, ESTROGEN RECEPTOR POSITIVE (HCC): ICD-10-CM

## 2025-02-20 DIAGNOSIS — C50.211 MALIGNANT NEOPLASM OF UPPER-INNER QUADRANT OF RIGHT BREAST IN FEMALE, ESTROGEN RECEPTOR POSITIVE (HCC): ICD-10-CM

## 2025-02-21 RX ORDER — ANASTROZOLE 1 MG/1
1 TABLET ORAL DAILY
Qty: 90 TABLET | Refills: 3 | Status: SHIPPED | OUTPATIENT
Start: 2025-02-21

## 2025-03-03 ENCOUNTER — OFFICE VISIT (OUTPATIENT)
Dept: PULMONOLOGY | Facility: CLINIC | Age: 60
End: 2025-03-03
Payer: COMMERCIAL

## 2025-03-03 VITALS
HEART RATE: 80 BPM | HEIGHT: 66 IN | WEIGHT: 176 LBS | DIASTOLIC BLOOD PRESSURE: 78 MMHG | OXYGEN SATURATION: 98 % | TEMPERATURE: 96.5 F | BODY MASS INDEX: 28.28 KG/M2 | SYSTOLIC BLOOD PRESSURE: 132 MMHG

## 2025-03-03 DIAGNOSIS — F17.211 CIGARETTE NICOTINE DEPENDENCE IN REMISSION: ICD-10-CM

## 2025-03-03 DIAGNOSIS — J44.9 COPD, SEVERE (HCC): Primary | ICD-10-CM

## 2025-03-03 DIAGNOSIS — J45.40 MODERATE PERSISTENT ASTHMA WITHOUT COMPLICATION: ICD-10-CM

## 2025-03-03 PROCEDURE — 99214 OFFICE O/P EST MOD 30 MIN: CPT | Performed by: INTERNAL MEDICINE

## 2025-03-03 NOTE — ASSESSMENT & PLAN NOTE
She has a 70-pack-year smoking history, quit 2016.  Repeat CT due in July.    I discussed with her that she is a candidate for lung cancer CT screening.     The following Shared Decision-Making points were covered:  Benefits of screening were discussed, including the rates of reduction in death from lung cancer and other causes.  Harms of screening were reviewed, including false positive tests, radiation exposure levels, risks of invasive procedures, risks of complications of screening, and risk of overdiagnosis.  I counseled on the importance of adherence to annual lung cancer LDCT screening, impact of co-morbidities, and ability or willingness to undergo diagnosis and treatment.  I counseled on the importance of maintaining abstinence as a former smoker or was counseled on the importance of smoking cessation if a current smoker    Review of Eligibility Criteria: She meets all of the criteria for Lung Cancer Screening.   She is 59 y.o.   She has 20 pack year tobacco history and is a current smoker or has quit within the past 15 years  She presents no signs or symptoms of lung cancer    After discussion, the patient decided to elect lung cancer screening.    Orders:    CT lung screening program; Future     13-Aug-2022 18:42

## 2025-03-03 NOTE — PROGRESS NOTES
Follow-up  Visit - Pulmonary Medicine   Name: Khushboo Pichardo      : 1965      MRN: [id number]  Encounter Provider: Gwen Paulino DO  Encounter Date: 3/3/2025   Encounter department: [facility] [place]  :  Assessment & Plan  COPD, severe (HCC)  She will continue with Breztri, 2 puffs twice daily.  We provided her with a spacer today to help improve appropriate delivery of the medication.  Based on PFTs, she does have severe obstruction.  Unfortunately, she was found not to be a candidate for bronchoscopic lung volume reduction  Orders:    Spacer Device for Inhaler    Moderate persistent asthma without complication  She will continue with Fasenra every 8 weeks and avoid triggers as able       Cigarette nicotine dependence in remission  She has a 70-pack-year smoking history, quit .  Repeat CT due in July.    I discussed with her that she is a candidate for lung cancer CT screening.     The following Shared Decision-Making points were covered:  Benefits of screening were discussed, including the rates of reduction in death from lung cancer and other causes.  Harms of screening were reviewed, including false positive tests, radiation exposure levels, risks of invasive procedures, risks of complications of screening, and risk of overdiagnosis.  I counseled on the importance of adherence to annual lung cancer LDCT screening, impact of co-morbidities, and ability or willingness to undergo diagnosis and treatment.  I counseled on the importance of maintaining abstinence as a former smoker or was counseled on the importance of smoking cessation if a current smoker    Review of Eligibility Criteria: She meets all of the criteria for Lung Cancer Screening.   She is 59 y.o.   She has 20 pack year tobacco history and is a current smoker or has quit within the past 15 years  She presents no signs or symptoms of lung cancer    After discussion, the patient decided to elect lung cancer  "screening.    Orders:    CT lung screening program; Future      Return in about 4 months (around 7/3/2025).    History of Present Illness   Khushboo Pichardo is a 59 y.o. female who presents for follow-up regarding asthma/COPD.  She continues to struggle with shortness of breath with exertion.  She is no longer working, on account of having disability related to her lung disease.  She is actively trying to find a new job, but is also contemplating whether to apply for Social Security/disability.  She has been compliant with Breztri twice daily and also requires Airsupra as a rescue inhaler as often as 4 times per day.  She did not find Trelegy Ellipta to be as beneficial as the Breztri.  She reports that with the Breztri she sometimes feels as if is not getting deep enough into her chest.  She had been going to pulmonary rehabilitation, but with recent insurance changes, no longer had coverage.  Instead, she is working out independently at the gym approximately 2 times per week.  She is on Fasenra every 8 weeks and has found it to be beneficial.  She recently moved back into the home that had significant mold problem.  She had a remediated prior to moving back in.    Review of Systems  Aside from what is mentioned in the HPI, ROS is otherwise negative         Medical History Reviewed by provider this encounter:     .    Objective   /78   Pulse 80   Temp (!) 96.5 °F (35.8 °C) (Tympanic)   Ht 5' 6\" (1.676 m)   Wt 79.8 kg (176 lb)   LMP  (LMP Unknown)   SpO2 98%   BMI 28.41 kg/m²     Physical Exam  Vitals reviewed.   Constitutional:       General: She is not in acute distress.     Appearance: She is well-developed.   Eyes:      General: No scleral icterus.  Neck:      Vascular: No JVD.   Cardiovascular:      Rate and Rhythm: Normal rate and regular rhythm.   Pulmonary:      Breath sounds: No wheezing, rhonchi or rales.   Abdominal:      Tenderness: There is no abdominal tenderness.   Skin:     General: Skin " is warm and dry.   Neurological:      Mental Status: She is alert and oriented to person, place, and time.   Psychiatric:         Mood and Affect: Mood normal.       Diagnostic Data:    Imaging and other studies: I have personally reviewed pertinent reports.   and I have personally reviewed pertinent films in PACS CT of the chest from 7/31/2024 shows emphysema, stable pulmonary nodules measuring up to 3 mm in size     Pulmonary function testing:  Performed 7/31/2024 and personally interpreted  FEV1/FVC ratio Z-score -3   FEV1 40% predicted  FVC 58% predicted.  No response to bronchodilators  TLC Z-score 0.61   % predicted  DLCO corrected for hemoglobin 63 % predicted.  PFTs with severe obstruction, severe air trapping and mild diffusion impairment    Gwen Paulino, DO

## 2025-03-03 NOTE — ASSESSMENT & PLAN NOTE
She will continue with Breztri, 2 puffs twice daily.  We provided her with a spacer today to help improve appropriate delivery of the medication.  Based on PFTs, she does have severe obstruction.  Unfortunately, she was found not to be a candidate for bronchoscopic lung volume reduction  Orders:    Spacer Device for Inhaler

## 2025-03-04 DIAGNOSIS — G43.109 MIGRAINE WITH AURA AND WITHOUT STATUS MIGRAINOSUS, NOT INTRACTABLE: ICD-10-CM

## 2025-03-04 DIAGNOSIS — J45.40 MODERATE PERSISTENT ASTHMA WITHOUT COMPLICATION: ICD-10-CM

## 2025-03-05 ENCOUNTER — TELEPHONE (OUTPATIENT)
Dept: PULMONOLOGY | Facility: CLINIC | Age: 60
End: 2025-03-05

## 2025-03-05 RX ORDER — ALBUTEROL SULFATE AND BUDESONIDE 90; 80 UG/1; UG/1
2 AEROSOL, METERED RESPIRATORY (INHALATION)
Refills: 5 | OUTPATIENT
Start: 2025-03-05

## 2025-03-05 NOTE — TELEPHONE ENCOUNTER
Reason for call:   [x] Prior Auth  [] Other:     Caller:  [] Patient  [x] Pharmacy  Name: [facility]  Address: [address], [place] [address]   Callback Number:  [phone]     Medication: Airsupra 90-80 MCG/ACT AERO     Dose/Frequency: Inhale 2 puffs every 4 (four) hours while awake     Quantity: 10.7 g    Ordering Provider:   [] PCP/Provider -   [x] Speciality/Provider - Gwen Paulino

## 2025-03-06 RX ORDER — TOPIRAMATE 100 MG/1
100 TABLET, FILM COATED ORAL DAILY
Qty: 90 TABLET | Refills: 1 | Status: SHIPPED | OUTPATIENT
Start: 2025-03-06

## 2025-03-19 ENCOUNTER — TELEPHONE (OUTPATIENT)
Age: 60
End: 2025-03-19

## 2025-03-19 NOTE — TELEPHONE ENCOUNTER
S/W pt.  Pt asking at her appt on 3/24, will she be able to get b/l TPI RT and LT trapezius?  Last one was 11/25/24.  Her pain is a 8/10 and for over 1 week she is waking up with a migraine.  She stated it is the same pain as before.  Please advise.

## 2025-03-19 NOTE — TELEPHONE ENCOUNTER
Left a detailed message as per release of health information, advising pt the same.  C/B # provided for any questions.    -added the TPIs to the appt line

## 2025-03-19 NOTE — TELEPHONE ENCOUNTER
Caller: Patient    Doctor: Dr. MITCHELL    Reason for call: Would like to know if she is able to get TPI on the OV  Patient been having headaches for a week    Call back#:

## 2025-03-19 NOTE — TELEPHONE ENCOUNTER
Caller: Patient    Doctor: Dr. MITCHELL    Reason for call: Called back and aware of her TPI on her OV     Call back#:

## 2025-03-24 ENCOUNTER — PROCEDURE VISIT (OUTPATIENT)
Dept: PAIN MEDICINE | Facility: MEDICAL CENTER | Age: 60
End: 2025-03-24
Payer: COMMERCIAL

## 2025-03-24 VITALS — HEIGHT: 66 IN | BODY MASS INDEX: 28.45 KG/M2 | WEIGHT: 177 LBS

## 2025-03-24 DIAGNOSIS — Z98.890 H/O CERVICAL SPINE SURGERY: ICD-10-CM

## 2025-03-24 DIAGNOSIS — M79.18 MYOFASCIAL PAIN SYNDROME: Primary | ICD-10-CM

## 2025-03-24 PROCEDURE — 20552 NJX 1/MLT TRIGGER POINT 1/2: CPT

## 2025-03-24 RX ORDER — LIDOCAINE HYDROCHLORIDE 10 MG/ML
4 INJECTION, SOLUTION EPIDURAL; INFILTRATION; INTRACAUDAL; PERINEURAL ONCE
Status: COMPLETED | OUTPATIENT
Start: 2025-03-24 | End: 2025-03-24

## 2025-03-24 RX ADMIN — LIDOCAINE HYDROCHLORIDE 4 ML: 10 INJECTION, SOLUTION EPIDURAL; INFILTRATION; INTRACAUDAL; PERINEURAL at 07:28
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
## 2025-03-27 DIAGNOSIS — J44.89 ASTHMA-COPD OVERLAP SYNDROME (HCC): ICD-10-CM

## 2025-03-27 RX ORDER — ALBUTEROL SULFATE 90 UG/1
INHALANT RESPIRATORY (INHALATION)
Qty: 8.5 G | Refills: 5 | Status: SHIPPED | OUTPATIENT
Start: 2025-03-27

## 2025-04-14 ENCOUNTER — TELEPHONE (OUTPATIENT)
Age: 60
End: 2025-04-14

## 2025-04-14 NOTE — TELEPHONE ENCOUNTER
Rec'd call from patient requesting to schedule a NP appt in [place]. Offered next available. Patient declined scheduling at this time and would like to try other out of network offices first.

## 2025-04-15 ENCOUNTER — TELEPHONE (OUTPATIENT)
Age: 60
End: 2025-04-15

## 2025-04-15 NOTE — TELEPHONE ENCOUNTER
Patient is requesting an insurance referral for the following specialty:      Test Name / Order Name: Dermatology     DX Code: Faina R23.3    Date Of Service: N/A    Location/Facility Name/Address/Phone #: [facility]     Location / Facility NPI:     Best Phone # To Reach The Patient:  [phone]

## 2025-04-16 NOTE — TELEPHONE ENCOUNTER
Called and left VM for pt to return call. Please advise pt when she returns call that she would need appt here with Dr. Morrow to evaluate her concern prior to a referral being issued. Please schedule next available.

## 2025-04-22 ENCOUNTER — TELEPHONE (OUTPATIENT)
Age: 60
End: 2025-04-22

## 2025-04-22 ENCOUNTER — OFFICE VISIT (OUTPATIENT)
Age: 60
End: 2025-04-22
Payer: MEDICARE

## 2025-04-22 VITALS — HEIGHT: 66 IN | BODY MASS INDEX: 28.45 KG/M2 | WEIGHT: 177 LBS

## 2025-04-22 DIAGNOSIS — L84 CALLUS: Primary | ICD-10-CM

## 2025-04-22 DIAGNOSIS — M20.41 HAMMER TOE OF RIGHT FOOT: ICD-10-CM

## 2025-04-22 PROCEDURE — 99213 OFFICE O/P EST LOW 20 MIN: CPT | Performed by: PODIATRIST

## 2025-04-22 NOTE — TELEPHONE ENCOUNTER
Patient called requesting to speak with Heidi TAYLOR to discuss [employer] prior authorization. I advised her she was assisting other patients at this time. I also advised her our speciality medication coordinator was also unavailable at this time but I will send them a message to return her call shortly. Please advise.

## 2025-04-22 NOTE — PROGRESS NOTES
"Name: Khushboo Pichardo      : 1965      MRN: [id number]  Encounter Provider: Kirill Collado DPM  Encounter Date: 2025   Encounter department: [facility]  :  Assessment & Plan  Callus         Hammer toe of right foot         - She is having intractable porokeratosis underneath her right fourth metatarsal head.  She is having substantial pain with ambulation and it does bother her actives of daily living.  Staying off of it helps she has not tried urea cream or padding yet was told that this was a callus and she thinks it was a wart.  - Following examination is an intractable porokeratosis likely coming from some retrograde pressure  - Dispensed donut pads and advised urea cream  - Return as needed    History of Present Illness   HPI  Khushboo Pichardo is a 59 y.o. female who presents evaluation and management of areas on her right foot that she believes are warts.  These feel better when she is trimming them.  They bother her both barefoot and in shoes.      Review of Systems   Constitutional:  Negative for chills and fever.   HENT:  Negative for ear pain and sore throat.    Eyes:  Negative for pain and visual disturbance.   Respiratory:  Negative for cough and shortness of breath.    Cardiovascular:  Negative for chest pain and palpitations.   Gastrointestinal:  Negative for abdominal pain and vomiting.   Genitourinary:  Negative for dysuria and hematuria.   Musculoskeletal:  Negative for arthralgias and back pain.   Skin:  Negative for color change and rash.   Neurological:  Negative for seizures and syncope.   All other systems reviewed and are negative.         Objective   Ht 5' 6\" (1.676 m)   Wt 80.3 kg (177 lb)   LMP  (LMP Unknown)   BMI 28.57 kg/m²      Physical Exam  Skin:     Comments: She does have a slight hammertoe on the right fourth digit which is causing I think some retrograde pressure underneath her fourth metatarsal head, intractable plantar " keratosis with pain with direct palpation of the inside of the compression.

## 2025-04-23 ENCOUNTER — OFFICE VISIT (OUTPATIENT)
Dept: FAMILY MEDICINE CLINIC | Facility: CLINIC | Age: 60
End: 2025-04-23
Payer: MEDICARE

## 2025-04-23 VITALS
DIASTOLIC BLOOD PRESSURE: 72 MMHG | HEART RATE: 87 BPM | HEIGHT: 66 IN | RESPIRATION RATE: 18 BRPM | TEMPERATURE: 99 F | BODY MASS INDEX: 28.33 KG/M2 | OXYGEN SATURATION: 97 % | WEIGHT: 176.3 LBS | SYSTOLIC BLOOD PRESSURE: 116 MMHG

## 2025-04-23 DIAGNOSIS — Z79.811 USE OF ANASTROZOLE: ICD-10-CM

## 2025-04-23 DIAGNOSIS — C50.211 MALIGNANT NEOPLASM OF UPPER-INNER QUADRANT OF RIGHT BREAST IN FEMALE, ESTROGEN RECEPTOR POSITIVE (HCC): ICD-10-CM

## 2025-04-23 DIAGNOSIS — Z17.0 MALIGNANT NEOPLASM OF UPPER-INNER QUADRANT OF RIGHT BREAST IN FEMALE, ESTROGEN RECEPTOR POSITIVE (HCC): ICD-10-CM

## 2025-04-23 DIAGNOSIS — Z29.89 IMMUNOTHERAPY: ICD-10-CM

## 2025-04-23 DIAGNOSIS — R23.3 EASY BRUISING: Primary | ICD-10-CM

## 2025-04-23 PROCEDURE — 99214 OFFICE O/P EST MOD 30 MIN: CPT | Performed by: FAMILY MEDICINE

## 2025-04-23 NOTE — TELEPHONE ENCOUNTER
Received Medicaid Drug Exception Form from [employer]. Filled out and faxed with last OVN to [phone].

## 2025-04-23 NOTE — ASSESSMENT & PLAN NOTE
Orders:    Vitamin K; Future    Von Willebrand antigen; Future    Factor 8 activity; Future    VWF Activity; Future    CBC and differential; Future

## 2025-04-23 NOTE — PROGRESS NOTES
"Name: Khushboo Pichardo      : 1965      MRN: [id number]  Encounter Provider: Leidy Morrow DO  Encounter Date: 2025   Encounter department: [facility] [place]  :  Assessment & Plan  Easy bruising    Orders:    Vitamin K; Future    Von Willebrand antigen; Future    Factor 8 activity; Future    VWF Activity; Future    CBC and differential; Future    Malignant neoplasm of 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, estrogen receptor positive (HCC)    Orders:    Vitamin K; Future    Von Willebrand antigen; Future    Factor 8 activity; Future    VWF Activity; Future    CBC and differential; Future    Use of anastrozole         Immunotherapy                Chief Complaint   Patient presents with    Bleeding/Bruising     Easily bruising on the wrists and hands        History of Present Illness   Ongoing problem for at least 2 years - thought was from chronic inhaled steroid rx, but now wonders if it's the Fasenra (benralizumab) because that was the newest med she was put on and was started around that time  Still only hands and wrists b/l, very slight bump will cause large bruises  Bleeding times on labs have been good all along- up to ER labs 2024  Not on any ASA or NSAIDs  pt stopped taking turmeric approx 3 months ago, because she read that could cause bleeding/bruising - made no difference  Does not take fish oil  Platelets and liver enzymes normal at ER visit 2024, WBC elevated, but was ill       2023  [facility]  C/o new problem easy bruising- states first noticed about a year ago, but wasn't too bad, now past 4-5 months \"has gotten real bad\" \"only on my arms and my hands\"  Denies gums bleeding, nosebleeds, blood in stool or urine or any melena  Not on any ASA and rarely takes meloxicam (1 or 2 times a month per pt)  Easy bruising  -     Protime-INR; Future        Review of Systems    Objective   /72 (BP Location: Left arm, Patient Position: " "Sitting, Cuff Size: Standard)   Pulse 87   Temp 99 °F (37.2 °C) (Tympanic)   Resp 18   Ht 5' 6\" (1.676 m)   Wt 80 kg (176 lb 4.8 oz)   LMP  (LMP Unknown)   SpO2 97%   BMI 28.46 kg/m²      Physical Exam  Vitals and nursing note reviewed.   Constitutional:       General: She is not in acute distress.     Appearance: She is well-groomed. She is not ill-appearing, toxic-appearing or diaphoretic.   HENT:      Head: Normocephalic and atraumatic.      Nose: Nose normal.   Eyes:      General: Lids are normal.      Conjunctiva/sclera: Conjunctivae normal.   Pulmonary:      Effort: Pulmonary effort is normal.   Skin:     General: Skin is warm and dry.      Coloration: Skin is not pale.      Findings: Bruising and ecchymosis present. No petechiae or rash.      Comments: Numerous bruises right>>>left dorsal hands and lower forearms   Neurological:      Mental Status: She is alert and oriented to person, place, and time.   Psychiatric:         Mood and Affect: Mood normal.         Behavior: Behavior normal. Behavior is cooperative.         Cognition and Memory: Cognition normal.         "

## 2025-04-23 NOTE — TELEPHONE ENCOUNTER
Gretchen calls to see if form that was faxed over yesterday has been  completed.Informed her form was faxed yesterday

## 2025-04-24 ENCOUNTER — APPOINTMENT (OUTPATIENT)
Dept: LAB | Facility: CLINIC | Age: 60
End: 2025-04-24
Payer: COMMERCIAL

## 2025-04-24 DIAGNOSIS — Z17.0 MALIGNANT NEOPLASM OF UPPER-INNER QUADRANT OF RIGHT BREAST IN FEMALE, ESTROGEN RECEPTOR POSITIVE (HCC): ICD-10-CM

## 2025-04-24 DIAGNOSIS — C50.211 MALIGNANT NEOPLASM OF UPPER-INNER QUADRANT OF RIGHT BREAST IN FEMALE, ESTROGEN RECEPTOR POSITIVE (HCC): ICD-10-CM

## 2025-04-24 DIAGNOSIS — R23.3 EASY BRUISING: ICD-10-CM

## 2025-04-24 LAB
BASOPHILS # BLD AUTO: 0.03 THOUSANDS/ÂΜL (ref 0–0.1)
BASOPHILS NFR BLD AUTO: 1 % (ref 0–1)
EOSINOPHIL # BLD AUTO: 0 THOUSAND/ÂΜL (ref 0–0.61)
EOSINOPHIL NFR BLD AUTO: 0 % (ref 0–6)
ERYTHROCYTE [DISTWIDTH] IN BLOOD BY AUTOMATED COUNT: 13.1 % (ref 11.6–15.1)
HCT VFR BLD AUTO: 42.1 % (ref 34.8–46.1)
HGB BLD-MCNC: 13.6 G/DL (ref 11.5–15.4)
IMM GRANULOCYTES # BLD AUTO: 0.01 THOUSAND/UL (ref 0–0.2)
IMM GRANULOCYTES NFR BLD AUTO: 0 % (ref 0–2)
LYMPHOCYTES # BLD AUTO: 1.67 THOUSANDS/ÂΜL (ref 0.6–4.47)
LYMPHOCYTES NFR BLD AUTO: 31 % (ref 14–44)
MCH RBC QN AUTO: 29.6 PG (ref 26.8–34.3)
MCHC RBC AUTO-ENTMCNC: 32.3 G/DL (ref 31.4–37.4)
MCV RBC AUTO: 92 FL (ref 82–98)
MONOCYTES # BLD AUTO: 0.69 THOUSAND/ÂΜL (ref 0.17–1.22)
MONOCYTES NFR BLD AUTO: 13 % (ref 4–12)
NEUTROPHILS # BLD AUTO: 2.93 THOUSANDS/ÂΜL (ref 1.85–7.62)
NEUTS SEG NFR BLD AUTO: 55 % (ref 43–75)
NRBC BLD AUTO-RTO: 0 /100 WBCS
PLATELET # BLD AUTO: 216 THOUSANDS/UL (ref 149–390)
PMV BLD AUTO: 11 FL (ref 8.9–12.7)
RBC # BLD AUTO: 4.6 MILLION/UL (ref 3.81–5.12)
WBC # BLD AUTO: 5.33 THOUSAND/UL (ref 4.31–10.16)

## 2025-04-24 PROCEDURE — 36415 COLL VENOUS BLD VENIPUNCTURE: CPT

## 2025-04-24 PROCEDURE — 85245 CLOT FACTOR VIII VW RISTOCTN: CPT

## 2025-04-24 PROCEDURE — 85025 COMPLETE CBC W/AUTO DIFF WBC: CPT

## 2025-04-24 PROCEDURE — 85246 CLOT FACTOR VIII VW ANTIGEN: CPT

## 2025-04-24 PROCEDURE — 85240 CLOT FACTOR VIII AHG 1 STAGE: CPT

## 2025-04-24 PROCEDURE — 84597 ASSAY OF VITAMIN K: CPT

## 2025-04-24 NOTE — TELEPHONE ENCOUNTER
Called and spoke to Khushboo. Advised that she is approved through [employer]. She states she no longer has [employer] and only has [employer] [facility]. She was previously getting her Fasenra through [facility] but now that she has [employer], she needs to go through [facility]. She does not prefer going through them as they gave her a hard time in the past with getting her medication. She will call her insurance to see if she can continue to use [facility] and let me know where to send a new script.    Approved 4/23/25-4/23/26

## 2025-04-24 NOTE — TELEPHONE ENCOUNTER
Spoke with Khushboo. She stated she spoke with [employer] and she is only allowed to use [facility]. She said she was due for her next injection yesterday.    Called [facility] and spoke to pharmacist .Zoraida Gave verbal for Fasenra auto injector pens. She will also expedite this order in hopes she will get it sooner than the 7-10 days.

## 2025-04-26 LAB
FACT XIIIA PPP-ACNC: 128 % (ref 56–140)
VWF AG ACT/NOR PPP IA: 108 % (ref 50–200)
VWF:RCO ACT/NOR PPP PL AGG: 88 % (ref 50–200)

## 2025-04-27 ENCOUNTER — OFFICE VISIT (OUTPATIENT)
Dept: URGENT CARE | Facility: CLINIC | Age: 60
End: 2025-04-27
Payer: MEDICARE

## 2025-04-27 VITALS
TEMPERATURE: 98.1 F | RESPIRATION RATE: 18 BRPM | HEART RATE: 78 BPM | DIASTOLIC BLOOD PRESSURE: 70 MMHG | OXYGEN SATURATION: 95 % | SYSTOLIC BLOOD PRESSURE: 116 MMHG

## 2025-04-27 DIAGNOSIS — R19.7 DIARRHEA, UNSPECIFIED TYPE: Primary | ICD-10-CM

## 2025-04-27 DIAGNOSIS — R11.0 NAUSEA: ICD-10-CM

## 2025-04-27 PROCEDURE — 99213 OFFICE O/P EST LOW 20 MIN: CPT | Performed by: PHYSICIAN ASSISTANT

## 2025-04-27 RX ORDER — ONDANSETRON 4 MG/1
4 TABLET, FILM COATED ORAL EVERY 8 HOURS PRN
Qty: 20 TABLET | Refills: 0 | Status: SHIPPED | OUTPATIENT
Start: 2025-04-27

## 2025-04-27 NOTE — PATIENT INSTRUCTIONS
Encourage staying hydrated by taking small sips of water, Gatorade, Pedialyte through out the day.  Avoid large amounts of water at one time.      Advance diet as tolerated starting with crackers, toast, banana, rice.  Avoid spicy, fried or greasy foods.  Ginger pops, Gingerale can help with nausea.  Peppermint can help sometimes as well.    Can give emetrol as needed for nausea.    Can give tylenol or ibuprofen as needed for headache, pain, fever.    If fever is present, avoid being around others until fever free without the use of fever reducing medication for 24 hours.    Fevers should resolve within 4-5 days with viral infections.  Vomiting usually subsides in 24-48 hours.    Diarrhea can last up to 1 week but should steadily improve.    If symptoms worsen or you develop increased abdominal pain or seem dehydrated and unable to keep anything down, go to the emergency room.  If not improving over the next week, follow up with pcp.
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
## 2025-04-29 ENCOUNTER — TELEPHONE (OUTPATIENT)
Age: 60
End: 2025-04-29

## 2025-04-29 NOTE — TELEPHONE ENCOUNTER
Patient called for lab results. Told her the Dr has not reviewed them yet. Someone will call her as soon as she looks at them.

## 2025-05-06 LAB — PHYTONADIONE SERPL-MCNC: 0.12 NG/ML (ref 0.1–2.2)

## 2025-05-20 DIAGNOSIS — G43.109 MIGRAINE WITH AURA AND WITHOUT STATUS MIGRAINOSUS, NOT INTRACTABLE: ICD-10-CM

## 2025-05-20 RX ORDER — SUMATRIPTAN SUCCINATE 100 MG/1
100 TABLET ORAL ONCE AS NEEDED
Qty: 9 TABLET | Refills: 1 | Status: SHIPPED | OUTPATIENT
Start: 2025-05-20 | End: 2025-05-21 | Stop reason: ALTCHOICE

## 2025-05-21 ENCOUNTER — OFFICE VISIT (OUTPATIENT)
Dept: FAMILY MEDICINE CLINIC | Facility: CLINIC | Age: 60
End: 2025-05-21
Payer: MEDICARE

## 2025-05-21 VITALS
OXYGEN SATURATION: 96 % | TEMPERATURE: 97 F | WEIGHT: 173 LBS | HEART RATE: 86 BPM | DIASTOLIC BLOOD PRESSURE: 60 MMHG | SYSTOLIC BLOOD PRESSURE: 116 MMHG | HEIGHT: 66 IN | BODY MASS INDEX: 27.8 KG/M2

## 2025-05-21 DIAGNOSIS — E78.5 DYSLIPIDEMIA: ICD-10-CM

## 2025-05-21 DIAGNOSIS — Z13.21 ENCOUNTER FOR VITAMIN DEFICIENCY SCREENING: ICD-10-CM

## 2025-05-21 DIAGNOSIS — R23.3 EASY BRUISING: ICD-10-CM

## 2025-05-21 DIAGNOSIS — G43.109 MIGRAINE WITH AURA AND WITHOUT STATUS MIGRAINOSUS, NOT INTRACTABLE: ICD-10-CM

## 2025-05-21 DIAGNOSIS — Z79.899 ENCOUNTER FOR LONG-TERM (CURRENT) USE OF MEDICATIONS: ICD-10-CM

## 2025-05-21 DIAGNOSIS — R73.03 PREDIABETES: ICD-10-CM

## 2025-05-21 DIAGNOSIS — Z13.29 THYROID DISORDER SCREEN: ICD-10-CM

## 2025-05-21 DIAGNOSIS — Z00.00 ANNUAL PHYSICAL EXAM: Primary | ICD-10-CM

## 2025-05-21 DIAGNOSIS — E04.1 LEFT THYROID NODULE: ICD-10-CM

## 2025-05-21 PROCEDURE — 99396 PREV VISIT EST AGE 40-64: CPT | Performed by: FAMILY MEDICINE

## 2025-05-21 PROCEDURE — 99214 OFFICE O/P EST MOD 30 MIN: CPT | Performed by: FAMILY MEDICINE

## 2025-05-21 RX ORDER — ZOLMITRIPTAN 5 MG/1
5 TABLET, ORALLY DISINTEGRATING ORAL ONCE AS NEEDED
Qty: 9 TABLET | Refills: 1 | Status: SHIPPED | OUTPATIENT
Start: 2025-05-21

## 2025-05-21 NOTE — PATIENT INSTRUCTIONS
"Patient Education     Routine physical for adults   The Basics   Written by the doctors and editors at [facility]   What is a physical? -- A physical is a routine visit, or \"check-up,\" with your doctor. You might also hear it called a \"wellness visit\" or \"preventive visit.\"  During each visit, the doctor will:   Ask about your physical and mental health   Ask about your habits, behaviors, and lifestyle   Do an exam   Give you vaccines if needed   Talk to you about any medicines you take   Give advice about your health   Answer your questions  Getting regular check-ups is an important part of taking care of your health. It can help your doctor find and treat any problems you have. But it's also important for preventing health problems.  A routine physical is different from a \"sick visit.\" A sick visit is when you see a doctor because of a health concern or problem. Since physicals are scheduled ahead of time, you can think about what you want to ask the doctor.  How often should I get a physical? -- It depends on your age and health. In general, for people age 21 years and older:   If you are younger than 50 years, you might be able to get a physical every 3 years.   If you are 50 years or older, your doctor might recommend a physical every year.  If you have an ongoing health condition, like diabetes or high blood pressure, your doctor will probably want to see you more often.  What happens during a physical? -- In general, each visit will include:   Physical exam - The doctor or nurse will check your height, weight, heart rate, and blood pressure. They will also look at your eyes and ears. They will ask about how you are feeling and whether you have any symptoms that bother you.   Medicines - It's a good idea to bring a list of all the medicines you take to each doctor visit. Your doctor will talk to you about your medicines and answer any questions. Tell them if you are having any side effects that bother you. You " "should also tell them if you are having trouble paying for any of your medicines.   Habits and behaviors - This includes:   Your diet   Your exercise habits   Whether you smoke, drink alcohol, or use drugs   Whether you are sexually active   Whether you feel safe at home  Your doctor will talk to you about things you can do to improve your health and lower your risk of health problems. They will also offer help and support. For example, if you want to quit smoking, they can give you advice and might prescribe medicines. If you want to improve your diet or get more physical activity, they can help you with this, too.   Lab tests, if needed - The tests you get will depend on your age and situation. For example, your doctor might want to check your:   Cholesterol   Blood sugar   Iron level   Vaccines - The recommended vaccines will depend on your age, health, and what vaccines you already had. Vaccines are very important because they can prevent certain serious or deadly infections.   Discussion of screening - \"Screening\" means checking for diseases or other health problems before they cause symptoms. Your doctor can recommend screening based on your age, risk, and preferences. This might include tests to check for:   Cancer, such as breast, prostate, cervical, ovarian, colorectal, prostate, lung, or skin cancer   Sexually transmitted infections, such as chlamydia and gonorrhea   Mental health conditions like depression and anxiety  Your doctor will talk to you about the different types of screening tests. They can help you decide which screenings to have. They can also explain what the results might mean.   Answering questions - The physical is a good time to ask the doctor or nurse questions about your health. If needed, they can refer you to other doctors or specialists, too.  Adults older than 65 years often need other care, too. As you get older, your doctor will talk to you about:   How to prevent falling at " home   Hearing or vision tests   Memory testing   How to take your medicines safely   Making sure that you have the help and support you need at home  All topics are updated as new evidence becomes available and our peer review process is complete.  This topic retrieved from [employer] on: May 02, 2024.  Topic 446402 Version 1.0  Release: 32.4.3 - C32.122  © 2024 UpToDate, Inc. and/or its affiliates. All rights reserved.  Consumer Information Use and Disclaimer   Disclaimer: This generalized information is a limited summary of diagnosis, treatment, and/or medication information. It is not meant to be comprehensive and should be used as a tool to help the user understand and/or assess potential diagnostic and treatment options. It does NOT include all information about conditions, treatments, medications, side effects, or risks that may apply to a specific patient. It is not intended to be medical advice or a substitute for the medical advice, diagnosis, or treatment of a health care provider based on the health care provider's examination and assessment of a patient's specific and unique circumstances. Patients must speak with a health care provider for complete information about their health, medical questions, and treatment options, including any risks or benefits regarding use of medications. This information does not endorse any treatments or medications as safe, effective, or approved for treating a specific patient. UpToDate, Inc. and its affiliates disclaim any warranty or liability relating to this information or the use thereof.The use of this information is governed by the Terms of Use, available at https://www.[employer]uwer.com/en/know/clinical-effectiveness-terms. 2024© UpToDate, Inc. and its affiliates and/or licensors. All rights reserved.  Copyright   © 2024 UpToDate, Inc. and/or its affiliates. All rights reserved.

## 2025-05-21 NOTE — ASSESSMENT & PLAN NOTE
Unchanged problem; initial workup negative  Orders:    Ambulatory Referral to Dermatology; Future

## 2025-05-21 NOTE — ASSESSMENT & PLAN NOTE
Orders:    ZOLMitriptan (ZOMIG-ZMT) 5 MG disintegrating tablet; Take 1 tablet (5 mg total) by mouth once as needed for migraine for up to 1 dose

## 2025-05-21 NOTE — PROGRESS NOTES
Adult Annual Physical  Name: Khushboo Pichardo      : 1965      MRN: [id number]  Encounter Provider: Leidy Morrow DO  Encounter Date: 2025   Encounter department: [facility]    :  Assessment & Plan  Annual physical exam         Migraine with aura and without status migrainosus, not intractable    Orders:  •  ZOLMitriptan (ZOMIG-ZMT) 5 MG disintegrating tablet; Take 1 tablet (5 mg total) by mouth once as needed for migraine for up to 1 dose    Easy bruising  Unchanged problem; initial workup negative  Orders:  •  Ambulatory Referral to Dermatology; Future    Left thyroid nodule    Orders:  •  TSH, 3rd generation with Free T4 reflex; Future    Prediabetes    Orders:  •  Hemoglobin A1C; Future  •  Comprehensive metabolic panel; Future    Dyslipidemia    Orders:  •  Lipid Panel with Direct LDL reflex; Future  •  Comprehensive metabolic panel; Future    Thyroid disorder screen    Orders:  •  TSH, 3rd generation with Free T4 reflex; Future    Encounter for long-term (current) use of medications    Orders:  •  TSH, 3rd generation with Free T4 reflex; Future  •  Vitamin D 25 hydroxy; Future  •  Vitamin B12; Future  •  Magnesium; Future    Encounter for vitamin deficiency screening    Orders:  •  Vitamin D 25 hydroxy; Future  •  Vitamin B12; Future  •  Magnesium; Future        Preventive Screenings:  - Diabetes Screening: risks/benefits discussed and orders placed  - Cholesterol Screening: risks/benefits discussed and orders placed   - Hepatitis C screening: screening up-to-date   - HIV screening: screening up-to-date   - Cervical cancer screening: screening up-to-date   - Breast cancer screening: has history of breast cancer and screening up-to-date   - Colon cancer screening: screening up-to-date   - Lung cancer screening: screening up-to-date   - Osteoporosis screening: screening up-to-date     Immunizations:  - Immunizations due: Tdap and Zoster (Shingrix)  - Risks/benefits  "immunizations discussed      Counseling/Anticipatory Guidance:      Patient will check insurance coverage of shingles vaccine and tetanus booster       Depression Screening and Follow-up Plan: Patient was screened for depression during today's encounter. They screened negative with a PHQ-2 score of 0.        History of Present Illness     Adult Annual Physical:  Patient presents for annual physical. Plus problem-focused visit  Still bruising easily - shows new large bruises left forearm from scratching last night and left hand from holding a bag on her hand  Also discusses that the rizatriptan melt-aways having been on backorder and sumitriptan tablets rx'd in place of - states doesn't work as fast as the melt-aways did, and has been told that those are not going to be made anymore.     Diet and Physical Activity:  - Diet/Nutrition: no special diet.  - Exercise: walking.    Depression Screening:  - PHQ-2 Score: 0    General Health:  - Sleep: 7-8 hours of sleep on average.  - Hearing: normal hearing bilateral ears.  - Vision: no vision problems and wears glasses. pt wesrs reading glasses  - Dental: regular dental visits.    /GYN Health:  - Follows with GYN: no.   - Menopause: postmenopausal.   - History of STDs: no  - Contraception: hysterectomy.      Advanced Care Planning:  - Has an advanced directive?: no    - Has a durable medical POA?: no    - ACP document given to patient?: yes      Review of Systems   Gastrointestinal:  Positive for diarrhea (chronic- states her GI advised take imodium daily, but pt states sometimes she has to take 3 a day, does not have f/u appt with GI - I advise pt to call to schedule).         Objective   /60   Pulse 86   Temp (!) 97 °F (36.1 °C) (Tympanic)   Ht 5' 6\" (1.676 m)   Wt 78.5 kg (173 lb)   LMP  (LMP Unknown)   SpO2 96%   BMI 27.92 kg/m²     Physical Exam  Vitals and nursing note reviewed.   Constitutional:       General: She is not in acute distress.     " Appearance: She is well-groomed. She is not ill-appearing, toxic-appearing or diaphoretic.   HENT:      Head: Normocephalic and atraumatic.      Right Ear: Tympanic membrane, ear canal and external ear normal.      Left Ear: Tympanic membrane, ear canal and external ear normal.      Nose: Nose normal.      Mouth/Throat:      Lips: Pink.      Mouth: Mucous membranes are moist.      Pharynx: Oropharynx is clear. Uvula midline.      Tonsils: 0 on the right. 0 on the left.     Eyes:      General: Lids are normal.      Extraocular Movements: Extraocular movements intact.      Conjunctiva/sclera: Conjunctivae normal.      Pupils: Pupils are equal, round, and reactive to light.     Neck:      Thyroid: No thyroid mass, thyromegaly or thyroid tenderness.      Vascular: No JVD.      Trachea: Trachea and phonation normal.     Cardiovascular:      Rate and Rhythm: Normal rate and regular rhythm.      Pulses: Normal pulses.      Heart sounds: Normal heart sounds.   Pulmonary:      Effort: Pulmonary effort is normal.      Breath sounds: Normal breath sounds and air entry.   Abdominal:      General: Bowel sounds are normal. There is no distension or abdominal bruit.      Palpations: Abdomen is soft. There is no hepatomegaly, splenomegaly or mass.      Tenderness: There is no abdominal tenderness.      Hernia: There is no hernia in the ventral area.     Musculoskeletal:      Cervical back: Neck supple.      Right lower leg: No edema.      Left lower leg: No edema.   Lymphadenopathy:      Cervical: No cervical adenopathy.     Skin:     General: Skin is warm and dry.      Capillary Refill: Capillary refill takes less than 2 seconds.      Coloration: Skin is not pale.     Neurological:      General: No focal deficit present.      Mental Status: She is alert and oriented to person, place, and time.      Cranial Nerves: Cranial nerves 2-12 are intact.      Sensory: Sensation is intact.      Motor: Motor function is intact.      Gait:  Gait normal.      Deep Tendon Reflexes: Reflexes are normal and symmetric.     Psychiatric:         Mood and Affect: Mood normal.         Behavior: Behavior normal. Behavior is cooperative.         Cognition and Memory: Cognition and memory normal.

## 2025-05-28 DIAGNOSIS — J44.9 COPD, SEVERE (HCC): ICD-10-CM

## 2025-05-28 RX ORDER — BUDESONIDE, GLYCOPYRROLATE, AND FORMOTEROL FUMARATE 160; 9; 4.8 UG/1; UG/1; UG/1
2 AEROSOL, METERED RESPIRATORY (INHALATION) 2 TIMES DAILY
Qty: 10.7 G | Refills: 12 | Status: SHIPPED | OUTPATIENT
Start: 2025-05-28

## 2025-05-29 ENCOUNTER — OFFICE VISIT (OUTPATIENT)
Age: 60
End: 2025-05-29
Payer: MEDICARE

## 2025-05-29 VITALS
OXYGEN SATURATION: 96 % | HEIGHT: 67 IN | TEMPERATURE: 98.2 F | WEIGHT: 174.6 LBS | HEART RATE: 74 BPM | SYSTOLIC BLOOD PRESSURE: 128 MMHG | DIASTOLIC BLOOD PRESSURE: 74 MMHG | BODY MASS INDEX: 27.4 KG/M2

## 2025-05-29 DIAGNOSIS — K58.0 IRRITABLE BOWEL SYNDROME WITH DIARRHEA: Primary | ICD-10-CM

## 2025-05-29 PROCEDURE — 99214 OFFICE O/P EST MOD 30 MIN: CPT | Performed by: DIETITIAN, REGISTERED

## 2025-05-29 RX ORDER — COLESTIPOL HYDROCHLORIDE 1 G/1
1 TABLET ORAL DAILY
Qty: 30 TABLET | Refills: 3 | Status: SHIPPED | OUTPATIENT
Start: 2025-05-29

## 2025-05-29 RX ORDER — DICYCLOMINE HYDROCHLORIDE 10 MG/1
10 CAPSULE ORAL DAILY PRN
Qty: 30 CAPSULE | Refills: 3 | Status: SHIPPED | OUTPATIENT
Start: 2025-05-29

## 2025-05-29 NOTE — PROGRESS NOTES
----- Message from Chela Anaya sent at 3/22/2019  9:34 AM EDT -----  Regarding: MELANY Verduzco/telephone  Pt's son Jag English [phone], checking on the status of an referral for his mother to see Dr Michelle Plummer, their fax number is [phone]. or (w) [phone], [facility] , the referral was  Original requested on Monday this week. Name: Khushboo Pichardo      : 1965      MRN: [id number]  Encounter Provider: Fer Camacho PA-C  Encounter Date: 2025   Encounter department: [facility]  :  Assessment & Plan  Irritable bowel syndrome with diarrhea  Patient reports worsening symptoms of IBS with diarrhea.  Rarely has episodes of constipation and this is typically related to Imodium use.  She typically takes Imodium once daily and sometimes has normal bowel function.  About 2-3 days per week she will have an exacerbation of diarrhea and can have very frequent BMs, sometimes once per hour, usually in the morning.  She has associated abdominal pain prior to BMs which improves afterwards.  She denies any incontinence, blood in the stool, or nocturnal BMs.  On those days, she takes an additional 2 tablets of Imodium which sometimes helps but sometimes does not.  She takes Bentyl as needed which is helpful but this causes drowsiness so she is not able to take when working.  There are no known dietary triggers.  She thinks symptoms may be associated with stress/anxiety.  She has lost about 5 lb over the last month, which she thinks might be due to an increase in physical activity with the warm weather but she is not trying to lose weight.    Colonoscopy 2025 normal other than few diverticula and multiple medium hemorrhoids.  Recommended for repeat colonoscopy in 5 years due to family history of colon cancer.    Previous celiac serology negative and fecal calprotectin normal in .  TSH normal in .  Orders:  •  dicyclomine (BENTYL) 10 mg capsule; Take 1 capsule (10 mg total) by mouth daily as needed (abdominal cramping, diarrhea)  •  colestipol (COLESTID) 1 g tablet; Take 1 tablet (1 g total) by mouth in the morning.  -Discussed gut-brain axis and connection between stress/anxiety and IBS.  Recommend stress management as able.  -Start colestipol once daily.  Advised patient to keep separate  from other medications.  Can increase dose if needed.  Discussed side effects of medication interactions and constipation.  -With start of colestipol, discontinue daily use of Imodium to prevent constipation, but OK to use as needed.  -If colestipol not helpful, can consider Viberzi or Lomotil.  -Continue Bentyl as needed but advised not to take when working or driving.  -Follow up in 3 months.      History of Present Illness   Khushboo Pichardo is a 59 y.o. female who presents for evaluation of IBS.  Last seen in office 2/2022.    Patient reports worsening symptoms of IBS with diarrhea.  Rarely has episodes of constipation and this is typically related to Imodium use.  She typically takes Imodium once daily and sometimes has normal bowel function.  About 2-3 days per week she will have an exacerbation of diarrhea and can have very frequent BMs, sometimes once per hour, usually in the morning.  She has associated abdominal pain prior to BMs which improves afterwards.  She denies any incontinence, blood in the stool, or nocturnal BMs.  On those days, she takes an additional 2 tablets of Imodium which sometimes helps but sometimes does not.  She takes Bentyl as needed which is helpful but this causes drowsiness so she is not able to take when working.  There are no known dietary triggers.  She thinks symptoms may be associated with stress/anxiety.  She has lost about 5 lb over the last month, which she thinks might be due to an increase in physical activity with the warm weather but she is not trying to lose weight.    Colonoscopy 2/2025 normal other than few diverticula and multiple medium hemorrhoids.  Recommended for repeat colonoscopy in 5 years due to family history of colon cancer.    HPI    Review of Systems A complete review of systems is negative other than that noted above in the HPI.      Current Medications[1]  Objective   /74 (BP Location: Left arm, Patient Position: Sitting, Cuff Size: Standard)    "Pulse 74   Temp 98.2 °F (36.8 °C) (Tympanic)   Ht 5' 6.75\" (1.695 m)   Wt 79.2 kg (174 lb 9.6 oz)   LMP  (LMP Unknown)   SpO2 96%   BMI 27.55 kg/m²     Physical Exam  Vitals reviewed.   Constitutional:       Appearance: Normal appearance.   HENT:      Head: Normocephalic and atraumatic.     Eyes:      Conjunctiva/sclera: Conjunctivae normal.     Pulmonary:      Effort: Pulmonary effort is normal.     Skin:     Coloration: Skin is not jaundiced or pale.     Neurological:      Mental Status: She is alert.     Psychiatric:         Mood and Affect: Mood normal.         Behavior: Behavior normal.          Lab Results: I personally reviewed relevant lab results.       Results for orders placed during the hospital encounter of 02/05/25    Colonoscopy    Impression  The terminal ileum, ileocecal valve, appendiceal orifice, cecum, ascending colon, hepatic flexure, transverse colon, splenic flexure, descending colon, sigmoid colon and rectosigmoid appeared normal.  Diverticulosis  Medium hemorrhoids        RECOMMENDATION:  Repeat colonoscopy in 5 years, due: 2/4/2030  Family history of colon cancer              Khushboo Sarabia DO               [1]  Current Outpatient Medications   Medication Sig Dispense Refill   • albuterol (PROVENTIL HFA,VENTOLIN HFA) 90 mcg/act inhaler INHALE 2 PUFFS EVERY 6 HOURS AS NEEDED FOR WHEEZING (Patient taking differently: Inhale 1 puff if needed for wheezing or shortness of breath) 8.5 g 5   • Albuterol-Budesonide 90-80 MCG/ACT AERO Inhale 2 puffs every 6 (six) hours as needed (shortness of breath/wheezing) 32.1 g 3   • anastrozole (ARIMIDEX) 1 mg tablet TAKE 1 TABLET BY MOUTH EVERY DAY 90 tablet 3   • Ascorbic Acid (vitamin C) 1000 MG tablet Take 1,000 mg by mouth in the morning.     • B Complex Vitamins (B COMPLEX 1 PO) Take by mouth in the morning.     • Benralizumab (Fasenra Pen) 30 MG/ML SOAJ Inject 30 mg under the skin every 56 days 1 mL 11   • Budeson-Glycopyrrol-Formoterol " ([facility]) 160-9-4.8 MCG/ACT AERO INHALE 2 PUFFS BY MOUTH TWICE A DAY RINSE MOUTH AFTER USE 10.7 g 12   • Calcium-Magnesium-Vitamin D 185- MG-MG-UNIT CAPS Take by mouth in the morning.     • colestipol (COLESTID) 1 g tablet Take 1 tablet (1 g total) by mouth in the morning. 30 tablet 3   • cyanocobalamin (VITAMIN B-12) 500 MCG tablet Take 500 mcg by mouth in the morning.     • dicyclomine (BENTYL) 10 mg capsule Take 1 capsule (10 mg total) by mouth daily as needed (abdominal cramping, diarrhea) 30 capsule 3   • Echinacea 400 MG CAPS Take by mouth in the morning.     • ipratropium-albuterol (DUO-NEB) 0.5-2.5 mg/3 mL nebulizer solution INHALE 3 ML BY NEBULIZATION EVERY 6 HOURS AS NEEDED FOR WHEEZE OR FOR SHORTNESS OF BREATH (Patient taking differently: Take 3 mL by nebulization if needed for shortness of breath or wheezing)     • loperamide (IMODIUM) 2 mg capsule Take 2 mg by mouth 4 (four) times a day as needed for diarrhea (Patient taking differently: Take 2 mg by mouth as needed for diarrhea Taking 1 tab daily and up to 3x a day PRN)     • Loratadine 10 MG CAPS Take 10 mg by mouth in the morning.     • Multiple Vitamins-Minerals (MULTIVITAMIN ADULT PO) Take by mouth in the morning.     • topiramate (TOPAMAX) 100 mg tablet TAKE 1 TABLET BY MOUTH EVERY DAY 90 tablet 1   • ZOLMitriptan (ZOMIG-ZMT) 5 MG disintegrating tablet Take 1 tablet (5 mg total) by mouth once as needed for migraine for up to 1 dose 9 tablet 1   • Albuterol-Budesonide (Airsupra) 90-80 MCG/ACT AERO Inhale 2 puffs every 4 (four) hours while awake (Patient not taking: Reported on 5/29/2025) 10.7 g 5     No current facility-administered medications for this visit.

## 2025-06-02 ENCOUNTER — TELEPHONE (OUTPATIENT)
Age: 60
End: 2025-06-02

## 2025-06-02 NOTE — TELEPHONE ENCOUNTER
Pt calling asking if SIBO test would be covered by insurance, she got CPT code from office for SIBO test and when she called insurance provider they told her the office needs to call not the patient please advise if SIBO breathe test will be covered by insurance, thank you

## 2025-06-06 ENCOUNTER — DOCUMENTATION (OUTPATIENT)
Dept: SURGICAL ONCOLOGY | Facility: CLINIC | Age: 60
End: 2025-06-06

## 2025-06-06 NOTE — PROGRESS NOTES
Received Paperwork   What type of form Other (Enter type in comments)   Scanned blank form into patient's Epic chart Yes   Method received form  [employer]   Provider responsible for form Dr. Underwood   Informed patient our office turn around time for completing patient forms is 5-7 business days. Yes, informed patient of turn around time     Comments Kamaljit Coleman / Dr. Underwood

## 2025-06-10 ENCOUNTER — APPOINTMENT (OUTPATIENT)
Dept: LAB | Facility: CLINIC | Age: 60
End: 2025-06-10
Payer: MEDICARE

## 2025-06-10 ENCOUNTER — TELEPHONE (OUTPATIENT)
Dept: FAMILY MEDICINE CLINIC | Facility: CLINIC | Age: 60
End: 2025-06-10

## 2025-06-10 DIAGNOSIS — E04.1 LEFT THYROID NODULE: ICD-10-CM

## 2025-06-10 DIAGNOSIS — Z79.899 ENCOUNTER FOR LONG-TERM (CURRENT) USE OF MEDICATIONS: ICD-10-CM

## 2025-06-10 DIAGNOSIS — R73.03 PREDIABETES: ICD-10-CM

## 2025-06-10 DIAGNOSIS — E78.5 DYSLIPIDEMIA: ICD-10-CM

## 2025-06-10 DIAGNOSIS — Z13.21 ENCOUNTER FOR VITAMIN DEFICIENCY SCREENING: ICD-10-CM

## 2025-06-10 DIAGNOSIS — Z13.29 THYROID DISORDER SCREEN: ICD-10-CM

## 2025-06-10 LAB
25(OH)D3 SERPL-MCNC: 48.8 NG/ML (ref 30–100)
ALBUMIN SERPL BCG-MCNC: 4.5 G/DL (ref 3.5–5)
ALP SERPL-CCNC: 92 U/L (ref 34–104)
ALT SERPL W P-5'-P-CCNC: 23 U/L (ref 7–52)
ANION GAP SERPL CALCULATED.3IONS-SCNC: 10 MMOL/L (ref 4–13)
AST SERPL W P-5'-P-CCNC: 18 U/L (ref 13–39)
BILIRUB SERPL-MCNC: 0.43 MG/DL (ref 0.2–1)
BUN SERPL-MCNC: 14 MG/DL (ref 5–25)
CALCIUM SERPL-MCNC: 9.5 MG/DL (ref 8.4–10.2)
CHLORIDE SERPL-SCNC: 109 MMOL/L (ref 96–108)
CHOLEST SERPL-MCNC: 229 MG/DL (ref ?–200)
CO2 SERPL-SCNC: 22 MMOL/L (ref 21–32)
CREAT SERPL-MCNC: 0.65 MG/DL (ref 0.6–1.3)
EST. AVERAGE GLUCOSE BLD GHB EST-MCNC: 120 MG/DL
GFR SERPL CREATININE-BSD FRML MDRD: 97 ML/MIN/1.73SQ M
GLUCOSE P FAST SERPL-MCNC: 89 MG/DL (ref 65–99)
HBA1C MFR BLD: 5.8 %
HDLC SERPL-MCNC: 57 MG/DL
LDLC SERPL CALC-MCNC: 143 MG/DL (ref 0–100)
MAGNESIUM SERPL-MCNC: 2.1 MG/DL (ref 1.9–2.7)
POTASSIUM SERPL-SCNC: 3.7 MMOL/L (ref 3.5–5.3)
PROT SERPL-MCNC: 7.3 G/DL (ref 6.4–8.4)
SODIUM SERPL-SCNC: 141 MMOL/L (ref 135–147)
TRIGL SERPL-MCNC: 146 MG/DL (ref ?–150)
TSH SERPL DL<=0.05 MIU/L-ACNC: 3.32 UIU/ML (ref 0.45–4.5)
VIT B12 SERPL-MCNC: 1981 PG/ML (ref 180–914)

## 2025-06-10 PROCEDURE — 80061 LIPID PANEL: CPT

## 2025-06-10 PROCEDURE — 82607 VITAMIN B-12: CPT

## 2025-06-10 PROCEDURE — 36415 COLL VENOUS BLD VENIPUNCTURE: CPT

## 2025-06-10 PROCEDURE — 82306 VITAMIN D 25 HYDROXY: CPT

## 2025-06-10 PROCEDURE — 84443 ASSAY THYROID STIM HORMONE: CPT

## 2025-06-10 PROCEDURE — 80053 COMPREHEN METABOLIC PANEL: CPT

## 2025-06-10 PROCEDURE — 83036 HEMOGLOBIN GLYCOSYLATED A1C: CPT

## 2025-06-10 PROCEDURE — 83735 ASSAY OF MAGNESIUM: CPT

## 2025-06-10 NOTE — TELEPHONE ENCOUNTER
Patient presented today to ask if mammogram referral can be placed by PCP as she is due soon. Pt requesting call once referral placed.

## 2025-06-12 DIAGNOSIS — K58.0 IRRITABLE BOWEL SYNDROME WITH DIARRHEA: ICD-10-CM

## 2025-06-12 RX ORDER — COLESTIPOL HYDROCHLORIDE 1 G/1
2 TABLET ORAL DAILY
Qty: 60 TABLET | Refills: 3 | Status: SHIPPED | OUTPATIENT
Start: 2025-06-12

## 2025-06-12 NOTE — TELEPHONE ENCOUNTER
As I contacted pt for a different matter, pt has mentioned that she has been looking for an update regarding SIBO breathe test and insurance,  I  mentioned to pt I'm not sure what was mentioned in last OV regarding this matter and I did inform her that we will not be able to contact insurance to get information because as I recall we give them to pt's if they are recommend by provider, pt was very frustrated with my respond as I informed her that I will connect with my team to help her and once we have resolved will contacted pt back. Pt was thankful and disconnected the line.     Coworker has approached me and has took over.

## 2025-06-12 NOTE — TELEPHONE ENCOUNTER
I spoke to pt informed of information provided by [employer]    Asked if she was given a reason they wouldn't help her when she called - she said no all they said was the office has to call

## 2025-06-12 NOTE — TELEPHONE ENCOUNTER
I spoke to pt informed of information provided by [employer]    Asked if she was given a reason they wouldn't help her when she called - she said no all they said was the office has to call

## 2025-06-12 NOTE — TELEPHONE ENCOUNTER
I called [facility] - ref# [id number]  Inquired about CPT 76924  Follows Medicaid guidelines  No authorization if done in outpatient facility  $2 copay - billed to patient

## 2025-06-12 NOTE — TELEPHONE ENCOUNTER
Pt called in to follow up if her mammogram referral has been placed as per requested 2 days before. I did check on the chart no order placed. Kindly help to place and keep the patient updated. Thanks

## 2025-06-12 NOTE — TELEPHONE ENCOUNTER
Patients GI provider:  A. Bone    Number to return call: [phone]    Reason for call: Pt calling to ask if we can increase her dosage of the colestipol (COLESTID). She says she tried it for about 2 weeks now and it hasn't worked at all for her.

## 2025-06-12 NOTE — TELEPHONE ENCOUNTER
I called [facility] - ref# [id number]  Inquired about CPT 07021  Follows Medicaid guidelines  No authorization if done in outpatient facility  $2 copay - billed to patient

## 2025-06-12 NOTE — TELEPHONE ENCOUNTER
Pt returned my call, have relayed message from provider and she has agreed will like and updated script sent to her pharmacy on file. I did inform pt that we will send script and to confirm with pharmacy when able to . Pt had other questions regarding another matter, I have informed her I will talk to provider & team and we will reach back  out to her.

## 2025-06-12 NOTE — TELEPHONE ENCOUNTER
LVM for pt to return office a call in regards of medication. Pt can be transferred to [facility] office when pt call back.

## 2025-06-17 DIAGNOSIS — Z79.811 LONG TERM CURRENT USE OF AROMATASE INHIBITOR: ICD-10-CM

## 2025-06-17 DIAGNOSIS — Z17.0 MALIGNANT NEOPLASM OF UPPER-INNER QUADRANT OF RIGHT BREAST IN FEMALE, ESTROGEN RECEPTOR POSITIVE (HCC): Primary | ICD-10-CM

## 2025-06-17 DIAGNOSIS — R92.30 DENSE BREAST TISSUE: ICD-10-CM

## 2025-06-17 DIAGNOSIS — C50.211 MALIGNANT NEOPLASM OF UPPER-INNER QUADRANT OF RIGHT BREAST IN FEMALE, ESTROGEN RECEPTOR POSITIVE (HCC): Primary | ICD-10-CM

## 2025-06-17 DIAGNOSIS — Z98.890 STATUS POST RIGHT BREAST LUMPECTOMY: ICD-10-CM

## 2025-06-18 NOTE — TELEPHONE ENCOUNTER
Called and spoke to patient. Patient understood.     Patient also inquiring if provider would review her recent labs. She is most concerned about her cholesterol.

## 2025-06-22 DIAGNOSIS — K58.0 IRRITABLE BOWEL SYNDROME WITH DIARRHEA: ICD-10-CM

## 2025-06-23 ENCOUNTER — PROCEDURE VISIT (OUTPATIENT)
Dept: PAIN MEDICINE | Facility: MEDICAL CENTER | Age: 60
End: 2025-06-23
Payer: MEDICARE

## 2025-06-23 VITALS — BODY MASS INDEX: 27.15 KG/M2 | WEIGHT: 173 LBS | HEIGHT: 67 IN

## 2025-06-23 DIAGNOSIS — M79.18 MYOFASCIAL PAIN SYNDROME: Primary | ICD-10-CM

## 2025-06-23 DIAGNOSIS — Z98.890 H/O CERVICAL SPINE SURGERY: ICD-10-CM

## 2025-06-23 PROCEDURE — 20552 NJX 1/MLT TRIGGER POINT 1/2: CPT

## 2025-06-23 RX ORDER — DICYCLOMINE HYDROCHLORIDE 10 MG/1
10 CAPSULE ORAL DAILY PRN
Qty: 90 CAPSULE | Refills: 1 | Status: SHIPPED | OUTPATIENT
Start: 2025-06-23

## 2025-06-23 RX ORDER — LIDOCAINE HYDROCHLORIDE 10 MG/ML
2 INJECTION, SOLUTION EPIDURAL; INFILTRATION; INTRACAUDAL; PERINEURAL ONCE
Status: COMPLETED | OUTPATIENT
Start: 2025-06-23 | End: 2025-06-23

## 2025-06-23 RX ADMIN — LIDOCAINE HYDROCHLORIDE 2 ML: 10 INJECTION, SOLUTION EPIDURAL; INFILTRATION; INTRACAUDAL; PERINEURAL at 11:50

## 2025-06-23 NOTE — PROGRESS NOTES
"Name: Khushboo Pichardo      : 1965      MRN: [id number]  Encounter Provider: Sabiha Kaur PA-C  Encounter Date: 2025   Encounter department: [facility] [place]  :  Assessment & Plan  Myofascial pain syndrome    Orders:    lidocaine (PF) (XYLOCAINE-MPF) 1 % injection 2 mL    H/O cervical spine surgery           Procedure: Trigger Point Injection x 4.     After discussing the risks of the procedure including, but not limited to: unchanged or increased pain, bleeding, infection, allergic reaction, soft-tissue injury, nerve damage, pneumothorax, informed consent was obtained.    The targeted areas were identified by the presence of discrete trigger points with localized tenderness, hypertonicity and taut bands. The skin overlying the target sites was then cleansed with alcohol. Next, a 25 gauge 1.25\" needle was inserted into the taut band of muscle.    At each site, aspiration was attempted and was negative for return of blood or other fluid. Then, two (2) site(s) injected with an equal portion of 1% lidocaine. Additionally, dry-needling in a fanlike distribution was performed at each site.    The needle was removed intact from the patient. The patient tolerated the procedure well. No complications were noted and hemostasis was achieved. The patient was instructed to contact us with any problems including any signs/symptoms of infection with persistent bleeding or drainage.       My impressions and treatment recommendations were discussed in detail with the patient who verbalized understanding and had no further questions.  Discharge instructions were provided. I personally saw and examined the patient and I agree with the above discussed plan of care.    History of Present Illness     Khushboo Pichardo is a 59 y.o. female who presents for a follow up office visit in regards to return of myofascial pain localized to the right trapezius and cervical paraspinal regions.  Patient requesting " "repeat trigger point injections into the right trapezius and cervical paraspinal musculature.  Patient reports that the previous injections significantly reproduced her pain.  She describes her pain as an intermittent throbbing and pressure-like pain.  Denies radiating pain and bilateral upper extremity weakness.  Admits intermittent numbness and tingling along bilateral 4th and 5th fingers.  Patient does note intermittent headaches associated with her current pain patterns.      Review of Systems   Constitutional:  Negative for fever and unexpected weight change.   HENT:  Negative for trouble swallowing.    Eyes:  Negative for visual disturbance.   Respiratory:  Positive for shortness of breath. Negative for wheezing.    Cardiovascular:  Negative for chest pain and palpitations.   Gastrointestinal:  Negative for constipation, diarrhea and nausea.   Endocrine: Negative for cold intolerance, heat intolerance and polydipsia.   Genitourinary:  Negative for difficulty urinating and frequency.   Musculoskeletal:  Positive for arthralgias, back pain and myalgias. Negative for gait problem and joint swelling.   Skin:  Negative for rash.   Neurological:  Negative for dizziness, seizures, syncope, weakness and headaches.   Hematological:  Does not bruise/bleed easily.   Psychiatric/Behavioral:  Negative for dysphoric mood.        Medical History Reviewed by provider this encounter:     .  Medications Ordered Prior to Encounter[1]      Objective   Ht 5' 6.75\" (1.695 m)   Wt 78.5 kg (173 lb)   LMP  (LMP Unknown)   BMI 27.30 kg/m²      Pain Score:   8  Physical Exam  Constitutional: normal, well developed, well nourished, alert, in no distress and non-toxic and no overt pain behavior.  Eyes: anicteric  HEENT: grossly intact  Neck: supple, symmetric, trachea midline and no masses   Pulmonary: even and unlabored  Cardiovascular: No edema or pitting edema present  Skin: Normal without rashes or lesions and well " hydrated  Psychiatric: Mood and affect appropriate  Neurologic: Cranial Nerves II-XII grossly intact  Musculoskeletal: normal, except for tenderness to palpation over the right trapezius muscle and right cervical paraspinal musculature with trigger points palpable    Radiology Results Review : No pertinent imaging studies reviewed.         [1]   Current Outpatient Medications on File Prior to Visit   Medication Sig Dispense Refill    Albuterol-Budesonide 90-80 MCG/ACT AERO Inhale 2 puffs every 6 (six) hours as needed (shortness of breath/wheezing) 32.1 g 3    anastrozole (ARIMIDEX) 1 mg tablet TAKE 1 TABLET BY MOUTH EVERY DAY 90 tablet 3    Ascorbic Acid (vitamin C) 1000 MG tablet Take 1,000 mg by mouth in the morning.      B Complex Vitamins (B COMPLEX 1 PO) Take by mouth in the morning.      Benralizumab (Fasenra Pen) 30 MG/ML SOAJ Inject 30 mg under the skin every 56 days 1 mL 11    Budeson-Glycopyrrol-Formoterol (Breztri Aerosphere) 160-9-4.8 MCG/ACT AERO INHALE 2 PUFFS BY MOUTH TWICE A DAY RINSE MOUTH AFTER USE 10.7 g 12    Calcium-Magnesium-Vitamin D 185- MG-MG-UNIT CAPS Take by mouth in the morning.      colestipol (COLESTID) 1 g tablet Take 2 tablets (2 g total) by mouth daily 60 tablet 3    cyanocobalamin (VITAMIN B-12) 500 MCG tablet Take 500 mcg by mouth in the morning.      dicyclomine (BENTYL) 10 mg capsule Take 1 capsule (10 mg total) by mouth daily as needed (abdominal cramping, diarrhea) 30 capsule 3    Loratadine 10 MG CAPS Take 10 mg by mouth in the morning.      topiramate (TOPAMAX) 100 mg tablet TAKE 1 TABLET BY MOUTH EVERY DAY 90 tablet 1    ZOLMitriptan (ZOMIG-ZMT) 5 MG disintegrating tablet Take 1 tablet (5 mg total) by mouth once as needed for migraine for up to 1 dose 9 tablet 1    albuterol (PROVENTIL HFA,VENTOLIN HFA) 90 mcg/act inhaler INHALE 2 PUFFS EVERY 6 HOURS AS NEEDED FOR WHEEZING (Patient taking differently: Inhale 1 puff if needed for wheezing or shortness of breath) 8.5 g 5     Albuterol-Budesonide (Airsupra) 90-80 MCG/ACT AERO Inhale 2 puffs every 4 (four) hours while awake (Patient not taking: Reported on 5/29/2025) 10.7 g 5    Echinacea 400 MG CAPS Take by mouth in the morning.      ipratropium-albuterol (DUO-NEB) 0.5-2.5 mg/3 mL nebulizer solution INHALE 3 ML BY NEBULIZATION EVERY 6 HOURS AS NEEDED FOR WHEEZE OR FOR SHORTNESS OF BREATH (Patient taking differently: Take 3 mL by nebulization if needed for shortness of breath or wheezing)      loperamide (IMODIUM) 2 mg capsule Take 2 mg by mouth 4 (four) times a day as needed for diarrhea (Patient taking differently: Take 2 mg by mouth as needed for diarrhea Taking 1 tab daily and up to 3x a day PRN)      Multiple Vitamins-Minerals (MULTIVITAMIN ADULT PO) Take by mouth in the morning.      [DISCONTINUED] budesonide-formoterol (Symbicort) 160-4.5 mcg/act inhaler Inhale 2 puffs 2 (two) times a day       No current facility-administered medications on file prior to visit.

## 2025-06-25 ENCOUNTER — TELEPHONE (OUTPATIENT)
Age: 60
End: 2025-06-25

## 2025-06-25 DIAGNOSIS — J44.9 COPD, SEVERE (HCC): Primary | ICD-10-CM

## 2025-06-25 DIAGNOSIS — J45.40 MODERATE PERSISTENT ASTHMA WITHOUT COMPLICATION: ICD-10-CM

## 2025-06-25 NOTE — TELEPHONE ENCOUNTER
Received call from patient asking for Heidi TAYLOR.  Heidi Y messaged but not available and patient stated she does prefer to speak with her.     Patient asked what she would like to speak with her about and she stated applied for S[employer] disability on 5/28/2025 and need forms and information sent in. She just received a letter outlining what needs to be sent to [employer] and the address/contact information.     Patient was asked if she can take a picture and send in a [email or web address] message. Patient stated she can send it.     Patient then stated she has a medication she needs to get authorized.     She also stated she wants to try dupixent. Saw it advertised and wants to know if Dr. Paulino feels it would be good for her.

## 2025-06-25 NOTE — TELEPHONE ENCOUNTER
Dr. Paulino    Pt states she does better on AirSupra vs Albuterol. Please renew prescription and send for prior auth. Pt states insurance advised will cover with PA.    Dupixent - Pt asking if this is would help her and be an appropriate medication for her. Please advise.     SSI-D - Pt filed for SSI-D. Pt forwarding letter via [employer], if can't do there will drop off at office.

## 2025-06-26 NOTE — TELEPHONE ENCOUNTER
PA for AIRSUPRA SUBMITTED to [facility]    via    [x]CMM-KEY: [id number]      [x]PA sent as URGENT    All office notes, labs and other pertaining documents and studies sent. Clinical questions answered. Awaiting determination from insurance company.     Turnaround time for your insurance to make a decision on your Prior Authorization can take 7-21 business days.

## 2025-06-27 ENCOUNTER — OFFICE VISIT (OUTPATIENT)
Dept: FAMILY MEDICINE CLINIC | Facility: CLINIC | Age: 60
End: 2025-06-27
Payer: MEDICARE

## 2025-06-27 VITALS
HEART RATE: 75 BPM | DIASTOLIC BLOOD PRESSURE: 60 MMHG | SYSTOLIC BLOOD PRESSURE: 102 MMHG | HEIGHT: 67 IN | OXYGEN SATURATION: 99 % | TEMPERATURE: 97.3 F | BODY MASS INDEX: 26.73 KG/M2 | WEIGHT: 170.3 LBS

## 2025-06-27 DIAGNOSIS — Z65.8 PSYCHOSOCIAL STRESSORS: ICD-10-CM

## 2025-06-27 DIAGNOSIS — Z63.79 STRESS DUE TO ILLNESS OF FAMILY MEMBER: ICD-10-CM

## 2025-06-27 DIAGNOSIS — F51.02 INSOMNIA DUE TO STRESS: ICD-10-CM

## 2025-06-27 DIAGNOSIS — E78.5 DYSLIPIDEMIA: Primary | ICD-10-CM

## 2025-06-27 DIAGNOSIS — R73.03 PREDIABETES: ICD-10-CM

## 2025-06-27 DIAGNOSIS — K58.2 IRRITABLE BOWEL SYNDROME WITH BOTH CONSTIPATION AND DIARRHEA: ICD-10-CM

## 2025-06-27 PROCEDURE — 99214 OFFICE O/P EST MOD 30 MIN: CPT | Performed by: FAMILY MEDICINE

## 2025-06-27 RX ORDER — HYDROXYZINE PAMOATE 25 MG/1
25 CAPSULE ORAL
Qty: 30 CAPSULE | Refills: 0 | Status: SHIPPED | OUTPATIENT
Start: 2025-06-27

## 2025-06-27 RX ORDER — ATORVASTATIN CALCIUM 10 MG/1
10 TABLET, FILM COATED ORAL DAILY
Qty: 30 TABLET | Refills: 1 | Status: SHIPPED | OUTPATIENT
Start: 2025-06-27

## 2025-06-27 RX ORDER — TRETINOIN 0.5 MG/G
CREAM TOPICAL
COMMUNITY
Start: 2025-06-02

## 2025-06-27 NOTE — PROGRESS NOTES
"Name: Khushboo Pichardo      : 1965      MRN: [id number]  Encounter Provider: Leidy Morrow DO  Encounter Date: 2025   Encounter department: [facility] AT [place]  :  Assessment & Plan  Dyslipidemia    Orders:    Ambulatory Referral to Nutrition Services; Future    atorvastatin (LIPITOR) 10 mg tablet; Take 1 tablet (10 mg total) by mouth daily    Prediabetes    Orders:    Ambulatory Referral to Nutrition Services; Future    Irritable bowel syndrome with both constipation and diarrhea    Orders:    Ambulatory Referral to Nutrition Services; Future    Psychosocial stressors    Orders:    hydrOXYzine pamoate (VISTARIL) 25 mg capsule; Take 1 capsule (25 mg total) by mouth daily at bedtime as needed for anxiety (or insomnia)    Stress due to illness of family member    Orders:    hydrOXYzine pamoate (VISTARIL) 25 mg capsule; Take 1 capsule (25 mg total) by mouth daily at bedtime as needed for anxiety (or insomnia)    Insomnia due to stress    Orders:    hydrOXYzine pamoate (VISTARIL) 25 mg capsule; Take 1 capsule (25 mg total) by mouth daily at bedtime as needed for anxiety (or insomnia)      Chief Complaint   Patient presents with    Follow-up          History of Present Illness   Scheduled f/u  Has prediabetes and dyslipidemia  States when eats fruits/vegetables, upsets her stomach, \"did go to GI and was put on medication and going to do a SIBO test\"  Already avoids fats      Review of Systems    Objective   /60   Pulse 75   Temp (!) 97.3 °F (36.3 °C) (Tympanic)   Ht 5' 7\" (1.702 m)   Wt 77.2 kg (170 lb 4.8 oz)   LMP  (LMP Unknown)   SpO2 99%   BMI 26.67 kg/m²      Physical Exam  Vitals and nursing note reviewed.   Constitutional:       General: She is not in acute distress.     Appearance: She is well-developed and well-groomed. She is not ill-appearing, toxic-appearing or diaphoretic.   HENT:      Head: Normocephalic and atraumatic.      Nose: Nose normal.      Mouth/Throat:     "  Mouth: Mucous membranes are moist.     Eyes:      Conjunctiva/sclera: Conjunctivae normal.       Cardiovascular:      Rate and Rhythm: Normal rate and regular rhythm.      Heart sounds: Normal heart sounds.   Pulmonary:      Effort: Pulmonary effort is normal.      Breath sounds: Normal breath sounds and air entry.   Abdominal:      General: Bowel sounds are increased.      Palpations: Abdomen is soft. There is no hepatomegaly, splenomegaly or mass.      Tenderness: There is no abdominal tenderness.     Skin:     Coloration: Skin is not pale.     Neurological:      Mental Status: She is alert and oriented to person, place, and time.     Psychiatric:         Mood and Affect: Mood normal.         Behavior: Behavior is cooperative.

## 2025-06-27 NOTE — ASSESSMENT & PLAN NOTE
Orders:    Ambulatory Referral to Nutrition Services; Future    atorvastatin (LIPITOR) 10 mg tablet; Take 1 tablet (10 mg total) by mouth daily

## 2025-06-28 NOTE — PROGRESS NOTES
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 bowel preparation was evaluated using the   [place] Bowel Preparation Scale with scores of: right colon = 2, transverse   colon = 2, left colon = 2. The total BBPS score was 6. Bowel prep was   adequate. The patient experienced no blood loss. The procedure was not   difficult. The patient tolerated the procedure well. There were no   apparent adverse events.     ANESTHESIA INFORMATION:  ASA: III  Anesthesia Type: IV Sedation with Anesthesia    MEDICATIONS:  No administrations occurring from 0923 to 0955 on 02/05/25     FINDINGS:  The terminal ileum, ileocecal valve, appendiceal orifice, cecum, ascending   colon, hepatic flexure, transverse colon, splenic flexure, descending   colon, sigmoid colon and rectosigmoid appeared normal.  Few diverticula  Multiple medium hemorrhoids observed during retroflexion    EVENTS:  Procedure Events   Event Event Time   ENDO SCOPE OUT TIME 2/5/2025  9:37 AM   ENDO CECUM REACHED 2/5/2025  9:41 AM   ENDO SCOPE OUT TIME 2/5/2025  9:54 AM     SPECIMENS:  * No specimens in log *    EQUIPMENT:  Colonoscope -[id number]  "  ENDOCUFF VISION LRG GREEN ID [id number]  Impression: The terminal ileum, ileocecal valve, appendiceal orifice, cecum, ascending   colon, hepatic flexure, transverse colon, splenic flexure, descending   colon, sigmoid colon and rectosigmoid appeared normal.  Diverticulosis  Medium hemorrhoids    RECOMMENDATION:  Repeat colonoscopy in 5 years, due: 2/4/2030  Family history of colon cancer                 Khushboo Sarabia,        LABS:  Lab data are reviewed and documented in [facility] history.       Lab Results   Component Value Date    HGB 13.6 04/24/2025    HCT 42.1 04/24/2025    MCV 92 04/24/2025     04/24/2025    WBC 5.33 04/24/2025    NRBC 0 04/24/2025    BANDSPCT 1 12/14/2023    ATYLMPCT 8 (H) 12/14/2023     Lab Results   Component Value Date     10/02/2015    K 3.7 06/10/2025     (H) 06/10/2025    CO2 22 06/10/2025    ANIONGAP 8 10/02/2015    BUN 14 06/10/2025    CREATININE 0.65 06/10/2025    GLUCOSE 92 07/07/2022    GLUF 89 06/10/2025    CALCIUM 9.5 06/10/2025    AST 18 06/10/2025    ALT 23 06/10/2025    ALKPHOS 92 06/10/2025    PROT 7.5 01/14/2015    BILITOT 0.2 01/14/2015    EGFR 97 06/10/2025       Lab Results   Component Value Date    IRON 105 07/29/2021    TIBC 336 07/29/2021    FERRITIN 101 07/29/2021       Lab Results   Component Value Date    DDKJDVIF32 1,981 (H) 06/10/2025    BJVCDJNB61 1,289 (H) 08/04/2015    MKTQAURZ14 792 10/01/2014       No results for input(s): \"WBC\", \"CREAT\", \"PLT\" in the last 72 hours.    By:  Skylar Field MD, 7/8/2025, 2:10 PM                                  "

## 2025-06-28 NOTE — ASSESSMENT & PLAN NOTE
This is a 59 y.o. c PMHx notable for post-menopause, Arthritis, COPD, depression, remote nicotine abuse, hyperlipidemia, hypothyroidism, irritable bowel syndrome, migraine, being seen in consultation for early localized HR+ R Breast cancer s/p lumpectomy on maintenance endocrine therapy  Orders:    anastrozole (ARIMIDEX) 1 mg tablet; Take 1 tablet (1 mg total) by mouth daily

## 2025-06-30 ENCOUNTER — TELEPHONE (OUTPATIENT)
Dept: PULMONOLOGY | Facility: CLINIC | Age: 60
End: 2025-06-30

## 2025-06-30 ENCOUNTER — TELEPHONE (OUTPATIENT)
Dept: MAMMOGRAPHY | Facility: CLINIC | Age: 60
End: 2025-06-30

## 2025-06-30 NOTE — TELEPHONE ENCOUNTER
PA for AIRSUPRA DENIED    Reason:(Screenshot if applicable)        Message sent to office clinical pool Yes    Denial letter scanned into Media Yes    We can gladly do an appeal but the process can take about 30-60 days to provide determination. Please have the office staff schedule a Peer to Peer at phone [phone] . If an appeal is truly warranted please have Provider send clinical documentation to the PA department to support the appeal.     **Please follow up with your patient regarding denial and next steps**

## 2025-07-01 NOTE — TELEPHONE ENCOUNTER
Yes please appeal. Patient has tried Symbicort in the past but none of the other alternatives are appropriate while she is also on Breztri as that is already her maintenance and Airsupra will be used as rescue.

## 2025-07-02 NOTE — TELEPHONE ENCOUNTER
PA for AIRSUPRA APPEALED via       [x]Letter sent to insurance via fax [phone]      All necessary records sent. Will await response from insurance company    Turnaround time for a decision to be made on an appeal could take up to 30 business days

## 2025-07-08 ENCOUNTER — OFFICE VISIT (OUTPATIENT)
Dept: HEMATOLOGY ONCOLOGY | Facility: CLINIC | Age: 60
End: 2025-07-08
Payer: MEDICARE

## 2025-07-08 ENCOUNTER — TELEPHONE (OUTPATIENT)
Dept: HEMATOLOGY ONCOLOGY | Facility: CLINIC | Age: 60
End: 2025-07-08

## 2025-07-08 VITALS
SYSTOLIC BLOOD PRESSURE: 126 MMHG | OXYGEN SATURATION: 96 % | RESPIRATION RATE: 16 BRPM | WEIGHT: 172 LBS | DIASTOLIC BLOOD PRESSURE: 68 MMHG | HEIGHT: 67 IN | TEMPERATURE: 96 F | HEART RATE: 67 BPM | BODY MASS INDEX: 27 KG/M2

## 2025-07-08 DIAGNOSIS — G43.109 MIGRAINE WITH AURA AND WITHOUT STATUS MIGRAINOSUS, NOT INTRACTABLE: ICD-10-CM

## 2025-07-08 DIAGNOSIS — C50.211 MALIGNANT NEOPLASM OF UPPER-INNER QUADRANT OF RIGHT BREAST IN FEMALE, ESTROGEN RECEPTOR POSITIVE (HCC): Primary | ICD-10-CM

## 2025-07-08 DIAGNOSIS — Z17.0 MALIGNANT NEOPLASM OF UPPER-INNER QUADRANT OF RIGHT BREAST IN FEMALE, ESTROGEN RECEPTOR POSITIVE (HCC): Primary | ICD-10-CM

## 2025-07-08 PROCEDURE — 99215 OFFICE O/P EST HI 40 MIN: CPT | Performed by: INTERNAL MEDICINE

## 2025-07-08 RX ORDER — ANASTROZOLE 1 MG/1
1 TABLET ORAL DAILY
Qty: 90 TABLET | Refills: 3 | Status: SHIPPED | OUTPATIENT
Start: 2025-07-08

## 2025-07-08 NOTE — TELEPHONE ENCOUNTER
Left msg for pt since we missed her at check out today following her appt.  The provider would like to see her back around 8/11 or 8/12.  Provided [facility] phone# [phone] to call and schedule

## 2025-07-09 RX ORDER — ZOLMITRIPTAN 5 MG/1
5 TABLET, ORALLY DISINTEGRATING ORAL ONCE AS NEEDED
Qty: 9 TABLET | Refills: 1 | Status: SHIPPED | OUTPATIENT
Start: 2025-07-09

## 2025-07-10 ENCOUNTER — TELEPHONE (OUTPATIENT)
Age: 60
End: 2025-07-10

## 2025-07-10 NOTE — TELEPHONE ENCOUNTER
Pt. Calling asking if she is allowed to use her inhalers with doing SIBO test, nothing listed on instructions sheet, pt. Just calling to make sure, would like to do SIBO test today and drop off kit tomorrow, please advise

## 2025-07-10 NOTE — TELEPHONE ENCOUNTER
Patient contacted office with questions regarding SIBO test. Call transferred to nurse triage to further assist.     Patient planning on dropping off SIBO test tomorrow.   Pulmonary Critical Care Progress Note        Date of Admission:  6/9/2018    Chief Complaint: SOB    History of Present Illness: 74 y.o. female with known past   medical history of type 2 diabetes, questionable bipolar disorder, history of   CVA, dyslipidemia.  The patient indicates that she was in her normal state of   health up until approximately 2 days ago, at which point in time, she had   increasing shortness of breath.  She denied any significant fever, chills.    Denied any headache, visual changes, has not had any cough or sputum   production.  No nausea, vomiting, abdominal pain or discomfort.  Has   intermittent chronic diarrhea, nothing more than usual.  Denies any dysuria or   lower extremity edema.  Further, she denies any sick contacts or recent   travels.  No history of DVTs or PEs in the past.  She indicates that the   shortness of breath has been progressive with increasing dyspnea on exertion.    She does not associate with positioning being any worse or better.  She could   lie flat and just as dyspneic as sitting up forward.  She denies any change   in her medications.  She does have tobacco history, which she quit about 1   year ago, smoking 1 pack a day x40 years.  Denies any alcohol or illicit drug   use.  She further denies any underlying heart disease or history of MI in the   past.       ROS:  Respiratory: unable to perform due to the patient's inability to effectively communicate, Cardiac: unable to perform due to the patient's inability to effectively communicate, GI: unable to perform due to the patient's inability to effectively communicate.      Interval Events:  24 hour interval history reviewed     Recurrent hypotension requiring reinitiation of intravenous pressors  Another daughter coming today  D/w daughter who lives in [place] - her sister is in agreement with plan  They both agree no trach or CNS surgery  NS consult pending with Dr Alarcon still?  Awake, not following  More calm with  xanax and haldol, less agitation   DEX 1.5  FENT 200  No Versed  SR 70s  SBp labile  Norepinephrine 7  Midodrine 5 tid  I/Os  Neg 149cc/24hrs  UO adequate  TF goal  Tm 100  No BM  Vent day #11  40% PEEP 14  Zyvox/Zosyn 6/7  SSI 14 units/24 hrs    Serial follow-up finds the patient remaining on pressors  Mechanics of breathing unchanged, patient remains inappropriate for any breathing trials  Neurologic status unchanged  Updated daughter at length again at bedside x2   Case reviewed with length with case management and palliative care team     YESTERDAY  NE yesterday afternoon  Vent day #10  CXR no change bilat opacities  Sedate - moves all four - not following  DEX 1.5  FENT 200  SR 80s  SBp 120s  Tm 100.2  MRI brain with contrast - c/o MASS > infarct  TF goal  UO adequate  Vanco/Zosyn  Lasix IV  Lovenox  I/Os  QTc 374 agitation waxing and waning throughout the day  Reviewed sedation    Protocols at length with nursing staff  Transient hypotension treated with norepinephrine  Midodrine therapy to be tried    Case reviewed times several with daughter at great length.  Reviewed current neurological findings both on exam and MRI with contrast done last evening  Malena reviewed ventilatord neurology, neurosurgery consultation pending  Course with patient's daughter and suggested it would be appropriate to do a tracheostomy and refer her to a long-term acute care facility if she was willing to participate in 1-2 months of rehab and vent weaning.  Also suggested that we wait for neurosurgical opinion regards to possible CNS mass, this consult is pending    PFSH:  No change.    Respiratory:  Weeks Vent Mode: APVCMV, Rate (breaths/min): 24, Vt Target (mL): 340, PEEP/CPAP: 14, FiO2: 40, Static Compliance (ml / cm H2O): 41, Control VTE (exp VT): 474  Pulse Oximetry: 98 %  Ventilator mechanics and waveforms are reviewed at the bedside with RT again          Exam: unlabored respirations, no intercostal retractions or  accessory muscle use on full vent support   Diminished breath sounds at the bases, bilateral coarse rales   Tachypneic but not labored   Secretions unchanged    ImagingAvailable data reviewed   Recent Labs      06/18/18   0525  06/19/18   0425  06/20/18   0510   ISTATAPH  7.388*  7.409  7.441   ISTATAPCO2  47.2*  49.6*  50.5*   ISTATAPO2  51*  85  72   ISTATATCO2  30  33  36*   [id number]  85*  96  95   ISTATARTHCO3  28.5*  31.3*  34.3*   ISTATARTBE  3  6*  9*   ISTATTEMP  36.6 C  98.5 F  37.4 C   ISTATFIO2  40  50  40   ISTATSPEC  Arterial  Arterial  Arterial   ISTATAPHTC  7.394*  7.410  7.435   ONEAIOBJ1JS  50*  85  74       HemoDynamics:  Pulse: 84, Heart Rate (Monitored): 78  NIBP: (!) 91/43  CVP (mm Hg): (!) 16 MM HG  Exam: regular rhythm (Sinus), tachycardia, no further AF, no murmur, pulses intact okay capillary refill,   Imaging: Available data reviewed      Amiodarone infusion stopped  Neuro:  GCS Total [place] Coma Score: 11     sedated on fentanyl and dexmedetomidine     Exam: no focal deficits noted Agitated Delirious Confused, moving all 4 extremities, not purposeful, not following, exam remains unchanged unfortunately    Imaging: Available data reviewed     MRI brain p.m. 6/17, no contrast  1. Age-related cerebral atrophy.  2. Mild periventricular white matter changes consistent with chronic microvascular ischemic gliosis.  3. Interval development of moderate-sized region of increased T2 signal intensity in the juxtacortical white matter in the left posterior frontal lobe. This could represent an area of venous infarction, gliosis, posterior reversible encephalopathy   syndrome, or possibly primary or metastatic brain neoplasm. Recommend limited contrast enhanced images through the brain.  4. Previous endovascular coiling of bilateral posterior communicating artery aneurysms.    MRI with contrast PM 6/18  1.  14 x 8 x 10 mm faintly enhancing structure centered in a region of T1 signal hypointensity in  the left juxta cortical posterior frontal lobe white matter which could represent primary or metastatic lesion, less likely subacute infarct.  2.  Changes consistent with endovascular repair of bilateral posterior communicating artery aneurysms are again noted.  3.  Mild diffuse cerebral substance loss.  4.  Bilateral mastoid effusions. Findings could be consistent with mastoiditis in the appropriate clinical setting.    Fluids:  Intake/Output       06/18/18 0700 - 06/19/18 0659 06/19/18 0700 - 06/20/18 0659 06/20/18 0700 - 06/21/18 0659      0700-1859 1900-0659 Total 0700-1859 1900-0659 Total 6378-5518 8199-7180 Total       Intake    I.V.  1144  547 1691  610.1  451.2 1061.3  --  -- --    Precedex Volume 396 396 792 342.1 305.2 647.3 -- -- --    IV Piggyback Volume (IV Piggyback) 200 100 300 100 100 200 -- -- --    IV Volume 500 7 507 120 30 150 -- -- --    IV Volume (Fentanyl) 48 44 92 48 16 64 -- -- --    Other  100  100 200  250  150 400  --  -- --    Medications (P.O./ Enteral Liquids) 100 100 200 250 150 400 -- -- --    Enteral  660  765 1425  860  700 1560  --  -- --    Free Water / Tube Flush -- 105 105 200 150 350 -- -- --    Intake (mL) (Enteral Tube Right Nare Cortrak Gastric Feeding Tube)   -- -- --    Total Intake 1904 1412 3316 1720.1 1301.2 3021.3 -- -- --       Output    Urine  1975  1125 3100  1600  1110 2710  --  -- --    Output (mL) ([REMOVED] Urinary Catheter Indwelling Catheter) 0188 503 6904 -- -- -- -- -- --    Output (mL) (Urinary Catheter Indwelling Catheter 18F) --  1110 2710 -- -- --    Total Output 1975 1125 3100 1600 1110 2710 -- -- --       Net I/O     -71 287 216 120.1 191.2 311.3 -- -- --           Recent Labs      06/18/18   0508  06/19/18   0443   SODIUM  137  138   POTASSIUM  4.5  4.8   CHLORIDE  103  103   CO2  27  29   BUN  52*  41*   CREATININE  1.21  1.11   CALCIUM  7.7*  7.9*       GI/Nutrition:  Exam: abdomen is soft and non-tender, normal  active bowel sounds, no CVAT,  unchanged  Imaging: Available data reviewed  NPO and tube feed Tolerated  Liver Function  Recent Labs      18   0508  18   0443   ALTSGPT  357*   --    ASTSGOT  293*   --    ALKPHOSPHAT  176*   --    TBILIRUBIN  0.6   --    PREALBUMIN  6.0*   --    GLUCOSE  193*  185*       Heme:  Recent Labs      18   0508  18   0443   RBC  2.76*  2.64*   HEMOGLOBIN  8.1*  7.6*   HEMATOCRIT  26.0*  24.9*   PLATELETCT  337  335       Infectious Disease:  Monitored Temp 2  Av.6 °C (99.6 °F)  Min: 37 °C (98.6 °F)  Max: 37.8 °C (100 °F)  Temp  Av.6 °C (99.7 °F)  Min: 37.3 °C (99.1 °F)  Max: 37.8 °C (100 °F)  Micro: reviewed.    No growth to date  Recent Labs      18   0508  18   0443   WBC  16.8*  14.4*   NEUTSPOLYS  68.90  68.80   LYMPHOCYTES  10.70*  11.70*   MONOCYTES  1.00  6.40   EOSINOPHILS  6.80  7.10*   BASOPHILS  1.90*  0.30   ASTSGOT  293*   --    ALTSGPT  357*   --    ALKPHOSPHAT  176*   --    TBILIRUBIN  0.6   --      Current Facility-Administered Medications   Medication Dose Frequency Provider Last Rate Last Dose   • ALPRAZolam (XANAX) tablet 0.5 mg  0.5 mg Q8HRS Evan Segura M.D.   0.5 mg at 18 0519   • haloperidol lactate (HALDOL) injection 1-5 mg  1-5 mg Q4HRS PRN Evan Segura M.D.   5 mg at 18 0348   • midodrine (PROAMATINE) tablet 5 mg  5 mg TID WITH MEALS Evan Segura M.D.   5 mg at 18 1331   • piperacillin-tazobactam (ZOSYN) 4.5 g in  mL IVPB  4.5 g Q8HRS Evan Segura M.D. 25 mL/hr at 18 0421 4.5 g at 18 0421   • oxyCODONE immediate-release (ROXICODONE) tablet 5 mg  5 mg Q6HRS ROMELIA CovingtonOKimberley   5 mg at 18 0520   • potassium bicarbonate (KLYTE) 25 MEQ effervescent tablet TBEF 25 mEq  25 mEq Q12HRS Evan Chen D.O.   25 mEq at 18   • midazolam (VERSED) 2 MG/2ML injection 1 mg  1 mg Q HOUR PRN Evan Segura M.D.   1 mg at 18 0438   •  norepinephrine (LEVOPHED) 8 mg in  mL Infusion  0-30 mcg/min Continuous Evan Segura M.D. 3.8 mL/hr at 06/19/18 2245 2 mcg/min at 06/19/18 2245   • insulin glargine (LANTUS) injection 5 Units  5 Units Q EVENING Evan Chen D.O.   5 Units at 06/19/18 2025   • linezolid (ZYVOX) tablet 600 mg  600 mg Q12HRS Oliver Carcamo M.D.   600 mg at 06/19/18 2026   • GENTEAL TEARS NIGHT-TIME OINT 1 Application  1 Application Q8HRS Oliver Carcamo M.D.   1 Application at 06/19/18 2050   • famotidine (PEPCID) tablet 20 mg  20 mg DAILY ROMELIA CovingtonOKimberley   20 mg at 06/19/18 0749   • furosemide (LASIX) injection 20 mg  20 mg Q12HRS Oliver Carcamo M.D.   20 mg at 06/19/18 2027   • fentaNYL (SUBLIMAZE) 50 mcg/mL in 50mL (Continuous Infusion)   Continuous Oliver Carcamo M.D. 4 mL/hr at 06/20/18 0131 200 mcg/hr at 06/20/18 0131   • dexmedetomidine (PRECEDEX) 400 mcg in D5W 100 mL infusion  0.1-1.5 mcg/kg/hr Continuous Oliver Carcamo M.D. 33 mL/hr at 06/20/18 0421 1.5 mcg/kg/hr at 06/20/18 0421   • labetalol (NORMODYNE,TRANDATE) injection 10-20 mg  10-20 mg Q HOUR PRN Mathew Gama M.D.   20 mg at 06/15/18 1151   • insulin regular (HUMULIN R) injection 3-14 Units  3-14 Units Q6HRS Oliver Carcamo M.D.   4 Units at 06/19/18 2314    And   • glucose 4 g chewable tablet 16 g  16 g Q15 MIN PRN Oliver Carcamo M.D.        And   • dextrose 50% (D50W) injection 25 mL  25 mL Q15 MIN PRN Oliver Carcamo M.D.       • rosuvastatin (CRESTOR) tablet 20 mg  20 mg Q EVENING Oliver Carcamo M.D.   20 mg at 06/19/18 2025   • sertraline (ZOLOFT) tablet 50 mg  50 mg QDAY Oliver Carcamo M.D.   50 mg at 06/19/18 2026   • MD ALERT...Adult ICU Electrolyte Replacement per Pharmacy Protocol   pharmacy to dose Jeremy M Gonda, M.D.       • Pharmacy Consult: Enteral tube feeding - review meds/change route/product selection  1 Each PRN Jeremy M Gonda, M.D.       • fentaNYL (SUBLIMAZE) injection 25 mcg  25 mcg Q HOUR PRN Caesar NEAL  Gonda, M.D.   Stopped at 06/14/18 1719    Or   • fentaNYL (SUBLIMAZE) injection 50 mcg  50 mcg Q HOUR PRN Jeremy M Gonda, M.D.   50 mcg at 06/15/18 1724    Or   • fentaNYL (SUBLIMAZE) injection 100 mcg  100 mcg Q HOUR PRN Jeremy M Gonda, M.D.   100 mcg at 06/18/18 1529   • ipratropium-albuterol (DUONEB) nebulizer solution  3 mL Q2HRS PRN (RT) Jeremy M Gonda, M.D.       • ipratropium-albuterol (DUONEB) nebulizer solution  3 mL Q4HRS (RT) Jeremy M Gonda, M.D.   3 mL at 06/20/18 0333   • aspirin (ASA) chewable tab 81 mg  81 mg DAILY Jeremy M Gonda, M.D.   81 mg at 06/19/18 0749   • gabapentin (NEURONTIN) capsule 300 mg  300 mg TID Jeremy M Gonda, M.D.   300 mg at 06/20/18 0519   • acetaminophen (TYLENOL) tablet 650 mg  650 mg Q6HRS PRN Jeremy M Gonda, M.D.   650 mg at 06/18/18 0756   • guaiFENesin dextromethorphan (ROBITUSSIN DM) 100-10 MG/5ML syrup 10 mL  10 mL Q6HRS PRN Jeremy M Gonda, M.D.       • senna-docusate (PERICOLACE or SENOKOT S) 8.6-50 MG per tablet 2 Tab  2 Tab BID Jeremy M Gonda, M.D.   2 Tab at 06/19/18 2026    And   • polyethylene glycol/lytes (MIRALAX) PACKET 1 Packet  1 Packet QDAY PRN Jeremy M Gonda, M.D.   1 Packet at 06/19/18 1500    And   • magnesium hydroxide (MILK OF MAGNESIA) suspension 30 mL  30 mL QDAY PRN Jeremy M Gonda, M.D.   30 mL at 06/14/18 0737    And   • bisacodyl (DULCOLAX) suppository 10 mg  10 mg QDAY PRN Jeremy M Gonda, M.D.       • Respiratory Care per Protocol   Continuous RT Sherrell Higgins M.D.       • heparin injection 5,000 Units  5,000 Units Q8HRS Sherrell Higgins M.D.   5,000 Units at 06/20/18 0519     Last reviewed on 6/9/2018 10:32 PM by [facility]    Quality  Measures:  Medications reviewed, Labs reviewed and Radiology images reviewed                      Assessment/Plan:  Acute hypoxic/hypercarbic respiratory failure    -intubated 6/10    -serial ABG/chest x-ray/ventilator mechanics reviews   -continue full vent support, ventilator settings adjusted today;  not appropriate for liberation     wean FiO2 and PEEP    Did not tolerate PEEP of 12, desaturated on FiO2 0.4, I had increased on time   -RT/O2 protocols   -Minimize sedation, dexmedetomidine and fentanyl as needed   -diuresis as tolerated fluid balance mildly negative    -Discussed the possibility of tracheostomy and LTAC transfer with daughter 6/19, NS eval 1st  Hypotension suspect related to medications   -Doubt sepsis/other causes of hypotension at this juncture   -Monitor fluid balance closely, appears euvolemic to hypervolemic   -Continue midodrine PO but increase dose to 10 mg 3 times daily   -Titrate norepinephrine as needed, add vasopressin as needed   -Limit hypotensive inducing sedation as safe/tolerated  Community-acquired pneumonia    -Febrile, increasing leukocytosis and worsening chest x-ray initially   -Suspicion for new HCAP versus other source of new infection   -Antibiotics escalated to Zosyn and vancomycin - BAL 6/15 light growth yeast   -change vanco to linazolid 6/17; complete 7 day course   Septic shock from pulmonary source with associated cardiac and respiratory failure   -s/p pressorsbut now back on them query medication related or persistent recurrent sepsis    -Lactic acidosis - trend resolved  Abnormal MRI brain with contrast   -Primary brain tumor versus metastatic disease suspected, PRES and CVA much less likely    Likely primary brain tumor with discussions with radiology and soha dejesus neurology   -Location may be amenable to surgical resection per neurology?   -neurosurgical consultation requested 6/19 with Dr. Alarcon call had been placed -awaiting consult    -Seizure prophylaxis?   -Minimal vasogenic edema, hold on Decadron for now?  Encephalopathy/agitation   -Suspect toxic metabolic secondary to sepsis, pneumonia   -CT head negative   -Check ammonia levels ×3 normal   -MRI brain 6/17 with broad differential, neuro consulted by hospitalist, MRI with contrast noted   -Resume home  Xanax, and as needed haloperidol   -Continue to try to avoid propofol but resume if necessary  Normocytic anemia   -Monitor with conservative transfusion strategy    -serial CBC   -Transfuse per conservative policy  Hyperkalemia -improving, monitor  Acute kidney injury, ATN/prerenal   -Avoid nephrotoxins, monitor urine output and renal function  Mildly elevated troponin - trend  Type 2 diabetes - SSI, ADA tube feed formula  Dyslipidemia - statin  Thrombocytosis - improving  History of tobacco use, 40 pack years, quit a year ago   - BDs  Prophylaxis    Up midodrine dose, hold diuresis, try to wean off norepinephrine, limit Versed which had been dropping blood pressure, avoid propofol  Add Seroquel -monitor QTC   Wean PEEP to 12 and monitor for desaturation, severely desaturated a few days ago with the prior attempt  Fam conference tomorrow when second daughter arrives in town  Bowel care protocols initiated for constipation  Up Lantus dose with high sliding scale insulin use in the last 24 hours    Updated daughter times several and had a lengthy conversation with hospitalist and palliative care/    Patient is critically ill. I have adjusted ventilator settings.  Ongoing critical care management as above.  She remains full CODE STATUS.  Discussed patient condition and risk of morbidity and/or mortality with Family, RN, RT, Pharmacy, , Charge nurse / hot rounds, hospitalist and neurology and Palliative care team.    The patient remains critically ill.  Critical care time = 80 prophylaxis minutes in directly providing and coordinating critical care and extensive data review.  No time overlap and excludes procedures.

## 2025-07-11 ENCOUNTER — TELEPHONE (OUTPATIENT)
Age: 60
End: 2025-07-11

## 2025-07-11 NOTE — TELEPHONE ENCOUNTER
Patient called insurance to verify if the Mammaprint was covered and states was told the office would need to send a request. Patient requesting a prior authorization. Please contact patient when approved at [phone]

## 2025-07-12 ENCOUNTER — OFFICE VISIT (OUTPATIENT)
Dept: URGENT CARE | Facility: CLINIC | Age: 60
End: 2025-07-12
Payer: MEDICARE

## 2025-07-12 VITALS
HEART RATE: 75 BPM | TEMPERATURE: 97.8 F | RESPIRATION RATE: 16 BRPM | DIASTOLIC BLOOD PRESSURE: 54 MMHG | OXYGEN SATURATION: 95 % | BODY MASS INDEX: 26.75 KG/M2 | WEIGHT: 170.8 LBS | SYSTOLIC BLOOD PRESSURE: 105 MMHG

## 2025-07-12 DIAGNOSIS — R50.9 SUBJECTIVE FEVER: ICD-10-CM

## 2025-07-12 DIAGNOSIS — R51.9 RIGHT FACIAL PAIN: ICD-10-CM
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
## 2025-07-12 DIAGNOSIS — S00.06XA TICK BITE OF SCALP, INITIAL ENCOUNTER: Primary | ICD-10-CM

## 2025-07-12 PROCEDURE — 99214 OFFICE O/P EST MOD 30 MIN: CPT | Performed by: NURSE PRACTITIONER

## 2025-07-12 PROCEDURE — 93005 ELECTROCARDIOGRAM TRACING: CPT | Performed by: NURSE PRACTITIONER

## 2025-07-12 RX ORDER — DOXYCYCLINE 100 MG/1
100 CAPSULE ORAL 2 TIMES DAILY
Qty: 28 CAPSULE | Refills: 0 | Status: SHIPPED | OUTPATIENT
Start: 2025-07-12 | End: 2025-07-26
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
## 2025-07-12 NOTE — PATIENT INSTRUCTIONS
The antibiotic you  have been prescribed causes sensitivity to the sun.  Wear sun screen and a hat.   You have been prescribed doxycycline for tick bite and being symptomatic.  Take all medication as prescribed.   You have been ordered a Lyme test - go to the lab and get it done.   You are to see your PCP in the next few days  Go to the ED if symptoms worsen     Do not take your doxycyline within 2 hours of any vitamins   Take tylenol or motrin as able for pain/fever.    If tests have been performed at [facility], our office will contact you with results if changes need to be made to the care plan discussed with you at the visit.  You can review your full results on [facility] MyChart.

## 2025-07-13 LAB
ATRIAL RATE: 77 BPM
ATRIAL RATE: 77 BPM
P AXIS: 63 DEGREES
P AXIS: 63 DEGREES
PR INTERVAL: 190 MS
PR INTERVAL: 190 MS
QRS AXIS: 70 DEGREES
QRS AXIS: 70 DEGREES
QRSD INTERVAL: 84 MS
QRSD INTERVAL: 84 MS
QT INTERVAL: 400 MS
QT INTERVAL: 400 MS
QTC INTERVAL: 453 MS
QTC INTERVAL: 453 MS
T WAVE AXIS: 65 DEGREES
T WAVE AXIS: 65 DEGREES
VENTRICULAR RATE: 77 BPM
VENTRICULAR RATE: 77 BPM

## 2025-07-13 PROCEDURE — 93010 ELECTROCARDIOGRAM REPORT: CPT | Performed by: INTERNAL MEDICINE

## 2025-07-14 ENCOUNTER — APPOINTMENT (OUTPATIENT)
Dept: LAB | Facility: HOSPITAL | Age: 60
End: 2025-07-14
Attending: NURSE PRACTITIONER
Payer: MEDICARE

## 2025-07-14 ENCOUNTER — HOSPITAL ENCOUNTER (OUTPATIENT)
Dept: CT IMAGING | Facility: HOSPITAL | Age: 60
Discharge: HOME | End: 2025-07-14
Attending: INTERNAL MEDICINE
Payer: MEDICARE

## 2025-07-14 DIAGNOSIS — R50.9 SUBJECTIVE FEVER: ICD-10-CM

## 2025-07-14 DIAGNOSIS — F17.211 CIGARETTE NICOTINE DEPENDENCE IN REMISSION: ICD-10-CM

## 2025-07-14 DIAGNOSIS — R51.9 RIGHT FACIAL PAIN: ICD-10-CM
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
## 2025-07-14 DIAGNOSIS — S00.06XA TICK BITE OF SCALP, INITIAL ENCOUNTER: ICD-10-CM

## 2025-07-14 PROCEDURE — 36415 COLL VENOUS BLD VENIPUNCTURE: CPT

## 2025-07-14 PROCEDURE — 71271 CT THORAX LUNG CANCER SCR C-: CPT

## 2025-07-14 PROCEDURE — 86618 LYME DISEASE ANTIBODY: CPT

## 2025-07-15 LAB — B BURGDOR IGG+IGM SER QL IA: NEGATIVE

## 2025-07-16 ENCOUNTER — TELEPHONE (OUTPATIENT)
Dept: GASTROENTEROLOGY | Facility: CLINIC | Age: 60
End: 2025-07-16

## 2025-07-20 DIAGNOSIS — F51.02 INSOMNIA DUE TO STRESS: ICD-10-CM

## 2025-07-20 DIAGNOSIS — Z65.8 PSYCHOSOCIAL STRESSORS: ICD-10-CM

## 2025-07-20 DIAGNOSIS — Z63.79 STRESS DUE TO ILLNESS OF FAMILY MEMBER: ICD-10-CM

## 2025-07-20 DIAGNOSIS — E78.5 DYSLIPIDEMIA: ICD-10-CM

## 2025-07-21 ENCOUNTER — APPOINTMENT (OUTPATIENT)
Dept: LAB | Facility: CLINIC | Age: 60
End: 2025-07-21
Attending: FAMILY MEDICINE
Payer: MEDICARE

## 2025-07-21 ENCOUNTER — OFFICE VISIT (OUTPATIENT)
Dept: FAMILY MEDICINE CLINIC | Facility: CLINIC | Age: 60
End: 2025-07-21
Payer: MEDICARE

## 2025-07-21 VITALS
RESPIRATION RATE: 18 BRPM | HEART RATE: 70 BPM | SYSTOLIC BLOOD PRESSURE: 102 MMHG | BODY MASS INDEX: 26.59 KG/M2 | TEMPERATURE: 97.2 F | OXYGEN SATURATION: 96 % | HEIGHT: 67 IN | DIASTOLIC BLOOD PRESSURE: 60 MMHG | WEIGHT: 169.4 LBS
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
## 2025-07-21 DIAGNOSIS — R22.0: ICD-10-CM

## 2025-07-21 DIAGNOSIS — H92.01 RIGHT EAR PAIN: ICD-10-CM

## 2025-07-21 DIAGNOSIS — R68.84 JAW PAIN: Primary | ICD-10-CM

## 2025-07-21 DIAGNOSIS — R68.84 JAW PAIN: ICD-10-CM

## 2025-07-21 PROCEDURE — 99214 OFFICE O/P EST MOD 30 MIN: CPT | Performed by: FAMILY MEDICINE

## 2025-07-21 PROCEDURE — 86618 LYME DISEASE ANTIBODY: CPT

## 2025-07-21 PROCEDURE — 36415 COLL VENOUS BLD VENIPUNCTURE: CPT

## 2025-07-21 RX ORDER — ATORVASTATIN CALCIUM 10 MG/1
10 TABLET, FILM COATED ORAL DAILY
Qty: 90 TABLET | Refills: 1 | Status: SHIPPED | OUTPATIENT
Start: 2025-07-21

## 2025-07-21 RX ORDER — HYDROXYZINE PAMOATE 25 MG/1
25 CAPSULE ORAL
Qty: 90 CAPSULE | Refills: 1 | Status: SHIPPED | OUTPATIENT
Start: 2025-07-21

## 2025-07-22 ENCOUNTER — OFFICE VISIT (OUTPATIENT)
Dept: GASTROENTEROLOGY | Facility: CLINIC | Age: 60
End: 2025-07-22
Payer: MEDICARE

## 2025-07-22 ENCOUNTER — HOSPITAL ENCOUNTER (OUTPATIENT)
Dept: RADIOLOGY | Facility: HOSPITAL | Age: 60
Discharge: HOME | End: 2025-07-22
Payer: MEDICARE

## 2025-07-22 DIAGNOSIS — R22.0: ICD-10-CM

## 2025-07-22 DIAGNOSIS — R68.84 JAW PAIN: ICD-10-CM

## 2025-07-22 DIAGNOSIS — H92.01 RIGHT EAR PAIN: ICD-10-CM

## 2025-07-22 DIAGNOSIS — K58.0 IRRITABLE BOWEL SYNDROME WITH DIARRHEA: Primary | ICD-10-CM

## 2025-07-22 LAB — B BURGDOR IGG+IGM SER QL IA: NEGATIVE

## 2025-07-22 PROCEDURE — 91065 BREATH HYDROGEN/METHANE TEST: CPT | Performed by: INTERNAL MEDICINE

## 2025-07-22 PROCEDURE — 70110 X-RAY EXAM OF JAW 4/> VIEWS: CPT

## 2025-07-22 PROCEDURE — PBNCHG PB NO CHARGE PLACEHOLDER: Performed by: INTERNAL MEDICINE

## 2025-07-22 NOTE — PROGRESS NOTES
[facility]       Bacterial Overgrowth Analytical Record    Khushboo Pichardo 59 y.o. female MRN: [id number]      Date of Test: 07/11/2025     Substrate Given: Lactulose    Ordering Provider: Fer Camacho PA-C    Medical Assistant: Agnes Plascencia    Symptoms: IBS/D    The patient presents for bacterial overgrowth testing.    Patient fasted overnight. Baseline readings obtained.   Breath test performed every 20 min for a total of 3 hr    Sample Clock Time ppmH2 ppmCH4 Co2% Jarad   Baseline   8:00   6   9   3.6   1.52   #1  20 minutes   8:26   6   8   3.5   1.57   #2  40 minutes   8:46   12   12   2.7   2.03   #3  60 minutes   9:06   15   11   3.0   1.83   #4  80 minutes   9:26   21   12   3.2   1.71   #5  100 minutes   9:46   61   20   2.7   2.03   #6  120 minutes   10:06   81   22   2.5   2.20   #7  140 minutes   10:26   48   20   2.2   2.50   #8  160 minutes   10:46   50   20   2.2   2.50   #9  180 minutes   11:06   44   21   2.1   2.61       Physician interpretation: 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  Defer management plan to patient's GI provider Fer Camacho PA-C.

## 2025-07-23 ENCOUNTER — TELEPHONE (OUTPATIENT)
Age: 60
End: 2025-07-23

## 2025-07-23 ENCOUNTER — RESULTS FOLLOW-UP (OUTPATIENT)
Dept: PULMONOLOGY | Facility: CLINIC | Age: 60
End: 2025-07-23

## 2025-07-23 DIAGNOSIS — K63.829 INTESTINAL METHANOGEN OVERGROWTH: ICD-10-CM

## 2025-07-23 DIAGNOSIS — K63.8219 SMALL INTESTINAL BACTERIAL OVERGROWTH (SIBO): ICD-10-CM

## 2025-07-23 DIAGNOSIS — K58.0 IRRITABLE BOWEL SYNDROME WITH DIARRHEA: Primary | ICD-10-CM

## 2025-07-23 DIAGNOSIS — D38.1 NEOPLASM OF UNCERTAIN BEHAVIOR OF RIGHT LOWER LOBE OF LUNG: Primary | ICD-10-CM

## 2025-07-23 RX ORDER — NEOMYCIN SULFATE 500 MG/1
500 TABLET ORAL 2 TIMES DAILY
Qty: 28 TABLET | Refills: 0 | Status: SHIPPED | OUTPATIENT
Start: 2025-07-23 | End: 2025-08-06

## 2025-07-23 NOTE — TELEPHONE ENCOUNTER
Spoke with pt and informed her that should not impact the chest CT results but thank you for the information

## 2025-07-23 NOTE — TELEPHONE ENCOUNTER
Pt states had a tick bite and went to urgent care on 7/12. She states she also had a pain in her jaw and ear. She realized that she was a little sick during that time and that she was just surprised this morning but wanted to let Dr. Paulino. The Tick bite was on the right side of her head and she did find out that it was not lyme disease. She did the scan on 7/14 and not sure if that effected the results of her CT imaging.

## 2025-07-23 NOTE — PROGRESS NOTES
Chest CT performed on 7/14/2025 shows stable pulmonary nodules measuring up to 4 mm going back to 2021.  There is a new right lower lobe irregular nodule in the right lower lobe measuring 11 x 6 mm.  This is new compared with July 2024.      I would like to obtain a PET scan for further characterization.  If there is significant uptake, she will need tissue sampling, likely in the form of navigational bronchoscopy.    I also discussed with the patient whether she would be interested in enrollment in the Nightingale study to help us risk stratify the nodule.  Patient is agreeable.  I have reached out to the  to see if she meets all criteria.    Regarding her asthma, she states that the Fasenra does not seem to be providing as much relief as previously.  She is interested in transitioning to Dupixent.  I will discuss with our specialty medication team regarding the possibility of making that transition.

## 2025-07-23 NOTE — TELEPHONE ENCOUNTER
PA for Xifaxan 550 mg    SUBMITTED to [employer]    via    [x]CMM-KEY: [id number]        [x]PA sent as URGENT    All office notes, labs and other pertaining documents and studies sent. Clinical questions answered. Awaiting determination from insurance company.     Turnaround time for your insurance to make a decision on your Prior Authorization can take 7-21 business days.

## 2025-07-24 ENCOUNTER — DOCUMENTATION (OUTPATIENT)
Dept: PULMONOLOGY | Facility: CLINIC | Age: 60
End: 2025-07-24

## 2025-07-24 DIAGNOSIS — G43.109 MIGRAINE WITH AURA AND WITHOUT STATUS MIGRAINOSUS, NOT INTRACTABLE: ICD-10-CM

## 2025-07-24 NOTE — TELEPHONE ENCOUNTER
Patient calling again to clarify whether the Mammo print would be covered by insurance. Patient called insurance and was told the doctor ordering would need to check if covered, patient has ooma. Please return her call if covered at [phone]

## 2025-07-24 NOTE — PROGRESS NOTES
LUNG NODULE CONFERENCE DISCUSSION:    Guille Paulino,    Your patient, Khushboo Pichardo, was discussed by our lung nodule conference team. Full report of discussion can be found in patient record.     Based upon the discussion, we are recommending that the patient undergo PET scan 8/7/2025. If PET avid, next steps and action plan will be discussed. Will set referral message reminder when PET is completed    We will reach out to the patient to coordinate next steps, but if you would like to discuss these results with the patient as well, it would likely be appreciated by the patient.    Thanks,  Lung Nodule Review Team    LUNG NODULE BOARD CONFERENCE DISCUSSION    DATE REVIEWED:  7/24/2025    DATE OF CHEST CT: 7/14/2025    REFERRING: Dr. Jefferson ALAS RECOMMENDATIONS:    Imaging Studies: PET scan scheduled 8/7/2025    Referrals: N/A    Procedures: N/A    PATIENT COMMUNICATION / INSTRUCTIONS: Action plan / next steps TBD after PET scan - will place referral message reminder post PET          DISCLAIMERS:    TO THE TREATING/REFERRING PHYSICIAN:  This conference is a meeting of clinicians who evaluate and discuss patients whom have been identified as having concerning findings on chest CT (LUNG RADS 3/4). Please note that the above opinion was a consensus of the conference attendees and is intended only to assist in quality care of the discussed patient.  The responsibility for follow up on the input given during the conference, along with any final decisions regarding plan of care, is that of the patient, patient's primary provider and specialists who will be involved in care plan moving forward. Nodule Nurse Navigators will assist in coordination of necessary appointments as outlined above.      TO THE PATIENT:  This summary is a brief record of care recommendations regarding abnormal CT findings. You may choose to share a copy with any of your doctors or nurses. However, this is not a detailed or  comprehensive record of your care.

## 2025-07-24 NOTE — TELEPHONE ENCOUNTER
PA for Xifaxan APPROVED     Date(s) approved 8/6/2025    This medication is a possible high dollar medication. The patient has not been contacted regarding the decision as the office clinical team will handle advising the patient and if/any patient assistance that may have been started.  Thank you.      Approval letter scanned into [employer] Yes

## 2025-07-25 ENCOUNTER — OFFICE VISIT (OUTPATIENT)
Dept: PAIN MEDICINE | Facility: MEDICAL CENTER | Age: 60
End: 2025-07-25
Payer: MEDICARE

## 2025-07-25 VITALS — WEIGHT: 169 LBS | HEIGHT: 67 IN | BODY MASS INDEX: 26.53 KG/M2

## 2025-07-25 DIAGNOSIS — M54.12 CERVICAL RADICULITIS: Primary | ICD-10-CM

## 2025-07-25 DIAGNOSIS — Z98.890 HISTORY OF CERVICAL SPINAL SURGERY: ICD-10-CM

## 2025-07-25 DIAGNOSIS — M54.2 CHRONIC NECK PAIN: ICD-10-CM

## 2025-07-25 DIAGNOSIS — G89.29 CHRONIC NECK PAIN: ICD-10-CM

## 2025-07-25 PROCEDURE — 99214 OFFICE O/P EST MOD 30 MIN: CPT

## 2025-07-25 RX ORDER — TOPIRAMATE 100 MG/1
100 TABLET, FILM COATED ORAL DAILY
Qty: 90 TABLET | Refills: 1 | Status: SHIPPED | OUTPATIENT
Start: 2025-07-25

## 2025-07-25 NOTE — PROGRESS NOTES
Name: Khushboo Pichardo      : 1965      MRN: [id number]  Encounter Provider: Sabiha Kaur PA-C  Encounter Date: 2025   Encounter department: [facility] [place]  :  Assessment & Plan  Cervical radiculitis    Orders:    MRI cervical spine wo contrast; Future    Chronic neck pain    Orders:    MRI cervical spine wo contrast; Future    History of cervical spinal surgery    Orders:    MRI cervical spine wo contrast; Future      Patient states she has been performing a physical therapy provided home exercise program daily over the past year.  Patient notes minimal relief following the performance of this home exercise program.  At this time, I believe it is medically necessary to update patient's cervical spine MRI due to worsening neck pain with cervical radicular symptoms.  Patient agreeable.  Cervical spine MRI without contrast ordered.    I discussed with patient trial of gabapentin for treatment of cervical radiculitis.  I discussed with the patient that I felt a medication such as gabapentin would be helpful in treating their pain. I discussed with the patient the type of medication it is, how it works, and that it requires a titration process that is specific to each individual. I reviewed with the patient that is may take 3-4 weeks for the medication's effects to be noticed and that is should never be abruptly stopped. Possible side effects include but are not limited to; vertigo, lethargy, nausea, and edema of the extremities.  Patient declined medication management at this time.  Patient states she would like to revisit possible medications at next visit.    Continue the use of meloxicam and methocarbamol as prescribed by PCP.    Follow-up pending MRI results.    My impressions and treatment recommendations were discussed in detail with the patient who verbalized understanding and had no further questions.  Discharge instructions were provided. I personally saw and examined the  patient and I agree with the above discussed plan of care.    History of Present Illness     Khushboo Pichardo is a 59 y.o. female with significant PMH of C5-6 arthroplasty who presents to [facility] for interval re-evaluation in regards to pain in the Neck.Patient presents today with pain along the neck that radiates to bilateral upper extremities, with right side worse than left. Pain is described as Intermittent, Pressure-like, and Pins & Needles. Symptoms are worse with lifting, cervical rotation, cervical extension, and performing ADLs such as household chores and gardening. On the numeric pain scale of 1-10, the pain typically increases to max of 6 out of 10, which is currently impacting their quality of life and interferes with their activities of daily living.  Admits numbness and tingling along the right thumb and right first finger. Admits to the use of Meloxicam, methocarbamol, and ice therapy for symptom management.  Patient states she currently performs a physical therapy provided home exercise program daily.  Patient notes minimal symptom relief following the performance of this home exercise program.    Review of Systems   Constitutional:  Negative for fever.   HENT:  Negative for hearing loss.    Eyes:  Negative for visual disturbance.   Respiratory:  Negative for cough.    Cardiovascular:  Negative for leg swelling.   Gastrointestinal:  Negative for abdominal pain and nausea.   Endocrine: Negative for polydipsia.   Genitourinary:  Negative for difficulty urinating.   Musculoskeletal:  Positive for neck pain and neck stiffness. Negative for gait problem and myalgias.   Skin:  Negative for rash.   Neurological:  Negative for numbness.   Hematological:  Does not bruise/bleed easily.   Psychiatric/Behavioral:  Negative for dysphoric mood and sleep disturbance.        Medical History Reviewed by provider this encounter:     .  Medications Ordered Prior to Encounter[1]     "  Objective   Ht 5' 7\" (1.702 m)   Wt 76.7 kg (169 lb)   LMP  (LMP Unknown)   BMI 26.47 kg/m²      Pain Score:   6  Physical Exam  Constitutional: normal, well developed, well nourished, alert, in no distress and non-toxic and no overt pain behavior.  Eyes: anicteric  HEENT: grossly intact  Neck: supple, symmetric, trachea midline and no masses   Pulmonary: even and unlabored  Cardiovascular: No edema or pitting edema present  Skin: Normal without rashes or lesions and well hydrated  Psychiatric: Mood and affect appropriate  Neurologic: Cranial Nerves II-XII grossly intact, bilateral upper extremity strength is normal, negative Sapna sign bilaterally, negative Spurling's bilaterally  Musculoskeletal: normal, except for significantly cervical spine range of motion in all planes, tenderness to palpation throughout cervical spine    Radiology Results Review : No pertinent imaging studies reviewed.         [1]   Current Outpatient Medications on File Prior to Visit   Medication Sig Dispense Refill    albuterol (PROVENTIL HFA,VENTOLIN HFA) 90 mcg/act inhaler INHALE 2 PUFFS EVERY 6 HOURS AS NEEDED FOR WHEEZING 8.5 g 5    Albuterol-Budesonide 90-80 MCG/ACT AERO Inhale 2 puffs every 6 (six) hours as needed (shortness of breath/wheezing) 32.1 g 3    Albuterol-Budesonide 90-80 MCG/ACT AERO Inhale 2 puffs every 4 (four) hours as needed (shortness of breath, cough, wheeze) 10.7 g 3    anastrozole (ARIMIDEX) 1 mg tablet Take 1 tablet (1 mg total) by mouth daily 90 tablet 3    Ascorbic Acid (vitamin C) 1000 MG tablet Take 1,000 mg by mouth in the morning.      atorvastatin (LIPITOR) 10 mg tablet TAKE 1 TABLET BY MOUTH EVERY DAY 90 tablet 1    B Complex Vitamins (B COMPLEX 1 PO) Take by mouth in the morning.      Benralizumab (Fasenra Pen) 30 MG/ML SOAJ Inject 30 mg under the skin every 56 days 1 mL 11    Budeson-Glycopyrrol-Formoterol (Breztri Aerosphere) 160-9-4.8 MCG/ACT AERO INHALE 2 PUFFS BY MOUTH TWICE A DAY RINSE " MOUTH AFTER USE 10.7 g 12    Calcium-Magnesium-Vitamin D 185- MG-MG-UNIT CAPS Take by mouth in the morning.      cyanocobalamin (VITAMIN B-12) 500 MCG tablet Take 500 mcg by mouth in the morning.      dicyclomine (BENTYL) 10 mg capsule TAKE 1 CAPSULE (10 MG TOTAL) BY MOUTH DAILY AS NEEDED (ABDOMINAL CRAMPING, DIARRHEA) 90 capsule 1    Echinacea 400 MG CAPS Take by mouth in the morning.      hydrOXYzine pamoate (VISTARIL) 25 mg capsule TAKE 1 CAPSULE (25 MG TOTAL) BY MOUTH DAILY AT BEDTIME AS NEEDED FOR ANXIETY (OR INSOMNIA) 90 capsule 1    ipratropium-albuterol (DUO-NEB) 0.5-2.5 mg/3 mL nebulizer solution       loperamide (IMODIUM) 2 mg capsule Take 2 mg by mouth 4 (four) times a day as needed for diarrhea      Loratadine 10 MG CAPS Take 10 mg by mouth in the morning.      Multiple Vitamins-Minerals (MULTIVITAMIN ADULT PO) Take by mouth in the morning.      neomycin (MYCIFRADIN) 500 mg tablet Take 1 tablet (500 mg total) by mouth in the morning and 1 tablet (500 mg total) before bedtime. Do all this for 14 days. 28 tablet 0    rifaximin (XIFAXAN) 550 mg tablet Take 1 tablet (550 mg total) by mouth 3 (three) times a day for 14 days 42 tablet 0    topiramate (TOPAMAX) 100 mg tablet TAKE 1 TABLET BY MOUTH EVERY DAY 90 tablet 1    ZOLMitriptan (ZOMIG-ZMT) 5 MG disintegrating tablet TAKE 1 TABLET (5 MG TOTAL) BY MOUTH ONCE AS NEEDED FOR MIGRAINE FOR UP TO 1 DOSE 9 tablet 1    tretinoin (RETIN-A) 0.05 % cream PLEASE SEE ATTACHED FOR DETAILED DIRECTIONS (Patient not taking: Reported on 7/21/2025)      [DISCONTINUED] budesonide-formoterol (Symbicort) 160-4.5 mcg/act inhaler Inhale 2 puffs 2 (two) times a day      [DISCONTINUED] topiramate (TOPAMAX) 100 mg tablet TAKE 1 TABLET BY MOUTH EVERY DAY 90 tablet 1     No current facility-administered medications on file prior to visit.

## 2025-07-29 ENCOUNTER — TELEPHONE (OUTPATIENT)
Dept: PULMONOLOGY | Facility: CLINIC | Age: 60
End: 2025-07-29

## 2025-07-29 DIAGNOSIS — J44.9 COPD, SEVERE (HCC): Primary | ICD-10-CM

## 2025-07-29 DIAGNOSIS — J45.40 MODERATE PERSISTENT ASTHMA WITHOUT COMPLICATION: ICD-10-CM

## 2025-07-30 RX ORDER — DUPILUMAB 300 MG/2ML
300 INJECTION, SOLUTION SUBCUTANEOUS
Qty: 4 ML | Refills: 11 | Status: SHIPPED | OUTPATIENT
Start: 2025-07-30

## 2025-07-31 RX ORDER — DUPILUMAB 300 MG/2ML
300 INJECTION, SOLUTION SUBCUTANEOUS
Qty: 4 ML | Refills: 11 | Status: SHIPPED | OUTPATIENT
Start: 2025-07-31

## 2025-08-04 ENCOUNTER — HOSPITAL ENCOUNTER (OUTPATIENT)
Dept: MRI IMAGING | Facility: HOSPITAL | Age: 60
Discharge: HOME | End: 2025-08-04
Payer: MEDICARE

## 2025-08-04 DIAGNOSIS — G89.29 CHRONIC NECK PAIN: ICD-10-CM

## 2025-08-04 DIAGNOSIS — Z98.890 HISTORY OF CERVICAL SPINAL SURGERY: ICD-10-CM

## 2025-08-04 DIAGNOSIS — M54.2 CHRONIC NECK PAIN: ICD-10-CM

## 2025-08-04 DIAGNOSIS — M54.12 CERVICAL RADICULITIS: ICD-10-CM

## 2025-08-04 PROCEDURE — 72141 MRI NECK SPINE W/O DYE: CPT

## 2025-08-07 ENCOUNTER — HOSPITAL ENCOUNTER (OUTPATIENT)
Dept: NUCLEAR MEDICINE | Facility: HOSPITAL | Age: 60
End: 2025-08-07
Attending: INTERNAL MEDICINE
Payer: MEDICARE

## 2025-08-11 ENCOUNTER — OFFICE VISIT (OUTPATIENT)
Dept: HEMATOLOGY ONCOLOGY | Facility: CLINIC | Age: 60
End: 2025-08-11
Payer: MEDICARE

## 2025-08-12 ENCOUNTER — RESULTS FOLLOW-UP (OUTPATIENT)
Dept: PULMONOLOGY | Facility: CLINIC | Age: 60
End: 2025-08-12

## 2025-08-12 ENCOUNTER — CLINICAL SUPPORT (OUTPATIENT)
Dept: NUTRITION | Facility: HOSPITAL | Age: 60
End: 2025-08-12
Attending: FAMILY MEDICINE
Payer: MEDICARE

## 2025-08-13 ENCOUNTER — PATIENT OUTREACH (OUTPATIENT)
Dept: PULMONOLOGY | Facility: CLINIC | Age: 60
End: 2025-08-13

## 2025-08-14 ENCOUNTER — HOSPITAL ENCOUNTER (OUTPATIENT)
Dept: MAMMOGRAPHY | Facility: HOSPITAL | Age: 60
End: 2025-08-14
Attending: FAMILY MEDICINE
Payer: MEDICARE

## 2025-08-22 ENCOUNTER — TELEPHONE (OUTPATIENT)
Age: 60
End: 2025-08-22

## (undated) DEVICE — KERLIX BANDAGE ROLL: Brand: KERLIX

## (undated) DEVICE — ADHESIVE SKIN HIGH VISCOSITY EXOFIN 1ML

## (undated) DEVICE — HEMOSTAT POWDER ADSORB SURGICEL 3GM

## (undated) DEVICE — SUT ETHILON 4-0 PS-2 18 IN 1667H

## (undated) DEVICE — ROSEBUD DISSECTORS: Brand: DEROYAL

## (undated) DEVICE — SUT MONOCRYL 3-0 SH 27 IN Y416H

## (undated) DEVICE — GLOVE INDICATOR PI UNDERGLOVE SZ 7.5 BLUE

## (undated) DEVICE — BRA SURGICAL SZ LGE (36-39)

## (undated) DEVICE — GLOVE SRG BIOGEL 6

## (undated) DEVICE — NEURO PATTIES 1/2 X 1 1/2

## (undated) DEVICE — DRAPE PROBE NEO-PROBE/ULTRASOUND

## (undated) DEVICE — MEDI-VAC YANKAUER SUCTION HANDLE W/BULBOUS AND CONTROL VENT: Brand: CARDINAL HEALTH

## (undated) DEVICE — DRAPE ADOLESCENT LAPAROTOMY

## (undated) DEVICE — SUPER SPONGES,MEDIUM: Brand: KERLIX

## (undated) DEVICE — PENCIL ELECTROSURG E-Z CLEAN -0035H

## (undated) DEVICE — 4-PORT MANIFOLD: Brand: NEPTUNE 2

## (undated) DEVICE — BETHLEHEM UNIVERSAL MINOR GEN: Brand: CARDINAL HEALTH

## (undated) DEVICE — DRILL BIT 6975150 FLUTELESS DRILL BIT

## (undated) DEVICE — 3M™ TEGADERM™ TRANSPARENT FILM DRESSING FRAME STYLE, 1624W, 2-3/8 IN X 2-3/4 IN (6 CM X 7 CM), 100/CT 4CT/CASE: Brand: 3M™ TEGADERM™

## (undated) DEVICE — SNAP KOVER: Brand: UNBRANDED

## (undated) DEVICE — TOOL MR8-SP14MH30T MR8 14CM SP MH 3F 3MM: Brand: MIDAS REX MR8

## (undated) DEVICE — DRAPE MICROSCOPE OPMI PENTERO

## (undated) DEVICE — DRAPE SHEET X-LG

## (undated) DEVICE — ANTIBACTERIAL VIOLET BRAIDED (POLYGLACTIN 910), SYNTHETIC ABSORBABLE SUTURE: Brand: COATED VICRYL

## (undated) DEVICE — CHLORAPREP HI-LITE 26ML ORANGE

## (undated) DEVICE — INTENDED FOR TISSUE SEPARATION, AND OTHER PROCEDURES THAT REQUIRE A SHARP SURGICAL BLADE TO PUNCTURE OR CUT.: Brand: BARD-PARKER ® CARBON RIB-BACK BLADES

## (undated) DEVICE — SUT VICRYL 3-0 SH 27 IN J416H

## (undated) DEVICE — PLUMEPEN PRO 10FT

## (undated) DEVICE — STERISTRIP 1/2 X 4IN

## (undated) DEVICE — SUT CHROMIC 3-0 SH 27 IN G122H

## (undated) DEVICE — BETHLEHEM UNIVERSAL SPINE, KIT: Brand: CARDINAL HEALTH

## (undated) DEVICE — INTENDED FOR TISSUE SEPARATION, AND OTHER PROCEDURES THAT REQUIRE A SHARP SURGICAL BLADE TO PUNCTURE OR CUT.: Brand: BARD-PARKER SAFETY BLADES SIZE 15, STERILE

## (undated) DEVICE — HEMOSTATIC MATRIX SURGIFLO 8ML W/THROMBIN

## (undated) DEVICE — 3M™ STERI-STRIP™ COMPOUND BENZOIN TINCTURE 40 BAGS/CARTON 4 CARTONS/CASE C1544: Brand: 3M™ STERI-STRIP™

## (undated) DEVICE — NEEDLE 25G X 1 1/2

## (undated) DEVICE — SUT ETHILON 3-0 FS-1 18 IN 663G

## (undated) DEVICE — SCD SEQUENTIAL COMPRESSION COMFORT SLEEVE MEDIUM KNEE LENGTH: Brand: KENDALL SCD

## (undated) DEVICE — SWABSTCK, BENZOIN TINCTURE, 1/PK, STRL: Brand: APLICARE

## (undated) DEVICE — 2000CC GUARDIAN II: Brand: GUARDIAN

## (undated) DEVICE — DRAIN HEMOVAC 1/8 CLOSED

## (undated) DEVICE — GLOVE SRG BIOGEL 7.5

## (undated) DEVICE — GAUZE SPONGES,16 PLY: Brand: CURITY

## (undated) DEVICE — GLOVE SRG BIOGEL 8

## (undated) DEVICE — SUT ETHILON 3-0 INFS-1 30 IN 669H

## (undated) DEVICE — ZIMMER® STERILE DISPOSABLE TOURNIQUET CUFF, DUAL PORT, SINGLE BLADDER, 18 IN. (46 CM)

## (undated) DEVICE — SUT MONOCRYL 4-0 PS-2 27 IN Y426H

## (undated) DEVICE — SUT VICRYL PLUS 3-0 RB-1 CR/8 18 IN VCP713D

## (undated) DEVICE — GLOVE PI ULTRA TOUCH SZ.7.5

## (undated) DEVICE — GLOVE SRG BIOGEL 7

## (undated) DEVICE — TELFA ADHESIVE ISLAND DRESSING: Brand: TELFA

## (undated) DEVICE — CYSTO TUBING SINGLE IRRIGATION

## (undated) DEVICE — TUBING SUCTION 5MM X 12 FT

## (undated) DEVICE — ANTIBACTERIAL UNDYED BRAIDED (POLYGLACTIN 910), SYNTHETIC ABSORBABLE SUTURE: Brand: COATED VICRYL

## (undated) DEVICE — SUT MONOCRYL PLUS 4-0 PS-2 18 IN MCP496G

## (undated) DEVICE — DRAPE C-ARMOUR

## (undated) DEVICE — SUT SILK 2-0 SH 30 IN K833H

## (undated) DEVICE — POOLE SUCTION HANDLE W/TUBING: Brand: CARDINAL HEALTH

## (undated) DEVICE — ELECTRODE BLADE MOD E-Z CLEAN 4IN -0014AM